# Patient Record
Sex: MALE | Race: WHITE | NOT HISPANIC OR LATINO | Employment: FULL TIME | URBAN - METROPOLITAN AREA
[De-identification: names, ages, dates, MRNs, and addresses within clinical notes are randomized per-mention and may not be internally consistent; named-entity substitution may affect disease eponyms.]

---

## 2017-03-27 ENCOUNTER — TELEPHONE (OUTPATIENT)
Dept: CARDIOLOGY | Facility: CLINIC | Age: 57
End: 2017-03-27

## 2017-03-27 NOTE — TELEPHONE ENCOUNTER
Patient called in requesting if an echocardiogram can be done before he sees Dr. Hamm in June. He describes a 3 week history of short jabbing sharp chest pains that can occur in rapid succession and are random in nature. They occur everyday and can have up to 15 episodes daily. There is nothing that makes it either better or worse. He remains physically active and exercise does not exacerbate these sharp jabs. He denies any associated symptoms such as sob,heart racing skipping or fluttering.His last echo in June 2015 showed normal heart function and structure. He has a history of cardiomyopathy. Other than these sharp jabs he has been feeling well. I tried giving him reassurance that this was unlikely cardiac related but he still would like to know if Dr. Hamm would order an echo to be sure his heart is fine. I told him that I will review with Dr. Hamm and get back to him with his recommendations. [facility]         Left ventricular systolic function is normal.  The visual ejection fraction is estimated at 55-60%.  The left ventricle is normal in size.  There is moderate concentric left ventricular hypertrophy.  The left atrium is mildly dilated.  Doppler interrogation does not demonstrate significant stenosis or  insufficiency involving cardiac valves.     No significant change since 7/25/2014.  ______________________________________________________________________________           Left Ventricle  The left ventricle is normal in size. There is moderate concentric left  ventricular hypertrophy. Left ventricular systolic function is normal. The  visual ejection fraction is estimated at 55-60%. No regional wall motion  abnormalities noted. There is no thrombus seen in the left ventricle.     Right Ventricle  The right ventricle is normal in structure, function and size. There is no  mass or thrombus in the right ventricle.  Atria  The left atrium is mildly dilated. Right atrial size is normal. There is  no  atrial shunt seen.     Mitral Valve  There is moderate mitral annular calcification. There is no mitral  regurgitation noted. There is no mitral valve stenosis.     Tricuspid Valve  Normal tricuspid valve. There is trace tricuspid regurgitation. Right  ventricular systolic pressure could not be approximated due to inadequate  tricuspid regurgitation. There is no tricuspid stenosis.     Aortic Valve  The aortic valve is trileaflet. No aortic regurgitation is present. No aortic  stenosis is present.     Pulmonic Valve  Normal pulmonic valve. There is no pulmonic valvular regurgitation. There is  no pulmonic valvular stenosis.     Vessels  The aortic root is normal size. The ascending aorta is Borderline dilated.  The IVC is normal in size and reactivity with respiration, suggesting normal  central venous pressure. The pulmonary artery is normal size.  Pericardium  The pericardium appears normal. There is no pleural effusion.     Rhythm  The rhythm was sinus bradycardia.     ______________________________________________________________________________  MMode/2D Measurements & Calculations     IVSd: 1.3 cm  LVIDd: 4.0 cm  LVIDs: 2.5 cm  LVPWd: 1.3 cm  FS: 36.5 %  EDV(Teich): 69.4 ml  ESV(Teich): 23.0 ml  LV mass(C)d: 188.0 grams  Ao root diam: 3.6 cm  LA dimension: 3.8 cm  asc Aorta Diam: 3.7 cm  LA/Ao: 1.1           Doppler Measurements & Calculations     MV E max adeola: 88.4 cm/sec  MV A max adeola: 107.6 cm/sec  MV E/A: 0.82  MV dec time: 0.25 sec  PA V2 max: 79.4 cm/sec  PA max P.5 mmHg  Lateral E/e': 11.3  Medial E/e': 13.5             I saw Artie Maria Alejandra today who is 54. I originally met Artie in 2006 when he presented with a very severe cardiomyopathy and ejection fraction of 15% and definite symptoms. The etiology of his cardiomyopathy was never completely defined but remarkably, with medical therapy of carvedilol and lisinopril, his ejection fraction recovered to be progressively normal. In the last 6  years, he has had ejection fractions of 55%-60% and essentially normal heart otherwise.       Artie comes back now after a 2-year hiatus and once again asymptomatic. He denies chest pain, shortness of breath, palpitations, orthopnea or lower leg edema. His blood pressures he said are always below 130/80. He lives a normal life. He exercises gently but regularly, walking 3 days a week and doing other business activities.       CURRENT MEDICATIONS: His current program is stable and includes:   1. Carvedilol 25 mg b.i.d.   2. Lisinopril 5 mg a day.   3. Spironolactone 12.5 mg a day.   4. Ranitidine 150 mg b.i.d.   5. Tylenol p.r.n.       His lipid profiles without a statin have been normal. He never did have documented arterial disease so an LDL of 98 is excellent. His renal function has been normal with creatinine 1.0 and BUN is between 9 and 18.       His last echocardiogram dated 06/03/2015 showed a normal-sized left ventricle and an EF of 55%-60%. The right ventricle was also normal. Mitral, aortic and tricuspid valves were normal. He did have very mild LVH.       PHYSICAL EXAM:   VITAL SIGNS: His physical exam revealed a blood pressure 130/80, heart rate 70 beats a minute, he weighed 174 pounds.   GENERAL: He is nicely tanned. He is trim and fit.   HEAD AND NECK: Normal. Carotids were equal, no bruits heard. No neck vein distention at 30 degrees.   HEART: Regular without gallop, murmur, rub or click.   LUNGS: Clear.   ABDOMEN: Soft without organomegaly.   EXTREMITIES: Show +2 pedal pulses, no edema.   NEUROLOGIC AND CUTANEOUS: Observations were normal. As always, he has a really nice tony.       Artie is 54. He is asymptomatic. His heart is essentially normal. As we do every 2 years, we discussed the pros and cons of stopping the medications versus continuing them. My therapeutic prejudice has been to continue medications in this setting. I offered to stop the spironolactone and continue carvedilol and lisinopril. He  felt that everything is fine in his world and he did not want to change anything and, quite honestly, I could not disagree with them.       I asked to see him in 2 years. I did not order any tests. I did not order any studies. If he has problems, let me know.       IMPRESSION:   1. Resolution of his clinical cardiomyopathy.   2. No signs of heart failure.   3. No medication changes or studies ordered.       PLAN: As above. If he has problems, let me know.

## 2017-03-31 ENCOUNTER — TELEPHONE (OUTPATIENT)
Dept: CARDIOLOGY | Facility: CLINIC | Age: 57
End: 2017-03-31

## 2017-03-31 DIAGNOSIS — I42.9 CARDIOMYOPATHY (H): ICD-10-CM

## 2017-03-31 DIAGNOSIS — R07.9 CHEST PAIN: Primary | ICD-10-CM

## 2017-03-31 NOTE — TELEPHONE ENCOUNTER
I phoned patient to tell him that Dr. Hamm would be happy to do an echocardiogram. There has been no changes in his stabbing chest pains. I placed the order while he was on the phone and then transferred him to scheduling. Sujey

## 2017-03-31 NOTE — TELEPHONE ENCOUNTER
----- Message from Berhane Hamm MD sent at 3/31/2017  3:54 PM CDT -----  If he is concerned => get the echo   JE

## 2017-04-17 ENCOUNTER — HOSPITAL ENCOUNTER (OUTPATIENT)
Dept: CARDIOLOGY | Facility: CLINIC | Age: 57
Discharge: HOME | End: 2017-04-17
Attending: INTERNAL MEDICINE | Admitting: INTERNAL MEDICINE
Payer: COMMERCIAL

## 2017-04-17 ENCOUNTER — TELEPHONE (OUTPATIENT)
Dept: CARDIOLOGY | Facility: CLINIC | Age: 57
End: 2017-04-17

## 2017-04-17 DIAGNOSIS — R07.9 CHEST PAIN: ICD-10-CM

## 2017-04-17 DIAGNOSIS — I42.9 CARDIOMYOPATHY (H): ICD-10-CM

## 2017-04-17 PROCEDURE — 93306 TTE W/DOPPLER COMPLETE: CPT

## 2017-04-17 PROCEDURE — 93306 TTE W/DOPPLER COMPLETE: CPT | Mod: 26 | Performed by: INTERNAL MEDICINE

## 2017-04-17 NOTE — TELEPHONE ENCOUNTER
I phoned patient results of resting echocardiogram that was done because he had been experiencing sharp chest pains. I told him that the echo showed normal heart structure and function and was reported as being unchanged from 2015. I was greatly relieved to know this. There has been no change in the nature or frequency of his chest pains and has no idea what is causing them. He returns 6/20 to see Dr. Hamm. He had no other questions for me at this time. Sujey

## 2017-05-25 DIAGNOSIS — I50.9 CHF (CONGESTIVE HEART FAILURE) (H): ICD-10-CM

## 2017-05-25 RX ORDER — CARVEDILOL 25 MG/1
25 TABLET ORAL 2 TIMES DAILY WITH MEALS
Qty: 180 TABLET | Refills: 0 | Status: SHIPPED | OUTPATIENT
Start: 2017-05-25 | End: 2017-06-20

## 2017-05-25 RX ORDER — LISINOPRIL 5 MG/1
5 TABLET ORAL DAILY
Qty: 90 TABLET | Refills: 0 | Status: SHIPPED | OUTPATIENT
Start: 2017-05-25 | End: 2017-06-20

## 2017-06-16 ENCOUNTER — PRE VISIT (OUTPATIENT)
Dept: CARDIOLOGY | Facility: CLINIC | Age: 57
End: 2017-06-16

## 2017-06-20 ENCOUNTER — OFFICE VISIT (OUTPATIENT)
Dept: CARDIOLOGY | Facility: CLINIC | Age: 57
End: 2017-06-20
Attending: INTERNAL MEDICINE
Payer: COMMERCIAL

## 2017-06-20 VITALS
DIASTOLIC BLOOD PRESSURE: 74 MMHG | HEIGHT: 72 IN | HEART RATE: 80 BPM | SYSTOLIC BLOOD PRESSURE: 122 MMHG | BODY MASS INDEX: 23.7 KG/M2 | WEIGHT: 175 LBS

## 2017-06-20 DIAGNOSIS — I43 CARDIOMYOPATHY IN DISEASES CLASSIFIED ELSEWHERE (H): ICD-10-CM

## 2017-06-20 DIAGNOSIS — I50.40 COMBINED SYSTOLIC AND DIASTOLIC CONGESTIVE HEART FAILURE, UNSPECIFIED CONGESTIVE HEART FAILURE CHRONICITY: ICD-10-CM

## 2017-06-20 DIAGNOSIS — I42.9 PRIMARY CARDIOMYOPATHY (H): ICD-10-CM

## 2017-06-20 PROCEDURE — 99213 OFFICE O/P EST LOW 20 MIN: CPT | Performed by: INTERNAL MEDICINE

## 2017-06-20 RX ORDER — LISINOPRIL 5 MG/1
5 TABLET ORAL DAILY
Qty: 90 TABLET | Refills: 0 | Status: SHIPPED | OUTPATIENT
Start: 2017-06-20 | End: 2017-11-15

## 2017-06-20 RX ORDER — SPIRONOLACTONE 25 MG/1
12.5 TABLET ORAL DAILY
Qty: 45 TABLET | Refills: 3 | Status: SHIPPED | OUTPATIENT
Start: 2017-06-20 | End: 2018-06-12

## 2017-06-20 RX ORDER — CARVEDILOL 25 MG/1
25 TABLET ORAL 2 TIMES DAILY WITH MEALS
Qty: 180 TABLET | Refills: 0 | Status: SHIPPED | OUTPATIENT
Start: 2017-06-20 | End: 2017-11-15

## 2017-06-20 NOTE — MR AVS SNAPSHOT
"              After Visit Summary   6/20/2017    Alvarez Bess    MRN: [id number]           Patient Information     Date Of Birth          1960        Visit Information        Provider Department      6/20/2017 7:45 AM Berhane Hamm MD [facility] [place]        Today's Diagnoses     Cardiomyopathy in diseases classified elsewhere (H)        Primary cardiomyopathy (H)        Combined systolic and diastolic congestive heart failure, unspecified congestive heart failure chronicity (H)           Follow-ups after your visit        Additional Services     Follow-Up with Cardiologist                 Future tests that were ordered for you today     Open Future Orders        Priority Expected Expires Ordered    Echocardiogram Routine 6/20/2019 7/10/2019 6/20/2017    Follow-Up with Cardiologist Routine 6/20/2019 7/10/2019 6/20/2017            Who to contact     If you have questions or need follow up information about today's clinic visit or your schedule please contact [facility] [place] directly at [phone].  Normal or non-critical lab and imaging results will be communicated to you by [employer], letter or phone within 4 business days after the clinic has received the results. If you do not hear from us within 7 days, please contact the clinic through [email or web address] or phone. If you have a critical or abnormal lab result, we will notify you by phone as soon as possible.  Submit refill requests through [employer] or call your pharmacy and they will forward the refill request to us. Please allow 3 business days for your refill to be completed.          Additional Information About Your Visit        MyChart Information     [employer] lets you send messages to your doctor, view your test results, renew your prescriptions, schedule appointments and more. To sign up, go to [email or web address] . Click on \"Log in\" on the left side of the screen, which will " "take you to the Welcome page. Then click on \"Sign up Now\" on the right side of the page.     You will be asked to enter the access code listed below, as well as some personal information. Please follow the directions to create your username and password.     Your access code is: [email or web address]  Expires: 2017  8:32 AM     Your access code will  in 90 days. If you need help or a new code, please call your [place] clinic or [phone].        Care EveryWhere ID     This is your Care EveryWhere ID. This could be used by other organizations to access your [place] medical records  [id number]        Your Vitals Were     Pulse Height BMI (Body Mass Index)             80 1.829 m (6') 23.73 kg/m2          Blood Pressure from Last 3 Encounters:   17 122/74   06/12/15 130/82   14 137/80    Weight from Last 3 Encounters:   17 79.4 kg (175 lb)   06/12/15 79.2 kg (174 lb 9.6 oz)   14 77.6 kg (171 lb)              We Performed the Following     Follow-Up with Cardiologist          Where to get your medicines      These medications were sent to [facility] [id number] [place] [address] [id number] [place] [address] AT [address]  [address] [facility] [place] [address]     Phone:  [phone]     carvedilol 25 MG tablet    lisinopril 5 MG tablet    spironolactone 25 MG tablet          Primary Care Provider Office Phone # Fax #    Frank Haines PA-C [phone]       [facility] [address] [address]        Thank you!     Thank you for choosing [facility] [place]  for your care. Our goal is always to provide you with excellent care. Hearing back from our patients is one way we can continue to improve our services. Please take a few minutes to complete the written survey that you may receive in the mail after your visit with us. Thank you!             Your Updated Medication List - Protect others around you: Learn " how to safely use, store and throw away your medicines at www.disposemymeds.org.          This list is accurate as of: 6/20/17  8:32 AM.  Always use your most recent med list.                   Brand Name Dispense Instructions for use    acetaminophen 325 MG tablet    TYLENOL    100 tablet    Take 2 tablets (650 mg) by mouth every 4 hours as needed for other (mild pain)       carvedilol 25 MG tablet    COREG    180 tablet    Take 1 tablet (25 mg) by mouth 2 times daily (with meals)       lisinopril 5 MG tablet    PRINIVIL/ZESTRIL    90 tablet    Take 1 tablet (5 mg) by mouth daily       spironolactone 25 MG tablet    ALDACTONE    45 tablet    Take 0.5 tablets (12.5 mg) by mouth daily       ZANTAC PO      Take 150 mg by mouth 2 times daily

## 2017-06-20 NOTE — PROGRESS NOTES
HPI and Plan:   See dictation  I recommend continuing current treatment plans in the chart.    No orders of the defined types were placed in this encounter.    Orders Placed This Encounter   Medications     carvedilol (COREG) 25 MG tablet     Sig: Take 1 tablet (25 mg) by mouth 2 times daily (with meals)     Dispense:  180 tablet     Refill:  0     lisinopril (PRINIVIL/ZESTRIL) 5 MG tablet     Sig: Take 1 tablet (5 mg) by mouth daily     Dispense:  90 tablet     Refill:  0     spironolactone (ALDACTONE) 25 MG tablet     Sig: Take 0.5 tablets (12.5 mg) by mouth daily     Dispense:  45 tablet     Refill:  3     Medications Discontinued During This Encounter   Medication Reason     carvedilol (COREG) 25 MG tablet Reorder     lisinopril (PRINIVIL/ZESTRIL) 5 MG tablet Reorder     spironolactone (ALDACTONE) 25 MG tablet Reorder         Encounter Diagnoses   Name Primary?     Cardiomyopathy in diseases classified elsewhere (H)      Primary cardiomyopathy (H)      Congestive heart failure (H)      Combined systolic and diastolic congestive heart failure, unspecified congestive heart failure chronicity (H)        CURRENT MEDICATIONS:  Current Outpatient Prescriptions   Medication Sig Dispense Refill     carvedilol (COREG) 25 MG tablet Take 1 tablet (25 mg) by mouth 2 times daily (with meals) 180 tablet 0     lisinopril (PRINIVIL/ZESTRIL) 5 MG tablet Take 1 tablet (5 mg) by mouth daily 90 tablet 0     spironolactone (ALDACTONE) 25 MG tablet Take 0.5 tablets (12.5 mg) by mouth daily 45 tablet 3     acetaminophen (TYLENOL) 325 MG tablet Take 2 tablets (650 mg) by mouth every 4 hours as needed for other (mild pain) 100 tablet 0     Ranitidine HCl (ZANTAC PO) Take 150 mg by mouth 2 times daily       [DISCONTINUED] carvedilol (COREG) 25 MG tablet Take 1 tablet (25 mg) by mouth 2 times daily (with meals) 180 tablet 0     [DISCONTINUED] lisinopril (PRINIVIL/ZESTRIL) 5 MG tablet Take 1 tablet (5 mg) by mouth daily 90 tablet 0      [DISCONTINUED] spironolactone (ALDACTONE) 25 MG tablet Take 0.5 tablets (12.5 mg) by mouth daily 45 tablet 3       ALLERGIES     Allergies   Allergen Reactions     Morphine Hcl      Extreme agitation       PAST MEDICAL HISTORY:  Past Medical History:   Diagnosis Date     Cardiomyopathy (H)      Congestive heart failure, unspecified      Gastro-oesophageal reflux disease      Hypertension        PAST SURGICAL HISTORY:  Past Surgical History:   Procedure Laterality Date     ANGIOGRAM  1/6/05    left dominant coronary system with essentially normal coronary arteries, trivial plaque, severe dilated cardiomyopathy, left ventricle hypertrophy and severely hypokinetic, asynchrony or desynchrony of LV function     ENT SURGERY      polyps from throat     EXCISE LESION FOOT  12/16/2013    Procedure: EXCISE LESION FOOT;  Scar revision of Right 3rd toe       SOFT TISSUE SURGERY      neuroma from right foot       FAMILY HISTORY:  Family History   Problem Relation Age of Onset     HEART DISEASE Brother        SOCIAL HISTORY:  Social History     Social History     Marital status: Single     Spouse name: N/A     Number of children: N/A     Years of education: N/A     Social History Main Topics     Smoking status: Former Smoker     Quit date: 12/10/2005     Smokeless tobacco: None     Alcohol use Yes      Comment: 2 beer / week     Drug use: No     Sexual activity: Not Asked     Other Topics Concern     None     Social History Narrative         Review of Systems:  Skin:  Negative       Eyes:  Positive for glasses    ENT:  Negative      Respiratory:  Negative for       Cardiovascular:    Positive for chest pain a month ago better know  Gastroenterology:        Genitourinary:  Negative for      Musculoskeletal:  Positive for   right shoulder pain  Neurologic:  Negative for      Psychiatric:  Negative for      Heme/Lymph/Imm:  Negative      Endocrine:  Negative        Physical Exam:  Vitals: /74  Pulse 80  Ht 1.829 m (6')  Wt  79.4 kg (175 lb)  BMI 23.73 kg/m2    Constitutional:  cooperative, alert and oriented, well developed, well nourished, in no acute distress appears younger than stated age      Skin:  warm and dry to the touch   nice tan!    Head:  normocephalic        Eyes:           ENT:  no pallor or cyanosis        Neck:  carotid pulses are full and equal bilaterally;JVP normal;no carotid bruit        Chest:  clear to auscultation          Cardiac: regular rhythm;no murmurs, gallops or rubs detected                  Abdomen:  abdomen soft;no masses;non-tender        Vascular: pulses full and equal                                        Extremities and Back:  no deformities, clubbing, cyanosis, erythema observed;no edema              Neurological:  affect appropriate, oriented to time, person and place;no gross motor deficits          Recent Lab Results:  LIPID RESULTS:  Lab Results   Component Value Date    CHOL 166 09/18/2014    HDL 51 09/18/2014    LDL 98 09/18/2014    TRIG 87 09/18/2014    CHOLHDLRATIO 3.3 09/18/2014       LIVER ENZYME RESULTS:  Lab Results   Component Value Date    AST 15 09/18/2014    ALT 27 09/18/2014       CBC RESULTS:  Lab Results   Component Value Date    WBC 12.9 (H) 12/23/2014    RBC 4.53 12/23/2014    HGB 14.8 12/23/2014    HCT 42.7 12/23/2014    MCV 94 12/23/2014    MCH 32.7 12/23/2014    MCHC 34.7 12/23/2014    RDW 12.2 12/23/2014     12/23/2014       BMP RESULTS:  Lab Results   Component Value Date     12/23/2014    POTASSIUM 4.5 12/23/2014    CHLORIDE 102 12/23/2014    CO2 27 12/23/2014    ANIONGAP 4 12/23/2014     (H) 12/23/2014    BUN 18 12/23/2014    CR 1.06 12/23/2014    GFRESTIMATED 73 12/23/2014    GFRESTBLACK 89 12/23/2014    PAULINA 9.3 12/23/2014        A1C RESULTS:  No results found for: A1C    INR RESULTS:  Lab Results   Component Value Date    INR 0.99 10/03/2005           CC  Berhane Hamm MD   [facility]  [address]  [place]  [id number]

## 2017-06-20 NOTE — LETTER
6/20/2017    Frank Haines PA-C  [facility]   [address]  [facility] [address]    RE: Alvarez NUÑEZ Maria Alejandra       Dear Colleague,    I had the pleasure of seeing Alvarez Bess in the [facility].    Artie Bess is 56.  I met him in 2006 when he presented with symptomatic severe idiopathic cardiomyopathy.  His EF at that time was 15%.  He was treated in the usual fashion with ACE, beta blocker and Aldactone therapy.  With that, his ejection fraction gradually crept towards normal.  He has had a tendency to hypertension in his adult years.  He had a remote history of drinking and smoking, but gave up both in 2005.  He has had a remarkable and spectacular recovery.  Coronary angiography in 2005 revealed normal coronary arteries and a previously noted dilated cardiomyopathy.      Currently, he is on:   1.  Carvedilol 25 mg b.i.d.   2.  Lisinopril 5 mg once a day.   3.  Spironolactone 12.5 mg a day.   4.  He also takes Zantac.      He says he feels wonderful.  He denies any cardiovascular symptoms including chest pain, palpitations, shortness of breath, orthopnea or lower leg edema.  He takes his medicines faithfully.  He exercises regularly and he is asymptomatic.      He has an essentially negative review of systems other than some achiness of the right shoulder which is clearly musculoskeletal.  The remainder of his review is negative.      He has had some chest pains, but they are focal left upper chest and stabbing, sharp and decidedly noncardiac in origin.      ALLERGIES:  He was intolerant of morphine.        PHYSICAL EXAMINATION:   GENERAL:  Trim adult male.  He looks younger than his years.   VITAL SIGNS:  Blood pressure 122/74, heart rate 80, weight 175 pounds.   HEAD AND NECK:  Normal.  Carotids were equal, no bruits heard.   HEART:  Regular without gallop, murmur, or rub.   LUNGS:  Clear to auscultation.   ABDOMEN:  Soft without organomegaly, mass or pain.    EXTREMITIES:  Normal with +2 pedal pulses, no edema.      I had once again a long discussion about proper treatment after a cardiomyopathy has resolved.  His was quite severe with an EF of 15%.  He really feels, and I  have to agree, that it is not broken do not fix it.  He is happy with his medicines, he feels asymptomatic.  He is totally tolerant of them.  I offered the idea that he could stop the Aldactone which is 12.5 mg a day and he clearly was uncomfortable and reluctant to do so.  For better or worse, he is normotensive, asymptomatic.  His ejection fraction was recently evaluated in a surveillance echo dated 04/17/2017, 2 months ago.  His left ventricle was normal sized. EF was 55%-60%.  All cardiac valves were normal and right heart pressures were normal; basically a normal heart.      It is an ongoing great ending to a wonderful story.  We agreed he would stay on his current program.  I asked to see him in 2 years with a surveillance echo at that time, if anything for partially for reassurance for this really delightful and high quality young man.  He works full-time.  He is happy with life and doing quite well.     Outpatient Encounter Prescriptions as of 6/20/2017   Medication Sig Dispense Refill     carvedilol (COREG) 25 MG tablet Take 1 tablet (25 mg) by mouth 2 times daily (with meals) 180 tablet 0     lisinopril (PRINIVIL/ZESTRIL) 5 MG tablet Take 1 tablet (5 mg) by mouth daily 90 tablet 0     spironolactone (ALDACTONE) 25 MG tablet Take 0.5 tablets (12.5 mg) by mouth daily 45 tablet 3     acetaminophen (TYLENOL) 325 MG tablet Take 2 tablets (650 mg) by mouth every 4 hours as needed for other (mild pain) 100 tablet 0     Ranitidine HCl (ZANTAC PO) Take 150 mg by mouth 2 times daily       [DISCONTINUED] carvedilol (COREG) 25 MG tablet Take 1 tablet (25 mg) by mouth 2 times daily (with meals) 180 tablet 0     [DISCONTINUED] lisinopril (PRINIVIL/ZESTRIL) 5 MG tablet Take 1 tablet (5 mg) by mouth daily  90 tablet 0     [DISCONTINUED] spironolactone (ALDACTONE) 25 MG tablet Take 0.5 tablets (12.5 mg) by mouth daily 45 tablet 3     No facility-administered encounter medications on file as of 6/20/2017.       IMPRESSION:   1.  Resolved underlying idiopathic cardiomyopathy.   2.  Mild hypertension, treated.   3.  Healthy adult otherwise, normal heart otherwise.   4.  No changes were made.  I renewed his 3 medicines.        If you have any questions or other concerns, give me a call.        Again, thank you for allowing me to participate in the care of your patient.      Sincerely,    SUNIL SALEH MD     [facility]

## 2017-06-20 NOTE — PROGRESS NOTES
HISTORY OF PRESENT ILLNESS:  Artie Bess is 56.  I met him in 2006 when he presented with symptomatic severe idiopathic cardiomyopathy.  His EF at that time was 15%.  He was treated in the usual fashion with ACE, beta blocker and Aldactone therapy.  With that, his ejection fraction gradually crept towards normal.  He has had a tendency to hypertension in his adult years.  He had a remote history of drinking and smoking, but gave up both in 2005.  He has had a remarkable and spectacular recovery.  Coronary angiography in 2005 revealed normal coronary arteries and a previously noted dilated cardiomyopathy.      Currently, he is on:   1.  Carvedilol 25 mg b.i.d.   2.  Lisinopril 5 mg once a day.   3.  Spironolactone 12.5 mg a day.   4.  He also takes Zantac.      He says he feels wonderful.  He denies any cardiovascular symptoms including chest pain, palpitations, shortness of breath, orthopnea or lower leg edema.  He takes his medicines faithfully.  He exercises regularly and he is asymptomatic.      He has an essentially negative review of systems other than some achiness of the right shoulder which is clearly musculoskeletal.  The remainder of his review is negative.      He has had some chest pains, but they are focal left upper chest and stabbing, sharp and decidedly noncardiac in origin.      ALLERGIES:  He was intolerant of morphine.      PHYSICAL EXAMINATION:   GENERAL:  Trim adult male.  He looks younger than his years.   VITAL SIGNS:  Blood pressure 122/74, heart rate 80, weight 175 pounds.   HEAD AND NECK:  Normal.  Carotids were equal, no bruits heard.   HEART:  Regular without gallop, murmur, or rub.   LUNGS:  Clear to auscultation.   ABDOMEN:  Soft without organomegaly, mass or pain.   EXTREMITIES:  Normal with +2 pedal pulses, no edema.      I had once again a long discussion about proper treatment after a cardiomyopathy has resolved.  His was quite severe with an EF of 15%.  He really feels, and I   have to agree, that it is not broken do not fix it.  He is happy with his medicines, he feels asymptomatic.  He is totally tolerant of them.  I offered the idea that he could stop the Aldactone which is 12.5 mg a day and he clearly was uncomfortable and reluctant to do so.  For better or worse, he is normotensive, asymptomatic.  His ejection fraction was recently evaluated in a surveillance echo dated 2017, 2 months ago.  His left ventricle was normal sized. EF was 55%-60%.  All cardiac valves were normal and right heart pressures were normal; basically a normal heart.      It is an ongoing great ending to a wonderful story.  We agreed he would stay on his current program.  I asked to see him in 2 years with a surveillance echo at that time, if anything for partially for reassurance for this really delightful and high quality young man.  He works full-time.  He is happy with life and doing quite well.      IMPRESSION:   1.  Resolved underlying idiopathic cardiomyopathy.   2.  Mild hypertension, treated.   3.  Healthy adult otherwise, normal heart otherwise.   4.  No changes were made.  I renewed his 3 medicines.        If you have any questions or other concerns, give me a call.        cc:      KARLI Smith   [address], [address]   [place] [address]         SUNIL SALEH MD, [facility]             D: 2017 08:32   T: 2017 15:34   MT: ELSIE      Name:     MAITE LUGO   MRN:      [id number]        Account:      [id number]   :      1960           Service Date: 2017      Document: [id number]

## 2017-11-15 DIAGNOSIS — I50.40 COMBINED SYSTOLIC AND DIASTOLIC CONGESTIVE HEART FAILURE, UNSPECIFIED CONGESTIVE HEART FAILURE CHRONICITY: ICD-10-CM

## 2017-11-15 RX ORDER — CARVEDILOL 25 MG/1
25 TABLET ORAL 2 TIMES DAILY WITH MEALS
Qty: 180 TABLET | Refills: 2 | Status: SHIPPED | OUTPATIENT
Start: 2017-11-15 | End: 2018-09-04

## 2017-11-15 RX ORDER — LISINOPRIL 5 MG/1
5 TABLET ORAL DAILY
Qty: 90 TABLET | Refills: 2 | Status: SHIPPED | OUTPATIENT
Start: 2017-11-15 | End: 2018-09-04

## 2018-06-12 DIAGNOSIS — I50.40 COMBINED SYSTOLIC AND DIASTOLIC CONGESTIVE HEART FAILURE, UNSPECIFIED CONGESTIVE HEART FAILURE CHRONICITY: ICD-10-CM

## 2018-06-12 RX ORDER — SPIRONOLACTONE 25 MG/1
12.5 TABLET ORAL DAILY
Qty: 45 TABLET | Refills: 3 | Status: SHIPPED | OUTPATIENT
Start: 2018-06-12 | End: 2019-07-25

## 2018-09-04 DIAGNOSIS — I50.40 COMBINED SYSTOLIC AND DIASTOLIC CONGESTIVE HEART FAILURE, UNSPECIFIED CONGESTIVE HEART FAILURE CHRONICITY: ICD-10-CM

## 2018-09-04 RX ORDER — LISINOPRIL 5 MG/1
5 TABLET ORAL DAILY
Qty: 90 TABLET | Refills: 0 | Status: SHIPPED | OUTPATIENT
Start: 2018-09-04 | End: 2018-09-07

## 2018-09-04 RX ORDER — CARVEDILOL 25 MG/1
25 TABLET ORAL 2 TIMES DAILY WITH MEALS
Qty: 180 TABLET | Refills: 0 | Status: SHIPPED | OUTPATIENT
Start: 2018-09-04 | End: 2018-09-07

## 2018-09-07 DIAGNOSIS — I42.9 CARDIOMYOPATHY (H): Primary | ICD-10-CM

## 2018-09-07 DIAGNOSIS — I50.40 COMBINED SYSTOLIC AND DIASTOLIC CONGESTIVE HEART FAILURE, UNSPECIFIED CONGESTIVE HEART FAILURE CHRONICITY: ICD-10-CM

## 2018-09-07 RX ORDER — LISINOPRIL 5 MG/1
5 TABLET ORAL DAILY
Qty: 90 TABLET | Refills: 3 | Status: SHIPPED | OUTPATIENT
Start: 2018-09-07 | End: 2018-12-12

## 2018-09-07 RX ORDER — CARVEDILOL 25 MG/1
25 TABLET ORAL 2 TIMES DAILY WITH MEALS
Qty: 180 TABLET | Refills: 0 | Status: SHIPPED | OUTPATIENT
Start: 2018-09-07 | End: 2018-12-12

## 2018-09-07 NOTE — TELEPHONE ENCOUNTER
Cardiac medications needing refills. Per Dr. Hamm, patient is not due for a f/u until 2019. Denies any shortness of breath , chest discomfort or other cardiac issues at this time. Will send in refills to get to next recommended f/u of June 2019.

## 2018-12-12 DIAGNOSIS — I50.9 CHF (CONGESTIVE HEART FAILURE) (H): ICD-10-CM

## 2018-12-12 DIAGNOSIS — I42.9 CARDIOMYOPATHY (H): ICD-10-CM

## 2018-12-12 DIAGNOSIS — I50.40 COMBINED SYSTOLIC AND DIASTOLIC CONGESTIVE HEART FAILURE (H): ICD-10-CM

## 2018-12-12 RX ORDER — CARVEDILOL 25 MG/1
25 TABLET ORAL 2 TIMES DAILY WITH MEALS
Qty: 180 TABLET | Refills: 0 | Status: SHIPPED | OUTPATIENT
Start: 2018-12-12 | End: 2019-03-11

## 2018-12-12 RX ORDER — LISINOPRIL 5 MG/1
5 TABLET ORAL DAILY
Qty: 90 TABLET | Refills: 3 | Status: SHIPPED | OUTPATIENT
Start: 2018-12-12 | End: 2019-12-03

## 2019-03-11 DIAGNOSIS — I50.40 COMBINED SYSTOLIC AND DIASTOLIC CONGESTIVE HEART FAILURE (H): ICD-10-CM

## 2019-03-11 DIAGNOSIS — I42.9 CARDIOMYOPATHY (H): ICD-10-CM

## 2019-03-11 RX ORDER — CARVEDILOL 25 MG/1
25 TABLET ORAL 2 TIMES DAILY WITH MEALS
Qty: 180 TABLET | Refills: 0 | Status: SHIPPED | OUTPATIENT
Start: 2019-03-11 | End: 2019-06-05

## 2019-05-06 ENCOUNTER — OFFICE VISIT (OUTPATIENT)
Dept: UROLOGY | Facility: CLINIC | Age: 59
End: 2019-05-06
Payer: COMMERCIAL

## 2019-05-06 VITALS — HEIGHT: 73 IN | OXYGEN SATURATION: 94 % | BODY MASS INDEX: 21.2 KG/M2 | HEART RATE: 76 BPM | WEIGHT: 160 LBS

## 2019-05-06 DIAGNOSIS — N41.0 ACUTE PROSTATITIS: ICD-10-CM

## 2019-05-06 DIAGNOSIS — N50.819 TESTICLE PAIN: Primary | ICD-10-CM

## 2019-05-06 LAB
ALBUMIN UR-MCNC: NEGATIVE MG/DL
APPEARANCE UR: CLEAR
BILIRUB UR QL STRIP: NEGATIVE
COLOR UR AUTO: YELLOW
GLUCOSE UR STRIP-MCNC: NEGATIVE MG/DL
HGB UR QL STRIP: NEGATIVE
KETONES UR STRIP-MCNC: NEGATIVE MG/DL
LEUKOCYTE ESTERASE UR QL STRIP: NEGATIVE
NITRATE UR QL: NEGATIVE
PH UR STRIP: 5.5 PH (ref 5–7)
RESIDUAL VOLUME (RV) (EXTERNAL): 118
SOURCE: NORMAL
SP GR UR STRIP: 1.01 (ref 1–1.03)
UROBILINOGEN UR STRIP-ACNC: 0.2 EU/DL (ref 0.2–1)

## 2019-05-06 PROCEDURE — 99204 OFFICE O/P NEW MOD 45 MIN: CPT | Mod: 25 | Performed by: PHYSICIAN ASSISTANT

## 2019-05-06 PROCEDURE — 51798 US URINE CAPACITY MEASURE: CPT | Performed by: PHYSICIAN ASSISTANT

## 2019-05-06 PROCEDURE — 81003 URINALYSIS AUTO W/O SCOPE: CPT | Mod: QW | Performed by: PHYSICIAN ASSISTANT

## 2019-05-06 RX ORDER — DOXYCYCLINE 100 MG/1
100 CAPSULE ORAL 2 TIMES DAILY
Qty: 84 CAPSULE | Refills: 1 | Status: SHIPPED | OUTPATIENT
Start: 2019-05-06 | End: 2021-10-05

## 2019-05-06 RX ORDER — TAMSULOSIN HYDROCHLORIDE 0.4 MG/1
0.4 CAPSULE ORAL DAILY
Qty: 90 CAPSULE | Refills: 3 | Status: SHIPPED | OUTPATIENT
Start: 2019-05-06 | End: 2021-10-05

## 2019-05-06 ASSESSMENT — MIFFLIN-ST. JEOR: SCORE: 1609.64

## 2019-05-06 ASSESSMENT — PAIN SCALES - GENERAL: PAINLEVEL: EXTREME PAIN (8)

## 2019-05-06 NOTE — NURSING NOTE
Pt here for bilateral testicle pain.  Pt sometimes has clear leakage from penis.  Pt states his penis is shrinking.  Pt states he feels like his L testicle is shrinking.  Pt sometimes has stomach pain.  Pt has no sex drive and cant get an erection.  When pt passes gas he feels like he has to pee.  Pt states he is not constipated.  Pt denies trauma to testicles.  Pt states he does a lot of heavy lifting at work.  He works in a printing company.  Pt denies gross hem.  Pt has dysuria.  Pt does not ride bike a lot.  Pt does not sit in a machine at work.  Pt doesn't drink caffeine or fruit juice.  Pt has kit terence in the am.  Pt sometimes has beers.      ALEKSANDR Herrera CMA     300

## 2019-05-06 NOTE — PATIENT INSTRUCTIONS
Doxycycline 100mg BID x 6 wks    You have been prescribed Flomax (tamsulosin).    Flomax as prescribed for men with difficulty urinating due to a benign prostatic enlargement and/or prostatitis.  Hopefully, you will see improvement in urinary stream in the next 10-14 days.    Common side effects include:  Weakness or fatigue  Blurred vision  Sinus congestion or runny nose  Dizziness or lightheadedness  Decrease or lack of ejaculation    Warm tub soaks every night.

## 2019-05-06 NOTE — PROGRESS NOTES
May 6, 2019      CC: low sex drive, lack of erection, right testicle pain    HPI:  Alvarez Bess is a pleasant 58 year old male who presents for evaluation of the above. Also notes feelings of incomplete emptying. All started rather suddenly 1 month ago.     Takes a diuretic at bedtime, nocturia 6x per night. No flank pain or gross hematuria. No fevers or chills.       10/28/16 PSA 0.91    Brother with prostate cancer.    Past Medical History:   Diagnosis Date     Cardiomyopathy (H)      Congestive heart failure, unspecified      Gastro-oesophageal reflux disease      Hypertension        Past Surgical History:   Procedure Laterality Date     ANGIOGRAM  05    left dominant coronary system with essentially normal coronary arteries, trivial plaque, severe dilated cardiomyopathy, left ventricle hypertrophy and severely hypokinetic, asynchrony or desynchrony of LV function     ENT SURGERY      polyps from throat     EXCISE LESION FOOT  2013    Procedure: EXCISE LESION FOOT;  Scar revision of Right 3rd toe       SOFT TISSUE SURGERY      neuroma from right foot       Social History     Socioeconomic History     Marital status: Single     Spouse name: Not on file     Number of children: Not on file     Years of education: Not on file     Highest education level: Not on file   Occupational History     Not on file   Social Needs     Financial resource strain: Not on file     Food insecurity:     Worry: Not on file     Inability: Not on file     Transportation needs:     Medical: Not on file     Non-medical: Not on file   Tobacco Use     Smoking status: Former Smoker     Last attempt to quit: 12/10/2005     Years since quittin.4   Substance and Sexual Activity     Alcohol use: Yes     Comment: 2 beer / week     Drug use: No     Sexual activity: Not on file   Lifestyle     Physical activity:     Days per week: Not on file     Minutes per session: Not on file     Stress: Not on file   Relationships     Social  "connections:     Talks on phone: Not on file     Gets together: Not on file     Attends Sikhism service: Not on file     Active member of club or organization: Not on file     Attends meetings of clubs or organizations: Not on file     Relationship status: Not on file     Intimate partner violence:     Fear of current or ex partner: Not on file     Emotionally abused: Not on file     Physically abused: Not on file     Forced sexual activity: Not on file   Other Topics Concern     Parent/sibling w/ CABG, MI or angioplasty before 65F 55M? Not Asked   Social History Narrative     Not on file       Family History   Problem Relation Age of Onset     Heart Disease Brother        ROS:14 point ROS neg other than the symptoms noted above in the HPI.    Allergies   Allergen Reactions     Morphine Hcl      Extreme agitation       Current Outpatient Medications   Medication     acetaminophen (TYLENOL) 325 MG tablet     carvedilol (COREG) 25 MG tablet     lisinopril (PRINIVIL/ZESTRIL) 5 MG tablet     Ranitidine HCl (ZANTAC PO)     spironolactone (ALDACTONE) 25 MG tablet     No current facility-administered medications for this visit.          PEx:   Pulse 76, height 1.854 m (6' 1\"), weight 72.6 kg (160 lb), SpO2 94 %.  6' 1\", Body mass index is 21.11 kg/m ., 160 lbs 0 oz  Gen appearance:  Well groomed,: age-appropriate appearing male in NAD.   HEENT:  EOMI, AT NC, CN grossly normal  Psych:  alert , comfortable in no acute distress  Neuro:  A/O X 3  Skin:  Warm to touch, clear of rashes, ecchymoses  Resp:  No increased respiratory effort  lymph:  No LE edema  Abd:  Soft/NT, ND, no palpable masses, no CVAT  Back: bony spine is non-tender, flanks are nontender  :      Circ penis, no penile plaques or lesions.      Orthotopic location of the urethral meatus.     Scrotum normal.     Testicles of normal firmness and consistency, no masses. Right testicle tender.     Epididymes bilaterally without masses, no tenderness with " palpation.     Vas and cord normal bilaterally.  NATACHA: normal tone, no masses, (medium) prostate without nodules or tenderness.    Urine: Neg  PVR 118cc    A/P: Alvarez Bess is a 58 year old male with prostatitis  -Doxy + flomax  -Suspect component of BPH w/ LUTs; 90 days of flomax prescribed  -If pain in testicle continues may need US  -ED/low sex drive may be a result of prostatitis. If not improved, will need total testosterone and poss referral to Endo.   -RTO 6 wks. Will need PSA at some point.   -Also briefly discussed role of cysto if needed    Lorena Archer PA-C  [facility]    30 minutes were spent with the patient today, > 50% in counseling and coordination of care

## 2019-05-06 NOTE — LETTER
5/6/2019       RE: Alvarez Bess  [address] [facility] [address]     Dear Colleague,    Thank you for referring your patient, Alvarez Bess, to the [facility] [place] at [facility]. Please see a copy of my visit note below.    May 6, 2019      CC: low sex drive, lack of erection, right testicle pain    HPI:  Alvarez Bess is a pleasant 58 year old male who presents for evaluation of the above. Also notes feelings of incomplete emptying. All started rather suddenly 1 month ago.     Takes a diuretic at bedtime, nocturia 6x per night. No flank pain or gross hematuria. No fevers or chills.       10/28/16 PSA 0.91    Brother with prostate cancer.    Past Medical History:   Diagnosis Date     Cardiomyopathy (H)      Congestive heart failure, unspecified      Gastro-oesophageal reflux disease      Hypertension        Past Surgical History:   Procedure Laterality Date     ANGIOGRAM  1/6/05    left dominant coronary system with essentially normal coronary arteries, trivial plaque, severe dilated cardiomyopathy, left ventricle hypertrophy and severely hypokinetic, asynchrony or desynchrony of LV function     ENT SURGERY      polyps from throat     EXCISE LESION FOOT  12/16/2013    Procedure: EXCISE LESION FOOT;  Scar revision of Right 3rd toe       SOFT TISSUE SURGERY      neuroma from right foot       Social History     Socioeconomic History     Marital status: Single     Spouse name: Not on file     Number of children: Not on file     Years of education: Not on file     Highest education level: Not on file   Occupational History     Not on file   Social Needs     Financial resource strain: Not on file     Food insecurity:     Worry: Not on file     Inability: Not on file     Transportation needs:     Medical: Not on file     Non-medical: Not on file   Tobacco Use     Smoking status: Former Smoker     Last attempt to quit:  "12/10/2005     Years since quittin.4   Substance and Sexual Activity     Alcohol use: Yes     Comment: 2 beer / week     Drug use: No     Sexual activity: Not on file   Lifestyle     Physical activity:     Days per week: Not on file     Minutes per session: Not on file     Stress: Not on file   Relationships     Social connections:     Talks on phone: Not on file     Gets together: Not on file     Attends Sabianist service: Not on file     Active member of club or organization: Not on file     Attends meetings of clubs or organizations: Not on file     Relationship status: Not on file     Intimate partner violence:     Fear of current or ex partner: Not on file     Emotionally abused: Not on file     Physically abused: Not on file     Forced sexual activity: Not on file   Other Topics Concern     Parent/sibling w/ CABG, MI or angioplasty before 65F 55M? Not Asked   Social History Narrative     Not on file       Family History   Problem Relation Age of Onset     Heart Disease Brother        ROS:14 point ROS neg other than the symptoms noted above in the HPI.    Allergies   Allergen Reactions     Morphine Hcl      Extreme agitation       Current Outpatient Medications   Medication     acetaminophen (TYLENOL) 325 MG tablet     carvedilol (COREG) 25 MG tablet     lisinopril (PRINIVIL/ZESTRIL) 5 MG tablet     Ranitidine HCl (ZANTAC PO)     spironolactone (ALDACTONE) 25 MG tablet     No current facility-administered medications for this visit.          PEx:   Pulse 76, height 1.854 m (6' 1\"), weight 72.6 kg (160 lb), SpO2 94 %.  6' 1\", Body mass index is 21.11 kg/m ., 160 lbs 0 oz  Gen appearance:  Well groomed,: age-appropriate appearing male in NAD.   HEENT:  EOMI, AT NC, CN grossly normal  Psych:  alert , comfortable in no acute distress  Neuro:  A/O X 3  Skin:  Warm to touch, clear of rashes, ecchymoses  Resp:  No increased respiratory effort  lymph:  No LE edema  Abd:  Soft/NT, ND, no palpable masses, no " CVAT  Back: bony spine is non-tender, flanks are nontender  :      Circ penis, no penile plaques or lesions.      Orthotopic location of the urethral meatus.     Scrotum normal.     Testicles of normal firmness and consistency, no masses. Right testicle tender.     Epididymes bilaterally without masses, no tenderness with palpation.     Vas and cord normal bilaterally.  NATACHA: normal tone, no masses, (medium) prostate without nodules or tenderness.    Urine: Neg  PVR 118cc    A/P: Alvarez Bess is a 58 year old male with prostatitis  -Doxy + flomax  -Suspect component of BPH w/ LUTs; 90 days of flomax prescribed  -If pain in testicle continues may need US  -ED/low sex drive may be a result of prostatitis. If not improved, will need total testosterone and poss referral to Endo.   -RTO 6 wks. Will need PSA at some point.   -Also briefly discussed role of cysto if needed    30 minutes were spent with the patient today, > 50% in counseling and coordination of care    Again, thank you for allowing me to participate in the care of your patient.      Sincerely,    Lorena Archer PA-C, PAMJ

## 2019-06-04 ENCOUNTER — TELEPHONE (OUTPATIENT)
Dept: UROLOGY | Facility: CLINIC | Age: 59
End: 2019-06-04

## 2019-06-04 NOTE — TELEPHONE ENCOUNTER
Urology pt of Lorena Archer PA-C seen as a new pt 5/6/2019. Alvarez is being treated for prostatitis and was started on doxycycline and Flomax. He calls today c/o headache which he attributes to the doxycycline. Will review with provider in clinic.    Per Dr. Perez, more likely cause is Flomax. Advised pt to stop Flomax and monitor result. He expressed understanding and agreement with this plan and will let this clinic know response.

## 2019-06-05 DIAGNOSIS — I50.40 COMBINED SYSTOLIC AND DIASTOLIC CONGESTIVE HEART FAILURE (H): ICD-10-CM

## 2019-06-05 DIAGNOSIS — I42.9 CARDIOMYOPATHY (H): ICD-10-CM

## 2019-06-05 RX ORDER — CARVEDILOL 25 MG/1
25 TABLET ORAL 2 TIMES DAILY WITH MEALS
Qty: 180 TABLET | Refills: 0 | Status: SHIPPED | OUTPATIENT
Start: 2019-06-05 | End: 2019-09-03

## 2019-06-17 ENCOUNTER — OFFICE VISIT (OUTPATIENT)
Dept: UROLOGY | Facility: CLINIC | Age: 59
End: 2019-06-17
Payer: COMMERCIAL

## 2019-06-17 VITALS
DIASTOLIC BLOOD PRESSURE: 62 MMHG | SYSTOLIC BLOOD PRESSURE: 118 MMHG | WEIGHT: 163 LBS | BODY MASS INDEX: 21.6 KG/M2 | HEIGHT: 73 IN | HEART RATE: 80 BPM

## 2019-06-17 DIAGNOSIS — Z12.5 SCREENING FOR PROSTATE CANCER: ICD-10-CM

## 2019-06-17 DIAGNOSIS — N50.82 SCROTAL PAIN: Primary | ICD-10-CM

## 2019-06-17 DIAGNOSIS — N41.9 PROSTATITIS: Primary | ICD-10-CM

## 2019-06-17 LAB
ALBUMIN UR-MCNC: NEGATIVE MG/DL
APPEARANCE UR: CLEAR
BILIRUB UR QL STRIP: NEGATIVE
COLOR UR AUTO: YELLOW
GLUCOSE UR STRIP-MCNC: NEGATIVE MG/DL
HGB UR QL STRIP: NEGATIVE
KETONES UR STRIP-MCNC: NEGATIVE MG/DL
LEUKOCYTE ESTERASE UR QL STRIP: NEGATIVE
NITRATE UR QL: NEGATIVE
PH UR STRIP: 5.5 PH (ref 5–7)
SOURCE: NORMAL
SP GR UR STRIP: 1.01 (ref 1–1.03)
UROBILINOGEN UR STRIP-ACNC: 0.2 EU/DL (ref 0.2–1)

## 2019-06-17 PROCEDURE — 81003 URINALYSIS AUTO W/O SCOPE: CPT | Mod: QW | Performed by: PHYSICIAN ASSISTANT

## 2019-06-17 PROCEDURE — 99214 OFFICE O/P EST MOD 30 MIN: CPT | Performed by: PHYSICIAN ASSISTANT

## 2019-06-17 ASSESSMENT — MIFFLIN-ST. JEOR: SCORE: 1623.24

## 2019-06-17 ASSESSMENT — PAIN SCALES - GENERAL: PAINLEVEL: MODERATE PAIN (4)

## 2019-06-17 NOTE — LETTER
6/17/2019       RE: Alvarez Bess  [address] [facility] [address]     Dear Colleague,    Thank you for referring your patient, Alvarez Bess, to the [facility] UROLOGY CLINIC [place] at [facility]. Please see a copy of my visit note below.    CC: low sex drive, lack of erection, right testicle pain    HPI:  Alvarez Bess is a pleasant 58 year old male who presents in follow up of the above. Also notes feelings of incomplete emptying. All started rather suddenly 1 month ago.     Takes a diuretic at bedtime, nocturia 6x per night. No flank pain or gross hematuria. No fevers or chills.   Right scrotal pain continues.     10/28/16 PSA 0.91    Brother with prostate cancer (around 55 years old).     Dizzy, congested and light headed with Flomax and Doxy. Able to take 4 weeks.       Past Medical History:   Diagnosis Date     Cardiomyopathy (H)      Congestive heart failure, unspecified      Gastro-oesophageal reflux disease      Hypertension      Penile discharge        Past Surgical History:   Procedure Laterality Date     ANGIOGRAM  1/6/05    left dominant coronary system with essentially normal coronary arteries, trivial plaque, severe dilated cardiomyopathy, left ventricle hypertrophy and severely hypokinetic, asynchrony or desynchrony of LV function     ENT SURGERY      polyps from throat     EXCISE LESION FOOT  12/16/2013    Procedure: EXCISE LESION FOOT;  Scar revision of Right 3rd toe       SOFT TISSUE SURGERY      neuroma from right foot       Social History     Socioeconomic History     Marital status: Single     Spouse name: Not on file     Number of children: Not on file     Years of education: Not on file     Highest education level: Not on file   Occupational History     Not on file   Social Needs     Financial resource strain: Not on file     Food insecurity:     Worry: Not on file     Inability: Not on file      "Transportation needs:     Medical: Not on file     Non-medical: Not on file   Tobacco Use     Smoking status: Former Smoker     Last attempt to quit: 12/10/2005     Years since quittin.5     Smokeless tobacco: Never Used   Substance and Sexual Activity     Alcohol use: Yes     Comment: 2 beer / week     Drug use: No     Sexual activity: Not on file   Lifestyle     Physical activity:     Days per week: Not on file     Minutes per session: Not on file     Stress: Not on file   Relationships     Social connections:     Talks on phone: Not on file     Gets together: Not on file     Attends Anglican service: Not on file     Active member of club or organization: Not on file     Attends meetings of clubs or organizations: Not on file     Relationship status: Not on file     Intimate partner violence:     Fear of current or ex partner: Not on file     Emotionally abused: Not on file     Physically abused: Not on file     Forced sexual activity: Not on file   Other Topics Concern     Parent/sibling w/ CABG, MI or angioplasty before 65F 55M? Not Asked   Social History Narrative     Not on file       Family History   Problem Relation Age of Onset     Heart Disease Brother        ROS:14 point ROS neg other than the symptoms noted above in the HPI.    Allergies   Allergen Reactions     Morphine Hcl      Extreme agitation       Current Outpatient Medications   Medication     acetaminophen (TYLENOL) 325 MG tablet     carvedilol (COREG) 25 MG tablet     lisinopril (PRINIVIL/ZESTRIL) 5 MG tablet     Probiotic Product (PROBIOTIC DAILY PO)     Ranitidine HCl (ZANTAC PO)     spironolactone (ALDACTONE) 25 MG tablet     doxycycline monohydrate (MONODOX) 100 MG capsule     tamsulosin (FLOMAX) 0.4 MG capsule     No current facility-administered medications for this visit.          PEx:   /62   Pulse 80   Ht 1.854 m (6' 1\")   Wt 73.9 kg (163 lb)   BMI 21.51 kg/m       Gen appearance:  Well groomed,: age-appropriate " appearing male in NAD.   HEENT:  EOMI, AT NC, CN grossly normal  Psych:  alert , comfortable in no acute distress  Neuro:  A/O X 3  Skin:  Warm to touch, clear of rashes, ecchymoses  Resp:  No increased respiratory effort  lymph:  No LE edema  Abd:  Soft/NT, ND, no palpable masses, no CVAT  Back: bony spine is non-tender, flanks are nontender  :  Last visit--     Circ penis, no penile plaques or lesions.      Orthotopic location of the urethral meatus.     Scrotum normal.     Testicles of normal firmness and consistency, no masses. Right testicle tender.     Epididymes bilaterally without masses, no tenderness with palpation.     Vas and cord normal bilaterally.  NATACHA: last visit--normal tone, no masses, (medium) prostate without nodules or tenderness.    Urine: Neg      A/P: Alvarez Bess is a 58 year old male with BPH w/ LUTS, right scrotal pain, ED, cardiac hx  - US, will call with results  -ED/low sex drive. May need total testosterone and poss referral to Endo. Defer to MD russell at follow up.   -cysto  -Stop Flomax    Lorena Archer PA-C  [facility]    30 minutes were spent with the patient today, > 50% in counseling and coordination of care

## 2019-06-17 NOTE — PROGRESS NOTES
CC: low sex drive, lack of erection, right testicle pain    HPI:  Alvarez Bess is a pleasant 58 year old male who presents in follow up of the above. Also notes feelings of incomplete emptying. All started rather suddenly 1 month ago.     Takes a diuretic at bedtime, nocturia 6x per night. No flank pain or gross hematuria. No fevers or chills.   Right scrotal pain continues.     10/28/16 PSA 0.91    Brother with prostate cancer (around 55 years old).     Dizzy, congested and light headed with Flomax and Doxy. Able to take 4 weeks.       Past Medical History:   Diagnosis Date     Cardiomyopathy (H)      Congestive heart failure, unspecified      Gastro-oesophageal reflux disease      Hypertension      Penile discharge        Past Surgical History:   Procedure Laterality Date     ANGIOGRAM  05    left dominant coronary system with essentially normal coronary arteries, trivial plaque, severe dilated cardiomyopathy, left ventricle hypertrophy and severely hypokinetic, asynchrony or desynchrony of LV function     ENT SURGERY      polyps from throat     EXCISE LESION FOOT  2013    Procedure: EXCISE LESION FOOT;  Scar revision of Right 3rd toe       SOFT TISSUE SURGERY      neuroma from right foot       Social History     Socioeconomic History     Marital status: Single     Spouse name: Not on file     Number of children: Not on file     Years of education: Not on file     Highest education level: Not on file   Occupational History     Not on file   Social Needs     Financial resource strain: Not on file     Food insecurity:     Worry: Not on file     Inability: Not on file     Transportation needs:     Medical: Not on file     Non-medical: Not on file   Tobacco Use     Smoking status: Former Smoker     Last attempt to quit: 12/10/2005     Years since quittin.5     Smokeless tobacco: Never Used   Substance and Sexual Activity     Alcohol use: Yes     Comment: 2 beer / week     Drug use: No     Sexual  "activity: Not on file   Lifestyle     Physical activity:     Days per week: Not on file     Minutes per session: Not on file     Stress: Not on file   Relationships     Social connections:     Talks on phone: Not on file     Gets together: Not on file     Attends Church service: Not on file     Active member of club or organization: Not on file     Attends meetings of clubs or organizations: Not on file     Relationship status: Not on file     Intimate partner violence:     Fear of current or ex partner: Not on file     Emotionally abused: Not on file     Physically abused: Not on file     Forced sexual activity: Not on file   Other Topics Concern     Parent/sibling w/ CABG, MI or angioplasty before 65F 55M? Not Asked   Social History Narrative     Not on file       Family History   Problem Relation Age of Onset     Heart Disease Brother        ROS:14 point ROS neg other than the symptoms noted above in the HPI.    Allergies   Allergen Reactions     Morphine Hcl      Extreme agitation       Current Outpatient Medications   Medication     acetaminophen (TYLENOL) 325 MG tablet     carvedilol (COREG) 25 MG tablet     lisinopril (PRINIVIL/ZESTRIL) 5 MG tablet     Probiotic Product (PROBIOTIC DAILY PO)     Ranitidine HCl (ZANTAC PO)     spironolactone (ALDACTONE) 25 MG tablet     doxycycline monohydrate (MONODOX) 100 MG capsule     tamsulosin (FLOMAX) 0.4 MG capsule     No current facility-administered medications for this visit.          PEx:   /62   Pulse 80   Ht 1.854 m (6' 1\")   Wt 73.9 kg (163 lb)   BMI 21.51 kg/m      Gen appearance:  Well groomed,: age-appropriate appearing male in NAD.   HEENT:  EOMI, AT NC, CN grossly normal  Psych:  alert , comfortable in no acute distress  Neuro:  A/O X 3  Skin:  Warm to touch, clear of rashes, ecchymoses  Resp:  No increased respiratory effort  lymph:  No LE edema  Abd:  Soft/NT, ND, no palpable masses, no CVAT  Back: bony spine is non-tender, flanks are " nontender  :  Last visit--     Circ penis, no penile plaques or lesions.      Orthotopic location of the urethral meatus.     Scrotum normal.     Testicles of normal firmness and consistency, no masses. Right testicle tender.     Epididymes bilaterally without masses, no tenderness with palpation.     Vas and cord normal bilaterally.  NATACHA: last visit--normal tone, no masses, (medium) prostate without nodules or tenderness.    Urine: Neg      A/P: Alvarez Bess is a 58 year old male with BPH w/ LUTS, right scrotal pain, ED, cardiac hx  - US, will call with results  -ED/low sex drive. May need total testosterone and poss referral to Endo. Defer to MD russell at follow up.   -cysto  -Stop Flomax    Lorena Archer PA-C  [facility]    30 minutes were spent with the patient today, > 50% in counseling and coordination of care

## 2019-06-17 NOTE — NURSING NOTE
Here for follow up. Testicular pain remains the same. No change in symptoms. He stopped both the Doxy and Flomax a few days ago because of severe headaches .Gin Bush LPN

## 2019-06-20 ENCOUNTER — HOSPITAL ENCOUNTER (OUTPATIENT)
Dept: LAB | Facility: CLINIC | Age: 59
Discharge: HOME | End: 2019-06-20
Attending: PHYSICIAN ASSISTANT | Admitting: PHYSICIAN ASSISTANT
Payer: COMMERCIAL

## 2019-06-20 DIAGNOSIS — Z12.5 SCREENING FOR PROSTATE CANCER: ICD-10-CM

## 2019-06-20 LAB — PSA SERPL-MCNC: 0.73 UG/L (ref 0–4)

## 2019-06-20 PROCEDURE — 84153 ASSAY OF PSA TOTAL: CPT | Performed by: PHYSICIAN ASSISTANT

## 2019-06-20 PROCEDURE — 36415 COLL VENOUS BLD VENIPUNCTURE: CPT | Performed by: PHYSICIAN ASSISTANT

## 2019-06-24 ENCOUNTER — HOSPITAL ENCOUNTER (OUTPATIENT)
Dept: ULTRASOUND IMAGING | Facility: CLINIC | Age: 59
Discharge: HOME | End: 2019-06-24
Attending: PHYSICIAN ASSISTANT | Admitting: PHYSICIAN ASSISTANT
Payer: COMMERCIAL

## 2019-06-24 DIAGNOSIS — N50.82 SCROTAL PAIN: ICD-10-CM

## 2019-06-24 PROCEDURE — 93976 VASCULAR STUDY: CPT

## 2019-07-16 ENCOUNTER — OFFICE VISIT (OUTPATIENT)
Dept: UROLOGY | Facility: CLINIC | Age: 59
End: 2019-07-16
Payer: COMMERCIAL

## 2019-07-16 VITALS
WEIGHT: 162 LBS | SYSTOLIC BLOOD PRESSURE: 146 MMHG | BODY MASS INDEX: 21.47 KG/M2 | DIASTOLIC BLOOD PRESSURE: 86 MMHG | OXYGEN SATURATION: 99 % | HEIGHT: 73 IN | HEART RATE: 72 BPM

## 2019-07-16 DIAGNOSIS — R68.82 LOW LIBIDO: ICD-10-CM

## 2019-07-16 DIAGNOSIS — N41.0 ACUTE PROSTATITIS: Primary | ICD-10-CM

## 2019-07-16 LAB
ALBUMIN UR-MCNC: NEGATIVE MG/DL
APPEARANCE UR: CLEAR
BILIRUB UR QL STRIP: NEGATIVE
COLOR UR AUTO: YELLOW
GLUCOSE UR STRIP-MCNC: NEGATIVE MG/DL
HGB UR QL STRIP: NEGATIVE
KETONES UR STRIP-MCNC: NEGATIVE MG/DL
LEUKOCYTE ESTERASE UR QL STRIP: NEGATIVE
NITRATE UR QL: NEGATIVE
PH UR STRIP: 6 PH (ref 5–7)
SOURCE: NORMAL
SP GR UR STRIP: 1.02 (ref 1–1.03)
UROBILINOGEN UR STRIP-ACNC: 0.2 EU/DL (ref 0.2–1)

## 2019-07-16 PROCEDURE — 99213 OFFICE O/P EST LOW 20 MIN: CPT | Performed by: UROLOGY

## 2019-07-16 PROCEDURE — 84270 ASSAY OF SEX HORMONE GLOBUL: CPT | Performed by: UROLOGY

## 2019-07-16 PROCEDURE — 36415 COLL VENOUS BLD VENIPUNCTURE: CPT | Performed by: UROLOGY

## 2019-07-16 PROCEDURE — 81003 URINALYSIS AUTO W/O SCOPE: CPT | Mod: QW | Performed by: UROLOGY

## 2019-07-16 PROCEDURE — 84403 ASSAY OF TOTAL TESTOSTERONE: CPT | Performed by: UROLOGY

## 2019-07-16 ASSESSMENT — MIFFLIN-ST. JEOR: SCORE: 1618.71

## 2019-07-16 ASSESSMENT — PAIN SCALES - GENERAL: PAINLEVEL: MILD PAIN (2)

## 2019-07-16 NOTE — NURSING NOTE
Pt states he can not get an erection.  When he does it curves.  Pt has no sex drive.  Pt states his penis disappears.    Pt has dysuria and tip of his penis burns.  Pt has testicle pain.  Pt states doxy was not helping.  Pt had HA and blurred vision.  Pt has not tried viagra.  ALEKSANDR Herrera CMA

## 2019-07-16 NOTE — LETTER
7/16/2019       RE: Alvarez Bess  [address] [address] [place] [address]     Dear Colleague,    Thank you for referring your patient, Alvarez Bess, to the [facility] [place] at [facility]. Please see a copy of my visit note below.    Alvarez Mitchell is a 58-year-old male withp 3 months of dysuria, discomfort at the meatus, low libido, poor erections with curvature.  This all occurred suddenly in April.  There is been no change with his home situation, work situation, girlfriend situation-he is monogamous.  There is no drug use.  He is lightheaded with Flomax and did not tolerate the doxycycline well.  He has not seen his primary care physician in 3 years  His brother has prostate cancer  He has a curvature with erection and it is painful  Other past medical history: Cardiomyopathy, congestive heart failure, GERD, hypertension, quit smoking 14 years ago.  Surgeries include right foot neuroma, throat polyps, angiogram showing dilated cardiomyopathy with severe hypokinesis  Medications: Tylenol, Coreg, lisinopril, probiotic, ranitidine, spironolactone  Allergies: Morphine  Review of systems: No  Hematuria  Normal urinalysis  Exam: Alert and oriented, normal vital signs, normal appearance, normal respirations, neuro grossly intact.  Normal testicles, epididymis knees and cords.  Normal phallus with plaque dorsally distal to the midshaft.  This is tender and consistent with Peyronie's.  Normal sphincter tone, no rectal mass or impaction, small benign prostate and normal seminal vesicles  Assessment: Possible bacterial prostatitis, Peyronie's disease, erectile dysfunction, low libido, microlithiasis on testicular ultrasound-discussed  Plan: Free and total testosterone this morning, bacterial semen culture, see Víctor Bhandari MD for treatment of Peyronie's disease.  Yearly PSA and digital rectal exam, yearly testicular exam and  ultrasound-low risk of getting testicular cancer    Again, thank you for allowing me to participate in the care of your patient.      Sincerely,    Mars Samuels MD

## 2019-07-16 NOTE — PROGRESS NOTES
Alvarez Mitchell is a 58-year-old male withp 3 months of dysuria, discomfort at the meatus, low libido, poor erections with curvature.  This all occurred suddenly in April.  There is been no change with his home situation, work situation, girlfriend situation-he is monogamous.  There is no drug use.  He is lightheaded with Flomax and did not tolerate the doxycycline well.  He has not seen his primary care physician in 3 years  His brother has prostate cancer  He has a curvature with erection and it is painful  Other past medical history: Cardiomyopathy, congestive heart failure, GERD, hypertension, quit smoking 14 years ago.  Surgeries include right foot neuroma, throat polyps, angiogram showing dilated cardiomyopathy with severe hypokinesis  Medications: Tylenol, Coreg, lisinopril, probiotic, ranitidine, spironolactone  Allergies: Morphine  Review of systems: No  Hematuria  Normal urinalysis  Exam: Alert and oriented, normal vital signs, normal appearance, normal respirations, neuro grossly intact.  Normal testicles, epididymis knees and cords.  Normal phallus with plaque dorsally distal to the midshaft.  This is tender and consistent with Peyronie's.  Normal sphincter tone, no rectal mass or impaction, small benign prostate and normal seminal vesicles  Assessment: Possible bacterial prostatitis, Peyronie's disease, erectile dysfunction, low libido, microlithiasis on testicular ultrasound-discussed  Plan: Free and total testosterone this morning, bacterial semen culture, see Víctor Bhandari MD for treatment of Peyronie's disease.  Yearly PSA and digital rectal exam, yearly testicular exam and ultrasound-low risk of getting testicular cancer

## 2019-07-16 NOTE — LETTER
7/16/2019      RE: Alvarez Bess  [address] [address] [address]       Alvarez Mitchell is a 58-year-old male withp 3 months of dysuria, discomfort at the meatus, low libido, poor erections with curvature.  This all occurred suddenly in April.  There is been no change with his home situation, work situation, girlfriend situation-he is monogamous.  There is no drug use.  He is lightheaded with Flomax and did not tolerate the doxycycline well.  He has not seen his primary care physician in 3 years  His brother has prostate cancer  He has a curvature with erection and it is painful  Other past medical history: Cardiomyopathy, congestive heart failure, GERD, hypertension, quit smoking 14 years ago.  Surgeries include right foot neuroma, throat polyps, angiogram showing dilated cardiomyopathy with severe hypokinesis  Medications: Tylenol, Coreg, lisinopril, probiotic, ranitidine, spironolactone  Allergies: Morphine  Review of systems: No  Hematuria  Normal urinalysis  Exam: Alert and oriented, normal vital signs, normal appearance, normal respirations, neuro grossly intact.  Normal testicles, epididymis knees and cords.  Normal phallus with plaque dorsally distal to the midshaft.  This is tender and consistent with Peyronie's.  Normal sphincter tone, no rectal mass or impaction, small benign prostate and normal seminal vesicles  Assessment: Possible bacterial prostatitis, Peyronie's disease, erectile dysfunction, low libido, microlithiasis on testicular ultrasound-discussed  Plan: Free and total testosterone this morning, bacterial semen culture, see Víctor Bhandari MD for treatment of Peyronie's disease.  Yearly PSA and digital rectal exam, yearly testicular exam and ultrasound-low risk of getting testicular cancer    Mars Samuels MD

## 2019-07-18 LAB
SHBG SERPL-SCNC: 30 NMOL/L (ref 11–80)
TESTOST FREE SERPL-MCNC: 12.9 NG/DL (ref 4.7–24.4)
TESTOST SERPL-MCNC: 570 NG/DL (ref 240–950)

## 2019-07-19 PROBLEM — E78.5 HYPERLIPIDEMIA: Status: ACTIVE | Noted: 2019-07-19

## 2019-07-23 ENCOUNTER — TELEPHONE (OUTPATIENT)
Dept: UROLOGY | Facility: CLINIC | Age: 59
End: 2019-07-23

## 2019-07-23 DIAGNOSIS — N41.0 ACUTE PROSTATITIS: ICD-10-CM

## 2019-07-23 PROCEDURE — 87070 CULTURE OTHR SPECIMN AEROBIC: CPT | Performed by: UROLOGY

## 2019-07-23 PROCEDURE — 87186 SC STD MICRODIL/AGAR DIL: CPT | Performed by: UROLOGY

## 2019-07-23 PROCEDURE — 87077 CULTURE AEROBIC IDENTIFY: CPT | Performed by: UROLOGY

## 2019-07-23 NOTE — TELEPHONE ENCOUNTER
Called Alvarez back to relate that results available to date are WNL.    [facility] Call Center    Phone Message    May a detailed message be left on voicemail: yes    Reason for Call: Requesting Results   Name/type of test: Testosterone Free and Total and UA without Microscopic  Date of test: 7/16  Was test done at a location other than [facility] (Please fill in the location if not [facility])?: No      Action Taken: Message routed to:  [facility] ua uro

## 2019-07-25 DIAGNOSIS — I50.9 CHF (CONGESTIVE HEART FAILURE) (H): ICD-10-CM

## 2019-07-25 RX ORDER — SPIRONOLACTONE 25 MG/1
12.5 TABLET ORAL DAILY
Qty: 45 TABLET | Refills: 0 | Status: SHIPPED | OUTPATIENT
Start: 2019-07-25 | End: 2019-10-28

## 2019-07-26 LAB
BACTERIA SPEC CULT: ABNORMAL
BACTERIA SPEC CULT: ABNORMAL
SPECIMEN SOURCE: ABNORMAL

## 2019-07-29 ENCOUNTER — HOSPITAL ENCOUNTER (OUTPATIENT)
Dept: CARDIOLOGY | Facility: CLINIC | Age: 59
Discharge: HOME | End: 2019-07-29
Attending: INTERNAL MEDICINE | Admitting: INTERNAL MEDICINE
Payer: COMMERCIAL

## 2019-07-29 DIAGNOSIS — I43 CARDIOMYOPATHY IN DISEASES CLASSIFIED ELSEWHERE (H): ICD-10-CM

## 2019-07-29 DIAGNOSIS — I42.9 PRIMARY CARDIOMYOPATHY (H): ICD-10-CM

## 2019-07-29 PROCEDURE — 93306 TTE W/DOPPLER COMPLETE: CPT | Mod: 26 | Performed by: INTERNAL MEDICINE

## 2019-07-29 PROCEDURE — 93306 TTE W/DOPPLER COMPLETE: CPT

## 2019-07-30 ENCOUNTER — OFFICE VISIT (OUTPATIENT)
Dept: CARDIOLOGY | Facility: CLINIC | Age: 59
End: 2019-07-30
Attending: INTERNAL MEDICINE
Payer: COMMERCIAL

## 2019-07-30 VITALS
BODY MASS INDEX: 23.3 KG/M2 | DIASTOLIC BLOOD PRESSURE: 68 MMHG | HEART RATE: 78 BPM | SYSTOLIC BLOOD PRESSURE: 130 MMHG | WEIGHT: 172 LBS | HEIGHT: 72 IN

## 2019-07-30 DIAGNOSIS — I43 CARDIOMYOPATHY IN DISEASES CLASSIFIED ELSEWHERE (H): Primary | ICD-10-CM

## 2019-07-30 PROCEDURE — 99213 OFFICE O/P EST LOW 20 MIN: CPT | Mod: 25 | Performed by: INTERNAL MEDICINE

## 2019-07-30 PROCEDURE — 93000 ELECTROCARDIOGRAM COMPLETE: CPT | Performed by: INTERNAL MEDICINE

## 2019-07-30 ASSESSMENT — MIFFLIN-ST. JEOR: SCORE: 1648.19

## 2019-07-30 NOTE — PROGRESS NOTES
Cardiology Progress Note          Assessment and Plan:     1. Nonischemic cardiomyopathy, resolved and asymptomatic    Continue current medications    Follow-up in 2 years with echocardiogram    This note was transcribed using electronic voice recognition software and there may be typographical errors present.                Interval History:   The patient is a very pleasant 58 year old former patient of Dr. Hamm who is seeing me to establish care.  He knows me from taking care of his mother in cardiology clinic.  He has a history of nonischemic cardiomyopathy with improvement from EF 15% to 55%, maintaining his gains and asymptomatic on recent echocardiogram 7/29/2019.  He denies any PND/orthopnea.  Denies exertional chest discomfort.  No edema.  Would like to continue on his current schedule of follow-up every 2 years with echocardiogram and continuing his current cardiovascular medications.                     Review of Systems:   Review of Systems:  Skin:  Negative for     Eyes:  Positive for glasses  ENT:  Negative for    Respiratory:  Positive for cough  Cardiovascular:    palpitations;Positive for;lightheadedness  Gastroenterology: Negative for    Genitourinary:  Positive for urinary frequency  Musculoskeletal:  Negative for    Neurologic:  Negative for    Psychiatric:  Negative for    Heme/Lymph/Imm:  Negative for    Endocrine:  Negative for                Physical Exam:     Vitals: /68   Pulse 78   Ht 1.829 m (6')   Wt 78 kg (172 lb)   BMI 23.33 kg/m    Constitutional:  cooperative, alert and oriented, well developed, well nourished, in no acute distress appears younger than stated age      Skin:  warm and dry to the touch        Head:  normocephalic        Eyes:       Equal and reactive    ENT:  no pallor or cyanosis        Neck:  JVP normal;no carotid bruit        Chest:  clear to auscultation        Cardiac: regular rhythm;no murmurs, gallops or rubs detected                  Abdomen:       benign    Vascular: pulses full and equal                                      Extremities and Back:  no deformities, clubbing, cyanosis, erythema observed;no edema        Neurological:  no gross motor deficits;affect appropriate                 Medications:     Current Outpatient Medications   Medication Sig Dispense Refill     acetaminophen (TYLENOL) 325 MG tablet Take 2 tablets (650 mg) by mouth every 4 hours as needed for other (mild pain) 100 tablet 0     carvedilol (COREG) 25 MG tablet Take 1 tablet (25 mg) by mouth 2 times daily (with meals) 180 tablet 0     lisinopril (PRINIVIL/ZESTRIL) 5 MG tablet Take 1 tablet (5 mg) by mouth daily 90 tablet 3     Probiotic Product (PROBIOTIC DAILY PO)        Ranitidine HCl (ZANTAC PO) Take 150 mg by mouth 2 times daily       spironolactone (ALDACTONE) 25 MG tablet Take 0.5 tablets (12.5 mg) by mouth daily 45 tablet 0     doxycycline monohydrate (MONODOX) 100 MG capsule Take 1 capsule (100 mg) by mouth 2 times daily (Patient not taking: Reported on 6/17/2019) 84 capsule 1     tamsulosin (FLOMAX) 0.4 MG capsule Take 1 capsule (0.4 mg) by mouth daily (Patient not taking: Reported on 6/17/2019) 90 capsule 3                Data:   All laboratory data reviewed  No results found for this or any previous visit (from the past 24 hour(s)).    All laboratory data reviewed  Lab Results   Component Value Date    CHOL 166 09/18/2014     Lab Results   Component Value Date    HDL 51 09/18/2014     Lab Results   Component Value Date    LDL 98 09/18/2014     Lab Results   Component Value Date    TRIG 87 09/18/2014     Lab Results   Component Value Date    CHOLHDLRATIO 3.3 09/18/2014     TSH   Date Value Ref Range Status   10/04/2005 0.82 0.4 - 5.0 mU/L Final     Last Basic Metabolic Panel:  Lab Results   Component Value Date     12/23/2014      Lab Results   Component Value Date    POTASSIUM 4.5 12/23/2014     Lab Results   Component Value Date    CHLORIDE 102 12/23/2014     Lab  Results   Component Value Date    PAULINA 9.3 12/23/2014     Lab Results   Component Value Date    CO2 27 12/23/2014     Lab Results   Component Value Date    BUN 18 12/23/2014     Lab Results   Component Value Date    CR 1.06 12/23/2014     Lab Results   Component Value Date     12/23/2014     Lab Results   Component Value Date    WBC 12.9 12/23/2014     Lab Results   Component Value Date    RBC 4.53 12/23/2014     Lab Results   Component Value Date    HGB 14.8 12/23/2014     Lab Results   Component Value Date    HCT 42.7 12/23/2014     Lab Results   Component Value Date    MCV 94 12/23/2014     Lab Results   Component Value Date    MCH 32.7 12/23/2014     Lab Results   Component Value Date    MCHC 34.7 12/23/2014     Lab Results   Component Value Date    RDW 12.2 12/23/2014     Lab Results   Component Value Date     12/23/2014

## 2019-07-30 NOTE — LETTER
7/30/2019    [facility] [place]  [address]  [facility] [address]    RE: Alvarez Bess       Dear Colleague,    I had the pleasure of seeing Alvarez Bess in the [facility].    Cardiology Progress Note          Assessment and Plan:     1. Nonischemic cardiomyopathy, resolved and asymptomatic    Continue current medications    Follow-up in 2 years with echocardiogram    This note was transcribed using electronic voice recognition software and there may be typographical errors present.                Interval History:   The patient is a very pleasant 58 year old former patient of Dr. Hamm who is seeing me to establish care.  He knows me from taking care of his mother in cardiology clinic.  He has a history of nonischemic cardiomyopathy with improvement from EF 15% to 55%, maintaining his gains and asymptomatic on recent echocardiogram 7/29/2019.  He denies any PND/orthopnea.  Denies exertional chest discomfort.  No edema.  Would like to continue on his current schedule of follow-up every 2 years with echocardiogram and continuing his current cardiovascular medications.                     Review of Systems:   Review of Systems:  Skin:  Negative for     Eyes:  Positive for glasses  ENT:  Negative for    Respiratory:  Positive for cough  Cardiovascular:    palpitations;Positive for;lightheadedness  Gastroenterology: Negative for    Genitourinary:  Positive for urinary frequency  Musculoskeletal:  Negative for    Neurologic:  Negative for    Psychiatric:  Negative for    Heme/Lymph/Imm:  Negative for    Endocrine:  Negative for                Physical Exam:     Vitals: /68   Pulse 78   Ht 1.829 m (6')   Wt 78 kg (172 lb)   BMI 23.33 kg/m     Constitutional:  cooperative, alert and oriented, well developed, well nourished, in no acute distress appears younger than stated age      Skin:  warm and dry to the touch        Head:  normocephalic        Eyes:        Equal and reactive    ENT:  no pallor or cyanosis        Neck:  JVP normal;no carotid bruit        Chest:  clear to auscultation        Cardiac: regular rhythm;no murmurs, gallops or rubs detected                  Abdomen:      benign    Vascular: pulses full and equal                                      Extremities and Back:  no deformities, clubbing, cyanosis, erythema observed;no edema        Neurological:  no gross motor deficits;affect appropriate                 Medications:     Current Outpatient Medications   Medication Sig Dispense Refill     acetaminophen (TYLENOL) 325 MG tablet Take 2 tablets (650 mg) by mouth every 4 hours as needed for other (mild pain) 100 tablet 0     carvedilol (COREG) 25 MG tablet Take 1 tablet (25 mg) by mouth 2 times daily (with meals) 180 tablet 0     lisinopril (PRINIVIL/ZESTRIL) 5 MG tablet Take 1 tablet (5 mg) by mouth daily 90 tablet 3     Probiotic Product (PROBIOTIC DAILY PO)        Ranitidine HCl (ZANTAC PO) Take 150 mg by mouth 2 times daily       spironolactone (ALDACTONE) 25 MG tablet Take 0.5 tablets (12.5 mg) by mouth daily 45 tablet 0     doxycycline monohydrate (MONODOX) 100 MG capsule Take 1 capsule (100 mg) by mouth 2 times daily (Patient not taking: Reported on 6/17/2019) 84 capsule 1     tamsulosin (FLOMAX) 0.4 MG capsule Take 1 capsule (0.4 mg) by mouth daily (Patient not taking: Reported on 6/17/2019) 90 capsule 3                Data:   All laboratory data reviewed  No results found for this or any previous visit (from the past 24 hour(s)).    All laboratory data reviewed  Lab Results   Component Value Date    CHOL 166 09/18/2014     Lab Results   Component Value Date    HDL 51 09/18/2014     Lab Results   Component Value Date    LDL 98 09/18/2014     Lab Results   Component Value Date    TRIG 87 09/18/2014     Lab Results   Component Value Date    CHOLHDLRATIO 3.3 09/18/2014     TSH   Date Value Ref Range Status   10/04/2005 0.82 0.4 - 5.0 mU/L Final      Last Basic Metabolic Panel:  Lab Results   Component Value Date     12/23/2014      Lab Results   Component Value Date    POTASSIUM 4.5 12/23/2014     Lab Results   Component Value Date    CHLORIDE 102 12/23/2014     Lab Results   Component Value Date    PAULINA 9.3 12/23/2014     Lab Results   Component Value Date    CO2 27 12/23/2014     Lab Results   Component Value Date    BUN 18 12/23/2014     Lab Results   Component Value Date    CR 1.06 12/23/2014     Lab Results   Component Value Date     12/23/2014     Lab Results   Component Value Date    WBC 12.9 12/23/2014     Lab Results   Component Value Date    RBC 4.53 12/23/2014     Lab Results   Component Value Date    HGB 14.8 12/23/2014     Lab Results   Component Value Date    HCT 42.7 12/23/2014     Lab Results   Component Value Date    MCV 94 12/23/2014     Lab Results   Component Value Date    MCH 32.7 12/23/2014     Lab Results   Component Value Date    MCHC 34.7 12/23/2014     Lab Results   Component Value Date    RDW 12.2 12/23/2014     Lab Results   Component Value Date     12/23/2014         Thank you for allowing me to participate in the care of your patient.    Sincerely,     Dl Ordonez MD     [facility]

## 2019-08-01 ENCOUNTER — TELEPHONE (OUTPATIENT)
Dept: UROLOGY | Facility: CLINIC | Age: 59
End: 2019-08-01

## 2019-08-01 DIAGNOSIS — N41.0 ACUTE PROSTATITIS: Primary | ICD-10-CM

## 2019-08-01 NOTE — TELEPHONE ENCOUNTER
[facility] Call Center    Phone Message    May a detailed message be left on voicemail: yes    Reason for Call: Other: Ariella from [employer] called with some questions about the cystoscopy that the pt is going to be having. She is needing to know the CPT code, the billable charges, and if it's going to be done in the office. Please give her a call back at [phone]      Action Taken: Message routed to:  [facility] Urology

## 2019-08-01 NOTE — TELEPHONE ENCOUNTER
I called and gave him the message from Dr. Samuels.  RX ordered.  Clarisa FITCH  [facility]  August 1, 2019 10:58 AM            ----- Message from Mars Samuels MD sent at 7/31/2019  5:06 PM CDT -----  OK to inform of normal testosterone  Start Augmentin 875mg q12 hrs X 2 weeks for prostate infection - #30 please

## 2019-09-03 DIAGNOSIS — I50.40 COMBINED SYSTOLIC AND DIASTOLIC CONGESTIVE HEART FAILURE (H): ICD-10-CM

## 2019-09-03 DIAGNOSIS — I42.9 CARDIOMYOPATHY (H): ICD-10-CM

## 2019-09-03 RX ORDER — CARVEDILOL 25 MG/1
25 TABLET ORAL 2 TIMES DAILY WITH MEALS
Qty: 180 TABLET | Refills: 3 | Status: SHIPPED | OUTPATIENT
Start: 2019-09-03 | End: 2020-08-17

## 2019-10-02 ENCOUNTER — HEALTH MAINTENANCE LETTER (OUTPATIENT)
Age: 59
End: 2019-10-02

## 2019-10-28 DIAGNOSIS — I50.9 CHF (CONGESTIVE HEART FAILURE) (H): ICD-10-CM

## 2019-10-28 RX ORDER — SPIRONOLACTONE 25 MG/1
12.5 TABLET ORAL DAILY
Qty: 45 TABLET | Refills: 2 | Status: SHIPPED | OUTPATIENT
Start: 2019-10-28 | End: 2020-04-17

## 2019-12-03 DIAGNOSIS — I42.9 CARDIOMYOPATHY (H): ICD-10-CM

## 2019-12-03 DIAGNOSIS — I50.9 CHF (CONGESTIVE HEART FAILURE) (H): ICD-10-CM

## 2019-12-03 RX ORDER — LISINOPRIL 5 MG/1
5 TABLET ORAL DAILY
Qty: 90 TABLET | Refills: 3 | Status: SHIPPED | OUTPATIENT
Start: 2019-12-03 | End: 2020-11-25

## 2019-12-03 NOTE — TELEPHONE ENCOUNTER
Medication Refilled: lisinopril  Last office visit: 7/30/19  Last Labs/EKG:none  Next office visit: 7/2021  Pharmacy sent to: niya Carmen RN

## 2020-04-17 DIAGNOSIS — I50.9 CHF (CONGESTIVE HEART FAILURE) (H): ICD-10-CM

## 2020-04-17 RX ORDER — SPIRONOLACTONE 25 MG/1
12.5 TABLET ORAL DAILY
Qty: 45 TABLET | Refills: 2 | Status: SHIPPED | OUTPATIENT
Start: 2020-04-17 | End: 2021-04-01

## 2020-08-17 DIAGNOSIS — I50.40 COMBINED SYSTOLIC AND DIASTOLIC CONGESTIVE HEART FAILURE (H): ICD-10-CM

## 2020-08-17 DIAGNOSIS — I42.9 CARDIOMYOPATHY (H): ICD-10-CM

## 2020-08-17 RX ORDER — CARVEDILOL 25 MG/1
25 TABLET ORAL 2 TIMES DAILY WITH MEALS
Qty: 180 TABLET | Refills: 3 | Status: SHIPPED | OUTPATIENT
Start: 2020-08-17 | End: 2021-07-29

## 2020-09-21 ENCOUNTER — TELEPHONE (OUTPATIENT)
Dept: CARDIOLOGY | Facility: CLINIC | Age: 60
End: 2020-09-21

## 2020-09-21 NOTE — TELEPHONE ENCOUNTER
VM received from patient, wondering if it is ok from a cardiac standpoint and his medications for him to get a Shingles shot.     Last OV 7/30/19 with Dr. Ordonez:    1. Nonischemic cardiomyopathy, resolved and asymptomatic    Continue current medications    Follow-up in 2 years with echocardiogram     This note was transcribed using electronic voice recognition software and there may be typographical errors present.     Will route to Dr. Ordonez for review.

## 2020-09-28 NOTE — TELEPHONE ENCOUNTER
RN called patient and left detailed VM with Neil Ordonez's recommendations below. RN advised patient in VM to call clinic with any further questions/concerns.       Dr. Ordonez's recommendations.      Yes okay for shingles shot thanks!

## 2020-11-25 DIAGNOSIS — I50.9 CHF (CONGESTIVE HEART FAILURE) (H): ICD-10-CM

## 2020-11-25 DIAGNOSIS — I42.9 CARDIOMYOPATHY (H): ICD-10-CM

## 2020-11-25 RX ORDER — LISINOPRIL 5 MG/1
5 TABLET ORAL DAILY
Qty: 90 TABLET | Refills: 2 | Status: SHIPPED | OUTPATIENT
Start: 2020-11-25 | End: 2021-08-17

## 2020-11-25 RX ORDER — LISINOPRIL 5 MG/1
5 TABLET ORAL DAILY
Qty: 90 TABLET | Refills: 2 | Status: SHIPPED | OUTPATIENT
Start: 2020-11-25 | End: 2020-11-25

## 2020-11-25 NOTE — TELEPHONE ENCOUNTER
Received refill request for:  Lisinopril  Last OV was: 7/30/2019  Labs/EKG: na  F/U scheduled: Orders for 7/2021  New script sent to: Jocelyn Mariscal LPN  [facility]  [phone]

## 2021-01-15 ENCOUNTER — HEALTH MAINTENANCE LETTER (OUTPATIENT)
Age: 61
End: 2021-01-15

## 2021-04-01 DIAGNOSIS — I50.9 CHF (CONGESTIVE HEART FAILURE) (H): ICD-10-CM

## 2021-04-01 RX ORDER — SPIRONOLACTONE 25 MG/1
12.5 TABLET ORAL DAILY
Qty: 45 TABLET | Refills: 2 | Status: SHIPPED | OUTPATIENT
Start: 2021-04-01 | End: 2022-01-07

## 2021-04-01 NOTE — TELEPHONE ENCOUNTER
LOV 7/30/19. RN refilled rx per RN refill protocol. Follow up two years per Dr. Ordonez. Orders up to date in [facility]. Will refill med.

## 2021-07-29 DIAGNOSIS — I42.9 CARDIOMYOPATHY (H): ICD-10-CM

## 2021-07-29 DIAGNOSIS — I50.40 COMBINED SYSTOLIC AND DIASTOLIC CONGESTIVE HEART FAILURE (H): ICD-10-CM

## 2021-07-29 RX ORDER — CARVEDILOL 25 MG/1
25 TABLET ORAL 2 TIMES DAILY WITH MEALS
Qty: 180 TABLET | Refills: 0 | Status: SHIPPED | OUTPATIENT
Start: 2021-07-29 | End: 2021-11-15

## 2021-08-17 DIAGNOSIS — I42.9 CARDIOMYOPATHY (H): ICD-10-CM

## 2021-08-17 DIAGNOSIS — I50.9 CHF (CONGESTIVE HEART FAILURE) (H): ICD-10-CM

## 2021-08-17 RX ORDER — LISINOPRIL 5 MG/1
5 TABLET ORAL DAILY
Qty: 90 TABLET | Refills: 0 | Status: SHIPPED | OUTPATIENT
Start: 2021-08-17 | End: 2021-11-15

## 2021-08-17 NOTE — TELEPHONE ENCOUNTER
Received refill request for:  Lisinopril  Last OV was: 7/30/2019 with Dr. Ordonez  Labs/EKG: last CMP 10/28/2016  F/U scheduled: 10/5/2021 with Dr. Ordonez  New script sent to: Jocelyn

## 2021-08-20 ENCOUNTER — HOSPITAL ENCOUNTER (OUTPATIENT)
Dept: CARDIOLOGY | Facility: CLINIC | Age: 61
Discharge: HOME | End: 2021-08-20
Attending: INTERNAL MEDICINE | Admitting: INTERNAL MEDICINE
Payer: COMMERCIAL

## 2021-08-20 DIAGNOSIS — I43 CARDIOMYOPATHY IN DISEASES CLASSIFIED ELSEWHERE (H): ICD-10-CM

## 2021-08-20 LAB — LVEF ECHO: NORMAL

## 2021-08-20 PROCEDURE — 93306 TTE W/DOPPLER COMPLETE: CPT

## 2021-08-20 PROCEDURE — 93306 TTE W/DOPPLER COMPLETE: CPT | Mod: 26 | Performed by: INTERNAL MEDICINE

## 2021-09-04 ENCOUNTER — HEALTH MAINTENANCE LETTER (OUTPATIENT)
Age: 61
End: 2021-09-04

## 2021-10-05 ENCOUNTER — OFFICE VISIT (OUTPATIENT)
Dept: CARDIOLOGY | Facility: CLINIC | Age: 61
End: 2021-10-05
Payer: COMMERCIAL

## 2021-10-05 VITALS
BODY MASS INDEX: 22.13 KG/M2 | SYSTOLIC BLOOD PRESSURE: 122 MMHG | WEIGHT: 167 LBS | HEIGHT: 73 IN | OXYGEN SATURATION: 98 % | HEART RATE: 75 BPM | DIASTOLIC BLOOD PRESSURE: 80 MMHG

## 2021-10-05 DIAGNOSIS — I42.9 CARDIOMYOPATHY, UNSPECIFIED TYPE (H): Primary | ICD-10-CM

## 2021-10-05 PROCEDURE — 99214 OFFICE O/P EST MOD 30 MIN: CPT | Performed by: INTERNAL MEDICINE

## 2021-10-05 RX ORDER — OMEPRAZOLE 20 MG/1
20 TABLET, DELAYED RELEASE ORAL DAILY
COMMUNITY

## 2021-10-05 ASSESSMENT — MIFFLIN-ST. JEOR: SCORE: 1631.39

## 2021-10-05 NOTE — LETTER
10/5/2021    [facility]  [address]  [facility] [address]    RE: Alvarez eBss       Dear Colleague,    I had the pleasure of seeing Alvarez Bess in the [facility].    Cardiology Progress Note          Assessment and Plan:       1. History of cardiomyopathy, resolved    Most recent echocardiogram in August had normal function.  Mildly enlarged aorta that likely does not need any specific follow-up except for continued blood pressure control.    Continue current cardiovascular medications.  Could reduce his beta-blocker dose if more fatigue.  If fatigue and dyspnea worsen, ischemic evaluation.      2. Atypical chest discomfort    Brief and nonexertional, likely neurologic or musculoskeletal    If exertional symptoms, patient will notify us.    Follow-up in 2 years with echo per patient request    30 minutes was spent with the patient, precharting and reviewing tests as well as post visit charting all done today..    This note was transcribed using electronic voice recognition software and there may be typographical errors present.                    Interval History:     The patient is a very pleasant 60 year old whom I have been following for history of cardiomyopathy, resolved.  He has a little more dyspnea and fatigue and feels like he is getting older.  We discussed possibly reducing his dose of beta-blocker but he prefers to stay on his current medical regimen as it has been working well for him.  He also gets atypical likely nerve pain that is very sharp and lasts for milliseconds.                     Review of Systems:     Review of Systems:  Skin:  Negative     Eyes:  Positive for glasses  ENT:  Negative    Respiratory:  Positive for dyspnea on exertion;cough  Cardiovascular:    palpitations;Positive for  Gastroenterology: Positive for reflux  Genitourinary:  Positive for nocturia  Musculoskeletal:  Positive for muscular  "weakness;neck pain  Neurologic:  Negative    Psychiatric:  Positive for    Heme/Lymph/Imm:  Negative    Endocrine:  Negative                Physical Exam:     Vitals: /80 (BP Location: Right arm, Patient Position: Sitting, Cuff Size: Adult Regular)   Pulse 75   Ht 1.854 m (6' 1\")   Wt 75.8 kg (167 lb)   SpO2 98%   BMI 22.03 kg/m    Constitutional:  cooperative, alert and oriented, well developed, well nourished, in no acute distress        Skin:  warm and dry to the touch        Head:  normocephalic        Eyes:       CONSUELO    Chest:  clear to auscultation        Cardiac: regular rhythm;no murmurs, gallops or rubs detected                  Extremities and Back:  no deformities, clubbing, cyanosis, erythema observed;no edema        Neurological:  no gross motor deficits;affect appropriate                 Medications:     Current Outpatient Medications   Medication Sig Dispense Refill     acetaminophen (TYLENOL) 325 MG tablet Take 2 tablets (650 mg) by mouth every 4 hours as needed for other (mild pain) 100 tablet 0     carvedilol (COREG) 25 MG tablet Take 1 tablet (25 mg) by mouth 2 times daily (with meals) 180 tablet 0     lisinopril (ZESTRIL) 5 MG tablet Take 1 tablet (5 mg) by mouth daily 90 tablet 0     omeprazole (PRILOSEC OTC) 20 MG EC tablet Take 20 mg by mouth daily       Probiotic Product (PROBIOTIC DAILY PO)        spironolactone (ALDACTONE) 25 MG tablet Take 0.5 tablets (12.5 mg) by mouth daily 45 tablet 2                Data:   All laboratory data reviewed  No results found for this or any previous visit (from the past 24 hour(s)).    All laboratory data reviewed  Lab Results   Component Value Date    CHOL 166 09/18/2014     Lab Results   Component Value Date    HDL 51 09/18/2014     Lab Results   Component Value Date    LDL 98 09/18/2014     Lab Results   Component Value Date    TRIG 87 09/18/2014     Lab Results   Component Value Date    CHOLHDLRATIO 3.3 09/18/2014     TSH   Date Value Ref " Range Status   10/04/2005 0.82 0.4 - 5.0 mU/L Final     Last Basic Metabolic Panel:  Lab Results   Component Value Date     12/23/2014      Lab Results   Component Value Date    POTASSIUM 4.5 12/23/2014     Lab Results   Component Value Date    CHLORIDE 102 12/23/2014     Lab Results   Component Value Date    PAULINA 9.3 12/23/2014     Lab Results   Component Value Date    CO2 27 12/23/2014     Lab Results   Component Value Date    BUN 18 12/23/2014     Lab Results   Component Value Date    CR 1.06 12/23/2014     Lab Results   Component Value Date     12/23/2014     Lab Results   Component Value Date    WBC 12.9 12/23/2014     Lab Results   Component Value Date    RBC 4.53 12/23/2014     Lab Results   Component Value Date    HGB 14.8 12/23/2014     Lab Results   Component Value Date    HCT 42.7 12/23/2014     Lab Results   Component Value Date    MCV 94 12/23/2014     Lab Results   Component Value Date    MCH 32.7 12/23/2014     Lab Results   Component Value Date    MCHC 34.7 12/23/2014     Lab Results   Component Value Date    RDW 12.2 12/23/2014     Lab Results   Component Value Date     12/23/2014                   Thank you for allowing me to participate in the care of your patient.      Sincerely,     Dl Ordonez MD     [facility]  cc:   No referring provider defined for this encounter.

## 2021-10-05 NOTE — PROGRESS NOTES
Cardiology Progress Note          Assessment and Plan:       1. History of cardiomyopathy, resolved    Most recent echocardiogram in August had normal function.  Mildly enlarged aorta that likely does not need any specific follow-up except for continued blood pressure control.    Continue current cardiovascular medications.  Could reduce his beta-blocker dose if more fatigue.  If fatigue and dyspnea worsen, ischemic evaluation.      2. Atypical chest discomfort    Brief and nonexertional, likely neurologic or musculoskeletal    If exertional symptoms, patient will notify us.    Follow-up in 2 years with echo per patient request    30 minutes was spent with the patient, precharting and reviewing tests as well as post visit charting all done today..    This note was transcribed using electronic voice recognition software and there may be typographical errors present.                    Interval History:     The patient is a very pleasant 60 year old whom I have been following for history of cardiomyopathy, resolved.  He has a little more dyspnea and fatigue and feels like he is getting older.  We discussed possibly reducing his dose of beta-blocker but he prefers to stay on his current medical regimen as it has been working well for him.  He also gets atypical likely nerve pain that is very sharp and lasts for milliseconds.                     Review of Systems:     Review of Systems:  Skin:  Negative     Eyes:  Positive for glasses  ENT:  Negative    Respiratory:  Positive for dyspnea on exertion;cough  Cardiovascular:    palpitations;Positive for  Gastroenterology: Positive for reflux  Genitourinary:  Positive for nocturia  Musculoskeletal:  Positive for muscular weakness;neck pain  Neurologic:  Negative    Psychiatric:  Positive for    Heme/Lymph/Imm:  Negative    Endocrine:  Negative                Physical Exam:     Vitals: /80 (BP Location: Right arm, Patient Position: Sitting, Cuff Size: Adult Regular)    "Pulse 75   Ht 1.854 m (6' 1\")   Wt 75.8 kg (167 lb)   SpO2 98%   BMI 22.03 kg/m    Constitutional:  cooperative, alert and oriented, well developed, well nourished, in no acute distress        Skin:  warm and dry to the touch        Head:  normocephalic        Eyes:       CONSUELO    Chest:  clear to auscultation        Cardiac: regular rhythm;no murmurs, gallops or rubs detected                  Extremities and Back:  no deformities, clubbing, cyanosis, erythema observed;no edema        Neurological:  no gross motor deficits;affect appropriate                 Medications:     Current Outpatient Medications   Medication Sig Dispense Refill     acetaminophen (TYLENOL) 325 MG tablet Take 2 tablets (650 mg) by mouth every 4 hours as needed for other (mild pain) 100 tablet 0     carvedilol (COREG) 25 MG tablet Take 1 tablet (25 mg) by mouth 2 times daily (with meals) 180 tablet 0     lisinopril (ZESTRIL) 5 MG tablet Take 1 tablet (5 mg) by mouth daily 90 tablet 0     omeprazole (PRILOSEC OTC) 20 MG EC tablet Take 20 mg by mouth daily       Probiotic Product (PROBIOTIC DAILY PO)        spironolactone (ALDACTONE) 25 MG tablet Take 0.5 tablets (12.5 mg) by mouth daily 45 tablet 2                Data:   All laboratory data reviewed  No results found for this or any previous visit (from the past 24 hour(s)).    All laboratory data reviewed  Lab Results   Component Value Date    CHOL 166 09/18/2014     Lab Results   Component Value Date    HDL 51 09/18/2014     Lab Results   Component Value Date    LDL 98 09/18/2014     Lab Results   Component Value Date    TRIG 87 09/18/2014     Lab Results   Component Value Date    CHOLHDLRATIO 3.3 09/18/2014     TSH   Date Value Ref Range Status   10/04/2005 0.82 0.4 - 5.0 mU/L Final     Last Basic Metabolic Panel:  Lab Results   Component Value Date     12/23/2014      Lab Results   Component Value Date    POTASSIUM 4.5 12/23/2014     Lab Results   Component Value Date    CHLORIDE " 102 12/23/2014     Lab Results   Component Value Date    PAULINA 9.3 12/23/2014     Lab Results   Component Value Date    CO2 27 12/23/2014     Lab Results   Component Value Date    BUN 18 12/23/2014     Lab Results   Component Value Date    CR 1.06 12/23/2014     Lab Results   Component Value Date     12/23/2014     Lab Results   Component Value Date    WBC 12.9 12/23/2014     Lab Results   Component Value Date    RBC 4.53 12/23/2014     Lab Results   Component Value Date    HGB 14.8 12/23/2014     Lab Results   Component Value Date    HCT 42.7 12/23/2014     Lab Results   Component Value Date    MCV 94 12/23/2014     Lab Results   Component Value Date    MCH 32.7 12/23/2014     Lab Results   Component Value Date    MCHC 34.7 12/23/2014     Lab Results   Component Value Date    RDW 12.2 12/23/2014     Lab Results   Component Value Date     12/23/2014

## 2021-11-15 DIAGNOSIS — I42.9 CARDIOMYOPATHY (H): ICD-10-CM

## 2021-11-15 DIAGNOSIS — I50.40 COMBINED SYSTOLIC AND DIASTOLIC CONGESTIVE HEART FAILURE (H): ICD-10-CM

## 2021-11-15 DIAGNOSIS — I50.9 CHF (CONGESTIVE HEART FAILURE) (H): ICD-10-CM

## 2021-11-15 RX ORDER — LISINOPRIL 5 MG/1
5 TABLET ORAL DAILY
Qty: 90 TABLET | Refills: 2 | Status: SHIPPED | OUTPATIENT
Start: 2021-11-15 | End: 2022-08-08

## 2021-11-15 RX ORDER — CARVEDILOL 25 MG/1
25 TABLET ORAL 2 TIMES DAILY WITH MEALS
Qty: 180 TABLET | Refills: 2 | Status: SHIPPED | OUTPATIENT
Start: 2021-11-15 | End: 2022-08-08

## 2022-01-07 DIAGNOSIS — I50.9 CHF (CONGESTIVE HEART FAILURE) (H): ICD-10-CM

## 2022-01-07 RX ORDER — SPIRONOLACTONE 25 MG/1
12.5 TABLET ORAL DAILY
Qty: 45 TABLET | Refills: 3 | Status: SHIPPED | OUTPATIENT
Start: 2022-01-07 | End: 2023-01-12

## 2022-01-07 NOTE — TELEPHONE ENCOUNTER
Medication Refilled: Spironolactone   Last office visit: 10/5/2021 with Dr. Ordonez   Last Labs/EKG: NA  Next office visit: 10/2023 per OV note   Pharmacy sent to: niya Carmen RN

## 2022-02-19 ENCOUNTER — HEALTH MAINTENANCE LETTER (OUTPATIENT)
Age: 62
End: 2022-02-19

## 2022-04-21 ENCOUNTER — TELEPHONE (OUTPATIENT)
Dept: CARDIOLOGY | Facility: CLINIC | Age: 62
End: 2022-04-21
Payer: COMMERCIAL

## 2022-04-21 NOTE — TELEPHONE ENCOUNTER
Contacted patient to review further. Patient is wondering if our clinic would recommend the booster shot from a cardiac standpoint. Reviewed with patient that we have recommended COVID-19 booster shots to our patients, due to their cardiac history. Patient verbalized understanding and agreed with plan of care. No further questions.

## 2022-04-21 NOTE — TELEPHONE ENCOUNTER
[facility] Call Center    Phone Message    May a detailed message be left on voicemail: yes     Reason for Call: Other: Patient called and spoke with writer, he states he would like to speak with care team regarding covid booster. Please call patient to discuss further      Action Taken: Message routed to:  [facility] Cardio     Travel Screening: Not Applicable

## 2022-08-08 DIAGNOSIS — I50.9 CHF (CONGESTIVE HEART FAILURE) (H): ICD-10-CM

## 2022-08-08 DIAGNOSIS — I50.40 COMBINED SYSTOLIC AND DIASTOLIC CONGESTIVE HEART FAILURE (H): ICD-10-CM

## 2022-08-08 DIAGNOSIS — I42.9 CARDIOMYOPATHY (H): ICD-10-CM

## 2022-08-08 RX ORDER — LISINOPRIL 5 MG/1
5 TABLET ORAL DAILY
Qty: 90 TABLET | Refills: 2 | Status: SHIPPED | OUTPATIENT
Start: 2022-08-08 | End: 2023-05-10

## 2022-08-08 RX ORDER — CARVEDILOL 25 MG/1
25 TABLET ORAL 2 TIMES DAILY WITH MEALS
Qty: 180 TABLET | Refills: 0 | Status: SHIPPED | OUTPATIENT
Start: 2022-08-08 | End: 2022-11-09

## 2022-08-08 NOTE — PROGRESS NOTES
[facility] Refill Guideline reviewed.  Medication meets criteria for refill. SYLVAIN Mccray RN

## 2022-10-22 ENCOUNTER — HEALTH MAINTENANCE LETTER (OUTPATIENT)
Age: 62
End: 2022-10-22

## 2022-11-09 DIAGNOSIS — I50.40 COMBINED SYSTOLIC AND DIASTOLIC CONGESTIVE HEART FAILURE (H): ICD-10-CM

## 2022-11-09 DIAGNOSIS — I42.9 CARDIOMYOPATHY (H): ICD-10-CM

## 2022-11-09 RX ORDER — CARVEDILOL 25 MG/1
25 TABLET ORAL 2 TIMES DAILY WITH MEALS
Qty: 180 TABLET | Refills: 3 | Status: SHIPPED | OUTPATIENT
Start: 2022-11-09 | End: 2023-11-07

## 2023-01-12 DIAGNOSIS — I50.9 CHF (CONGESTIVE HEART FAILURE) (H): ICD-10-CM

## 2023-01-12 RX ORDER — SPIRONOLACTONE 25 MG/1
12.5 TABLET ORAL DAILY
Qty: 45 TABLET | Refills: 2 | Status: SHIPPED | OUTPATIENT
Start: 2023-01-12 | End: 2023-06-13

## 2023-04-01 ENCOUNTER — HEALTH MAINTENANCE LETTER (OUTPATIENT)
Age: 63
End: 2023-04-01

## 2023-05-04 ASSESSMENT — ENCOUNTER SYMPTOMS
NAUSEA: 0
DIZZINESS: 0
FREQUENCY: 0
DIARRHEA: 0
HEADACHES: 0
HEMATURIA: 0
CHILLS: 0
MYALGIAS: 1
PALPITATIONS: 0
ARTHRALGIAS: 1
EYE PAIN: 0
JOINT SWELLING: 1
CONSTIPATION: 0
ABDOMINAL PAIN: 0
WEAKNESS: 0
HEARTBURN: 0
SHORTNESS OF BREATH: 0
COUGH: 1
HEMATOCHEZIA: 0
SORE THROAT: 0
DYSURIA: 0
NERVOUS/ANXIOUS: 0
PARESTHESIAS: 1
FEVER: 0

## 2023-05-10 ENCOUNTER — OFFICE VISIT (OUTPATIENT)
Dept: INTERNAL MEDICINE | Facility: CLINIC | Age: 63
End: 2023-05-10
Payer: COMMERCIAL

## 2023-05-10 ENCOUNTER — ANCILLARY PROCEDURE (OUTPATIENT)
Dept: GENERAL RADIOLOGY | Facility: CLINIC | Age: 63
End: 2023-05-10
Payer: COMMERCIAL

## 2023-05-10 VITALS
HEART RATE: 70 BPM | DIASTOLIC BLOOD PRESSURE: 61 MMHG | RESPIRATION RATE: 20 BRPM | TEMPERATURE: 98.1 F | BODY MASS INDEX: 21.54 KG/M2 | OXYGEN SATURATION: 99 % | SYSTOLIC BLOOD PRESSURE: 102 MMHG | WEIGHT: 159 LBS | HEIGHT: 72 IN

## 2023-05-10 DIAGNOSIS — Z12.11 SCREEN FOR COLON CANCER: ICD-10-CM

## 2023-05-10 DIAGNOSIS — I10 PRIMARY HYPERTENSION: ICD-10-CM

## 2023-05-10 DIAGNOSIS — Z11.4 SCREENING FOR HIV (HUMAN IMMUNODEFICIENCY VIRUS): ICD-10-CM

## 2023-05-10 DIAGNOSIS — M25.649 MORNING JOINT STIFFNESS OF HAND, UNSPECIFIED LATERALITY: ICD-10-CM

## 2023-05-10 DIAGNOSIS — Z11.59 NEED FOR HEPATITIS C SCREENING TEST: ICD-10-CM

## 2023-05-10 DIAGNOSIS — Z00.00 ROUTINE GENERAL MEDICAL EXAMINATION AT A HEALTH CARE FACILITY: Primary | ICD-10-CM

## 2023-05-10 DIAGNOSIS — R05.3 CHRONIC COUGH: ICD-10-CM

## 2023-05-10 DIAGNOSIS — Z12.5 SCREENING PSA (PROSTATE SPECIFIC ANTIGEN): ICD-10-CM

## 2023-05-10 DIAGNOSIS — M25.442 SWELLING OF FINGER JOINT OF LEFT HAND: ICD-10-CM

## 2023-05-10 DIAGNOSIS — M25.441 FINGER JOINT SWELLING, RIGHT: ICD-10-CM

## 2023-05-10 DIAGNOSIS — I42.9 CARDIOMYOPATHY (H): ICD-10-CM

## 2023-05-10 DIAGNOSIS — R20.2 NUMBNESS AND TINGLING IN BOTH HANDS: ICD-10-CM

## 2023-05-10 DIAGNOSIS — E78.2 MIXED HYPERLIPIDEMIA: ICD-10-CM

## 2023-05-10 DIAGNOSIS — L30.9 DERMATITIS: ICD-10-CM

## 2023-05-10 DIAGNOSIS — R20.0 NUMBNESS AND TINGLING IN BOTH HANDS: ICD-10-CM

## 2023-05-10 DIAGNOSIS — I50.9 CONGESTIVE HEART FAILURE, UNSPECIFIED HF CHRONICITY, UNSPECIFIED HEART FAILURE TYPE (H): ICD-10-CM

## 2023-05-10 DIAGNOSIS — I50.9 CHF (CONGESTIVE HEART FAILURE) (H): ICD-10-CM

## 2023-05-10 LAB
ALBUMIN SERPL BCG-MCNC: 3.9 G/DL (ref 3.5–5.2)
ALP SERPL-CCNC: 92 U/L (ref 40–129)
ALT SERPL W P-5'-P-CCNC: 19 U/L (ref 10–50)
ANION GAP SERPL CALCULATED.3IONS-SCNC: 12 MMOL/L (ref 7–15)
AST SERPL W P-5'-P-CCNC: 17 U/L (ref 10–50)
BASOPHILS # BLD AUTO: 0.1 10E3/UL (ref 0–0.2)
BASOPHILS # BLD MANUAL: 0.2 10E3/UL (ref 0–0.2)
BASOPHILS NFR BLD AUTO: 1 %
BASOPHILS NFR BLD MANUAL: 1 %
BILIRUB SERPL-MCNC: 0.5 MG/DL
BUN SERPL-MCNC: 10.3 MG/DL (ref 8–23)
CALCIUM SERPL-MCNC: 9 MG/DL (ref 8.8–10.2)
CHLORIDE SERPL-SCNC: 98 MMOL/L (ref 98–107)
CHOLEST SERPL-MCNC: 137 MG/DL
CREAT SERPL-MCNC: 0.97 MG/DL (ref 0.67–1.17)
CRP SERPL-MCNC: 16.5 MG/L
DEPRECATED HCO3 PLAS-SCNC: 25 MMOL/L (ref 22–29)
EOSINOPHIL # BLD AUTO: 11.9 10E3/UL (ref 0–0.7)
EOSINOPHIL # BLD MANUAL: 10.9 10E3/UL (ref 0–0.7)
EOSINOPHIL NFR BLD AUTO: 50 %
EOSINOPHIL NFR BLD MANUAL: 46 %
ERYTHROCYTE [DISTWIDTH] IN BLOOD BY AUTOMATED COUNT: 13.1 % (ref 10–15)
ERYTHROCYTE [DISTWIDTH] IN BLOOD BY AUTOMATED COUNT: 13.2 % (ref 10–15)
ERYTHROCYTE [SEDIMENTATION RATE] IN BLOOD BY WESTERGREN METHOD: 7 MM/HR (ref 0–20)
GFR SERPL CREATININE-BSD FRML MDRD: 89 ML/MIN/1.73M2
GLUCOSE SERPL-MCNC: 104 MG/DL (ref 70–99)
HBA1C MFR BLD: 5.1 % (ref 0–5.6)
HCT VFR BLD AUTO: 42.7 % (ref 40–53)
HCT VFR BLD AUTO: 43.5 % (ref 40–53)
HDLC SERPL-MCNC: 46 MG/DL
HGB BLD-MCNC: 14.7 G/DL (ref 13.3–17.7)
HGB BLD-MCNC: 14.8 G/DL (ref 13.3–17.7)
IMM GRANULOCYTES # BLD: 0.1 10E3/UL
IMM GRANULOCYTES NFR BLD: 0 %
LDLC SERPL CALC-MCNC: 75 MG/DL
LYMPHOCYTES # BLD AUTO: 2.4 10E3/UL (ref 0.8–5.3)
LYMPHOCYTES # BLD MANUAL: 3.1 10E3/UL (ref 0.8–5.3)
LYMPHOCYTES NFR BLD AUTO: 10 %
LYMPHOCYTES NFR BLD MANUAL: 13 %
MCH RBC QN AUTO: 32.4 PG (ref 26.5–33)
MCH RBC QN AUTO: 32.7 PG (ref 26.5–33)
MCHC RBC AUTO-ENTMCNC: 34 G/DL (ref 31.5–36.5)
MCHC RBC AUTO-ENTMCNC: 34.4 G/DL (ref 31.5–36.5)
MCV RBC AUTO: 94 FL (ref 78–100)
MCV RBC AUTO: 96 FL (ref 78–100)
MONOCYTES # BLD AUTO: 1 10E3/UL (ref 0–1.3)
MONOCYTES # BLD MANUAL: 0.7 10E3/UL (ref 0–1.3)
MONOCYTES NFR BLD AUTO: 4 %
MONOCYTES NFR BLD MANUAL: 3 %
NEUTROPHILS # BLD AUTO: 8 10E3/UL (ref 1.6–8.3)
NEUTROPHILS # BLD MANUAL: 8.8 10E3/UL (ref 1.6–8.3)
NEUTROPHILS NFR BLD AUTO: 34 %
NEUTROPHILS NFR BLD MANUAL: 37 %
NONHDLC SERPL-MCNC: 91 MG/DL
PLAT MORPH BLD: ABNORMAL
PLATELET # BLD AUTO: 293 10E3/UL (ref 150–450)
PLATELET # BLD AUTO: 313 10E3/UL (ref 150–450)
POTASSIUM SERPL-SCNC: 5 MMOL/L (ref 3.4–5.3)
PROT SERPL-MCNC: 7 G/DL (ref 6.4–8.3)
PSA SERPL DL<=0.01 NG/ML-MCNC: 3.73 NG/ML (ref 0–4.5)
RBC # BLD AUTO: 4.52 10E6/UL (ref 4.4–5.9)
RBC # BLD AUTO: 4.54 10E6/UL (ref 4.4–5.9)
RBC MORPH BLD: ABNORMAL
SODIUM SERPL-SCNC: 135 MMOL/L (ref 136–145)
TRIGL SERPL-MCNC: 80 MG/DL
TSH SERPL DL<=0.005 MIU/L-ACNC: 0.99 UIU/ML (ref 0.3–4.2)
WBC # BLD AUTO: 23.1 10E3/UL (ref 4–11)
WBC # BLD AUTO: 23.7 10E3/UL (ref 4–11)

## 2023-05-10 PROCEDURE — 80050 GENERAL HEALTH PANEL: CPT

## 2023-05-10 PROCEDURE — G0103 PSA SCREENING: HCPCS

## 2023-05-10 PROCEDURE — 71046 X-RAY EXAM CHEST 2 VIEWS: CPT | Mod: TC | Performed by: RADIOLOGY

## 2023-05-10 PROCEDURE — 36415 COLL VENOUS BLD VENIPUNCTURE: CPT

## 2023-05-10 PROCEDURE — 86140 C-REACTIVE PROTEIN: CPT

## 2023-05-10 PROCEDURE — 86431 RHEUMATOID FACTOR QUANT: CPT

## 2023-05-10 PROCEDURE — 80061 LIPID PANEL: CPT

## 2023-05-10 PROCEDURE — 86038 ANTINUCLEAR ANTIBODIES: CPT

## 2023-05-10 PROCEDURE — 85652 RBC SED RATE AUTOMATED: CPT

## 2023-05-10 PROCEDURE — 85007 BL SMEAR W/DIFF WBC COUNT: CPT

## 2023-05-10 PROCEDURE — 87389 HIV-1 AG W/HIV-1&-2 AB AG IA: CPT

## 2023-05-10 PROCEDURE — 86803 HEPATITIS C AB TEST: CPT

## 2023-05-10 PROCEDURE — 83036 HEMOGLOBIN GLYCOSYLATED A1C: CPT

## 2023-05-10 PROCEDURE — 99386 PREV VISIT NEW AGE 40-64: CPT

## 2023-05-10 PROCEDURE — 99214 OFFICE O/P EST MOD 30 MIN: CPT | Mod: 25

## 2023-05-10 RX ORDER — TAMSULOSIN HYDROCHLORIDE 0.4 MG/1
0.4 CAPSULE ORAL DAILY
Qty: 90 CAPSULE | Refills: 1 | Status: SHIPPED | OUTPATIENT
Start: 2023-05-10 | End: 2023-05-10

## 2023-05-10 RX ORDER — LISINOPRIL 5 MG/1
5 TABLET ORAL DAILY
Qty: 90 TABLET | Refills: 1 | Status: SHIPPED | OUTPATIENT
Start: 2023-05-10 | End: 2023-08-11

## 2023-05-10 ASSESSMENT — ENCOUNTER SYMPTOMS
JOINT SWELLING: 1
CHILLS: 0
ARTHRALGIAS: 1
HEADACHES: 0
NERVOUS/ANXIOUS: 0
SORE THROAT: 0
COUGH: 1
CONSTIPATION: 0
EYE PAIN: 0
FEVER: 0
PARESTHESIAS: 1
WEAKNESS: 0
DIZZINESS: 0
FREQUENCY: 0
ABDOMINAL PAIN: 0
SHORTNESS OF BREATH: 0
NAUSEA: 0
MYALGIAS: 1
PALPITATIONS: 0
DYSURIA: 0
DIARRHEA: 0
HEARTBURN: 0
HEMATOCHEZIA: 0
HEMATURIA: 0

## 2023-05-10 NOTE — PATIENT INSTRUCTIONS
Carpal tunnel wrist brace    Flonase nasal spray may help with cough    Preventive Health Recommendations  Male Ages 50 - 64    Yearly exam:             See your health care provider every year in order to  o   Review health changes.   o   Discuss preventive care.    o   Review your medicines if your doctor has prescribed any.   Have a cholesterol test every 5 years, or more frequently if you are at risk for high cholesterol/heart disease.   Have a diabetes test (fasting glucose) every three years. If you are at risk for diabetes, you should have this test more often.   Have a colonoscopy at age 50, or have a yearly FIT test (stool test). These exams will check for colon cancer.    Talk with your health care provider about whether or not a prostate cancer screening test (PSA) is right for you.  You should be tested each year for STDs (sexually transmitted diseases), if you re at risk.     Shots: Get a flu shot each year. Get a tetanus shot every 10 years.     Nutrition:  Eat at least 5 servings of fruits and vegetables daily.   Eat whole-grain bread, whole-wheat pasta and brown rice instead of white grains and rice.   Get adequate Calcium and Vitamin D.     Lifestyle  Exercise for at least 150 minutes a week (30 minutes a day, 5 days a week). This will help you control your weight and prevent disease.   Limit alcohol to one drink per day.   No smoking.   Wear sunscreen to prevent skin cancer.   See your dentist every six months for an exam and cleaning.   See your eye doctor every 1 to 2 years.

## 2023-05-10 NOTE — PROGRESS NOTES
psa  SUBJECTIVE:   CC: Artie is an 62 year old who presents for preventative health visit.       5/10/2023     8:29 AM   Additional Questions   Roomed by Mei Martinez MA   Accompanied by Himself   Patient has been advised of split billing requirements and indicates understanding: Yes     Healthy Habits:     Getting at least 3 servings of Calcium per day:  NO    Bi-annual eye exam:  NO    Dental care twice a year:  NO    Sleep apnea or symptoms of sleep apnea:  None    Diet:  Regular (no restrictions)    Frequency of exercise:  2-3 days/week    Duration of exercise:  Less than 15 minutes    Taking medications regularly:  Yes    Medication side effects:  Not applicable    PHQ-2 Total Score: 0    Additional concerns today:  Yes      Today's PHQ-2 Score:       2023     1:59 PM   PHQ-2 (  [employer])   Q1: Little interest or pleasure in doing things 0   Q2: Feeling down, depressed or hopeless 0   PHQ-2 Score 0   Q1: Little interest or pleasure in doing things Not at all   Q2: Feeling down, depressed or hopeless Not at all   PHQ-2 Score 0       Have you ever done Advance Care Planning? (For example, a Health Directive, POLST, or a discussion with a medical provider or your loved ones about your wishes): No, advance care planning information given to patient to review.  Advanced care planning was discussed at today's visit.    Social History     Tobacco Use     Smoking status: Former     Types: Cigarettes     Quit date: 12/10/2005     Years since quittin.4     Smokeless tobacco: Never   Vaping Use     Vaping status: Never Used   Substance Use Topics     Alcohol use: Yes     Comment: 2 beer / week             2023     1:58 PM   Alcohol Use   Prescreen: >3 drinks/day or >7 drinks/week? No          View : No data to display.                Last PSA:   PSA   Date Value Ref Range Status   2019 0.73 0 - 4 ug/L Final     Comment:     Assay Method:  Chemiluminescence using Siemens Vista analyzer       Reviewed  orders with patient. Reviewed health maintenance and updated orders accordingly - Yes    Reviewed and updated as needed this visit by clinical staff   Tobacco  Allergies  Meds              Reviewed and updated as needed this visit by Provider                 Past Medical History:   Diagnosis Date     Cardiomyopathy (H)      Congestive heart failure (H)      Problem list name updated by automated process. Provider to review     Congestive heart failure, unspecified      Gastro-oesophageal reflux disease      Hyperlipidemia 7/19/2019     Hypertension      Penile discharge      Vitamin D deficiency 7/9/2010      Past Surgical History:   Procedure Laterality Date     ANGIOGRAM  1/6/05    left dominant coronary system with essentially normal coronary arteries, trivial plaque, severe dilated cardiomyopathy, left ventricle hypertrophy and severely hypokinetic, asynchrony or desynchrony of LV function     ENT SURGERY      polyps from throat     EXCISE LESION FOOT  12/16/2013    Procedure: EXCISE LESION FOOT;  Scar revision of Right 3rd toe       SOFT TISSUE SURGERY      neuroma from right foot       Review of Systems   Constitutional: Negative for chills and fever.   HENT: Negative for congestion, ear pain, hearing loss and sore throat.    Eyes: Negative for pain and visual disturbance.   Respiratory: Positive for cough. Negative for shortness of breath.    Cardiovascular: Negative for chest pain, palpitations and peripheral edema.   Gastrointestinal: Negative for abdominal pain, constipation, diarrhea, heartburn, hematochezia and nausea.   Genitourinary: Negative for dysuria, frequency, genital sores, hematuria, impotence, penile discharge and urgency.   Musculoskeletal: Positive for arthralgias, joint swelling and myalgias.   Skin: Positive for rash.   Neurological: Positive for paresthesias. Negative for dizziness, weakness and headaches.   Psychiatric/Behavioral: Negative for mood changes. The patient is not  "nervous/anxious.      6 months  Numbness in hands. When sleeping he feels his hand tingling. Feels very still in the morning  phallens test is positive for tingling. Clumsiness in hands when he picks things up he drops them at times.  He has changed position while sleeping at night and that has improved symptoms   Joints swollen in     Cough- over one month. Patient does smoke. Cough comes and goes. It is not productive. No fever or chills, no SOB    OBJECTIVE:   /61 (BP Location: Right arm, Patient Position: Sitting, Cuff Size: Adult Regular)   Pulse 70   Temp 98.1  F (36.7  C) (Oral)   Resp 20   Ht 1.822 m (5' 11.75\")   Wt 72.1 kg (159 lb)   SpO2 99%   BMI 21.71 kg/m      Physical Exam  GENERAL: alert and no distress  EYES: Eyes grossly normal to inspection  HENT: ear canals and TM's normal, nose and mouth without ulcers or lesions  NECK: no adenopathy, no asymmetry, masses, or scars and thyroid normal to palpation  RESP: lungs clear to auscultation - no rales, rhonchi or wheezes  CV: regular rate and rhythm, normal S1 S2, no S3 or S4, no murmur, click or rub, no peripheral edema and peripheral pulses strong  ABDOMEN: soft, nontender, no hepatosplenomegaly, no masses and bowel sounds normal  MS: no gross musculoskeletal defects noted, no edema  SKIN: no suspicious lesions or rashes, dermatitis on LE  NEURO: Normal strength and tone, mentation intact and speech normal  PSYCH: mentation appears normal, affect normal/bright      ASSESSMENT/PLAN:   (Z00.00) Routine general medical examination at a health care facility  (primary encounter diagnosis)  Comment: routine  Plan: TSH WITH FREE T4 REFLEX, Comprehensive         metabolic panel (BMP + Alb, Alk Phos, ALT, AST,        Total. Bili, TP)          (Z11.4) Screening for HIV (human immunodeficiency virus)  Comment: screening  Plan: HIV Antigen Antibody Combo            (Z11.59) Need for hepatitis C screening test  Comment: screening  Plan: Hepatitis C " Screen Reflex to HCV RNA Quant and         Genotype            (I10) Primary hypertension  Comment: Under control today on current regimen.  Patient BP is low but he is asymptomatic.  Continue meds  Plan:     (E78.2) Mixed hyperlipidemia  Comment: Update lipids  Plan:     (I50.9) Congestive heart failure, unspecified HF chronicity, unspecified heart failure type (H)  Comment: Is going to follow up with cardiology.  Asymptomatic at this point  Plan: Lipid panel reflex to direct LDL Non-fasting,         CBC with Platelets            (M25.442) Swelling of finger joint of left hand  Comment: Patient has morning stiffness and swollen joints in bilateral upper extremities.  Will check for autoimmune and refer to hand specialist  Plan: Orthopedic  Referral, Anti Nuclear Nelda        IgG by IFA with Reflex, Rheumatoid factor, ESR:        Erythrocyte sedimentation rate, CRP,         inflammation            (M25.441) Finger joint swelling, right  Comment: As above  Plan: Orthopedic  Referral, Anti Nuclear Nelda        IgG by IFA with Reflex, Rheumatoid factor, ESR:        Erythrocyte sedimentation rate, CRP,         inflammation            (R20.0,  R20.2) Numbness and tingling in both hands  Comment: As above  Plan: Orthopedic  Referral, Anti Nuclear Nelda        IgG by IFA with Reflex, Rheumatoid factor, ESR:        Erythrocyte sedimentation rate, CRP,         inflammation            (Z12.11) Screen for colon cancer  Comment: Screening  Plan: Colonoscopy Screening  Referral            (M25.649) Morning joint stiffness of hand, unspecified laterality  Comment: Complains of morning stiffness in hands that gets better throughout the day  Plan: ortho consult    (L30.9) Dermatitis  Comment: on LE. Has steroid cream that has helped. Will follow up with derm  Plan:     (R05.3) Chronic cough  Comment: intermittent cough. Non-productive. No fever or chills  Plan: XR Chest 2 Views            (Z12.5)  Screening PSA (prostate specific antigen)  Comment: screening  Plan: PSA, screen            COUNSELING:   Reviewed preventive health counseling, as reflected in patient instructions        He reports that he quit smoking about 17 years ago. His smoking use included cigarettes. He has never used smokeless tobacco.      Moshe Nieves NP  [facility]

## 2023-05-11 DIAGNOSIS — D72.10 EOSINOPHILIA, UNSPECIFIED TYPE: Primary | ICD-10-CM

## 2023-05-11 DIAGNOSIS — R79.82 ELEVATED C-REACTIVE PROTEIN (CRP): ICD-10-CM

## 2023-05-11 LAB
ANA SER QL IF: NEGATIVE
HCV AB SERPL QL IA: NONREACTIVE
HIV 1+2 AB+HIV1 P24 AG SERPL QL IA: NONREACTIVE
RHEUMATOID FACT SER NEPH-ACNC: <7 IU/ML

## 2023-05-12 ENCOUNTER — PATIENT OUTREACH (OUTPATIENT)
Dept: ONCOLOGY | Facility: CLINIC | Age: 63
End: 2023-05-12
Payer: COMMERCIAL

## 2023-05-12 NOTE — PROGRESS NOTES
Referral received for benign heme services, see below.    Referral reason: Eosinophilia - chronic    Current abnormal labs: Available in Chart Review    Outreach: MyChart sent to patient    Plan: Triage instructions updated and sent to [facility] for completion.

## 2023-05-15 ENCOUNTER — TRANSFERRED RECORDS (OUTPATIENT)
Dept: HEALTH INFORMATION MANAGEMENT | Facility: CLINIC | Age: 63
End: 2023-05-15

## 2023-05-19 ENCOUNTER — LAB (OUTPATIENT)
Dept: LAB | Facility: CLINIC | Age: 63
End: 2023-05-19
Payer: COMMERCIAL

## 2023-05-19 DIAGNOSIS — R79.82 ELEVATED C-REACTIVE PROTEIN (CRP): ICD-10-CM

## 2023-05-19 DIAGNOSIS — D72.10 EOSINOPHILIA, UNSPECIFIED TYPE: ICD-10-CM

## 2023-05-19 LAB
BASOPHILS # BLD MANUAL: 0 10E3/UL (ref 0–0.2)
BASOPHILS NFR BLD MANUAL: 0 %
CRP SERPL-MCNC: 11.37 MG/L
EOSINOPHIL # BLD MANUAL: 10.2 10E3/UL (ref 0–0.7)
EOSINOPHIL NFR BLD MANUAL: 40 %
ERYTHROCYTE [DISTWIDTH] IN BLOOD BY AUTOMATED COUNT: 13.4 % (ref 10–15)
HCT VFR BLD AUTO: 42.9 % (ref 40–53)
HGB BLD-MCNC: 14.5 G/DL (ref 13.3–17.7)
LYMPHOCYTES # BLD MANUAL: 4.1 10E3/UL (ref 0.8–5.3)
LYMPHOCYTES NFR BLD MANUAL: 16 %
MCH RBC QN AUTO: 32.7 PG (ref 26.5–33)
MCHC RBC AUTO-ENTMCNC: 33.8 G/DL (ref 31.5–36.5)
MCV RBC AUTO: 97 FL (ref 78–100)
MONOCYTES # BLD MANUAL: 1 10E3/UL (ref 0–1.3)
MONOCYTES NFR BLD MANUAL: 4 %
NEUTROPHILS # BLD MANUAL: 10.2 10E3/UL (ref 1.6–8.3)
NEUTROPHILS NFR BLD MANUAL: 40 %
PATH REV: ABNORMAL
PLAT MORPH BLD: ABNORMAL
PLATELET # BLD AUTO: 292 10E3/UL (ref 150–450)
RBC # BLD AUTO: 4.43 10E6/UL (ref 4.4–5.9)
RBC MORPH BLD: ABNORMAL
WBC # BLD AUTO: 25.6 10E3/UL (ref 4–11)

## 2023-05-19 PROCEDURE — 85007 BL SMEAR W/DIFF WBC COUNT: CPT

## 2023-05-19 PROCEDURE — 36415 COLL VENOUS BLD VENIPUNCTURE: CPT

## 2023-05-19 PROCEDURE — 85027 COMPLETE CBC AUTOMATED: CPT

## 2023-05-19 PROCEDURE — 86140 C-REACTIVE PROTEIN: CPT

## 2023-06-12 ENCOUNTER — TELEPHONE (OUTPATIENT)
Dept: CARDIOLOGY | Facility: CLINIC | Age: 63
End: 2023-06-12
Payer: COMMERCIAL

## 2023-06-12 DIAGNOSIS — I50.9 CHF (CONGESTIVE HEART FAILURE) (H): ICD-10-CM

## 2023-06-12 NOTE — TELEPHONE ENCOUNTER
Health Call Center    Phone Message    May a detailed message be left on voicemail: yes     Reason for Call: Other: Artie called requesting to speak with Dr. Ordonez or another member of his care team regarding some concerns he has about his health. Artie has been noticing his BP has been dropping and then going back to normal then dropping again. He also had labs done with his Primary back in May and they noticed some odd blood counts. Please reach out to Artie to discuss. Thank you!     Action Taken: Other: Cardiology    Travel Screening: Not Applicable     Thank you!  [facility]

## 2023-06-13 RX ORDER — SPIRONOLACTONE 25 MG/1
12.5 TABLET ORAL DAILY
Qty: 1 TABLET | Refills: 0 | COMMUNITY
Start: 2023-06-13 | End: 2023-07-26

## 2023-06-13 NOTE — TELEPHONE ENCOUNTER
"RN reviewed with patient his concerns. Patient reported to RN that he has had some abnormal lab work (elevated WBC count and CRP) and his PMD recommended f/u with hematology. Patient reports to RN that his BP's have been running consistently low in the morning and into the early afternoon (systolic often <90). Patient reports his BP will \"normalize\" in the afternoon to low 100's/115 range. Patient reports his HR on average is in the 80's. Patient is currently taking carvedilol 25mg BID, lisinopril 5mg daily and spironolactone 12.5 mg daily. Patient reports that in the morning and early afternoon when his BP is low he has some dizziness and light headedness. RN advised patient that this RN would send updates to Dr. Ordonez for further review and recommendation and that we would call him with that update. Patient verbalized understanding and is in agreement with plan.         "

## 2023-06-14 ENCOUNTER — TELEPHONE (OUTPATIENT)
Dept: CARDIOLOGY | Facility: CLINIC | Age: 63
End: 2023-06-14

## 2023-06-14 ENCOUNTER — OFFICE VISIT (OUTPATIENT)
Dept: ORTHOPEDICS | Facility: CLINIC | Age: 63
End: 2023-06-14
Payer: COMMERCIAL

## 2023-06-14 VITALS — WEIGHT: 156 LBS | BODY MASS INDEX: 21.13 KG/M2 | HEIGHT: 72 IN

## 2023-06-14 DIAGNOSIS — M25.442 SWELLING OF FINGER JOINT OF LEFT HAND: Primary | ICD-10-CM

## 2023-06-14 DIAGNOSIS — R20.2 NUMBNESS AND TINGLING IN BOTH HANDS: ICD-10-CM

## 2023-06-14 DIAGNOSIS — G56.03 BILATERAL CARPAL TUNNEL SYNDROME: ICD-10-CM

## 2023-06-14 DIAGNOSIS — M25.441 FINGER JOINT SWELLING, RIGHT: ICD-10-CM

## 2023-06-14 DIAGNOSIS — R20.0 NUMBNESS AND TINGLING IN BOTH HANDS: ICD-10-CM

## 2023-06-14 PROCEDURE — 99204 OFFICE O/P NEW MOD 45 MIN: CPT | Performed by: FAMILY MEDICINE

## 2023-06-14 RX ORDER — CELECOXIB 100 MG/1
100 CAPSULE ORAL 2 TIMES DAILY PRN
Qty: 60 CAPSULE | Refills: 1 | Status: SHIPPED | OUTPATIENT
Start: 2023-06-14 | End: 2023-08-09

## 2023-06-14 NOTE — PATIENT INSTRUCTIONS
1. Swelling of finger joint of left hand    2. Finger joint swelling, right    3. Numbness and tingling in both hands    4. Bilateral carpal tunnel syndrome      - Patient has bilateral chronic numbness, tingling and weakness due to carpal tunnel syndrome  -Patient will purchase over-the-counter cock-up wrist braces.  Patient was shown options online  -Patient will start Celebrex 100mg twice a day as needed  -Patient will start formal hand therapy and home exercise program  -Patietn will follow up in 4-6 weeks for re-evaluation.  -Call direct clinic number [[phone]] at any time with questions or concerns.    Albert Yeo MD [facility]  [facility]

## 2023-06-14 NOTE — TELEPHONE ENCOUNTER
[facility] Call Center    Phone Message    May a detailed message be left on voicemail: yes     Reason for Call: Other: Pt states was prescribed a medication by Ortho and wants to make sure he is ok to take it, please reach out to pt to further assist.     Action Taken: Other: Cardiology    Travel Screening: Not Applicable     Thank you!  [facility]

## 2023-06-14 NOTE — LETTER
6/14/2023         RE: Alvarez Bess  [address] [facility] [address]        Dear Colleague,    Thank you for referring your patient, Alvarez Bess, to the [facility] [place]. Please see a copy of my visit note below.    ASSESSMENT & PLAN  Patient Instructions     1. Swelling of finger joint of left hand    2. Finger joint swelling, right    3. Numbness and tingling in both hands    4. Bilateral carpal tunnel syndrome      - Patient has bilateral chronic numbness, tingling and weakness due to carpal tunnel syndrome  -Patient will purchase over-the-counter cock-up wrist braces.  Patient was shown options online  -Patient will start Celebrex 100mg twice a day as needed  -Patient will start formal hand therapy and home exercise program  -Patietn will follow up in 4-6 weeks for re-evaluation.  -Call direct clinic number [[phone]] at any time with questions or concerns.    Albert Yeo MD [facility]  [facility]          -----    SUBJECTIVE  Alvarez Bess is a/an 62 year old Right handed male who is seen in consultation at the request of  Moshe Nieves N.P. for evaluation of bilateral hand pain and numbness / tingling (R>L). The patient is seen by themselves.    Onset: 6 month(s) ago. Reports insidious onset without acute precipitating event.  Location of Pain: bilateral hands with pain radiating / shooting up arms to shoulders, neck  Rating of Pain at worst: 10/10  Rating of Pain Currently: 6/10  Worsened by: pain is worse at night, pain wakes him at night,  / grasp  Better with: nothing  Treatments tried: rest/activity avoidance, ice and Tylenol  Associated symptoms: burning, numbness and tingling in bilateral hands - mainly thumb, 2nd and 3rd fingers and palm, and weakness of both hands, stiffness in the morning  Orthopedic history: YES - patient also complains of neck pain ongoing for 2-3 months  "- has been seeing chiropractor - no relief  Relevant surgical history: NO  Social history: social history: works - owns [employer] business    Past Medical History:   Diagnosis Date     Cardiomyopathy (H)      Congestive heart failure (H)      Problem list name updated by automated process. Provider to review     Congestive heart failure, unspecified      Gastro-oesophageal reflux disease      Hyperlipidemia 2019     Hypertension      Penile discharge      Vitamin D deficiency 2010     Social History     Socioeconomic History     Marital status: Single   Tobacco Use     Smoking status: Some Days     Types: Cigarettes     Last attempt to quit: 12/10/2005     Years since quittin.5     Smokeless tobacco: Never   Vaping Use     Vaping status: Never Used   Substance and Sexual Activity     Alcohol use: Yes     Comment: 2 beer day     Drug use: No     Sexual activity: Not Currently   Other Topics Concern     Parent/sibling w/ CABG, MI or angioplasty before 65F 55M? No         Patient's past medical, surgical, social, and family histories were reviewed today and no changes are noted.    REVIEW OF SYSTEMS:  10 point ROS is negative other than symptoms noted above in HPI, Past Medical History or as stated below  Constitutional: NEGATIVE for fever, chills, change in weight  Skin: NEGATIVE for worrisome rashes, moles or lesions  GI/: NEGATIVE for bowel or bladder changes  Neuro: NEGATIVE for weakness, dizziness or paresthesias    OBJECTIVE:  Ht 1.822 m (5' 11.75\")   Wt 70.8 kg (156 lb)   BMI 21.31 kg/m     General: healthy, alert and in no distress  HEENT: no scleral icterus or conjunctival erythema  Skin: no suspicious lesions or rash. No jaundice.  CV: regular rhythm by palpation  Resp: normal respiratory effort without conversational dyspnea   Psych: normal mood and affect  Gait: normal steady gait with appropriate coordination and balance  Neuro: normal light touch sensory exam of the bilateral hands.  "   MSK:  BILATERALBILATERAL HAND  Inspection:    No swelling or obvious deformity or asymmetry  Palpation:   Carpals: normal   Metacarpals: normal   Thumb: normal   Fingers: normal  Range of Motion:    Full active flexion and extension at MCP, PIP, and DIP joints; normal finger cascade without malrotation.  Wrist pronation, supination, and ulnar/radial deviation normal.  Strength:  Grossly intact  Special Tests:    Positive: Tinel's    Negative: Phalen's    Independent visualization of the below image:  No results found for this or any previous visit (from the past 24 hour(s)).          Albert Yeo MD [facility]        Again, thank you for allowing me to participate in the care of your patient.        Sincerely,        Albert Yeo, MD

## 2023-06-14 NOTE — TELEPHONE ENCOUNTER
Patient called and inquired if Dr. Ordonez had any concerns with him taking celebrex along with his cardiac meds (spironolactone, lisinopril and carvedilol). RN did not see any severe interactions on micromedex, but will send to Dr. Ordonez for review.

## 2023-06-14 NOTE — PROGRESS NOTES
ASSESSMENT & PLAN  Patient Instructions     1. Swelling of finger joint of left hand    2. Finger joint swelling, right    3. Numbness and tingling in both hands    4. Bilateral carpal tunnel syndrome      - Patient has bilateral chronic numbness, tingling and weakness due to carpal tunnel syndrome  -Patient will purchase over-the-counter cock-up wrist braces.  Patient was shown options online  -Patient will start Celebrex 100mg twice a day as needed  -Patient will start formal hand therapy and home exercise program  -Patietn will follow up in 4-6 weeks for re-evaluation.  -Call direct clinic number [[phone]] at any time with questions or concerns.    Albert Yeo MD [facility] Orthopedics and Sports Medicine  [facility]          -----    SUBJECTIVE  Alvarez Bess is a/an 62 year old Right handed male who is seen in consultation at the request of  Moshe Nieves N.P. for evaluation of bilateral hand pain and numbness / tingling (R>L). The patient is seen by themselves.    Onset: 6 month(s) ago. Reports insidious onset without acute precipitating event.  Location of Pain: bilateral hands with pain radiating / shooting up arms to shoulders, neck  Rating of Pain at worst: 10/10  Rating of Pain Currently: 6/10  Worsened by: pain is worse at night, pain wakes him at night,  / grasp  Better with: nothing  Treatments tried: rest/activity avoidance, ice and Tylenol  Associated symptoms: burning, numbness and tingling in bilateral hands - mainly thumb, 2nd and 3rd fingers and palm, and weakness of both hands, stiffness in the morning  Orthopedic history: YES - patient also complains of neck pain ongoing for 2-3 months - has been seeing chiropractor - no relief  Relevant surgical history: NO  Social history: social history: works - owns printing business    Past Medical History:   Diagnosis Date     Cardiomyopathy (H)      Congestive heart failure (H)      Problem list name updated by  "automated process. Provider to review     Congestive heart failure, unspecified      Gastro-oesophageal reflux disease      Hyperlipidemia 2019     Hypertension      Penile discharge      Vitamin D deficiency 2010     Social History     Socioeconomic History     Marital status: Single   Tobacco Use     Smoking status: Some Days     Types: Cigarettes     Last attempt to quit: 12/10/2005     Years since quittin.5     Smokeless tobacco: Never   Vaping Use     Vaping status: Never Used   Substance and Sexual Activity     Alcohol use: Yes     Comment: 2 beer day     Drug use: No     Sexual activity: Not Currently   Other Topics Concern     Parent/sibling w/ CABG, MI or angioplasty before 65F 55M? No         Patient's past medical, surgical, social, and family histories were reviewed today and no changes are noted.    REVIEW OF SYSTEMS:  10 point ROS is negative other than symptoms noted above in HPI, Past Medical History or as stated below  Constitutional: NEGATIVE for fever, chills, change in weight  Skin: NEGATIVE for worrisome rashes, moles or lesions  GI/: NEGATIVE for bowel or bladder changes  Neuro: NEGATIVE for weakness, dizziness or paresthesias    OBJECTIVE:  Ht 1.822 m (5' 11.75\")   Wt 70.8 kg (156 lb)   BMI 21.31 kg/m     General: healthy, alert and in no distress  HEENT: no scleral icterus or conjunctival erythema  Skin: no suspicious lesions or rash. No jaundice.  CV: regular rhythm by palpation  Resp: normal respiratory effort without conversational dyspnea   Psych: normal mood and affect  Gait: normal steady gait with appropriate coordination and balance  Neuro: normal light touch sensory exam of the bilateral hands.    MSK:  BILATERALBILATERAL HAND  Inspection:    No swelling or obvious deformity or asymmetry  Palpation:   Carpals: normal   Metacarpals: normal   Thumb: normal   Fingers: normal  Range of Motion:    Full active flexion and extension at MCP, PIP, and DIP joints; normal " finger cascade without malrotation.  Wrist pronation, supination, and ulnar/radial deviation normal.  Strength:  Grossly intact  Special Tests:    Positive: Tinel's    Negative: Phalen's    Independent visualization of the below image:  No results found for this or any previous visit (from the past 24 hour(s)).          Albert Yeo MD [facility] Sports and Orthopedic Care

## 2023-06-15 NOTE — TELEPHONE ENCOUNTER
RN called patient and left detailed VM with Dr. Ordonez's recommendations below and advised patient in VM to callback with any questions.       Can sometimes cause fluid retention but I'm okay with him using it.... if he gets more swelling or shortness of breath (on med) would repeat limited echo.     Dr. Ordonez

## 2023-06-16 NOTE — TELEPHONE ENCOUNTER
RECORDS STATUS - ALL OTHER DIAGNOSIS      RECORDS RECEIVED FROM: Epic   DATE RECEIVED:    NOTES STATUS DETAILS   OFFICE NOTE from referring provider Epic 05/10/23: Moshe Nieves NP   MEDICATION LIST [facility]    LABS     PATHOLOGY REPORTS     ANYTHING RELATED TO DIAGNOSIS Epic Most recent 05/19/23

## 2023-06-27 ENCOUNTER — VIRTUAL VISIT (OUTPATIENT)
Dept: ONCOLOGY | Facility: CLINIC | Age: 63
End: 2023-06-27
Payer: COMMERCIAL

## 2023-06-27 ENCOUNTER — PRE VISIT (OUTPATIENT)
Dept: ONCOLOGY | Facility: CLINIC | Age: 63
End: 2023-06-27
Payer: COMMERCIAL

## 2023-06-27 DIAGNOSIS — D72.10 EOSINOPHILIA, UNSPECIFIED TYPE: ICD-10-CM

## 2023-06-27 PROCEDURE — 99205 OFFICE O/P NEW HI 60 MIN: CPT | Mod: VID | Performed by: INTERNAL MEDICINE

## 2023-06-27 RX ORDER — BETAMETHASONE DIPROPIONATE 0.5 MG/G
CREAM TOPICAL
COMMUNITY
Start: 2023-06-25 | End: 2023-12-13

## 2023-06-27 NOTE — Clinical Note
"    6/27/2023         RE: Alvarez Bess  [address] [facility] [address]        Dear Colleague,    Thank you for referring your patient, Alvarez Bess, to the [facility]. Please see a copy of my visit note below.    Virtual Visit Details    Type of service:  Video Visit     Originating Location (pt. Location): {video visit patient location:902654::\"Home\"}  {PROVIDER LOCATION On-site should be selected for visits conducted from your clinic location or adjoining [facility] hospital, academic office, or other nearby [facility] building. Off-site should be selected for all other provider locations, including home:506064}  Distant Location (provider location):  {virtual location provider:909183}  Platform used for Video Visit: {Virtual Visit Platforms:837563::\"[email or web address]\"}    [facility]    Hematology/Oncology New Patient Note      Today's Date: 06/27/23    Reason for Consult: eosinophilia      HISTORY OF PRESENT ILLNESS: Alvarez is a very pleasant 62-year-old male who is here for further evaluation of eosinophilia and elevated white count.  Patient states that he has had 14 pound unintentional weight loss over the last year .  He denies any night sweats or weight loss.  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  He is following up with dermatology.  Patient had an elevated white count since 2014 December   his white count was at 12.9.   Most recently his white count Is a 25.6 absolute neutrophils at 10.2 absolute eosinophils at 10.2 CRP level at 11.371-month ago it was at 16.5 sedimentation rate at 7.  His hemoglobin is normal at 14.7 platelet count of 2.93    REVIEW OF SYSTEMS:   14 point ROS was reviewed and is negative other than as noted above in HPI.       HOME MEDICATIONS:  Current Outpatient Medications   Medication Sig Dispense Refill     acetaminophen (TYLENOL) 325 MG tablet Take 2 tablets (650 mg) by mouth " every 4 hours as needed for other (mild pain) 100 tablet 0     augmented betamethasone dipropionate (DIPROLENE AF) 0.05 % external cream        carvedilol (COREG) 25 MG tablet Take 1 tablet (25 mg) by mouth 2 times daily (with meals) 180 tablet 3     lisinopril (ZESTRIL) 5 MG tablet Take 1 tablet (5 mg) by mouth daily 90 tablet 1     omeprazole (PRILOSEC OTC) 20 MG EC tablet Take 20 mg by mouth daily       Probiotic Product (PROBIOTIC DAILY PO)        spironolactone (ALDACTONE) 25 MG tablet Take 0.5 tablets (12.5 mg) by mouth daily 1 tablet 0     celecoxib (CELEBREX) 100 MG capsule Take 1 capsule (100 mg) by mouth 2 times daily as needed for moderate pain (Patient not taking: Reported on 6/27/2023) 60 capsule 1         ALLERGIES:  Allergies   Allergen Reactions     Morphine Hcl      Extreme agitation         PAST MEDICAL HISTORY:  Past Medical History:   Diagnosis Date     Cardiomyopathy (H)      Congestive heart failure (H)      Problem list name updated by automated process. Provider to review     Congestive heart failure, unspecified      Gastro-oesophageal reflux disease      Hyperlipidemia 7/19/2019     Hypertension      Penile discharge      Vitamin D deficiency 7/9/2010         PAST SURGICAL HISTORY:  Past Surgical History:   Procedure Laterality Date     ANGIOGRAM  1/6/05    left dominant coronary system with essentially normal coronary arteries, trivial plaque, severe dilated cardiomyopathy, left ventricle hypertrophy and severely hypokinetic, asynchrony or desynchrony of LV function     ENT SURGERY      polyps from throat     EXCISE LESION FOOT  12/16/2013    Procedure: EXCISE LESION FOOT;  Scar revision of Right 3rd toe       SOFT TISSUE SURGERY      neuroma from right foot         SOCIAL HISTORY:  Social History     Socioeconomic History     Marital status: Single     Spouse name: Not on file     Number of children: Not on file     Years of education: Not on file     Highest education level: Not on file    Occupational History     Not on file   Tobacco Use     Smoking status: Some Days     Types: Cigarettes     Last attempt to quit: 12/10/2005     Years since quittin.5     Smokeless tobacco: Never   Vaping Use     Vaping Use: Never used   Substance and Sexual Activity     Alcohol use: Yes     Comment: 2 beer day     Drug use: No     Sexual activity: Not Currently   Other Topics Concern     Parent/sibling w/ CABG, MI or angioplasty before 65F 55M? No   Social History Narrative     Not on file     Social Determinants of Health     Financial Resource Strain: Not on file   Food Insecurity: Not on file   Transportation Needs: Not on file   Physical Activity: Not on file   Stress: Not on file   Social Connections: Not on file   Intimate Partner Violence: Not on file   Housing Stability: Not on file   Smokes on and off.  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  Brother prostate cancer.  Father had melanoma      PHYSICAL EXAM:  Vital signs:  There were no vitals taken for this visit.   ECO  ECO  GENERAL/CONSTITUTIONAL: No acute distress. Healthy, alert.  EYES: No scleral icterus.  No redness or discharge.    RESPIRATORY: No audible wheeze, cough, or visible cyanosis.  No visible retractions or increased work of breathing.  Able to speak fully in complete sentences.  MUSCULOSKELETAL: Normal range of motion.  NEUROLOGIC: Alert, oriented, answers questions appropriately. No tremor. Mentation intact and speech normal  INTEGUMENTARY: No jaundice.  No obvious rash or skin lesions.  PSYCHIATRIC:  Mentation appears normal, affect normal/bright, judgement and insight intact, normal speech and appearance well-groomed.    The rest of a comprehensive physical exam is deferred due to public health emergency video visit  restrictions.ce      LABS:  CBC RESULTS:   Recent Labs   Lab Test 05/19/23  0833   WBC 25.6*   RBC 4.43   HGB 14.5   HCT 42.9   MCV 97   MCH 32.7   MCHC 33.8   RDW 13.4          Recent Labs   Lab Test 05/10/23  0922   *   POTASSIUM 5.0   CHLORIDE 98   CO2 25   ANIONGAP 12   *   BUN 10.3   CR 0.97   PAULINA 9.0         PATHOLOGY:  na    IMAGING:  na    ASSESSMENT/PLAN:  Alvarez is a very pleasant 62-year-old male who is here for further evaluation of leukocytosis, eosinophilia and neutrophilia    Concerning findings are unintentional weight loss and a rash that has been ongoing.  We will get a copy of his report.  Dermatology reportk I would like to obtain a CT of chest abdomen pelvis to rule out malignancy with an elevated CRP, unintentional weight loss.  In addition to that we will obtain peripheral flow, peripheral smear, CML work-up.  If the above does not explain the process I will obtain a bone marrow biopsy    1.  Eosinophilia/leukocytosis plan as above see me back after resulted and hopefully 1 to 2 weeks    2.  Rash: We will get the dermatology report.  He states it was biopsied at [facility] in [place]         Total time spent on day of visit, including review of tests, obtaining/reviewing separately obtained history, ordering medications/tests/procedures, communicating with PCP/consultants, and documenting in electronic medical record: 60 minutes        Vasyl Case MD  Hematology/Oncology  [facility]      Again, thank you for allowing me to participate in the care of your patient.        Sincerely,        Vasyl Case MD

## 2023-06-27 NOTE — NURSING NOTE
Is the patient currently in the state of [place]? YES    Visit mode:VIDEO    If the visit is dropped, the patient can be reconnected by: VIDEO VISIT: Send to e-mail at: [email or web address]    Will anyone else be joining the visit? NO      How would you like to obtain your AVS? MyChart    Are changes needed to the allergy or medication list? NO    Reason for visit: Consult      Viviana MARTINEZ

## 2023-06-27 NOTE — PROGRESS NOTES
[facility]    Hematology/Oncology New Patient Note      Today's Date: 06/27/23    Reason for Consult: eosinophilia      HISTORY OF PRESENT ILLNESS: Alvarez is a very pleasant 62-year-old male who is here for further evaluation of eosinophilia and elevated white count.  Patient states that he has had 14 pound unintentional weight loss over the last year .  He denies any night sweats or weight loss.  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  He is following up with dermatology.  Patient had an elevated white count since 2014 December   his white count was at 12.9.   Most recently his white count Is a 25.6 absolute neutrophils at 10.2 absolute eosinophils at 10.2 CRP level at 11.371-month ago it was at 16.5 sedimentation rate at 7.  His hemoglobin is normal at 14.7 platelet count of 2.93    REVIEW OF SYSTEMS:   14 point ROS was reviewed and is negative other than as noted above in HPI.       HOME MEDICATIONS:  Current Outpatient Medications   Medication Sig Dispense Refill     acetaminophen (TYLENOL) 325 MG tablet Take 2 tablets (650 mg) by mouth every 4 hours as needed for other (mild pain) 100 tablet 0     augmented betamethasone dipropionate (DIPROLENE AF) 0.05 % external cream        carvedilol (COREG) 25 MG tablet Take 1 tablet (25 mg) by mouth 2 times daily (with meals) 180 tablet 3     lisinopril (ZESTRIL) 5 MG tablet Take 1 tablet (5 mg) by mouth daily 90 tablet 1     omeprazole (PRILOSEC OTC) 20 MG EC tablet Take 20 mg by mouth daily       Probiotic Product (PROBIOTIC DAILY PO)        spironolactone (ALDACTONE) 25 MG tablet Take 0.5 tablets (12.5 mg) by mouth daily 1 tablet 0     celecoxib (CELEBREX) 100 MG capsule Take 1 capsule (100 mg) by mouth 2 times daily as needed for moderate pain (Patient not taking: Reported on 6/27/2023) 60 capsule 1         ALLERGIES:  Allergies   Allergen Reactions     Morphine Hcl      Extreme agitation          PAST MEDICAL HISTORY:  Past Medical History:   Diagnosis Date     Cardiomyopathy (H)      Congestive heart failure (H)      Problem list name updated by automated process. Provider to review     Congestive heart failure, unspecified      Gastro-oesophageal reflux disease      Hyperlipidemia 2019     Hypertension      Penile discharge      Vitamin D deficiency 2010         PAST SURGICAL HISTORY:  Past Surgical History:   Procedure Laterality Date     ANGIOGRAM  05    left dominant coronary system with essentially normal coronary arteries, trivial plaque, severe dilated cardiomyopathy, left ventricle hypertrophy and severely hypokinetic, asynchrony or desynchrony of LV function     ENT SURGERY      polyps from throat     EXCISE LESION FOOT  2013    Procedure: EXCISE LESION FOOT;  Scar revision of Right 3rd toe       SOFT TISSUE SURGERY      neuroma from right foot         SOCIAL HISTORY:  Social History     Socioeconomic History     Marital status: Single     Spouse name: Not on file     Number of children: Not on file     Years of education: Not on file     Highest education level: Not on file   Occupational History     Not on file   Tobacco Use     Smoking status: Some Days     Types: Cigarettes     Last attempt to quit: 12/10/2005     Years since quittin.5     Smokeless tobacco: Never   Vaping Use     Vaping Use: Never used   Substance and Sexual Activity     Alcohol use: Yes     Comment: 2 beer day     Drug use: No     Sexual activity: Not Currently   Other Topics Concern     Parent/sibling w/ CABG, MI or angioplasty before 65F 55M? No   Social History Narrative     Not on file     Social Determinants of Health     Financial Resource Strain: Not on file   Food Insecurity: Not on file   Transportation Needs: Not on file   Physical Activity: Not on file   Stress: Not on file   Social Connections: Not on file   Intimate Partner Violence: Not on file   Housing Stability: Not on file    Smokes on and off.  Works at a [employer] shop drinks 2 beers a day      FAMILY HISTORY:  Family History   Problem Relation Age of Onset     Heart Disease Mother      Heart Disease Brother      Myocardial Infarction Brother    Mother had ovarian cancer.  Brother prostate cancer.  Father had melanoma      PHYSICAL EXAM:  Vital signs:  There were no vitals taken for this visit.   ECO  ECO  GENERAL/CONSTITUTIONAL: No acute distress. Healthy, alert.  EYES: No scleral icterus.  No redness or discharge.    RESPIRATORY: No audible wheeze, cough, or visible cyanosis.  No visible retractions or increased work of breathing.  Able to speak fully in complete sentences.  MUSCULOSKELETAL: Normal range of motion.  NEUROLOGIC: Alert, oriented, answers questions appropriately. No tremor. Mentation intact and speech normal  INTEGUMENTARY: No jaundice.  No obvious rash or skin lesions.  PSYCHIATRIC:  Mentation appears normal, affect normal/bright, judgement and insight intact, normal speech and appearance well-groomed.    The rest of a comprehensive physical exam is deferred due to public health emergency video visit restrictions.ce      LABS:  CBC RESULTS:   Recent Labs   Lab Test 23  0833   WBC 25.6*   RBC 4.43   HGB 14.5   HCT 42.9   MCV 97   MCH 32.7   MCHC 33.8   RDW 13.4          Recent Labs   Lab Test 05/10/23  0922   *   POTASSIUM 5.0   CHLORIDE 98   CO2 25   ANIONGAP 12   *   BUN 10.3   CR 0.97   PAULINA 9.0         PATHOLOGY:  na    IMAGING:  na    ASSESSMENT/PLAN:  Alvraez is a very pleasant 62-year-old male who is here for further evaluation of leukocytosis, eosinophilia and neutrophilia    Concerning findings are unintentional weight loss and a rash that has been ongoing.  We will get a copy of his report.  Dermatology reportk I would like to obtain a CT of chest abdomen pelvis to rule out malignancy with an elevated CRP, unintentional weight loss.  In addition to that we will obtain  peripheral flow, peripheral smear, CML work-up.  If the above does not explain the process I will obtain a bone marrow biopsy    1.  Eosinophilia/leukocytosis plan as above see me back after resulted and hopefully 1 to 2 weeks    2.  Rash: We will get the dermatology report.  He states it was biopsied at [facility] in [place]         Total time spent on day of visit, including review of tests, obtaining/reviewing separately obtained history, ordering medications/tests/procedures, communicating with PCP/consultants, and documenting in electronic medical record: 60 minutes        Vasyl Case MD  Hematology/Oncology  [facility]

## 2023-06-27 NOTE — Clinical Note
"    6/27/2023         RE: Alvarez Bess  [id number] [address] [id number]        Dear Colleague,    Thank you for referring your patient, Alvarez Bess, to the [facility]. Please see a copy of my visit note below.    Virtual Visit Details    Type of service:  Video Visit     Originating Location (pt. Location): {video visit patient location:210231::\"Home\"}  {PROVIDER LOCATION On-site should be selected for visits conducted from your clinic location or adjoining [facility] hospital, academic office, or other nearby [facility] building. Off-site should be selected for all other provider locations, including home:[id number]}  Distant Location (provider location):  {virtual location provider:751791}  Platform used for Video Visit: {Virtual Visit Platforms:607684::\"Rijuven\"}    [facility]    Hematology/Oncology New Patient Note      Today's Date: 06/27/23    Reason for Consult: eosinophilia      HISTORY OF PRESENT ILLNESS: Alvarez is a very pleasant 62-year-old male who is here for further evaluation of eosinophilia and elevated white count.  Patient states that he has had 14 pound unintentional weight loss over the last year .  He denies any night sweats or weight loss.  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  He is following up with dermatology.  Patient had an elevated white count since 2014 December   his white count was at 12.9.   Most recently his white count Is a 25.6 absolute neutrophils at 10.2 absolute eosinophils at 10.2 CRP level at 11.371-month ago it was at 16.5 sedimentation rate at 7.  His hemoglobin is normal at 14.7 platelet count of 2.93    REVIEW OF SYSTEMS:   14 point ROS was reviewed and is negative other than as noted above in HPI.       HOME MEDICATIONS:  Current Outpatient Medications   Medication Sig Dispense Refill     acetaminophen (TYLENOL) 325 MG tablet Take 2 tablets (650 mg) by mouth " every 4 hours as needed for other (mild pain) 100 tablet 0     augmented betamethasone dipropionate (DIPROLENE AF) 0.05 % external cream        carvedilol (COREG) 25 MG tablet Take 1 tablet (25 mg) by mouth 2 times daily (with meals) 180 tablet 3     lisinopril (ZESTRIL) 5 MG tablet Take 1 tablet (5 mg) by mouth daily 90 tablet 1     omeprazole (PRILOSEC OTC) 20 MG EC tablet Take 20 mg by mouth daily       Probiotic Product (PROBIOTIC DAILY PO)        spironolactone (ALDACTONE) 25 MG tablet Take 0.5 tablets (12.5 mg) by mouth daily 1 tablet 0     celecoxib (CELEBREX) 100 MG capsule Take 1 capsule (100 mg) by mouth 2 times daily as needed for moderate pain (Patient not taking: Reported on 6/27/2023) 60 capsule 1         ALLERGIES:  Allergies   Allergen Reactions     Morphine Hcl      Extreme agitation         PAST MEDICAL HISTORY:  Past Medical History:   Diagnosis Date     Cardiomyopathy (H)      Congestive heart failure (H)      Problem list name updated by automated process. Provider to review     Congestive heart failure, unspecified      Gastro-oesophageal reflux disease      Hyperlipidemia 7/19/2019     Hypertension      Penile discharge      Vitamin D deficiency 7/9/2010         PAST SURGICAL HISTORY:  Past Surgical History:   Procedure Laterality Date     ANGIOGRAM  1/6/05    left dominant coronary system with essentially normal coronary arteries, trivial plaque, severe dilated cardiomyopathy, left ventricle hypertrophy and severely hypokinetic, asynchrony or desynchrony of LV function     ENT SURGERY      polyps from throat     EXCISE LESION FOOT  12/16/2013    Procedure: EXCISE LESION FOOT;  Scar revision of Right 3rd toe       SOFT TISSUE SURGERY      neuroma from right foot         SOCIAL HISTORY:  Social History     Socioeconomic History     Marital status: Single     Spouse name: Not on file     Number of children: Not on file     Years of education: Not on file     Highest education level: Not on file    Occupational History     Not on file   Tobacco Use     Smoking status: Some Days     Types: Cigarettes     Last attempt to quit: 12/10/2005     Years since quittin.5     Smokeless tobacco: Never   Vaping Use     Vaping Use: Never used   Substance and Sexual Activity     Alcohol use: Yes     Comment: 2 beer day     Drug use: No     Sexual activity: Not Currently   Other Topics Concern     Parent/sibling w/ CABG, MI or angioplasty before 65F 55M? No   Social History Narrative     Not on file     Social Determinants of Health     Financial Resource Strain: Not on file   Food Insecurity: Not on file   Transportation Needs: Not on file   Physical Activity: Not on file   Stress: Not on file   Social Connections: Not on file   Intimate Partner Violence: Not on file   Housing Stability: Not on file   Smokes on and off.  Works at a [employer] shop drinks 2 beers a day      FAMILY HISTORY:  Family History   Problem Relation Age of Onset     Heart Disease Mother      Heart Disease Brother      Myocardial Infarction Brother    Mother had ovarian cancer.  Brother prostate cancer.  Father had melanoma      PHYSICAL EXAM:  Vital signs:  There were no vitals taken for this visit.   ECO  ECO  GENERAL/CONSTITUTIONAL: No acute distress. Healthy, alert.  EYES: No scleral icterus.  No redness or discharge.    RESPIRATORY: No audible wheeze, cough, or visible cyanosis.  No visible retractions or increased work of breathing.  Able to speak fully in complete sentences.  MUSCULOSKELETAL: Normal range of motion.  NEUROLOGIC: Alert, oriented, answers questions appropriately. No tremor. Mentation intact and speech normal  INTEGUMENTARY: No jaundice.  No obvious rash or skin lesions.  PSYCHIATRIC:  Mentation appears normal, affect normal/bright, judgement and insight intact, normal speech and appearance well-groomed.    The rest of a comprehensive physical exam is deferred due to public health emergency video visit  restrictions.ce      LABS:  CBC RESULTS:   Recent Labs   Lab Test 05/19/23  0833   WBC 25.6*   RBC 4.43   HGB 14.5   HCT 42.9   MCV 97   MCH 32.7   MCHC 33.8   RDW 13.4          Recent Labs   Lab Test 05/10/23  0922   *   POTASSIUM 5.0   CHLORIDE 98   CO2 25   ANIONGAP 12   *   BUN 10.3   CR 0.97   PAULINA 9.0         PATHOLOGY:  na    IMAGING:  na    ASSESSMENT/PLAN:  Alvarez is a very pleasant 62-year-old male who is here for further evaluation of leukocytosis, eosinophilia and neutrophilia    Concerning findings are unintentional weight loss and a rash that has been ongoing.  We will get a copy of his report.  Dermatology reportk I would like to obtain a CT of chest abdomen pelvis to rule out malignancy with an elevated CRP, unintentional weight loss.  In addition to that we will obtain peripheral flow, peripheral smear, CML work-up.  If the above does not explain the process I will obtain a bone marrow biopsy    1.  Eosinophilia/leukocytosis plan as above see me back after resulted and hopefully 1 to 2 weeks    2.  Rash: We will get the dermatology report.  He states it was biopsied at [facility] in [place]         Total time spent on day of visit, including review of tests, obtaining/reviewing separately obtained history, ordering medications/tests/procedures, communicating with PCP/consultants, and documenting in electronic medical record: 60 minutes        Vasyl Case MD  Hematology/Oncology  [facility]      Again, thank you for allowing me to participate in the care of your patient.        Sincerely,        Vasyl Case MD

## 2023-06-28 ENCOUNTER — TELEPHONE (OUTPATIENT)
Dept: ONCOLOGY | Facility: CLINIC | Age: 63
End: 2023-06-28
Payer: COMMERCIAL

## 2023-06-28 NOTE — TELEPHONE ENCOUNTER
Left voicemail message for patient requesting a return call regarding scheduling follow up with Dr Case, see appointment scheduled on 7/24/23.

## 2023-07-13 ENCOUNTER — HOSPITAL ENCOUNTER (OUTPATIENT)
Dept: CT IMAGING | Facility: CLINIC | Age: 63
Discharge: HOME | End: 2023-07-13
Attending: INTERNAL MEDICINE | Admitting: INTERNAL MEDICINE
Payer: COMMERCIAL

## 2023-07-13 ENCOUNTER — LAB (OUTPATIENT)
Dept: INFUSION THERAPY | Facility: CLINIC | Age: 63
End: 2023-07-13
Attending: INTERNAL MEDICINE
Payer: COMMERCIAL

## 2023-07-13 DIAGNOSIS — D72.10 EOSINOPHILIA, UNSPECIFIED TYPE: ICD-10-CM

## 2023-07-13 LAB
BASOPHILS # BLD MANUAL: 0 10E3/UL (ref 0–0.2)
BASOPHILS NFR BLD MANUAL: 0 %
EOSINOPHIL # BLD MANUAL: 13.5 10E3/UL (ref 0–0.7)
EOSINOPHIL NFR BLD MANUAL: 51 %
ERYTHROCYTE [DISTWIDTH] IN BLOOD BY AUTOMATED COUNT: 13.3 % (ref 10–15)
HCT VFR BLD AUTO: 42.4 % (ref 40–53)
HGB BLD-MCNC: 14.1 G/DL (ref 13.3–17.7)
INTERPRETATION: NORMAL
LYMPHOCYTES # BLD MANUAL: 2.4 10E3/UL (ref 0.8–5.3)
LYMPHOCYTES NFR BLD MANUAL: 9 %
MCH RBC QN AUTO: 32.4 PG (ref 26.5–33)
MCHC RBC AUTO-ENTMCNC: 33.3 G/DL (ref 31.5–36.5)
MCV RBC AUTO: 98 FL (ref 78–100)
MONOCYTES # BLD MANUAL: 1.3 10E3/UL (ref 0–1.3)
MONOCYTES NFR BLD MANUAL: 5 %
NEUTROPHILS # BLD MANUAL: 9.3 10E3/UL (ref 1.6–8.3)
NEUTROPHILS NFR BLD MANUAL: 35 %
PATH REPORT.COMMENTS IMP SPEC: NORMAL
PATH REPORT.COMMENTS IMP SPEC: NORMAL
PATH REPORT.FINAL DX SPEC: NORMAL
PATH REPORT.MICROSCOPIC SPEC OTHER STN: NORMAL
PATH REPORT.MICROSCOPIC SPEC OTHER STN: NORMAL
PATH REPORT.RELEVANT HX SPEC: NORMAL
PLAT MORPH BLD: ABNORMAL
PLATELET # BLD AUTO: 283 10E3/UL (ref 150–450)
RBC # BLD AUTO: 4.35 10E6/UL (ref 4.4–5.9)
RBC MORPH BLD: ABNORMAL
RETICS # AUTO: 0.08 10E6/UL (ref 0.03–0.1)
RETICS/RBC NFR AUTO: 1.9 % (ref 0.5–2)
SIGNIFICANT RESULTS: NORMAL
SPECIMEN DESCRIPTION: NORMAL
TEST DETAILS, MDL: NORMAL
WBC # BLD AUTO: 26.5 10E3/UL (ref 4–11)

## 2023-07-13 PROCEDURE — 74177 CT ABD & PELVIS W/CONTRAST: CPT

## 2023-07-13 PROCEDURE — 81339 MPL GENE SEQ ALYS EXON 10: CPT

## 2023-07-13 PROCEDURE — 81270 JAK2 GENE: CPT | Performed by: INTERNAL MEDICINE

## 2023-07-13 PROCEDURE — 250N000011 HC RX IP 250 OP 636: Performed by: RADIOLOGY

## 2023-07-13 PROCEDURE — 85060 BLOOD SMEAR INTERPRETATION: CPT | Performed by: PATHOLOGY

## 2023-07-13 PROCEDURE — 82785 ASSAY OF IGE: CPT | Performed by: INTERNAL MEDICINE

## 2023-07-13 PROCEDURE — 250N000009 HC RX 250: Performed by: RADIOLOGY

## 2023-07-13 PROCEDURE — 36415 COLL VENOUS BLD VENIPUNCTURE: CPT

## 2023-07-13 PROCEDURE — 85045 AUTOMATED RETICULOCYTE COUNT: CPT | Performed by: INTERNAL MEDICINE

## 2023-07-13 PROCEDURE — G0452 MOLECULAR PATHOLOGY INTERPR: HCPCS | Mod: 26 | Performed by: PATHOLOGY

## 2023-07-13 PROCEDURE — 85027 COMPLETE CBC AUTOMATED: CPT | Performed by: INTERNAL MEDICINE

## 2023-07-13 PROCEDURE — 85007 BL SMEAR W/DIFF WBC COUNT: CPT | Performed by: INTERNAL MEDICINE

## 2023-07-13 RX ORDER — IOPAMIDOL 755 MG/ML
500 INJECTION, SOLUTION INTRAVASCULAR ONCE
Status: COMPLETED | OUTPATIENT
Start: 2023-07-13 | End: 2023-07-13

## 2023-07-13 RX ADMIN — IOPAMIDOL 79 ML: 755 INJECTION, SOLUTION INTRAVENOUS at 08:44

## 2023-07-13 RX ADMIN — SODIUM CHLORIDE 60 ML: 9 INJECTION, SOLUTION INTRAVENOUS at 08:44

## 2023-07-13 NOTE — PROGRESS NOTES
Nursing Note:  Alvarez Bess presents today for labs/PIV placement  Patient seen by provider today: No   present during visit today: Not Applicable.    Note: N/A.    Intravenous Access:  Labs drawn without difficulty.  Peripheral IV placed.    Discharge Plan:   Patient was sent to imaging for CT appointment.    Grace Tobar RN

## 2023-07-14 LAB — IGE SERPL-ACNC: 448 KU/L (ref 0–114)

## 2023-07-19 LAB
INTERPRETATION: NORMAL
SIGNIFICANT RESULTS: NORMAL
SPECIMEN DESCRIPTION: NORMAL
TEST DETAILS, MDL: NORMAL

## 2023-07-21 ENCOUNTER — TELEPHONE (OUTPATIENT)
Dept: INTERNAL MEDICINE | Facility: CLINIC | Age: 63
End: 2023-07-21
Payer: COMMERCIAL

## 2023-07-24 ENCOUNTER — ONCOLOGY VISIT (OUTPATIENT)
Dept: ONCOLOGY | Facility: CLINIC | Age: 63
End: 2023-07-24
Attending: INTERNAL MEDICINE
Payer: COMMERCIAL

## 2023-07-24 VITALS
SYSTOLIC BLOOD PRESSURE: 135 MMHG | HEART RATE: 74 BPM | RESPIRATION RATE: 16 BRPM | HEIGHT: 71 IN | BODY MASS INDEX: 21.7 KG/M2 | DIASTOLIC BLOOD PRESSURE: 76 MMHG | WEIGHT: 155 LBS

## 2023-07-24 DIAGNOSIS — R59.1 LYMPHADENOPATHY: Primary | ICD-10-CM

## 2023-07-24 PROCEDURE — G0463 HOSPITAL OUTPT CLINIC VISIT: HCPCS | Performed by: INTERNAL MEDICINE

## 2023-07-24 PROCEDURE — 99215 OFFICE O/P EST HI 40 MIN: CPT | Performed by: INTERNAL MEDICINE

## 2023-07-24 ASSESSMENT — PAIN SCALES - GENERAL: PAINLEVEL: SEVERE PAIN (6)

## 2023-07-24 NOTE — PROGRESS NOTES
[facility]    Hematology/Oncology Return patient note    Today's Date: July 24, 2023    Reason for Consult: eosinophilia      HISTORY OF PRESENT ILLNESS: Alvarez is a very pleasant 62-year-old male who is here for further evaluation of eosinophilia and elevated white count.  Patient states that he has had 14 pound unintentional weight loss over the last year .  He denies any night sweats or weight loss.  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  He is following up with dermatology.  Patient had an elevated white count since 2014 December   his white count was at 12.9.   Most recently his white count Is a 25.6 absolute neutrophils at 10.2 absolute eosinophils at 10.2 CRP level at 11.371-month ago it was at 16.5 sedimentation rate at 7.  His hemoglobin is normal at 14.7 platelet count of 2.93    Interval history: Artie returns for follow-up today.  He states he has not been feeling well and he has a low blood pressure issue. He is on lisinopril and carvedilol and is being managed by Dr Ordonez.  He also continues to have a rash that is not improving with steroids he has not seen dermatology recently. I still dont have the dermatology report from previous     REVIEW OF SYSTEMS:   14 point ROS was reviewed and is negative other than as noted above in HPI.       HOME MEDICATIONS:  Current Outpatient Medications   Medication Sig Dispense Refill    acetaminophen (TYLENOL) 325 MG tablet Take 2 tablets (650 mg) by mouth every 4 hours as needed for other (mild pain) 100 tablet 0    augmented betamethasone dipropionate (DIPROLENE AF) 0.05 % external cream       carvedilol (COREG) 25 MG tablet Take 1 tablet (25 mg) by mouth 2 times daily (with meals) 180 tablet 3    lisinopril (ZESTRIL) 5 MG tablet Take 1 tablet (5 mg) by mouth daily 90 tablet 1    omeprazole (PRILOSEC OTC) 20 MG EC tablet Take 20 mg by mouth daily      Probiotic Product (PROBIOTIC DAILY  PO)       celecoxib (CELEBREX) 100 MG capsule Take 1 capsule (100 mg) by mouth 2 times daily as needed for moderate pain (Patient not taking: Reported on 2023) 60 capsule 1    spironolactone (ALDACTONE) 25 MG tablet Take 0.5 tablets (12.5 mg) by mouth daily (Patient not taking: Reported on 2023) 1 tablet 0         ALLERGIES:  Allergies   Allergen Reactions    Morphine Hcl      Extreme agitation         PAST MEDICAL HISTORY:  Past Medical History:   Diagnosis Date    Cardiomyopathy (H)     Congestive heart failure (H)      Problem list name updated by automated process. Provider to review    Congestive heart failure, unspecified     Gastro-oesophageal reflux disease     Hyperlipidemia 2019    Hypertension     Penile discharge     Vitamin D deficiency 2010         PAST SURGICAL HISTORY:  Past Surgical History:   Procedure Laterality Date    ANGIOGRAM  05    left dominant coronary system with essentially normal coronary arteries, trivial plaque, severe dilated cardiomyopathy, left ventricle hypertrophy and severely hypokinetic, asynchrony or desynchrony of LV function    ENT SURGERY      polyps from throat    EXCISE LESION FOOT  2013    Procedure: EXCISE LESION FOOT;  Scar revision of Right 3rd toe      SOFT TISSUE SURGERY      neuroma from right foot         SOCIAL HISTORY:  Social History     Socioeconomic History    Marital status: Single     Spouse name: Not on file    Number of children: Not on file    Years of education: Not on file    Highest education level: Not on file   Occupational History    Not on file   Tobacco Use    Smoking status: Some Days     Types: Cigarettes     Last attempt to quit: 12/10/2005     Years since quittin.6    Smokeless tobacco: Never   Vaping Use    Vaping Use: Never used   Substance and Sexual Activity    Alcohol use: Yes     Comment: 2 beer day    Drug use: No    Sexual activity: Not Currently   Other Topics Concern    Parent/sibling w/ CABG, MI or  angioplasty before 65F 55M? No   Social History Narrative    Not on file     Social Determinants of Health     Financial Resource Strain: Not on file   Food Insecurity: Not on file   Transportation Needs: Not on file   Physical Activity: Not on file   Stress: Not on file   Social Connections: Not on file   Intimate Partner Violence: Not on file   Housing Stability: Not on file   Smokes on and off.  Works at a [employer] shop drinks 2 beers a day      FAMILY HISTORY:  Family History   Problem Relation Age of Onset    Heart Disease Mother     Heart Disease Brother     Myocardial Infarction Brother    Mother had ovarian cancer.  Brother prostate cancer.  Father had melanoma      PHYSICAL EXAM:  Vital signs:  There were no vitals taken for this visit.   ECO     Erythematous maculopapular rash on the legs and back  Appears cachectic  Heart tachycardic  Lungs clear      LABS:  Noted and reviewed with the patient he has a detected alteration in MPL      PATHOLOGY:  na    IMAGIN. Diffuse colonic wall thickening predominantly of the descending and  sigmoid colon, of indeterminate etiology, can be seen with colitis.  2. Multiple bilateral enlarged axillary and inguinal lymph nodes, of  indeterminate etiology.  3. Small amount of free fluid in the pelvis, indeterminate, could be  reactive.  4. 2.3 cm left adrenal nodule, not significantly changed as compared  to 2014 exam, and is likely benign nodule likely adrenal adenoma.  5. A few scattered pulmonary nodules including a 3 mm posterior right  lower lobe nodule, as per Fleischner's Society criteria, for low risk  patient no routine follow-up is recommended, and for high-risk  patient, optional chest CT in 12 months can be considered.  6. Moderate atherosclerotic vascular calcification of the abdominal  aorta and iliac vessels.    ASSESSMENT/PLAN:  Alvarez is a very pleasant 62-year-old male who is here for further evaluation of leukocytosis, eosinophilia and  neutrophilia      JAK2 mutation negative.  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  Mutation is associated with AML, CMML, myeloproliferative neoplasm and primary myelofibrosis      1.  Eosinophilia/leukocytosis  with MPL mutation  -check bone marrow biopsy  2.  Rash:  -Refer to dermatology for biopsy    3.  Lymphadenopathy on CT scan   -PET/CT To assess SUV of the nodes    4 diffuse colonic thickening  -no diarrhea, refer to GI    5 2.3 adrenal nodule  -pet/ct     6 hypotension: hold lisinopril, follow up with Dr Ordonez he is informed    Total time spent on day of visit, including review of tests, obtaining/reviewing separately obtained history, ordering medications/tests/procedures, communicating with PCP/consultants, and documenting in electronic medical record: 45 minutes    Vasyl Case MD  Hematology/Oncology  [facility]

## 2023-07-24 NOTE — PROGRESS NOTES
"Oncology Rooming Note    July 24, 2023 10:51 AM   Alvarez Bess is a 62 year old male who presents for:    Chief Complaint   Patient presents with    Oncology Clinic Visit     Initial Vitals: There were no vitals taken for this visit. Estimated body mass index is 21.31 kg/m  as calculated from the following:    Height as of 6/14/23: 1.822 m (5' 11.75\").    Weight as of 6/14/23: 70.8 kg (156 lb). There is no height or weight on file to calculate BSA.  Data Unavailable Comment: Data Unavailable   No LMP for male patient.  Allergies reviewed: Yes  Medications reviewed: Yes    Medications: Medication refills not needed today.  Pharmacy name entered into [employer]: [id number] - [place], [place] - [address]    Clinical concerns: Artie has had feelings of dizziness and weakness. Today, I struggled on getting an O2 reading from him. After warming his hands, I was able to get a reading of 98.       Soledad Yip MA            "

## 2023-07-24 NOTE — LETTER
"    7/24/2023         RE: Alvarez Bess  [address] [facility] [address]        Dear Colleague,    Thank you for referring your patient, Alvarez Bess, to the [facility]. Please see a copy of my visit note below.    Oncology Rooming Note    July 24, 2023 10:51 AM   Alvarez Bess is a 62 year old male who presents for:    Chief Complaint   Patient presents with     Oncology Clinic Visit     Initial Vitals: There were no vitals taken for this visit. Estimated body mass index is 21.31 kg/m  as calculated from the following:    Height as of 6/14/23: 1.822 m (5' 11.75\").    Weight as of 6/14/23: 70.8 kg (156 lb). There is no height or weight on file to calculate BSA.  Data Unavailable Comment: Data Unavailable   No LMP for male patient.  Allergies reviewed: Yes  Medications reviewed: Yes    Medications: Medication refills not needed today.  Pharmacy name entered into [facility]: [id number] [place], [place] - [address] [facility] [address]    Clinical concerns: Artie has had feelings of dizziness and weakness. Today, I struggled on getting an O2 reading from him. After warming his hands, I was able to get a reading of 98.       Soledad Yip MA              [facility]    Hematology/Oncology Return patient note    Today's Date: July 24, 2023    Reason for Consult: eosinophilia      HISTORY OF PRESENT ILLNESS: Alvarez is a very pleasant 62-year-old male who is here for further evaluation of eosinophilia and elevated white count.  Patient states that he has had 14 pound unintentional weight loss over the last year .  He denies any night sweats or weight loss.  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  He is following up with dermatology.  Patient had an elevated white count since 2014 December   his white count was at 12.9.   Most recently his white count Is a 25.6 absolute " neutrophils at 10.2 absolute eosinophils at 10.2 CRP level at 11.371-month ago it was at 16.5 sedimentation rate at 7.  His hemoglobin is normal at 14.7 platelet count of 2.93    Interval history: Artie returns for follow-up today.  He states he has not been feeling well and he has a low blood pressure issue. He is on lisinopril and carvedilol and is being managed by Dr Ordonez.  He also continues to have a rash that is not improving with steroids he has not seen dermatology recently. I still dont have the dermatology report from previous     REVIEW OF SYSTEMS:   14 point ROS was reviewed and is negative other than as noted above in HPI.       HOME MEDICATIONS:  Current Outpatient Medications   Medication Sig Dispense Refill     acetaminophen (TYLENOL) 325 MG tablet Take 2 tablets (650 mg) by mouth every 4 hours as needed for other (mild pain) 100 tablet 0     augmented betamethasone dipropionate (DIPROLENE AF) 0.05 % external cream        carvedilol (COREG) 25 MG tablet Take 1 tablet (25 mg) by mouth 2 times daily (with meals) 180 tablet 3     lisinopril (ZESTRIL) 5 MG tablet Take 1 tablet (5 mg) by mouth daily 90 tablet 1     omeprazole (PRILOSEC OTC) 20 MG EC tablet Take 20 mg by mouth daily       Probiotic Product (PROBIOTIC DAILY PO)        celecoxib (CELEBREX) 100 MG capsule Take 1 capsule (100 mg) by mouth 2 times daily as needed for moderate pain (Patient not taking: Reported on 6/27/2023) 60 capsule 1     spironolactone (ALDACTONE) 25 MG tablet Take 0.5 tablets (12.5 mg) by mouth daily (Patient not taking: Reported on 7/24/2023) 1 tablet 0         ALLERGIES:  Allergies   Allergen Reactions     Morphine Hcl      Extreme agitation         PAST MEDICAL HISTORY:  Past Medical History:   Diagnosis Date     Cardiomyopathy (H)      Congestive heart failure (H)      Problem list name updated by automated process. Provider to review     Congestive heart failure, unspecified      Gastro-oesophageal reflux disease       Hyperlipidemia 2019     Hypertension      Penile discharge      Vitamin D deficiency 2010         PAST SURGICAL HISTORY:  Past Surgical History:   Procedure Laterality Date     ANGIOGRAM  05    left dominant coronary system with essentially normal coronary arteries, trivial plaque, severe dilated cardiomyopathy, left ventricle hypertrophy and severely hypokinetic, asynchrony or desynchrony of LV function     ENT SURGERY      polyps from throat     EXCISE LESION FOOT  2013    Procedure: EXCISE LESION FOOT;  Scar revision of Right 3rd toe       SOFT TISSUE SURGERY      neuroma from right foot         SOCIAL HISTORY:  Social History     Socioeconomic History     Marital status: Single     Spouse name: Not on file     Number of children: Not on file     Years of education: Not on file     Highest education level: Not on file   Occupational History     Not on file   Tobacco Use     Smoking status: Some Days     Types: Cigarettes     Last attempt to quit: 12/10/2005     Years since quittin.6     Smokeless tobacco: Never   Vaping Use     Vaping Use: Never used   Substance and Sexual Activity     Alcohol use: Yes     Comment: 2 beer day     Drug use: No     Sexual activity: Not Currently   Other Topics Concern     Parent/sibling w/ CABG, MI or angioplasty before 65F 55M? No   Social History Narrative     Not on file     Social Determinants of Health     Financial Resource Strain: Not on file   Food Insecurity: Not on file   Transportation Needs: Not on file   Physical Activity: Not on file   Stress: Not on file   Social Connections: Not on file   Intimate Partner Violence: Not on file   Housing Stability: Not on file   Smokes on and off.  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  Brother prostate cancer.  Father  had melanoma      PHYSICAL EXAM:  Vital signs:  There were no vitals taken for this visit.   ECO     Erythematous maculopapular rash on the legs and back  Appears cachectic  Heart tachycardic  Lungs clear      LABS:  Noted and reviewed with the patient he has a detected alteration in MPL      PATHOLOGY:  na    IMAGIN. Diffuse colonic wall thickening predominantly of the descending and  sigmoid colon, of indeterminate etiology, can be seen with colitis.  2. Multiple bilateral enlarged axillary and inguinal lymph nodes, of  indeterminate etiology.  3. Small amount of free fluid in the pelvis, indeterminate, could be  reactive.  4. 2.3 cm left adrenal nodule, not significantly changed as compared  to 2014 exam, and is likely benign nodule likely adrenal adenoma.  5. A few scattered pulmonary nodules including a 3 mm posterior right  lower lobe nodule, as per Fleischner's Society criteria, for low risk  patient no routine follow-up is recommended, and for high-risk  patient, optional chest CT in 12 months can be considered.  6. Moderate atherosclerotic vascular calcification of the abdominal  aorta and iliac vessels.    ASSESSMENT/PLAN:  Alvarez is a very pleasant 62-year-old male who is here for further evaluation of leukocytosis, eosinophilia and neutrophilia      JAK2 mutation negative.  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  Mutation is associated with AML, CMML, myeloproliferative neoplasm and primary myelofibrosis      1.  Eosinophilia/leukocytosis  with MPL mutation  -check bone marrow biopsy  2.  Rash:  -Refer to dermatology for biopsy    3.  Lymphadenopathy on CT scan   -PET/CT To assess SUV of the nodes    4 diffuse colonic thickening  -no diarrhea, refer to GI    5 2.3 adrenal nodule  -pet/ct     6 hypotension: hold lisinopril, follow up with Dr Ordonez he is informed    Total time spent on day of visit, including review of tests, obtaining/reviewing separately obtained history, ordering  medications/tests/procedures, communicating with PCP/consultants, and documenting in electronic medical record: 45 minutes    Vasyl Case MD  Hematology/Oncology  [facility]           Again, thank you for allowing me to participate in the care of your patient.        Sincerely,        Vasyl Case MD

## 2023-07-25 ENCOUNTER — TELEPHONE (OUTPATIENT)
Dept: CARDIOLOGY | Facility: CLINIC | Age: 63
End: 2023-07-25
Payer: COMMERCIAL

## 2023-07-25 ENCOUNTER — PREP FOR PROCEDURE (OUTPATIENT)
Dept: ONCOLOGY | Facility: CLINIC | Age: 63
End: 2023-07-25
Payer: COMMERCIAL

## 2023-07-25 DIAGNOSIS — I50.9 CHF (CONGESTIVE HEART FAILURE) (H): ICD-10-CM

## 2023-07-25 DIAGNOSIS — I42.9 CARDIOMYOPATHY (H): ICD-10-CM

## 2023-07-25 DIAGNOSIS — R59.1 LYMPHADENOPATHY: Primary | ICD-10-CM

## 2023-07-25 NOTE — TELEPHONE ENCOUNTER
Spoke with patient and he stated that Dr. Case reviewed his recent BPs (see mychart enc 7/19/23 for readings) and patient was instructed to hold his Lisinopril. He held his lisinopril starting today and so far his readings have been  4:30 am 79/60, 90/64, 1 pm 121/70, 135/73, 125/68.   Patient plans to continue holding his Lisinopril (continuing the Carvedilol) and monitoring his BP. He will send Dr. Ordonez an update of the readings in 1 week unless Dr. Ordonez has any other recommendations or concerns in the mean time.     Will route to Dr. Ordonez to update.

## 2023-07-25 NOTE — TELEPHONE ENCOUNTER
Health Call Center    Phone Message    May a detailed message be left on voicemail: yes     Reason for Call: Other: Pt stated his Oncologist took him off the isinopril (ZESTRIL) 5 MG tablet yesterday.  Pt wants to make sure it's ok w/Dr. Ordonez and for his heart to be off the isinopril (ZESTRIL) 5 MG tablet.  Please call pt back to discuss.     Action Taken: Message routed to:  [facility] cardio    Travel Screening: Not Applicable    Thank you!  [facility]

## 2023-07-28 ENCOUNTER — DOCUMENTATION ONLY (OUTPATIENT)
Dept: OTHER | Facility: CLINIC | Age: 63
End: 2023-07-28
Payer: COMMERCIAL

## 2023-07-28 PROBLEM — R59.1 LYMPHADENOPATHY: Status: ACTIVE | Noted: 2023-07-25

## 2023-08-08 ENCOUNTER — HOSPITAL ENCOUNTER (OUTPATIENT)
Dept: PET IMAGING | Facility: CLINIC | Age: 63
Discharge: HOME | End: 2023-08-08
Attending: INTERNAL MEDICINE | Admitting: INTERNAL MEDICINE
Payer: COMMERCIAL

## 2023-08-08 DIAGNOSIS — R59.1 LYMPHADENOPATHY: ICD-10-CM

## 2023-08-08 PROCEDURE — 78815 PET IMAGE W/CT SKULL-THIGH: CPT | Mod: PI

## 2023-08-08 PROCEDURE — 78815 PET IMAGE W/CT SKULL-THIGH: CPT | Mod: 26 | Performed by: STUDENT IN AN ORGANIZED HEALTH CARE EDUCATION/TRAINING PROGRAM

## 2023-08-08 PROCEDURE — A9552 F18 FDG: HCPCS | Performed by: INTERNAL MEDICINE

## 2023-08-08 PROCEDURE — 343N000001 HC RX 343: Performed by: INTERNAL MEDICINE

## 2023-08-08 RX ADMIN — FLUDEOXYGLUCOSE F-18 9.88 MILLICURIE: 500 INJECTION, SOLUTION INTRAVENOUS at 06:43

## 2023-08-11 ENCOUNTER — OFFICE VISIT (OUTPATIENT)
Dept: INTERNAL MEDICINE | Facility: CLINIC | Age: 63
End: 2023-08-11
Payer: COMMERCIAL

## 2023-08-11 VITALS
HEIGHT: 73 IN | WEIGHT: 156 LBS | TEMPERATURE: 98.3 F | SYSTOLIC BLOOD PRESSURE: 116 MMHG | DIASTOLIC BLOOD PRESSURE: 62 MMHG | OXYGEN SATURATION: 100 % | RESPIRATION RATE: 18 BRPM | HEART RATE: 88 BPM | BODY MASS INDEX: 20.67 KG/M2

## 2023-08-11 DIAGNOSIS — R59.1 LYMPHADENOPATHY: ICD-10-CM

## 2023-08-11 DIAGNOSIS — I10 PRIMARY HYPERTENSION: ICD-10-CM

## 2023-08-11 DIAGNOSIS — D72.10 EOSINOPHILIA, UNSPECIFIED TYPE: ICD-10-CM

## 2023-08-11 DIAGNOSIS — Z01.818 PREOP GENERAL PHYSICAL EXAM: Primary | ICD-10-CM

## 2023-08-11 LAB
ANION GAP SERPL CALCULATED.3IONS-SCNC: 10 MMOL/L (ref 7–15)
BASOPHILS # BLD MANUAL: 0.7 10E3/UL (ref 0–0.2)
BASOPHILS NFR BLD MANUAL: 2 %
BUN SERPL-MCNC: 10.8 MG/DL (ref 8–23)
CALCIUM SERPL-MCNC: 9.3 MG/DL (ref 8.8–10.2)
CHLORIDE SERPL-SCNC: 98 MMOL/L (ref 98–107)
CREAT SERPL-MCNC: 0.85 MG/DL (ref 0.67–1.17)
DEPRECATED HCO3 PLAS-SCNC: 28 MMOL/L (ref 22–29)
EOSINOPHIL # BLD MANUAL: 20.1 10E3/UL (ref 0–0.7)
EOSINOPHIL NFR BLD MANUAL: 61 %
ERYTHROCYTE [DISTWIDTH] IN BLOOD BY AUTOMATED COUNT: 13.2 % (ref 10–15)
GFR SERPL CREATININE-BSD FRML MDRD: >90 ML/MIN/1.73M2
GLUCOSE SERPL-MCNC: 63 MG/DL (ref 70–99)
HCT VFR BLD AUTO: 40.6 % (ref 40–53)
HGB BLD-MCNC: 13.7 G/DL (ref 13.3–17.7)
LYMPHOCYTES # BLD MANUAL: 3.9 10E3/UL (ref 0.8–5.3)
LYMPHOCYTES NFR BLD MANUAL: 12 %
MCH RBC QN AUTO: 32.4 PG (ref 26.5–33)
MCHC RBC AUTO-ENTMCNC: 33.7 G/DL (ref 31.5–36.5)
MCV RBC AUTO: 96 FL (ref 78–100)
MONOCYTES # BLD MANUAL: 0.7 10E3/UL (ref 0–1.3)
MONOCYTES NFR BLD MANUAL: 2 %
NEUTROPHILS # BLD MANUAL: 7.6 10E3/UL (ref 1.6–8.3)
NEUTROPHILS NFR BLD MANUAL: 23 %
PLAT MORPH BLD: ABNORMAL
PLATELET # BLD AUTO: 284 10E3/UL (ref 150–450)
POTASSIUM SERPL-SCNC: 4.2 MMOL/L (ref 3.4–5.3)
RBC # BLD AUTO: 4.23 10E6/UL (ref 4.4–5.9)
RBC MORPH BLD: ABNORMAL
SODIUM SERPL-SCNC: 136 MMOL/L (ref 136–145)
WBC # BLD AUTO: 32.9 10E3/UL (ref 4–11)

## 2023-08-11 PROCEDURE — 93000 ELECTROCARDIOGRAM COMPLETE: CPT | Performed by: NURSE PRACTITIONER

## 2023-08-11 PROCEDURE — 80048 BASIC METABOLIC PNL TOTAL CA: CPT | Performed by: NURSE PRACTITIONER

## 2023-08-11 PROCEDURE — 85007 BL SMEAR W/DIFF WBC COUNT: CPT | Performed by: NURSE PRACTITIONER

## 2023-08-11 PROCEDURE — 99203 OFFICE O/P NEW LOW 30 MIN: CPT | Performed by: NURSE PRACTITIONER

## 2023-08-11 PROCEDURE — 36415 COLL VENOUS BLD VENIPUNCTURE: CPT | Performed by: NURSE PRACTITIONER

## 2023-08-11 PROCEDURE — 85027 COMPLETE CBC AUTOMATED: CPT | Performed by: NURSE PRACTITIONER

## 2023-08-11 NOTE — PROGRESS NOTES
[place] [address]  [address]  [facility] [address]  Phone: [phone]  Primary Provider: Moshe Nieves  Pre-op Performing Provider: KADEN YO      PREOPERATIVE EVALUATION:  Today's date: 8/11/2023    Alvarez Bess is a 62 year old male who presents for a preoperative evaluation.      8/11/2023    10:02 AM   Additional Questions   Roomed by jluis SOTO       Surgical Information:  Surgery/Procedure: bone marrow biopsy  [place] [facility] [place]geon: Oli  Surgery Date: 8-15-23  Time of Surgery: 11 am  Where patient plans to recover: At home with family  Fax number for surgical facility: Note does not need to be faxed, will be available electronically in Epic.    Assessment & Plan     The proposed surgical procedure is considered LOW risk.    Preop general physical exam    - EKG 12-lead complete w/read - Clinics    Lymphadenopathy  He had a recent PET scan that recommended possible biopsy of axilla or inguinal lymph nodes with concern for lymphoma.  He will follow-up with his oncologist regarding this  - CBC with platelets and differential; Future  - CBC with platelets and differential    Eosinophilia, unspecified type  He has had abnormalities in his blood work and a bone marrow biopsy is recommended to define  - CBC with platelets and differential; Future  - CBC with platelets and differential    Primary hypertension  In good range on current medicines  - CBC with platelets and differential; Future  - Basic metabolic panel  (Ca, Cl, CO2, Creat, Gluc, K, Na, BUN); Future  - CBC with platelets and differential  - Basic metabolic panel  (Ca, Cl, CO2, Creat, Gluc, K, Na, BUN)            - No identified additional risk factors other than previously addressed    Antiplatelet or Anticoagulation Medication Instructions:   - Patient is on no antiplatelet or anticoagulation medications.    Additional Medication Instructions:  Patient is to take all scheduled  medications on the day of surgery    RECOMMENDATION:  APPROVAL GIVEN to proceed with proposed procedure, without further diagnostic evaluation.      21 minutes spent by me on the date of the encounter doing chart review, history and exam, documentation and further activities per the note      Subjective       HPI related to upcoming procedure: lymphadenopathy  He had a recent PET scan that showed some enlarged lymph nodes in the axilla and inguinal area.  A biopsy was recommended for those and he will discuss that with his oncologist.  He has had some eosinophilia and is having the bone marrow biopsy to kind to define his blood abnormalities.  He is being done under anesthesia and that is why he needs a preop    He has a remote history of heart failure in 2005, but has been well-controlled and in no episodes since that time.  He feels well, but a little tired.  His energy is little bit improved than it was few months ago.  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  He has an intermittent cough that is nonproductive with no fever or other symptoms of any kind of infection        8/11/2023     9:01 AM   Preop Questions   1. Have you ever had a heart attack or stroke? No   2. Have you ever had surgery on your heart or blood vessels, such as a stent placement, a coronary artery bypass, or surgery on an artery in your head, neck, heart, or legs? No   3. Do you have chest pain with activity? No   4. Do you have a history of  heart failure? YES - 2005- well controlled since   5. Do you currently have a cold, bronchitis or symptoms of other infection? UNKNOWN - occasional cough - not feeling sick     6. Do you have a cough, shortness of breath, or wheezing? YES - as above    7. Do you or anyone in your family have previous history of blood clots? No   8. Do you or does anyone in your family have a serious bleeding problem such as prolonged bleeding following  surgeries or cuts? No   9. Have you ever had problems with anemia or been told to take iron pills? No   10. Have you had any abnormal blood loss such as black, tarry or bloody stools? No   11. Have you ever had a blood transfusion? No   12. Are you willing to have a blood transfusion if it is medically needed before, during, or after your surgery? Yes   13. Have you or any of your relatives ever had problems with anesthesia? No   14. Do you have sleep apnea, excessive snoring or daytime drowsiness? No   15. Do you have any artifical heart valves or other implanted medical devices like a pacemaker, defibrillator, or continuous glucose monitor? No   16. Do you have artificial joints? No   17. Are you allergic to latex? No       Health Care Directive:  Patient has a Health Care Directive on file      Preoperative Review of :   reviewed - no record of controlled substances prescribed.          Review of Systems  CONSTITUTIONAL: NEGATIVE for fever, chills, change in weight  INTEGUMENTARY/SKIN: NEGATIVE for worrisome rashes, moles or lesions  EYES: NEGATIVE for vision changes or irritation  ENT/MOUTH: NEGATIVE for ear, mouth and throat problems  RESP: NEGATIVE for significant cough or SOB  CV: NEGATIVE for chest pain, palpitations or peripheral edema  GI: NEGATIVE for nausea, abdominal pain, heartburn, or change in bowel habits  : NEGATIVE for frequency, dysuria, or hematuria  MUSCULOSKELETAL: NEGATIVE for significant arthralgias or myalgia  NEURO: NEGATIVE for weakness, dizziness or paresthesias  ENDOCRINE: NEGATIVE for temperature intolerance, skin/hair changes  HEME: NEGATIVE for bleeding problems  PSYCHIATRIC: NEGATIVE for changes in mood or affect    Patient Active Problem List    Diagnosis Date Noted    Lymphadenopathy 07/25/2023     Priority: Medium    Hyperlipidemia 07/19/2019     Priority: Medium    Plantar wart of right foot 05/12/2016     Priority: Medium    Cardiomyopathy (H)      Priority: Medium     Congestive heart failure (H)      Priority: Medium     Problem list name updated by automated process. Provider to review      Gastroenteritis 09/17/2014     Priority: Medium    Hypertension 07/09/2010     Priority: Medium    IBS (irritable bowel syndrome) 07/09/2010     Priority: Medium    Impaired fasting glucose 07/09/2010     Priority: Medium    Vitamin D deficiency 07/09/2010     Priority: Medium      Past Medical History:   Diagnosis Date    Cardiomyopathy (H)     Congestive heart failure (H)      Problem list name updated by automated process. Provider to review    Congestive heart failure, unspecified     Gastro-oesophageal reflux disease     Hyperlipidemia 07/19/2019    Hypertension     Penile discharge     Vitamin D deficiency 07/09/2010     Past Surgical History:   Procedure Laterality Date    ANGIOGRAM  1/6/05    left dominant coronary system with essentially normal coronary arteries, trivial plaque, severe dilated cardiomyopathy, left ventricle hypertrophy and severely hypokinetic, asynchrony or desynchrony of LV function    ENT SURGERY      polyps from throat    EXCISE LESION FOOT  12/16/2013    Procedure: EXCISE LESION FOOT;  Scar revision of Right 3rd toe      SOFT TISSUE SURGERY      neuroma from right foot     Current Outpatient Medications   Medication Sig Dispense Refill    acetaminophen (TYLENOL) 325 MG tablet Take 2 tablets (650 mg) by mouth every 4 hours as needed for other (mild pain) 100 tablet 0    augmented betamethasone dipropionate (DIPROLENE AF) 0.05 % external cream       carvedilol (COREG) 25 MG tablet Take 1 tablet (25 mg) by mouth 2 times daily (with meals) 180 tablet 3    lisinopril (ZESTRIL) 5 MG tablet Take 1 tablet (5 mg) by mouth daily 90 tablet 1    omeprazole (PRILOSEC OTC) 20 MG EC tablet Take 20 mg by mouth daily      Probiotic Product (PROBIOTIC DAILY PO)          Allergies   Allergen Reactions    Morphine Hcl      Extreme agitation        Social History     Tobacco Use     Smoking status: Some Days     Packs/day: 0.50     Years: 10.00     Pack years: 5.00     Types: Cigarettes     Last attempt to quit: 12/10/2005     Years since quittin.6    Smokeless tobacco: Never   Substance Use Topics    Alcohol use: Yes     Comment: 2 beer day     Family History   Problem Relation Age of Onset    Heart Disease Mother     Heart Disease Brother     Myocardial Infarction Brother      History   Drug Use No         Objective     There were no vitals taken for this visit.    Physical Exam    GENERAL APPEARANCE: alert and no distress     HENT: ear canals and TM's normal and nose and mouth without ulcers or lesions     RESP: lungs clear to auscultation - no rales, rhonchi or wheezes     CV: regular rates and rhythm, normal S1 S2, no S3 or S4 and no murmur, click or rub     ABDOMEN:  soft, nontender, no HSM or masses and bowel sounds normal     MS: extremities normal- no gross deformities noted, no evidence of inflammation in joints, FROM in all extremities.     SKIN: no suspicious lesions or rashes     NEURO: Normal strength and tone, sensory exam grossly normal, mentation intact and speech normal     PSYCH: mentation appears normal. and affect normal/bright    Recent Labs   Lab Test 23  0805 23  0833 05/10/23  0922   HGB 14.1 14.5 14.8  14.7    292 313  293   NA  --   --  135*   POTASSIUM  --   --  5.0   CR  --   --  0.97   A1C  --   --  5.1        Diagnostics:  Labs pending at this time.  Results will be reviewed when available.   EKG: appears normal, NSR, unchanged from previous tracings    Revised Cardiac Risk Index (RCRI):  The patient has the following serious cardiovascular risks for perioperative complications:   - No serious cardiac risks = 0 points   Hisotyr heart failure  but no issues since that time     RCRI Interpretation: 1 point: Class II (low risk - 0.9% complication rate)         Signed Electronically by: CARLI Reed CNP  Copy of this  evaluation report is provided to requesting physician.

## 2023-08-11 NOTE — NURSING NOTE
"Chief Complaint   Patient presents with    Pre-Op Exam     initial /62   Pulse 88   Temp 98.3  F (36.8  C) (Oral)   Resp 18   Ht 1.854 m (6' 1\")   Wt 70.8 kg (156 lb)   SpO2 100%   BMI 20.58 kg/m   Estimated body mass index is 20.58 kg/m  as calculated from the following:    Height as of this encounter: 1.854 m (6' 1\").    Weight as of this encounter: 70.8 kg (156 lb)..  bp completed using cuff size large  PRO ARORA LPN  "

## 2023-08-11 NOTE — H&P (VIEW-ONLY)
[place] [address]  [address]  [facility] [address]  Phone: [phone]  Primary Provider: Moshe Nieves  Pre-op Performing Provider: KADEN YO      PREOPERATIVE EVALUATION:  Today's date: 8/11/2023    Alvarez Bess is a 62 year old male who presents for a preoperative evaluation.      8/11/2023    10:02 AM   Additional Questions   Roomed by jluis SOTO       Surgical Information:  Surgery/Procedure: bone marrow biopsy  Surgery Location: [facility] [place]  Surgeon: Oli  Surgery Date: 8-15-23  Time of Surgery: 11 am  Where patient plans to recover: At home with family  Fax number for surgical facility: Note does not need to be faxed, will be available electronically in Epic.    Assessment & Plan     The proposed surgical procedure is considered LOW risk.    Preop general physical exam    - EKG 12-lead complete w/read - Clinics    Lymphadenopathy  He had a recent PET scan that recommended possible biopsy of axilla or inguinal lymph nodes with concern for lymphoma.  He will follow-up with his oncologist regarding this  - CBC with platelets and differential; Future  - CBC with platelets and differential    Eosinophilia, unspecified type  He has had abnormalities in his blood work and a bone marrow biopsy is recommended to define  - CBC with platelets and differential; Future  - CBC with platelets and differential    Primary hypertension  In good range on current medicines  - CBC with platelets and differential; Future  - Basic metabolic panel  (Ca, Cl, CO2, Creat, Gluc, K, Na, BUN); Future  - CBC with platelets and differential  - Basic metabolic panel  (Ca, Cl, CO2, Creat, Gluc, K, Na, BUN)            - No identified additional risk factors other than previously addressed    Antiplatelet or Anticoagulation Medication Instructions:   - Patient is on no antiplatelet or anticoagulation medications.    Additional Medication Instructions:  Patient is to take all scheduled  medications on the day of surgery    RECOMMENDATION:  APPROVAL GIVEN to proceed with proposed procedure, without further diagnostic evaluation.      21 minutes spent by me on the date of the encounter doing chart review, history and exam, documentation and further activities per the note      Subjective       HPI related to upcoming procedure: lymphadenopathy  He had a recent PET scan that showed some enlarged lymph nodes in the axilla and inguinal area.  A biopsy was recommended for those and he will discuss that with his oncologist.  He has had some eosinophilia and is having the bone marrow biopsy to kind to define his blood abnormalities.  He is being done under anesthesia and that is why he needs a preop    He has a remote history of heart failure in 2005, but has been well-controlled and in no episodes since that time.  He feels well, but a little tired.  His energy is little bit improved than it was few months ago.  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  He has an intermittent cough that is nonproductive with no fever or other symptoms of any kind of infection        8/11/2023     9:01 AM   Preop Questions   1. Have you ever had a heart attack or stroke? No   2. Have you ever had surgery on your heart or blood vessels, such as a stent placement, a coronary artery bypass, or surgery on an artery in your head, neck, heart, or legs? No   3. Do you have chest pain with activity? No   4. Do you have a history of  heart failure? YES - 2005- well controlled since   5. Do you currently have a cold, bronchitis or symptoms of other infection? UNKNOWN - occasional cough - not feeling sick     6. Do you have a cough, shortness of breath, or wheezing? YES - as above    7. Do you or anyone in your family have previous history of blood clots? No   8. Do you or does anyone in your family have a serious bleeding problem such as prolonged bleeding following  surgeries or cuts? No   9. Have you ever had problems with anemia or been told to take iron pills? No   10. Have you had any abnormal blood loss such as black, tarry or bloody stools? No   11. Have you ever had a blood transfusion? No   12. Are you willing to have a blood transfusion if it is medically needed before, during, or after your surgery? Yes   13. Have you or any of your relatives ever had problems with anesthesia? No   14. Do you have sleep apnea, excessive snoring or daytime drowsiness? No   15. Do you have any artifical heart valves or other implanted medical devices like a pacemaker, defibrillator, or continuous glucose monitor? No   16. Do you have artificial joints? No   17. Are you allergic to latex? No       Health Care Directive:  Patient has a Health Care Directive on file      Preoperative Review of :   reviewed - no record of controlled substances prescribed.          Review of Systems  CONSTITUTIONAL: NEGATIVE for fever, chills, change in weight  INTEGUMENTARY/SKIN: NEGATIVE for worrisome rashes, moles or lesions  EYES: NEGATIVE for vision changes or irritation  ENT/MOUTH: NEGATIVE for ear, mouth and throat problems  RESP: NEGATIVE for significant cough or SOB  CV: NEGATIVE for chest pain, palpitations or peripheral edema  GI: NEGATIVE for nausea, abdominal pain, heartburn, or change in bowel habits  : NEGATIVE for frequency, dysuria, or hematuria  MUSCULOSKELETAL: NEGATIVE for significant arthralgias or myalgia  NEURO: NEGATIVE for weakness, dizziness or paresthesias  ENDOCRINE: NEGATIVE for temperature intolerance, skin/hair changes  HEME: NEGATIVE for bleeding problems  PSYCHIATRIC: NEGATIVE for changes in mood or affect    Patient Active Problem List    Diagnosis Date Noted    Lymphadenopathy 07/25/2023     Priority: Medium    Hyperlipidemia 07/19/2019     Priority: Medium    Plantar wart of right foot 05/12/2016     Priority: Medium    Cardiomyopathy (H)      Priority: Medium     Congestive heart failure (H)      Priority: Medium     Problem list name updated by automated process. Provider to review      Gastroenteritis 09/17/2014     Priority: Medium    Hypertension 07/09/2010     Priority: Medium    IBS (irritable bowel syndrome) 07/09/2010     Priority: Medium    Impaired fasting glucose 07/09/2010     Priority: Medium    Vitamin D deficiency 07/09/2010     Priority: Medium      Past Medical History:   Diagnosis Date    Cardiomyopathy (H)     Congestive heart failure (H)      Problem list name updated by automated process. Provider to review    Congestive heart failure, unspecified     Gastro-oesophageal reflux disease     Hyperlipidemia 07/19/2019    Hypertension     Penile discharge     Vitamin D deficiency 07/09/2010     Past Surgical History:   Procedure Laterality Date    ANGIOGRAM  1/6/05    left dominant coronary system with essentially normal coronary arteries, trivial plaque, severe dilated cardiomyopathy, left ventricle hypertrophy and severely hypokinetic, asynchrony or desynchrony of LV function    ENT SURGERY      polyps from throat    EXCISE LESION FOOT  12/16/2013    Procedure: EXCISE LESION FOOT;  Scar revision of Right 3rd toe      SOFT TISSUE SURGERY      neuroma from right foot     Current Outpatient Medications   Medication Sig Dispense Refill    acetaminophen (TYLENOL) 325 MG tablet Take 2 tablets (650 mg) by mouth every 4 hours as needed for other (mild pain) 100 tablet 0    augmented betamethasone dipropionate (DIPROLENE AF) 0.05 % external cream       carvedilol (COREG) 25 MG tablet Take 1 tablet (25 mg) by mouth 2 times daily (with meals) 180 tablet 3    lisinopril (ZESTRIL) 5 MG tablet Take 1 tablet (5 mg) by mouth daily 90 tablet 1    omeprazole (PRILOSEC OTC) 20 MG EC tablet Take 20 mg by mouth daily      Probiotic Product (PROBIOTIC DAILY PO)          Allergies   Allergen Reactions    Morphine Hcl      Extreme agitation        Social History     Tobacco Use     Smoking status: Some Days     Packs/day: 0.50     Years: 10.00     Pack years: 5.00     Types: Cigarettes     Last attempt to quit: 12/10/2005     Years since quittin.6    Smokeless tobacco: Never   Substance Use Topics    Alcohol use: Yes     Comment: 2 beer day     Family History   Problem Relation Age of Onset    Heart Disease Mother     Heart Disease Brother     Myocardial Infarction Brother      History   Drug Use No         Objective     There were no vitals taken for this visit.    Physical Exam    GENERAL APPEARANCE: alert and no distress     HENT: ear canals and TM's normal and nose and mouth without ulcers or lesions     RESP: lungs clear to auscultation - no rales, rhonchi or wheezes     CV: regular rates and rhythm, normal S1 S2, no S3 or S4 and no murmur, click or rub     ABDOMEN:  soft, nontender, no HSM or masses and bowel sounds normal     MS: extremities normal- no gross deformities noted, no evidence of inflammation in joints, FROM in all extremities.     SKIN: no suspicious lesions or rashes     NEURO: Normal strength and tone, sensory exam grossly normal, mentation intact and speech normal     PSYCH: mentation appears normal. and affect normal/bright    Recent Labs   Lab Test 23  0805 23  0833 05/10/23  0922   HGB 14.1 14.5 14.8  14.7    292 313  293   NA  --   --  135*   POTASSIUM  --   --  5.0   CR  --   --  0.97   A1C  --   --  5.1        Diagnostics:  Labs pending at this time.  Results will be reviewed when available.   EKG: appears normal, NSR, unchanged from previous tracings    Revised Cardiac Risk Index (RCRI):  The patient has the following serious cardiovascular risks for perioperative complications:   - No serious cardiac risks = 0 points   Hisotyr heart failure  but no issues since that time     RCRI Interpretation: 1 point: Class II (low risk - 0.9% complication rate)         Signed Electronically by: CARLI Reed CNP  Copy of this  evaluation report is provided to requesting physician.

## 2023-08-14 ENCOUNTER — ANESTHESIA EVENT (OUTPATIENT)
Dept: SURGERY | Facility: AMBULATORY SURGERY CENTER | Age: 63
End: 2023-08-14
Payer: COMMERCIAL

## 2023-08-14 ENCOUNTER — TELEPHONE (OUTPATIENT)
Dept: ONCOLOGY | Facility: CLINIC | Age: 63
End: 2023-08-14
Payer: COMMERCIAL

## 2023-08-14 RX ORDER — LIDOCAINE HYDROCHLORIDE 10 MG/ML
8-10 INJECTION, SOLUTION EPIDURAL; INFILTRATION; INTRACAUDAL; PERINEURAL
Status: CANCELLED | OUTPATIENT
Start: 2023-08-14

## 2023-08-14 RX ORDER — LIDOCAINE 40 MG/G
CREAM TOPICAL
Status: CANCELLED | OUTPATIENT
Start: 2023-08-14

## 2023-08-14 NOTE — TELEPHONE ENCOUNTER
Writer called Artie back regarding his [employer] message inquiring about his follow up results after his Bone marrow biopsy. Informed Artie that Dr. Case will review his bone marrow biopsy results at his follow up visit on 9/19/23 with Dr. Case. Margret Nicholas RN,BSN,OCN,CBCN

## 2023-08-15 ENCOUNTER — ANESTHESIA (OUTPATIENT)
Dept: SURGERY | Facility: AMBULATORY SURGERY CENTER | Age: 63
End: 2023-08-15
Payer: COMMERCIAL

## 2023-08-15 ENCOUNTER — OFFICE VISIT (OUTPATIENT)
Dept: ONCOLOGY | Facility: CLINIC | Age: 63
End: 2023-08-15
Attending: NURSE PRACTITIONER
Payer: COMMERCIAL

## 2023-08-15 ENCOUNTER — LAB (OUTPATIENT)
Dept: LAB | Facility: CLINIC | Age: 63
End: 2023-08-15
Attending: INTERNAL MEDICINE
Payer: COMMERCIAL

## 2023-08-15 ENCOUNTER — HOSPITAL ENCOUNTER (OUTPATIENT)
Facility: AMBULATORY SURGERY CENTER | Age: 63
Discharge: HOME | End: 2023-08-15
Attending: DENTIST
Payer: COMMERCIAL

## 2023-08-15 VITALS — HEART RATE: 90 BPM

## 2023-08-15 VITALS
DIASTOLIC BLOOD PRESSURE: 61 MMHG | RESPIRATION RATE: 14 BRPM | TEMPERATURE: 97.8 F | HEART RATE: 73 BPM | SYSTOLIC BLOOD PRESSURE: 124 MMHG | WEIGHT: 153 LBS | HEIGHT: 73 IN | BODY MASS INDEX: 20.28 KG/M2 | OXYGEN SATURATION: 98 %

## 2023-08-15 VITALS
RESPIRATION RATE: 16 BRPM | SYSTOLIC BLOOD PRESSURE: 135 MMHG | HEART RATE: 73 BPM | OXYGEN SATURATION: 99 % | TEMPERATURE: 98.6 F | DIASTOLIC BLOOD PRESSURE: 76 MMHG

## 2023-08-15 DIAGNOSIS — D72.10 EOSINOPHILIA, UNSPECIFIED TYPE: ICD-10-CM

## 2023-08-15 DIAGNOSIS — R59.1 LYMPHADENOPATHY: ICD-10-CM

## 2023-08-15 DIAGNOSIS — D72.10 EOSINOPHILIA, UNSPECIFIED TYPE: Primary | ICD-10-CM

## 2023-08-15 LAB
BASOPHILS # BLD MANUAL: 0 10E3/UL (ref 0–0.2)
BASOPHILS NFR BLD MANUAL: 0 %
EOSINOPHIL # BLD MANUAL: 18.2 10E3/UL (ref 0–0.7)
EOSINOPHIL NFR BLD MANUAL: 62 %
ERYTHROCYTE [DISTWIDTH] IN BLOOD BY AUTOMATED COUNT: 13.2 % (ref 10–15)
HCT VFR BLD AUTO: 43.4 % (ref 40–53)
HGB BLD-MCNC: 15 G/DL (ref 13.3–17.7)
INR PPP: 1.16 (ref 0.85–1.15)
INTERPRETATION: NORMAL
LYMPHOCYTES # BLD MANUAL: 1.8 10E3/UL (ref 0.8–5.3)
LYMPHOCYTES NFR BLD MANUAL: 6 %
MCH RBC QN AUTO: 32.4 PG (ref 26.5–33)
MCHC RBC AUTO-ENTMCNC: 34.6 G/DL (ref 31.5–36.5)
MCV RBC AUTO: 94 FL (ref 78–100)
MONOCYTES # BLD MANUAL: 0.9 10E3/UL (ref 0–1.3)
MONOCYTES NFR BLD MANUAL: 3 %
MYELOCYTES # BLD MANUAL: 0.3 10E3/UL
MYELOCYTES NFR BLD MANUAL: 1 %
NEUTROPHILS # BLD MANUAL: 8.2 10E3/UL (ref 1.6–8.3)
NEUTROPHILS NFR BLD MANUAL: 28 %
PLAT MORPH BLD: ABNORMAL
PLATELET # BLD AUTO: 265 10E3/UL (ref 150–450)
RBC # BLD AUTO: 4.63 10E6/UL (ref 4.4–5.9)
RBC MORPH BLD: ABNORMAL
RETICS # AUTO: 0.08 10E6/UL (ref 0.03–0.1)
RETICS/RBC NFR AUTO: 1.6 % (ref 0.5–2)
SIGNIFICANT RESULTS: NORMAL
SPECIMEN DESCRIPTION: NORMAL
TEST DETAILS, MDL: NORMAL
WBC # BLD AUTO: 29.3 10E3/UL (ref 4–11)

## 2023-08-15 PROCEDURE — 88184 FLOWCYTOMETRY/ TC 1 MARKER: CPT | Performed by: PATHOLOGY

## 2023-08-15 PROCEDURE — 88264 CHROMOSOME ANALYSIS 20-25: CPT

## 2023-08-15 PROCEDURE — 88185 FLOWCYTOMETRY/TC ADD-ON: CPT

## 2023-08-15 PROCEDURE — 88341 IMHCHEM/IMCYTCHM EA ADD ANTB: CPT | Mod: 26 | Performed by: PATHOLOGY

## 2023-08-15 PROCEDURE — 36592 COLLECT BLOOD FROM PICC: CPT

## 2023-08-15 PROCEDURE — 85060 BLOOD SMEAR INTERPRETATION: CPT | Mod: GC | Performed by: PATHOLOGY

## 2023-08-15 PROCEDURE — 85097 BONE MARROW INTERPRETATION: CPT | Mod: GC | Performed by: PATHOLOGY

## 2023-08-15 PROCEDURE — 88291 CYTO/MOLECULAR REPORT: CPT | Performed by: MEDICAL GENETICS

## 2023-08-15 PROCEDURE — 88313 SPECIAL STAINS GROUP 2: CPT | Mod: TC,59

## 2023-08-15 PROCEDURE — 88313 SPECIAL STAINS GROUP 2: CPT | Mod: 26 | Performed by: PATHOLOGY

## 2023-08-15 PROCEDURE — 85007 BL SMEAR W/DIFF WBC COUNT: CPT

## 2023-08-15 PROCEDURE — 88342 IMHCHEM/IMCYTCHM 1ST ANTB: CPT | Mod: 26 | Performed by: PATHOLOGY

## 2023-08-15 PROCEDURE — 88305 TISSUE EXAM BY PATHOLOGIST: CPT | Mod: 26 | Performed by: PATHOLOGY

## 2023-08-15 PROCEDURE — 999N000128 HC STATISTIC PERIPHERAL IV START W/O US GUIDANCE

## 2023-08-15 PROCEDURE — 36415 COLL VENOUS BLD VENIPUNCTURE: CPT

## 2023-08-15 PROCEDURE — 88311 DECALCIFY TISSUE: CPT | Mod: 26 | Performed by: PATHOLOGY

## 2023-08-15 PROCEDURE — G0452 MOLECULAR PATHOLOGY INTERPR: HCPCS | Mod: 26 | Performed by: PATHOLOGY

## 2023-08-15 PROCEDURE — 88369 M/PHMTRC ALYSISHQUANT/SEMIQ: CPT | Mod: 26 | Performed by: MEDICAL GENETICS

## 2023-08-15 PROCEDURE — 85610 PROTHROMBIN TIME: CPT

## 2023-08-15 PROCEDURE — 88311 DECALCIFY TISSUE: CPT | Mod: TC

## 2023-08-15 PROCEDURE — 88184 FLOWCYTOMETRY/ TC 1 MARKER: CPT

## 2023-08-15 PROCEDURE — 38222 DX BONE MARROW BX & ASPIR: CPT

## 2023-08-15 PROCEDURE — 85027 COMPLETE CBC AUTOMATED: CPT

## 2023-08-15 PROCEDURE — 81450 HL NEO GSAP 5-50DNA/DNA&RNA: CPT

## 2023-08-15 PROCEDURE — 85045 AUTOMATED RETICULOCYTE COUNT: CPT

## 2023-08-15 PROCEDURE — 88275 CYTOGENETICS 100-300: CPT | Mod: XU

## 2023-08-15 PROCEDURE — 88368 INSITU HYBRIDIZATION MANUAL: CPT | Mod: 26 | Performed by: MEDICAL GENETICS

## 2023-08-15 PROCEDURE — 88237 TISSUE CULTURE BONE MARROW: CPT

## 2023-08-15 PROCEDURE — 999N000285 HC STATISTIC VASC ACCESS LAB DRAW WITH PIV START

## 2023-08-15 PROCEDURE — 88189 FLOWCYTOMETRY/READ 16 & >: CPT | Mod: GC | Performed by: PATHOLOGY

## 2023-08-15 RX ORDER — ONDANSETRON 2 MG/ML
4 INJECTION INTRAMUSCULAR; INTRAVENOUS EVERY 30 MIN PRN
Status: DISCONTINUED | OUTPATIENT
Start: 2023-08-15 | End: 2023-08-16 | Stop reason: HOSPADM

## 2023-08-15 RX ORDER — FENTANYL CITRATE 50 UG/ML
50 INJECTION, SOLUTION INTRAMUSCULAR; INTRAVENOUS EVERY 5 MIN PRN
Status: DISCONTINUED | OUTPATIENT
Start: 2023-08-15 | End: 2023-08-16 | Stop reason: HOSPADM

## 2023-08-15 RX ORDER — ONDANSETRON 4 MG/1
4 TABLET, ORALLY DISINTEGRATING ORAL EVERY 30 MIN PRN
Status: DISCONTINUED | OUTPATIENT
Start: 2023-08-15 | End: 2023-08-16 | Stop reason: HOSPADM

## 2023-08-15 RX ORDER — HYDROMORPHONE HYDROCHLORIDE 1 MG/ML
0.4 INJECTION, SOLUTION INTRAMUSCULAR; INTRAVENOUS; SUBCUTANEOUS EVERY 5 MIN PRN
Status: DISCONTINUED | OUTPATIENT
Start: 2023-08-15 | End: 2023-08-16 | Stop reason: HOSPADM

## 2023-08-15 RX ORDER — OXYCODONE HYDROCHLORIDE 5 MG/1
10 TABLET ORAL EVERY 4 HOURS PRN
Status: DISCONTINUED | OUTPATIENT
Start: 2023-08-15 | End: 2023-08-16 | Stop reason: HOSPADM

## 2023-08-15 RX ORDER — SODIUM CHLORIDE, SODIUM LACTATE, POTASSIUM CHLORIDE, CALCIUM CHLORIDE 600; 310; 30; 20 MG/100ML; MG/100ML; MG/100ML; MG/100ML
INJECTION, SOLUTION INTRAVENOUS CONTINUOUS
Status: DISCONTINUED | OUTPATIENT
Start: 2023-08-15 | End: 2023-08-16 | Stop reason: HOSPADM

## 2023-08-15 RX ORDER — HYDRALAZINE HYDROCHLORIDE 20 MG/ML
2.5-5 INJECTION INTRAMUSCULAR; INTRAVENOUS EVERY 10 MIN PRN
Status: DISCONTINUED | OUTPATIENT
Start: 2023-08-15 | End: 2023-08-16 | Stop reason: HOSPADM

## 2023-08-15 RX ORDER — LIDOCAINE 40 MG/G
CREAM TOPICAL
Status: DISCONTINUED | OUTPATIENT
Start: 2023-08-15 | End: 2023-08-16 | Stop reason: HOSPADM

## 2023-08-15 RX ORDER — FENTANYL CITRATE 50 UG/ML
25 INJECTION, SOLUTION INTRAMUSCULAR; INTRAVENOUS EVERY 5 MIN PRN
Status: DISCONTINUED | OUTPATIENT
Start: 2023-08-15 | End: 2023-08-16 | Stop reason: HOSPADM

## 2023-08-15 RX ORDER — LIDOCAINE HYDROCHLORIDE 10 MG/ML
8-10 INJECTION, SOLUTION EPIDURAL; INFILTRATION; INTRACAUDAL; PERINEURAL
Status: DISCONTINUED | OUTPATIENT
Start: 2023-08-15 | End: 2023-08-16 | Stop reason: HOSPADM

## 2023-08-15 RX ORDER — HYDROMORPHONE HYDROCHLORIDE 1 MG/ML
0.2 INJECTION, SOLUTION INTRAMUSCULAR; INTRAVENOUS; SUBCUTANEOUS EVERY 5 MIN PRN
Status: DISCONTINUED | OUTPATIENT
Start: 2023-08-15 | End: 2023-08-16 | Stop reason: HOSPADM

## 2023-08-15 RX ORDER — FENTANYL CITRATE 50 UG/ML
25 INJECTION, SOLUTION INTRAMUSCULAR; INTRAVENOUS
Status: DISCONTINUED | OUTPATIENT
Start: 2023-08-15 | End: 2023-08-16 | Stop reason: HOSPADM

## 2023-08-15 RX ORDER — LIDOCAINE HYDROCHLORIDE 20 MG/ML
INJECTION, SOLUTION INFILTRATION; PERINEURAL PRN
Status: DISCONTINUED | OUTPATIENT
Start: 2023-08-15 | End: 2023-08-15

## 2023-08-15 RX ORDER — METOPROLOL TARTRATE 1 MG/ML
1-2 INJECTION, SOLUTION INTRAVENOUS EVERY 5 MIN PRN
Status: DISCONTINUED | OUTPATIENT
Start: 2023-08-15 | End: 2023-08-16 | Stop reason: HOSPADM

## 2023-08-15 RX ORDER — ACETAMINOPHEN 325 MG/1
975 TABLET ORAL ONCE
Status: COMPLETED | OUTPATIENT
Start: 2023-08-15 | End: 2023-08-15

## 2023-08-15 RX ORDER — PROPOFOL 10 MG/ML
INJECTION, EMULSION INTRAVENOUS CONTINUOUS PRN
Status: DISCONTINUED | OUTPATIENT
Start: 2023-08-15 | End: 2023-08-15

## 2023-08-15 RX ORDER — OXYCODONE HYDROCHLORIDE 5 MG/1
5 TABLET ORAL EVERY 4 HOURS PRN
Status: DISCONTINUED | OUTPATIENT
Start: 2023-08-15 | End: 2023-08-16 | Stop reason: HOSPADM

## 2023-08-15 RX ORDER — PROPOFOL 10 MG/ML
INJECTION, EMULSION INTRAVENOUS PRN
Status: DISCONTINUED | OUTPATIENT
Start: 2023-08-15 | End: 2023-08-15

## 2023-08-15 RX ADMIN — PROPOFOL 20 MG: 10 INJECTION, EMULSION INTRAVENOUS at 11:26

## 2023-08-15 RX ADMIN — PROPOFOL 30 MG: 10 INJECTION, EMULSION INTRAVENOUS at 11:18

## 2023-08-15 RX ADMIN — LIDOCAINE HYDROCHLORIDE 60 MG: 20 INJECTION, SOLUTION INFILTRATION; PERINEURAL at 11:12

## 2023-08-15 RX ADMIN — PROPOFOL 200 MCG/KG/MIN: 10 INJECTION, EMULSION INTRAVENOUS at 11:12

## 2023-08-15 RX ADMIN — SODIUM CHLORIDE, SODIUM LACTATE, POTASSIUM CHLORIDE, CALCIUM CHLORIDE: 600; 310; 30; 20 INJECTION, SOLUTION INTRAVENOUS at 11:10

## 2023-08-15 RX ADMIN — ACETAMINOPHEN 975 MG: 325 TABLET ORAL at 10:23

## 2023-08-15 RX ADMIN — PROPOFOL 20 MG: 10 INJECTION, EMULSION INTRAVENOUS at 11:23

## 2023-08-15 RX ADMIN — PROPOFOL 30 MG: 10 INJECTION, EMULSION INTRAVENOUS at 11:30

## 2023-08-15 ASSESSMENT — LIFESTYLE VARIABLES: TOBACCO_USE: 1

## 2023-08-15 NOTE — NURSING NOTE
Chief Complaint   Patient presents with    Labs Only     Labs drawn by RN in Lab following Right Arm PIV placement by Vascular Access RN.      Gin Shabazz, RN

## 2023-08-15 NOTE — DISCHARGE INSTRUCTIONS
How to Care for your Bone Marrow Biopsy    Activity  Relax and take it easy for the next 24 hours.   Resume regular activity after 24 hours.    Diet   Resume pre-procedure diet and drink plenty of fluids.    If you received sedation, you may feel a little nauseated so start with a clear liquid diet until the nausea passes.    Do Not Immerse Bone Marrow Biopsy Puncture Site in Water  Do not take a bath until the puncture site has healed.  Do not sit in a hot tub or spa until the puncture site has healed.  Do not swim until the puncture site has healed.  Wait 24 hours before taking a shower.    Drainage  Drainage should be minimal.  IF bleeding should occur and soaks through the dressing, lie down and put pressure on the puncture site.    IF bleeding persists, apply gentle pressure with your hand over the dressing for 5 minutes.    IF the pressure doesn't stop the bleeding, contact your provider immediately.    Dressing  Keep the dressing dry and in place for 24 hours, unless instructed otherwise.    IF bleeding soaks through the dressing in the first 24 hours do NOT remove the dressing as you may pull off any scab that has formed.  Instead, reinforce the dressing with extra gauze and tape.    No Alcohol  Do not drink alcoholic beverages for the next 24 hours.    No Driving or Operating Machinery  No driving or operating machinery for the next 24 hours.    Notify your provider IF:    Excessive bleeding or drainage at the puncture site    Excessive swelling, redness or tenderness at the puncture site    Fever above 100.5 degrees taken orally    Severe pain    Drainage that is green, yellow, thick white or has a bad odor    Telephone Numbers  Bone Marrow transplant clinic:  [phone] (Monday thru Friday, 8:00 am to 4:00 pm)  After business hours call the [facility]:  [phone] and ask for the Hematology/BMT doctor on call.  Or call the Emergency Room at the [facility]  [facility]:  [phone].    You took 975 mg of tylenol at 10:30 am. You can take additional tylenol after 4:30 pm.

## 2023-08-15 NOTE — ANESTHESIA PREPROCEDURE EVALUATION
Anesthesia Pre-Procedure Evaluation    Patient: Alvarez Bess   MRN: [id number] : 1960        Procedure : Procedure(s):  BIOPSY, BONE MARROW          Past Medical History:   Diagnosis Date    Cardiomyopathy (H)     Congestive heart failure (H)      Problem list name updated by automated process. Provider to review    Congestive heart failure, unspecified     Eosinophilia     Gastro-oesophageal reflux disease     Hyperlipidemia 2019    Hypertension     Penile discharge     Vitamin D deficiency 2010      Past Surgical History:   Procedure Laterality Date    ANGIOGRAM  05    left dominant coronary system with essentially normal coronary arteries, trivial plaque, severe dilated cardiomyopathy, left ventricle hypertrophy and severely hypokinetic, asynchrony or desynchrony of LV function    ENT SURGERY      polyps from throat    EXCISE LESION FOOT  2013    Procedure: EXCISE LESION FOOT;  Scar revision of Right 3rd toe      SOFT TISSUE SURGERY      neuroma from right foot      Allergies   Allergen Reactions    Morphine Hcl      Extreme agitation      Social History     Tobacco Use    Smoking status: Some Days     Packs/day: 0.50     Years: 10.00     Pack years: 5.00     Types: Cigarettes     Last attempt to quit: 12/10/2005     Years since quittin.6    Smokeless tobacco: Never   Substance Use Topics    Alcohol use: Yes     Comment: 2 beer day      Wt Readings from Last 1 Encounters:   23 70.8 kg (156 lb)        Anesthesia Evaluation   Pt has had prior anesthetic.         ROS/MED HX  ENT/Pulmonary:     (+)                tobacco use, Current use,                      Neurologic:       Cardiovascular:     (+) Dyslipidemia hypertension- -   -  - -      CHF                                METS/Exercise Tolerance:     Hematologic: Comments: Lymphadenopathy      Musculoskeletal:       GI/Hepatic:     (+) GERD,                   Renal/Genitourinary:       Endo:        Psychiatric/Substance Use:       Infectious Disease:       Malignancy:       Other:            Physical Exam    Airway        Mallampati: II   TM distance: > 3 FB   Neck ROM: full   Mouth opening: > 3 cm    Respiratory Devices and Support         Dental       (+) Minor Abnormalities - some fillings, tiny chips      Cardiovascular   cardiovascular exam normal          Pulmonary   pulmonary exam normal                OUTSIDE LABS:  CBC:   Lab Results   Component Value Date    WBC 29.3 (H) 08/15/2023    WBC 32.9 (H) 08/11/2023    HGB 15.0 08/15/2023    HGB 13.7 08/11/2023    HCT 43.4 08/15/2023    HCT 40.6 08/11/2023     08/15/2023     08/11/2023     BMP:   Lab Results   Component Value Date     08/11/2023     (L) 05/10/2023    POTASSIUM 4.2 08/11/2023    POTASSIUM 5.0 05/10/2023    CHLORIDE 98 08/11/2023    CHLORIDE 98 05/10/2023    CO2 28 08/11/2023    CO2 25 05/10/2023    BUN 10.8 08/11/2023    BUN 10.3 05/10/2023    CR 0.85 08/11/2023    CR 0.97 05/10/2023    GLC 63 (L) 08/11/2023     (H) 05/10/2023     COAGS:   Lab Results   Component Value Date    PTT 35 10/07/2005    INR 0.99 10/03/2005     POC:   Lab Results   Component Value Date     10/04/2005     HEPATIC:   Lab Results   Component Value Date    ALBUMIN 3.9 05/10/2023    PROTTOTAL 7.0 05/10/2023    ALT 19 05/10/2023    AST 17 05/10/2023    ALKPHOS 92 05/10/2023    BILITOTAL 0.5 05/10/2023     OTHER:   Lab Results   Component Value Date    PH 7.35 10/04/2005    LACT 0.8 09/17/2014    A1C 5.1 05/10/2023    PAULINA 9.3 08/11/2023    PHOS 3.9 10/06/2005    MAG 2.4 (H) 10/06/2005    LIPASE 81 09/18/2014    TSH 0.99 05/10/2023    T4 1.25 10/04/2005    T3 94 10/04/2005    SED 7 05/10/2023       Anesthesia Plan    ASA Status:  3    NPO Status:  NPO Appropriate    Anesthesia Type: MAC.     - Reason for MAC: straight local not clinically adequate              Consents    Anesthesia Plan(s) and associated risks, benefits, and  realistic alternatives discussed. Questions answered and patient/representative(s) expressed understanding.     - Discussed: Risks, Benefits and Alternatives for the PROCEDURE were discussed     - Discussed with:  Patient            Postoperative Care            Comments:                Jordin Johnson MD

## 2023-08-15 NOTE — ANESTHESIA CARE TRANSFER NOTE
Patient: Alvarez Bess    Procedure: Procedure(s):  BIOPSY, BONE MARROW       Diagnosis: Lymphadenopathy [R59.1]  Diagnosis Additional Information: No value filed.    Anesthesia Type:   MAC     Note:    Oropharynx: spontaneously breathing  Level of Consciousness: awake  Oxygen Supplementation: room air    Independent Airway: airway patency satisfactory and stable  Dentition: dentition unchanged  Vital Signs Stable: post-procedure vital signs reviewed and stable  Report to RN Given: handoff report given  Patient transferred to: Phase II    Handoff Report: Identifed the Patient, Identified the Reponsible Provider, Reviewed the pertinent medical history, Discussed the surgical course, Reviewed Intra-OP anesthesia mangement and issues during anesthesia, Set expectations for post-procedure period and Allowed opportunity for questions and acknowledgement of understanding      Vitals:  Vitals Value Taken Time   BP 91/53 08/15/23 1139   Temp 36.4  C (97.6  F) 08/15/23 1139   Pulse     Resp 14 08/15/23 1139   SpO2 97 % 08/15/23 1139       Electronically Signed By: CARLI Roblero CRNA  August 15, 2023  11:42 AM

## 2023-08-15 NOTE — ANESTHESIA POSTPROCEDURE EVALUATION
Patient: Alvarez Bess    Procedure: Procedure(s):  BIOPSY, BONE MARROW       Anesthesia Type:  MAC    Note:  Disposition: Outpatient   Postop Pain Control: Uneventful            Sign Out: Well controlled pain   PONV: No   Neuro/Psych: Uneventful            Sign Out: Acceptable/Baseline neuro status   Airway/Respiratory: Uneventful            Sign Out: Acceptable/Baseline resp. status   CV/Hemodynamics: Uneventful            Sign Out: Acceptable CV status; No obvious hypovolemia; No obvious fluid overload   Other NRE: NONE   DID A NON-ROUTINE EVENT OCCUR?            Last vitals:  Vitals Value Taken Time   /61 08/15/23 1200   Temp 36.6  C (97.8  F) 08/15/23 1200   Pulse     Resp 14 08/15/23 1200   SpO2 98 % 08/15/23 1200       Electronically Signed By: Jordin Johnson MD  August 15, 2023  12:34 PM

## 2023-08-15 NOTE — LETTER
8/15/2023         RE: Alvarez Bess  [address] [address] [address]        Dear Colleague,    Thank you for referring your patient, Alvarez Bess, to the [facility]. Please see a copy of my visit note below.    Please see same day procedure note.       Again, thank you for allowing me to participate in the care of your patient.        Sincerely,        [facility] Advanced Practice Provider

## 2023-08-15 NOTE — PROCEDURES
"BMT ONC Adult Bone Marrow Biopsy Procedure Note  August 15, 2023  /61   Pulse 73   Temp 97.8  F (36.6  C) (Temporal)   Resp 14   Ht 1.854 m (6' 1\")   Wt 69.4 kg (153 lb)   SpO2 98%   BMI 20.19 kg/m       Learning needs assessment complete within 12 months? YES    DIAGNOSIS: Work up of eosinophilia    PROCEDURE: Unilateral Bone Marrow Biopsy and Unilateral Aspirate    LOCATION: [facility] 5th floor-Procedure Room    Patient s identification was positively verified by verbal identification and invasive procedure safety checklist was completed. Informed consent was obtained. Following the administration of Propofol as pre-medication, patient was placed in the prone position and prepped and draped in a sterile manner. Approximately 10 cc of 1% Lidocaine was used over the left posterior iliac spine. Following this a 3 mm incision was made. Trephine bone marrow core(s) was (were) obtained from the [facility]. Bone marrow aspirates were obtained from the [facility]. Aspirates were sent for morphology, immunophenotyping, cytogenetics, and molecular diagnostics. A total of approximately 20 ml of marrow was aspirated. Following this procedure a sterile dressing was applied to the bone marrow biopsy site(s). The patient was placed in the supine position to maintain pressure on the biopsy site. Post-procedure wound care instructions were given.     Complications: NO    Pre-procedural pain: 0 out of 10 on the numeric pain rating scale.     Procedural pain: 0 out of 10 on the numeric pain rating scale.     Post-procedural pain assessment: 0 out of 10 on the numeric pain rating scale.     Interventions: NO    Length of procedure:20 minutes or less      Procedure performed by: Susy FIELD-BC      "

## 2023-08-16 LAB
PATH REPORT.COMMENTS IMP SPEC: NORMAL
PATH REPORT.FINAL DX SPEC: NORMAL
PATH REPORT.MICROSCOPIC SPEC OTHER STN: NORMAL
PATH REPORT.RELEVANT HX SPEC: NORMAL

## 2023-08-18 ENCOUNTER — OFFICE VISIT (OUTPATIENT)
Dept: CARDIOLOGY | Facility: CLINIC | Age: 63
End: 2023-08-18
Payer: COMMERCIAL

## 2023-08-18 VITALS
HEART RATE: 79 BPM | WEIGHT: 157 LBS | SYSTOLIC BLOOD PRESSURE: 140 MMHG | DIASTOLIC BLOOD PRESSURE: 70 MMHG | BODY MASS INDEX: 20.71 KG/M2

## 2023-08-18 DIAGNOSIS — I40.1 EOSINOPHILIC MYOCARDITIS: Primary | ICD-10-CM

## 2023-08-18 DIAGNOSIS — D72.18 EOSINOPHILIC MYOCARDITIS: Primary | ICD-10-CM

## 2023-08-18 LAB
PATH REPORT.ADDENDUM SPEC: NORMAL
PATH REPORT.COMMENTS IMP SPEC: NORMAL
PATH REPORT.COMMENTS IMP SPEC: NORMAL
PATH REPORT.FINAL DX SPEC: NORMAL
PATH REPORT.GROSS SPEC: NORMAL
PATH REPORT.MICROSCOPIC SPEC OTHER STN: NORMAL
PATH REPORT.MICROSCOPIC SPEC OTHER STN: NORMAL
PATH REPORT.RELEVANT HX SPEC: NORMAL

## 2023-08-18 PROCEDURE — 99215 OFFICE O/P EST HI 40 MIN: CPT | Performed by: INTERNAL MEDICINE

## 2023-08-18 NOTE — PROGRESS NOTES
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  He may stay on the carvedilol for now and remain off his lisinopril and spironolactone.  If his eosinophilia gets under better control in the future, anticipate him needing reinstitution of some of his previous cardiac medications.      History of cardiomyopathy  Recheck echocardiogram as peripheral eosinophilia can cause damage to the heart valves and apical thrombus.    Follow-up with me in January already scheduled    40 minutes was spent with the patient, precharting and reviewing tests as well as post visit charting all done today..    This note was transcribed using electronic voice recognition software and there may be typographical errors present.                    Interval History:     The patient is a very pleasant 62 year old whom I have been following for history of cardiomyopathy with resolution of LV dysfunction who had been stable on his antihypertensives for a number of years.    However he did get recently diagnosed with leukocytosis and eosinophilia and is undergoing work-up with Dr. Case whom he likes very much.    She astutely noticed that he was hypotensive and symptomatic on his current medical regimen and reached out to me directly to have him seen and medications adjusted.    He is currently off his spironolactone and lisinopril, remains on his carvedilol with good blood pressure control.  In the mornings he can dip as low as 100 systolic but he feels fine.  Less lightheaded and dizzy than he was previously.                     Review of Systems:     Review of Systems:  Skin:  Positive for rash   Eyes:  Positive for    ENT:  Negative    Respiratory:  Negative     Cardiovascular:    fatigue;lightheadedness;Positive for  Gastroenterology: Negative    Genitourinary:  not assessed    Musculoskeletal:  Positive for joint pain  Neurologic:  Negative    Psychiatric:  Positive for sleep disturbances  Heme/Lymph/Imm:  Negative    Endocrine:  Negative                Physical Exam:     Vitals: BP (!) 140/70   Pulse 79   Wt 71.2 kg (157 lb)   BMI 20.71 kg/m    Constitutional:  cooperative, alert and oriented, well developed, well nourished, in no acute distress appears younger than stated age      Skin:  warm and dry to the touch   nice tan!    Head:  normocephalic        Eyes:  pupils equal and round, conjunctivae and lids unremarkable, sclera white, no xanthalasma, EOMS intact, no nystagmus        Chest:  clear to auscultation        Cardiac: regular rhythm       grade 2;systolic murmur          Extremities and Back:  no deformities, clubbing, cyanosis, erythema observed;no edema        Neurological:  no gross motor deficits;affect appropriate                 Medications:     Current Outpatient Medications   Medication Sig Dispense Refill    acetaminophen (TYLENOL) 325 MG tablet Take 2 tablets (650 mg) by mouth every 4 hours as needed for other (mild pain) 100 tablet 0    augmented betamethasone dipropionate (DIPROLENE AF) 0.05 % external cream       carvedilol (COREG) 25 MG tablet Take 1 tablet (25 mg) by mouth 2 times daily (with meals) 180 tablet 3    omeprazole (PRILOSEC OTC) 20 MG EC tablet Take 20 mg by mouth daily      Probiotic Product (PROBIOTIC DAILY PO)                   Data:   All laboratory data reviewed  No results found for this or any previous visit (from the past 24 hour(s)).    All laboratory data reviewed  Lab Results   Component Value Date    CHOL 137 05/10/2023    CHOL 166 09/18/2014     Lab Results   Component Value Date    HDL 46 05/10/2023    HDL 51 09/18/2014     Lab Results   Component Value Date    LDL 75 05/10/2023    LDL 98 09/18/2014     Lab Results    Component Value Date    TRIG 80 05/10/2023    TRIG 87 09/18/2014     Lab Results   Component Value Date    CHOLHDLRATIO 3.3 09/18/2014     TSH   Date Value Ref Range Status   05/10/2023 0.99 0.30 - 4.20 uIU/mL Final   10/04/2005 0.82 0.4 - 5.0 mU/L Final     Last Basic Metabolic Panel:  Lab Results   Component Value Date     08/11/2023     12/23/2014      Lab Results   Component Value Date    POTASSIUM 4.2 08/11/2023    POTASSIUM 4.5 12/23/2014     Lab Results   Component Value Date    CHLORIDE 98 08/11/2023    CHLORIDE 102 12/23/2014     Lab Results   Component Value Date    PAULINA 9.3 08/11/2023    PAULINA 9.3 12/23/2014     Lab Results   Component Value Date    CO2 28 08/11/2023    CO2 27 12/23/2014     Lab Results   Component Value Date    BUN 10.8 08/11/2023    BUN 18 12/23/2014     Lab Results   Component Value Date    CR 0.85 08/11/2023    CR 1.06 12/23/2014     Lab Results   Component Value Date    GLC 63 08/11/2023     12/23/2014     Lab Results   Component Value Date    WBC 29.3 08/15/2023    WBC 12.9 12/23/2014     Lab Results   Component Value Date    RBC 4.63 08/15/2023    RBC 4.53 12/23/2014     Lab Results   Component Value Date    HGB 15.0 08/15/2023    HGB 14.8 12/23/2014     Lab Results   Component Value Date    HCT 43.4 08/15/2023    HCT 42.7 12/23/2014     Lab Results   Component Value Date    MCV 94 08/15/2023    MCV 94 12/23/2014     Lab Results   Component Value Date    MCH 32.4 08/15/2023    MCH 32.7 12/23/2014     Lab Results   Component Value Date    MCHC 34.6 08/15/2023    MCHC 34.7 12/23/2014     Lab Results   Component Value Date    RDW 13.2 08/15/2023    RDW 12.2 12/23/2014     Lab Results   Component Value Date     08/15/2023     12/23/2014

## 2023-08-18 NOTE — LETTER
8/18/2023    Moshe Nieves, MERVIN  [address] [facility] [address]    RE: Alvarez Bess       Dear Colleague,     I had the pleasure of seeing Alvarez Bess in the [facility].  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  He may stay on the carvedilol for now and remain off his lisinopril and spironolactone.  If his eosinophilia gets under better control in the future, anticipate him needing reinstitution of some of his previous cardiac medications.      History of cardiomyopathy  Recheck echocardiogram as peripheral eosinophilia can cause damage to the heart valves and apical thrombus.    Follow-up with me in January already scheduled    40 minutes was spent with the patient, precharting and reviewing tests as well as post visit charting all done today..    This note was transcribed using electronic voice recognition software and there may be typographical errors present.                    Interval History:     The patient is a very pleasant 62 year old whom I have been following for history of cardiomyopathy with resolution of LV dysfunction who had been stable on his antihypertensives for a number of years.    However he did get recently diagnosed with leukocytosis and eosinophilia and is undergoing work-up with Dr. Case whom he likes very much.    She astutely noticed that he was hypotensive and symptomatic on his current medical regimen and reached out to me directly to have him seen and medications adjusted.    He is currently off his spironolactone and lisinopril, remains on his carvedilol with good blood pressure control.  In the mornings he can dip as low as 100 systolic but he feels fine.   Less lightheaded and dizzy than he was previously.                     Review of Systems:     Review of Systems:  Skin:  Positive for rash   Eyes:  Positive for    ENT:  Negative    Respiratory:  Negative    Cardiovascular:    fatigue;lightheadedness;Positive for  Gastroenterology: Negative    Genitourinary:  not assessed    Musculoskeletal:  Positive for joint pain  Neurologic:  Negative    Psychiatric:  Positive for sleep disturbances  Heme/Lymph/Imm:  Negative    Endocrine:  Negative                Physical Exam:     Vitals: BP (!) 140/70   Pulse 79   Wt 71.2 kg (157 lb)   BMI 20.71 kg/m    Constitutional:  cooperative, alert and oriented, well developed, well nourished, in no acute distress appears younger than stated age      Skin:  warm and dry to the touch   nice tan!    Head:  normocephalic        Eyes:  pupils equal and round, conjunctivae and lids unremarkable, sclera white, no xanthalasma, EOMS intact, no nystagmus        Chest:  clear to auscultation        Cardiac: regular rhythm       grade 2;systolic murmur          Extremities and Back:  no deformities, clubbing, cyanosis, erythema observed;no edema        Neurological:  no gross motor deficits;affect appropriate                 Medications:     Current Outpatient Medications   Medication Sig Dispense Refill    acetaminophen (TYLENOL) 325 MG tablet Take 2 tablets (650 mg) by mouth every 4 hours as needed for other (mild pain) 100 tablet 0    augmented betamethasone dipropionate (DIPROLENE AF) 0.05 % external cream       carvedilol (COREG) 25 MG tablet Take 1 tablet (25 mg) by mouth 2 times daily (with meals) 180 tablet 3    omeprazole (PRILOSEC OTC) 20 MG EC tablet Take 20 mg by mouth daily      Probiotic Product (PROBIOTIC DAILY PO)                   Data:   All laboratory data reviewed  No results found for this or any previous visit (from the past 24 hour(s)).    All laboratory data reviewed  Lab Results   Component Value Date    CHOL 137  05/10/2023    CHOL 166 09/18/2014     Lab Results   Component Value Date    HDL 46 05/10/2023    HDL 51 09/18/2014     Lab Results   Component Value Date    LDL 75 05/10/2023    LDL 98 09/18/2014     Lab Results   Component Value Date    TRIG 80 05/10/2023    TRIG 87 09/18/2014     Lab Results   Component Value Date    CHOLHDLRATIO 3.3 09/18/2014     TSH   Date Value Ref Range Status   05/10/2023 0.99 0.30 - 4.20 uIU/mL Final   10/04/2005 0.82 0.4 - 5.0 mU/L Final     Last Basic Metabolic Panel:  Lab Results   Component Value Date     08/11/2023     12/23/2014      Lab Results   Component Value Date    POTASSIUM 4.2 08/11/2023    POTASSIUM 4.5 12/23/2014     Lab Results   Component Value Date    CHLORIDE 98 08/11/2023    CHLORIDE 102 12/23/2014     Lab Results   Component Value Date    PAULINA 9.3 08/11/2023    PAULINA 9.3 12/23/2014     Lab Results   Component Value Date    CO2 28 08/11/2023    CO2 27 12/23/2014     Lab Results   Component Value Date    BUN 10.8 08/11/2023    BUN 18 12/23/2014     Lab Results   Component Value Date    CR 0.85 08/11/2023    CR 1.06 12/23/2014     Lab Results   Component Value Date    GLC 63 08/11/2023     12/23/2014     Lab Results   Component Value Date    WBC 29.3 08/15/2023    WBC 12.9 12/23/2014     Lab Results   Component Value Date    RBC 4.63 08/15/2023    RBC 4.53 12/23/2014     Lab Results   Component Value Date    HGB 15.0 08/15/2023    HGB 14.8 12/23/2014     Lab Results   Component Value Date    HCT 43.4 08/15/2023    HCT 42.7 12/23/2014     Lab Results   Component Value Date    MCV 94 08/15/2023    MCV 94 12/23/2014     Lab Results   Component Value Date    MCH 32.4 08/15/2023    MCH 32.7 12/23/2014     Lab Results   Component Value Date    MCHC 34.6 08/15/2023    MCHC 34.7 12/23/2014     Lab Results   Component Value Date    RDW 13.2 08/15/2023    RDW 12.2 12/23/2014     Lab Results   Component Value Date     08/15/2023     12/23/2014          Thank you for allowing me to participate in the care of your patient.      Sincerely,     Dl Ordonez MD     [facility]  cc:   No referring provider defined for this encounter.

## 2023-08-21 LAB
CULTURE HARVEST COMPLETE DATE: NORMAL
INTERPRETATION: NORMAL

## 2023-08-23 DIAGNOSIS — D72.10 EOSINOPHILIA: Primary | ICD-10-CM

## 2023-08-24 ENCOUNTER — LAB (OUTPATIENT)
Dept: LAB | Facility: CLINIC | Age: 63
End: 2023-08-24
Attending: INTERNAL MEDICINE
Payer: COMMERCIAL

## 2023-08-24 ENCOUNTER — HOSPITAL ENCOUNTER (OUTPATIENT)
Dept: CARDIOLOGY | Facility: CLINIC | Age: 63
Discharge: HOME | End: 2023-08-24
Attending: INTERNAL MEDICINE
Payer: COMMERCIAL

## 2023-08-24 DIAGNOSIS — I40.1 EOSINOPHILIC MYOCARDITIS: ICD-10-CM

## 2023-08-24 DIAGNOSIS — D72.18 EOSINOPHILIC MYOCARDITIS: ICD-10-CM

## 2023-08-24 LAB — LVEF ECHO: NORMAL

## 2023-08-24 PROCEDURE — 93306 TTE W/DOPPLER COMPLETE: CPT

## 2023-08-24 PROCEDURE — 81170 ABL1 GENE: CPT | Performed by: INTERNAL MEDICINE

## 2023-08-24 PROCEDURE — 93306 TTE W/DOPPLER COMPLETE: CPT | Mod: 26 | Performed by: INTERNAL MEDICINE

## 2023-08-24 PROCEDURE — 36415 COLL VENOUS BLD VENIPUNCTURE: CPT | Mod: GT,95 | Performed by: INTERNAL MEDICINE

## 2023-09-05 LAB
BCR/ABL1 KINASE DOMAIN MUT ANL BLD/T: NOT DETECTED
SPECIMEN SOURCE: NORMAL

## 2023-09-19 ENCOUNTER — TELEPHONE (OUTPATIENT)
Dept: ONCOLOGY | Facility: CLINIC | Age: 63
End: 2023-09-19
Payer: COMMERCIAL

## 2023-09-19 ENCOUNTER — ONCOLOGY VISIT (OUTPATIENT)
Dept: ONCOLOGY | Facility: CLINIC | Age: 63
End: 2023-09-19
Attending: INTERNAL MEDICINE
Payer: COMMERCIAL

## 2023-09-19 VITALS
DIASTOLIC BLOOD PRESSURE: 77 MMHG | WEIGHT: 156.8 LBS | SYSTOLIC BLOOD PRESSURE: 158 MMHG | HEIGHT: 73 IN | OXYGEN SATURATION: 93 % | BODY MASS INDEX: 20.78 KG/M2 | RESPIRATION RATE: 16 BRPM | HEART RATE: 61 BPM

## 2023-09-19 DIAGNOSIS — R94.8 ABNORMAL POSITRON EMISSION TOMOGRAPHY (PET) SCAN: Primary | ICD-10-CM

## 2023-09-19 DIAGNOSIS — D72.118 OTHER HYPEREOSINOPHILIC SYNDROME: Primary | ICD-10-CM

## 2023-09-19 LAB
ADDITIONAL COMMENTS: NORMAL
CULTURE HARVEST COMPLETE DATE: NORMAL
CULTURE HARVEST COMPLETE DATE: NORMAL
INTERPRETATION: NORMAL
ISCN: NORMAL
METHODS: NORMAL

## 2023-09-19 PROCEDURE — G0463 HOSPITAL OUTPT CLINIC VISIT: HCPCS | Performed by: INTERNAL MEDICINE

## 2023-09-19 PROCEDURE — 99215 OFFICE O/P EST HI 40 MIN: CPT | Performed by: INTERNAL MEDICINE

## 2023-09-19 RX ORDER — LORAZEPAM 0.5 MG/1
0.5 TABLET ORAL EVERY 6 HOURS PRN
Qty: 30 TABLET | Refills: 3 | Status: ON HOLD | OUTPATIENT
Start: 2023-09-19 | End: 2024-03-20

## 2023-09-19 RX ORDER — HYDROXYUREA 500 MG/1
500 CAPSULE ORAL DAILY
Qty: 901 CAPSULE | Refills: 3 | Status: SHIPPED | OUTPATIENT
Start: 2023-09-19 | End: 2024-01-23 | Stop reason: DRUGHIGH

## 2023-09-19 ASSESSMENT — PAIN SCALES - GENERAL: PAINLEVEL: NO PAIN (0)

## 2023-09-19 NOTE — PROGRESS NOTES
"Oncology Rooming Note    September 19, 2023 8:47 AM   Alvarez Bess is a 62 year old male who presents for:    Chief Complaint   Patient presents with    Oncology Clinic Visit     Initial Vitals: BP (!) 158/77   Pulse 61   Resp 16   Ht 1.854 m (6' 1\")   Wt 71.1 kg (156 lb 12.8 oz)   BMI 20.69 kg/m   Estimated body mass index is 20.69 kg/m  as calculated from the following:    Height as of this encounter: 1.854 m (6' 1\").    Weight as of this encounter: 71.1 kg (156 lb 12.8 oz). Body surface area is 1.91 meters squared.  No Pain (0) Comment: Data Unavailable   No LMP for male patient.  Allergies reviewed: Yes  Medications reviewed: Yes    Medications: Medication refills not needed today.  Pharmacy name entered into [employer]: [id number] - [place], [place] - [address]    Clinical concerns: None       Soledad Yip MA            "

## 2023-09-19 NOTE — LETTER
"    9/19/2023         RE: Alvarez Bess  [address] [facility] [address]        Dear Colleague,    Thank you for referring your patient, Alvarez Bess, to the [facility]. Please see a copy of my visit note below.    Oncology Rooming Note    September 19, 2023 8:47 AM   Alvarez Bess is a 62 year old male who presents for:    Chief Complaint   Patient presents with     Oncology Clinic Visit     Initial Vitals: BP (!) 158/77   Pulse 61   Resp 16   Ht 1.854 m (6' 1\")   Wt 71.1 kg (156 lb 12.8 oz)   BMI 20.69 kg/m   Estimated body mass index is 20.69 kg/m  as calculated from the following:    Height as of this encounter: 1.854 m (6' 1\").    Weight as of this encounter: 71.1 kg (156 lb 12.8 oz). Body surface area is 1.91 meters squared.  No Pain (0) Comment: Data Unavailable   No LMP for male patient.  Allergies reviewed: Yes  Medications reviewed: Yes    Medications: Medication refills not needed today.  Pharmacy name entered into [employer]: [id number] [place], [place] - [address]    Clinical concerns: None       Soledad Yip MA              [facility]    Hematology/Oncology Return patient note    Today's Date: September 19, 2023    Reason for Consult: eosinophilia      HISTORY OF PRESENT ILLNESS: Alvarez is a very pleasant 62-year-old male who is here for further evaluation of eosinophilia and elevated white count.  Patient states that he has had 14 pound unintentional weight loss over the last year .  He denies any night sweats or weight loss.  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  He is following up with dermatology.  Patient had an elevated white count since 2014 December   his white count was at 12.9.   Most recently his white count Is a 25.6 absolute neutrophils at 10.2 absolute eosinophils at 10.2 CRP level at 11.371-month ago it " was at 16.5 sedimentation rate at 7.  His hemoglobin is normal at 14.7 platelet count of 2.93    Interval history: Artie returns for follow-up today.  He states he has not been feeling well and he has a low blood pressure issue. He is on lisinopril and carvedilol and is being managed by Dr Ordonez.  He also continues to have a rash that is not improving with steroids he has not seen dermatology recently. I still dont have the dermatology report from previous       Interval history: Artie returns for follow-up today.  Multiple investigations were completed  And the results are following      1.  PET/CT shows bilateral axillary and inguinal hypermetabolic adenopathy concerning for neoplastic process including hypereosinophilic syndromes and lymphoma.  Recommend tissue sampling.  SUV in those regions at 3.3-3.6.  Additional of hypermetabolic foci within the right gluteus and left vastus lateralis intermedius muscles SUV of 8.4 may be related to the neoplastic process.  Images were reviewed with the patient.  Patient denies any night sweats or weight loss his weight has actually been stable.  His the rash persists    2 bone marrow biopsy Completed on 8/15/2023 showed marked hypercellular bone marrow with marked eosinophilia maturing trilineage hematopoiesis no increased dysplasia or increase in blasts.  Peripheral blood showing moderate leukocytosis with eosinophilia.  Adequate neutrophils with slight shift to immaturity.  Flow cytometry did not show any increase in myeloid blasts and no abnormal population of cells.  Morphologic findings just showed peripheral eosinophilia.  JAK2 mutation negative.  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  This was a summary for the bone marrow biopsy.    FISH cytogenetics and molecular studies were negative for PD GFR alpha, beta, FGFR 1, JAK2  genes and BCR-ABL mutation ruling out certain leukemias and CML    REVIEW OF SYSTEMS:   14 point ROS was reviewed and is negative other than as noted above in HPI.       HOME MEDICATIONS:  Current Outpatient Medications   Medication Sig Dispense Refill     acetaminophen (TYLENOL) 325 MG tablet Take 2 tablets (650 mg) by mouth every 4 hours as needed for other (mild pain) 100 tablet 0     augmented betamethasone dipropionate (DIPROLENE AF) 0.05 % external cream        carvedilol (COREG) 25 MG tablet Take 1 tablet (25 mg) by mouth 2 times daily (with meals) 180 tablet 3     hydroxyurea (HYDREA) 500 MG capsule Take 1 capsule (500 mg) by mouth daily 901 capsule 3     LORazepam (ATIVAN) 0.5 MG tablet Take 1 tablet (0.5 mg) by mouth every 6 hours as needed for anxiety 30 tablet 3     omeprazole (PRILOSEC OTC) 20 MG EC tablet Take 20 mg by mouth daily       Probiotic Product (PROBIOTIC DAILY PO)            ALLERGIES:  Allergies   Allergen Reactions     Morphine Hcl      Extreme agitation         PAST MEDICAL HISTORY:  Past Medical History:   Diagnosis Date     Cardiomyopathy (H)      Congestive heart failure (H)      Problem list name updated by automated process. Provider to review     Congestive heart failure, unspecified      Eosinophilia      Gastro-oesophageal reflux disease      Hyperlipidemia 07/19/2019     Hypertension      Penile discharge      Vitamin D deficiency 07/09/2010         PAST SURGICAL HISTORY:  Past Surgical History:   Procedure Laterality Date     ANGIOGRAM  1/6/05    left dominant coronary system with essentially normal coronary arteries, trivial plaque, severe dilated cardiomyopathy, left ventricle hypertrophy and severely hypokinetic, asynchrony or desynchrony of LV function     BONE MARROW BIOPSY, BONE SPECIMEN, NEEDLE/TROCAR N/A 8/15/2023    Procedure: BIOPSY, BONE MARROW;  Surgeon: Susy Baird;  Location: [facility] OR     ENT SURGERY      polyps from throat     EXCISE LESION FOOT   "2013    Procedure: EXCISE LESION FOOT;  Scar revision of Right 3rd toe       SOFT TISSUE SURGERY      neuroma from right foot         SOCIAL HISTORY:  Social History     Socioeconomic History     Marital status: Single     Spouse name: Not on file     Number of children: Not on file     Years of education: Not on file     Highest education level: Not on file   Occupational History     Not on file   Tobacco Use     Smoking status: Some Days     Packs/day: 0.50     Years: 10.00     Pack years: 5.00     Types: Cigarettes     Last attempt to quit: 12/10/2005     Years since quittin.7     Smokeless tobacco: Never   Vaping Use     Vaping Use: Never used   Substance and Sexual Activity     Alcohol use: Yes     Comment: 2 beer day     Drug use: No     Sexual activity: Not Currently     Partners: Male   Other Topics Concern     Parent/sibling w/ CABG, MI or angioplasty before 65F 55M? No   Social History Narrative     Not on file     Social Determinants of Health     Financial Resource Strain: Not on file   Food Insecurity: Not on file   Transportation Needs: Not on file   Physical Activity: Not on file   Stress: Not on file   Social Connections: Not on file   Interpersonal Safety: Not on file   Housing Stability: Not on file   Smokes on and off.  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  Brother prostate cancer.  Father had melanoma      PHYSICAL EXAM:  Vital signs:  BP (!) 158/77   Pulse 61   Resp 16   Ht 1.854 m (6' 1\")   Wt 71.1 kg (156 lb 12.8 oz)   SpO2 93%   BMI 20.69 kg/m     ECO     Erythematous maculopapular rash on the legs and back  Appears stable  Lungs are clear      LABS:  Noted and reviewed with the patient he has a detected alteration in MPL see interval history      PATHOLOGY:  As per interval " history     ASSESSMENT/PLAN:  Alvarez is a very pleasant 62-year-old male who is here for further evaluation of leukocytosis, eosinophilia and neutrophilia    Patient appears to have myeloproliferative features with MPL + disease,  HES, ELIZABETH-NOS- and PDGFRA-associated disease account for a significant proportion of the cases of HES with myeloproliferative features, a causative genetic abnormality cannot be demonstrated in all cases. In a [place] series of 35 patients with HES, 9 patients were described as having clinical or hematological features of myeloproliferative syndrome (including palpable splenomegaly, neutrophilia, circulating myelocytes and/or erythroblasts, hyperplastic BM, and myelofibrosis).11  Three of the 9 patients had no detectable FIP1L1/PDGFRA (F/P) fusion gene or cytogenetic abnormalities, and 1 responded to imatinib therapy. Several additional case series have described imatinib-responsive, F/P-negative patients with HES,12-15  although the presence of myeloproliferative features in these patients has not been systematically addressed.  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  Based on the PET/CT I do want obtain a biopsy to see if there is any associated lymphomas      1.  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        Sincerely,        Vasyl Case MD

## 2023-09-19 NOTE — TELEPHONE ENCOUNTER
Called patient, he is aware of his Ctogenetic result showing no clonal chromosomal abnormality. Results will be discussed with Dr. Case and Artie is aware that writer will contact him if there are any changes in his treatment plan. Margret Nicholas RN,BSN,OCN,CBCN

## 2023-09-20 ENCOUNTER — TELEPHONE (OUTPATIENT)
Dept: INTERVENTIONAL RADIOLOGY/VASCULAR | Facility: CLINIC | Age: 63
End: 2023-09-20
Payer: COMMERCIAL

## 2023-09-20 DIAGNOSIS — L29.9 ITCHING: ICD-10-CM

## 2023-09-20 DIAGNOSIS — R94.8 ABNORMAL POSITRON EMISSION TOMOGRAPHY (PET) SCAN: Primary | ICD-10-CM

## 2023-09-20 NOTE — TELEPHONE ENCOUNTER
Interventional Radiology - Progress Note  Outpatient - [facility]  9/20/2023    IR Brief Note    Patient referred to IR for biopsy of left thigh lesion as demonstrated on PET CT one month ago. Imaging reviewed with Dr Conde. Per Dr Conde, recommend MRI Lt thigh with contrast to review prior to scheduling biopsy. Discussed with Dr Case. Her team will place orders for imaging. Will defer biopsy outpatient order until after MRI. All aforementioned in agreement with plan.    Ildefonso Elliott PA-C  Interventional Radiology  [phone] (IR)  *[phone] (TY Office)

## 2023-09-20 NOTE — TELEPHONE ENCOUNTER
Called Artie left message that left thigh MRI is needed before biopsy can be done. Order placed and left Artie message that scheduling will reach out to him.     Also, left Artie message no change in his treatment plan per Dr. Case with his cytogenetics result. Margret Nicholas RN,BSN,OCN,CBCN

## 2023-09-21 ENCOUNTER — ANCILLARY PROCEDURE (OUTPATIENT)
Dept: MRI IMAGING | Facility: CLINIC | Age: 63
End: 2023-09-21
Attending: INTERNAL MEDICINE
Payer: COMMERCIAL

## 2023-09-21 DIAGNOSIS — R94.8 ABNORMAL POSITRON EMISSION TOMOGRAPHY (PET) SCAN: ICD-10-CM

## 2023-09-21 PROCEDURE — 255N000002 HC RX 255 OP 636: Mod: JZ | Performed by: INTERNAL MEDICINE

## 2023-09-21 PROCEDURE — A9585 GADOBUTROL INJECTION: HCPCS | Mod: JZ | Performed by: INTERNAL MEDICINE

## 2023-09-21 PROCEDURE — 73719 MRI LOWER EXTREMITY W/DYE: CPT | Mod: LT

## 2023-09-21 RX ORDER — GADOBUTROL 604.72 MG/ML
7 INJECTION INTRAVENOUS ONCE
Status: COMPLETED | OUTPATIENT
Start: 2023-09-21 | End: 2023-09-21

## 2023-09-21 RX ADMIN — GADOBUTROL 7 ML: 604.72 INJECTION INTRAVENOUS at 14:00

## 2023-09-23 NOTE — PROGRESS NOTES
[facility]    Hematology/Oncology Return patient note    Today's Date: September 19, 2023    Reason for Consult: eosinophilia      HISTORY OF PRESENT ILLNESS: Alvarez is a very pleasant 62-year-old male who is here for further evaluation of eosinophilia and elevated white count.  Patient states that he has had 14 pound unintentional weight loss over the last year .  He denies any night sweats or weight loss.  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  He is following up with dermatology.  Patient had an elevated white count since 2014 December   his white count was at 12.9.   Most recently his white count Is a 25.6 absolute neutrophils at 10.2 absolute eosinophils at 10.2 CRP level at 11.371-month ago it was at 16.5 sedimentation rate at 7.  His hemoglobin is normal at 14.7 platelet count of 2.93    Interval history: Artie returns for follow-up today.  He states he has not been feeling well and he has a low blood pressure issue. He is on lisinopril and carvedilol and is being managed by Dr Ordonez.  He also continues to have a rash that is not improving with steroids he has not seen dermatology recently. I still dont have the dermatology report from previous       Interval history: Artie returns for follow-up today.  Multiple investigations were completed  And the results are following      1.  PET/CT shows bilateral axillary and inguinal hypermetabolic adenopathy concerning for neoplastic process including hypereosinophilic syndromes and lymphoma.  Recommend tissue sampling.  SUV in those regions at 3.3-3.6.  Additional of hypermetabolic foci within the right gluteus and left vastus lateralis intermedius muscles SUV of 8.4 may be related to the neoplastic process.  Images were reviewed with the patient.  Patient denies any night sweats or weight loss his weight has actually been stable.  His the rash persists    2 bone marrow biopsy Completed on  8/15/2023 showed marked hypercellular bone marrow with marked eosinophilia maturing trilineage hematopoiesis no increased dysplasia or increase in blasts.  Peripheral blood showing moderate leukocytosis with eosinophilia.  Adequate neutrophils with slight shift to immaturity.  Flow cytometry did not show any increase in myeloid blasts and no abnormal population of cells.  Morphologic findings just showed peripheral eosinophilia.  JAK2 mutation negative.  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  This was a summary for the bone marrow biopsy.    FISH cytogenetics and molecular studies were negative for PD GFR alpha, beta, FGFR 1, JAK2 genes and BCR-ABL mutation ruling out certain leukemias and CML    REVIEW OF SYSTEMS:   14 point ROS was reviewed and is negative other than as noted above in HPI.       HOME MEDICATIONS:  Current Outpatient Medications   Medication Sig Dispense Refill    acetaminophen (TYLENOL) 325 MG tablet Take 2 tablets (650 mg) by mouth every 4 hours as needed for other (mild pain) 100 tablet 0    augmented betamethasone dipropionate (DIPROLENE AF) 0.05 % external cream       carvedilol (COREG) 25 MG tablet Take 1 tablet (25 mg) by mouth 2 times daily (with meals) 180 tablet 3    hydroxyurea (HYDREA) 500 MG capsule Take 1 capsule (500 mg) by mouth daily 901 capsule 3    LORazepam (ATIVAN) 0.5 MG tablet Take 1 tablet (0.5 mg) by mouth every 6 hours as needed for anxiety 30 tablet 3    omeprazole (PRILOSEC OTC) 20 MG EC tablet Take 20 mg by mouth daily      Probiotic Product (PROBIOTIC DAILY PO)            ALLERGIES:  Allergies   Allergen Reactions    Morphine Hcl      Extreme agitation         PAST MEDICAL HISTORY:  Past Medical History:   Diagnosis Date    Cardiomyopathy (H)     Congestive heart failure (H)      Problem list name updated by automated  process. Provider to review    Congestive heart failure, unspecified     Eosinophilia     Gastro-oesophageal reflux disease     Hyperlipidemia 2019    Hypertension     Penile discharge     Vitamin D deficiency 2010         PAST SURGICAL HISTORY:  Past Surgical History:   Procedure Laterality Date    ANGIOGRAM  05    left dominant coronary system with essentially normal coronary arteries, trivial plaque, severe dilated cardiomyopathy, left ventricle hypertrophy and severely hypokinetic, asynchrony or desynchrony of LV function    BONE MARROW BIOPSY, BONE SPECIMEN, NEEDLE/TROCAR N/A 8/15/2023    Procedure: BIOPSY, BONE MARROW;  Surgeon: Susy Baird;  Location: [facility] OR    ENT SURGERY      polyps from throat    EXCISE LESION FOOT  2013    Procedure: EXCISE LESION FOOT;  Scar revision of Right 3rd toe      SOFT TISSUE SURGERY      neuroma from right foot         SOCIAL HISTORY:  Social History     Socioeconomic History    Marital status: Single     Spouse name: Not on file    Number of children: Not on file    Years of education: Not on file    Highest education level: Not on file   Occupational History    Not on file   Tobacco Use    Smoking status: Some Days     Packs/day: 0.50     Years: 10.00     Pack years: 5.00     Types: Cigarettes     Last attempt to quit: 12/10/2005     Years since quittin.7    Smokeless tobacco: Never   Vaping Use    Vaping Use: Never used   Substance and Sexual Activity    Alcohol use: Yes     Comment: 2 beer day    Drug use: No    Sexual activity: Not Currently     Partners: Male   Other Topics Concern    Parent/sibling w/ CABG, MI or angioplasty before 65F 55M? No   Social History Narrative    Not on file     Social Determinants of Health     Financial Resource Strain: Not on file   Food Insecurity: Not on file   Transportation Needs: Not on file   Physical Activity: Not on file   Stress: Not on file   Social Connections: Not on file   Interpersonal  "Safety: Not on file   Housing Stability: Not on file   Smokes on and off.  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  Brother prostate cancer.  Father had melanoma      PHYSICAL EXAM:  Vital signs:  BP (!) 158/77   Pulse 61   Resp 16   Ht 1.854 m (6' 1\")   Wt 71.1 kg (156 lb 12.8 oz)   SpO2 93%   BMI 20.69 kg/m     ECO     Erythematous maculopapular rash on the legs and back  Appears stable  Lungs are clear      LABS:  Noted and reviewed with the patient he has a detected alteration in MPL see interval history      PATHOLOGY:  As per interval history     ASSESSMENT/PLAN:  Alvarez is a very pleasant 62-year-old male who is here for further evaluation of leukocytosis, eosinophilia and neutrophilia    Patient appears to have myeloproliferative features with MPL + disease,  HES, ELIZABETH-NOS- and PDGFRA-associated disease account for a significant proportion of the cases of HES with myeloproliferative features, a causative genetic abnormality cannot be demonstrated in all cases. In a [place] series of 35 patients with HES, 9 patients were described as having clinical or hematological features of myeloproliferative syndrome (including palpable splenomegaly, neutrophilia, circulating myelocytes and/or erythroblasts, hyperplastic BM, and myelofibrosis).11  Three of the 9 patients had no detectable FIP1L1/PDGFRA (F/P) fusion gene or cytogenetic abnormalities, and 1 responded to imatinib therapy. Several additional case series have described imatinib-responsive, F/P-negative patients with HES,12-15  although the presence of myeloproliferative features in these patients has not been systematically addressed.  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  Based on the PET/CT I do want obtain a biopsy to see if there is any associated lymphomas      1.  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

## 2023-09-25 ENCOUNTER — TELEPHONE (OUTPATIENT)
Dept: ONCOLOGY | Facility: CLINIC | Age: 63
End: 2023-09-25
Payer: COMMERCIAL

## 2023-09-25 RX ORDER — HYDROXYZINE HYDROCHLORIDE 25 MG/1
25 TABLET, FILM COATED ORAL EVERY 8 HOURS PRN
Qty: 30 TABLET | Refills: 1 | Status: SHIPPED | OUTPATIENT
Start: 2023-09-25 | End: 2023-12-13

## 2023-09-25 NOTE — TELEPHONE ENCOUNTER
Loretta from [facility] calling to follow up on a fax they received regarding pathology slides.  Loretta states that they do not have any slides at their office, as all of their pathology is completed through [facility].  Loretta would like to talk to RNCC to clarify request.    Will route to RNCC in basket for follow up.    Imelda Cho RN on 9/25/2023 at 4:45 PM

## 2023-09-25 NOTE — CONFIDENTIAL NOTE
Spoke to Artie - let him know that IR is in process of approving the biopsy so they should be reaching out to schedule him in the next day or so. Once we know when the biopsy will take place then he can start taking the Hydrea 24 hours after the biopsy per Dr. Case.  We will need to get him set up for follow up labs and in person visit with Dr. Case 2-3 weeks after the biopsy.    Patient understands and agrees with plan.    Thanks    Ruthy Cintron RN Care Coordinator  [facility]  Ph.[phone] Fax. [phone]

## 2023-09-27 ENCOUNTER — TELEPHONE (OUTPATIENT)
Dept: INTERVENTIONAL RADIOLOGY/VASCULAR | Facility: CLINIC | Age: 63
End: 2023-09-27
Payer: COMMERCIAL

## 2023-09-27 NOTE — TELEPHONE ENCOUNTER
IR OP referral for a left thing muscle biopsy seen on PET which may be related to a neoplastic process.     The imaging was originally reviewed by Radiologist Dr Conde in August who recommended MRI imaging first which was done on 9/21/23.     9/27 Reviewed MRI and notes with Radiologist Dr Maher. There is no discrete mass or target to biopsy, Consider another PET scan in the future to follow up.     Thanks, [facility] CNP ([phone]) (phone [phone])

## 2023-09-29 ENCOUNTER — LAB REQUISITION (OUTPATIENT)
Dept: LAB | Facility: CLINIC | Age: 63
End: 2023-09-29
Payer: COMMERCIAL

## 2023-09-29 PROCEDURE — 88321 CONSLTJ&REPRT SLD PREP ELSWR: CPT | Performed by: DERMATOLOGY

## 2023-10-01 LAB
PATH REPORT.COMMENTS IMP SPEC: NORMAL
PATH REPORT.FINAL DX SPEC: NORMAL
PATH REPORT.GROSS SPEC: NORMAL
PATH REPORT.MICROSCOPIC SPEC OTHER STN: NORMAL
PATH REPORT.RELEVANT HX SPEC: NORMAL
PATH REPORT.RELEVANT HX SPEC: NORMAL
PATH REPORT.SITE OF ORIGIN SPEC: NORMAL

## 2023-10-12 ENCOUNTER — LAB (OUTPATIENT)
Dept: LAB | Facility: CLINIC | Age: 63
End: 2023-10-12
Payer: COMMERCIAL

## 2023-10-12 DIAGNOSIS — D72.118 OTHER HYPEREOSINOPHILIC SYNDROME: ICD-10-CM

## 2023-10-12 LAB
ALBUMIN SERPL BCG-MCNC: 3.7 G/DL (ref 3.5–5.2)
ALP SERPL-CCNC: 76 U/L (ref 40–129)
ALT SERPL W P-5'-P-CCNC: 15 U/L (ref 0–70)
ANION GAP SERPL CALCULATED.3IONS-SCNC: 9 MMOL/L (ref 7–15)
AST SERPL W P-5'-P-CCNC: 21 U/L (ref 0–45)
BASO+EOS+MONOS # BLD AUTO: ABNORMAL 10*3/UL
BASO+EOS+MONOS NFR BLD AUTO: ABNORMAL %
BASOPHILS # BLD AUTO: ABNORMAL 10*3/UL
BASOPHILS # BLD MANUAL: 0 10E3/UL (ref 0–0.2)
BASOPHILS NFR BLD AUTO: ABNORMAL %
BASOPHILS NFR BLD MANUAL: 0 %
BILIRUB SERPL-MCNC: 0.7 MG/DL
BUN SERPL-MCNC: 15.5 MG/DL (ref 8–23)
CALCIUM SERPL-MCNC: 9.1 MG/DL (ref 8.8–10.2)
CHLORIDE SERPL-SCNC: 96 MMOL/L (ref 98–107)
CREAT SERPL-MCNC: 0.89 MG/DL (ref 0.67–1.17)
DEPRECATED HCO3 PLAS-SCNC: 27 MMOL/L (ref 22–29)
EGFRCR SERPLBLD CKD-EPI 2021: >90 ML/MIN/1.73M2
EOSINOPHIL # BLD AUTO: ABNORMAL 10*3/UL
EOSINOPHIL # BLD MANUAL: 7.8 10E3/UL (ref 0–0.7)
EOSINOPHIL NFR BLD AUTO: ABNORMAL %
EOSINOPHIL NFR BLD MANUAL: 39 %
ERYTHROCYTE [DISTWIDTH] IN BLOOD BY AUTOMATED COUNT: 14.1 % (ref 10–15)
GLUCOSE SERPL-MCNC: 97 MG/DL (ref 70–99)
HCT VFR BLD AUTO: 42.3 % (ref 40–53)
HGB BLD-MCNC: 14.6 G/DL (ref 13.3–17.7)
IMM GRANULOCYTES # BLD: ABNORMAL 10*3/UL
IMM GRANULOCYTES NFR BLD: ABNORMAL %
LYMPHOCYTES # BLD AUTO: ABNORMAL 10*3/UL
LYMPHOCYTES # BLD MANUAL: 1.6 10E3/UL (ref 0.8–5.3)
LYMPHOCYTES NFR BLD AUTO: ABNORMAL %
LYMPHOCYTES NFR BLD MANUAL: 8 %
MCH RBC QN AUTO: 32.9 PG (ref 26.5–33)
MCHC RBC AUTO-ENTMCNC: 34.5 G/DL (ref 31.5–36.5)
MCV RBC AUTO: 95 FL (ref 78–100)
MONOCYTES # BLD AUTO: ABNORMAL 10*3/UL
MONOCYTES # BLD MANUAL: 0.6 10E3/UL (ref 0–1.3)
MONOCYTES NFR BLD AUTO: ABNORMAL %
MONOCYTES NFR BLD MANUAL: 3 %
NEUTROPHILS # BLD AUTO: ABNORMAL 10*3/UL
NEUTROPHILS # BLD MANUAL: 10.1 10E3/UL (ref 1.6–8.3)
NEUTROPHILS NFR BLD AUTO: ABNORMAL %
NEUTROPHILS NFR BLD MANUAL: 50 %
NRBC # BLD AUTO: 0 10E3/UL
NRBC BLD AUTO-RTO: 0 /100
PLAT MORPH BLD: ABNORMAL
PLATELET # BLD AUTO: 217 10E3/UL (ref 150–450)
POTASSIUM SERPL-SCNC: 4.3 MMOL/L (ref 3.4–5.3)
PROT SERPL-MCNC: 6.6 G/DL (ref 6.4–8.3)
RBC # BLD AUTO: 4.44 10E6/UL (ref 4.4–5.9)
RBC MORPH BLD: ABNORMAL
SODIUM SERPL-SCNC: 132 MMOL/L (ref 135–145)
WBC # BLD AUTO: 20.1 10E3/UL (ref 4–11)

## 2023-10-12 PROCEDURE — 80053 COMPREHEN METABOLIC PANEL: CPT

## 2023-10-12 PROCEDURE — 36415 COLL VENOUS BLD VENIPUNCTURE: CPT

## 2023-10-12 PROCEDURE — 85027 COMPLETE CBC AUTOMATED: CPT

## 2023-10-12 PROCEDURE — 85007 BL SMEAR W/DIFF WBC COUNT: CPT

## 2023-10-26 ENCOUNTER — LAB (OUTPATIENT)
Dept: LAB | Facility: CLINIC | Age: 63
End: 2023-10-26
Payer: COMMERCIAL

## 2023-10-26 DIAGNOSIS — D72.118 OTHER HYPEREOSINOPHILIC SYNDROME: ICD-10-CM

## 2023-10-26 LAB
BASOPHILS # BLD AUTO: ABNORMAL 10*3/UL
BASOPHILS # BLD MANUAL: 0.2 10E3/UL (ref 0–0.2)
BASOPHILS NFR BLD AUTO: ABNORMAL %
BASOPHILS NFR BLD MANUAL: 1 %
EOSINOPHIL # BLD AUTO: ABNORMAL 10*3/UL
EOSINOPHIL # BLD MANUAL: 8.2 10E3/UL (ref 0–0.7)
EOSINOPHIL NFR BLD AUTO: ABNORMAL %
EOSINOPHIL NFR BLD MANUAL: 48 %
ERYTHROCYTE [DISTWIDTH] IN BLOOD BY AUTOMATED COUNT: 16.8 % (ref 10–15)
HCT VFR BLD AUTO: 40.5 % (ref 40–53)
HGB BLD-MCNC: 13.9 G/DL (ref 13.3–17.7)
IMM GRANULOCYTES # BLD: ABNORMAL 10*3/UL
IMM GRANULOCYTES NFR BLD: ABNORMAL %
LYMPHOCYTES # BLD AUTO: ABNORMAL 10*3/UL
LYMPHOCYTES # BLD MANUAL: 2.6 10E3/UL (ref 0.8–5.3)
LYMPHOCYTES NFR BLD AUTO: ABNORMAL %
LYMPHOCYTES NFR BLD MANUAL: 15 %
MCH RBC QN AUTO: 33.7 PG (ref 26.5–33)
MCHC RBC AUTO-ENTMCNC: 34.3 G/DL (ref 31.5–36.5)
MCV RBC AUTO: 98 FL (ref 78–100)
MONOCYTES # BLD AUTO: ABNORMAL 10*3/UL
MONOCYTES # BLD MANUAL: 0.7 10E3/UL (ref 0–1.3)
MONOCYTES NFR BLD AUTO: ABNORMAL %
MONOCYTES NFR BLD MANUAL: 4 %
NEUTROPHILS # BLD AUTO: ABNORMAL 10*3/UL
NEUTROPHILS # BLD MANUAL: 5.4 10E3/UL (ref 1.6–8.3)
NEUTROPHILS NFR BLD AUTO: ABNORMAL %
NEUTROPHILS NFR BLD MANUAL: 32 %
NRBC # BLD AUTO: 0 10E3/UL
NRBC BLD AUTO-RTO: 0 /100
PLAT MORPH BLD: ABNORMAL
PLATELET # BLD AUTO: 229 10E3/UL (ref 150–450)
RBC # BLD AUTO: 4.13 10E6/UL (ref 4.4–5.9)
RBC MORPH BLD: ABNORMAL
WBC # BLD AUTO: 17 10E3/UL (ref 4–11)

## 2023-10-26 PROCEDURE — 85027 COMPLETE CBC AUTOMATED: CPT

## 2023-10-26 PROCEDURE — 36415 COLL VENOUS BLD VENIPUNCTURE: CPT

## 2023-10-26 PROCEDURE — 85007 BL SMEAR W/DIFF WBC COUNT: CPT

## 2023-10-30 ENCOUNTER — ONCOLOGY VISIT (OUTPATIENT)
Dept: ONCOLOGY | Facility: CLINIC | Age: 63
End: 2023-10-30
Attending: INTERNAL MEDICINE
Payer: COMMERCIAL

## 2023-10-30 VITALS
HEIGHT: 73 IN | RESPIRATION RATE: 16 BRPM | OXYGEN SATURATION: 100 % | WEIGHT: 160 LBS | HEART RATE: 65 BPM | DIASTOLIC BLOOD PRESSURE: 74 MMHG | SYSTOLIC BLOOD PRESSURE: 154 MMHG | BODY MASS INDEX: 21.2 KG/M2

## 2023-10-30 DIAGNOSIS — R94.8 ABNORMAL POSITRON EMISSION TOMOGRAPHY (PET) SCAN: Primary | ICD-10-CM

## 2023-10-30 DIAGNOSIS — D72.118 OTHER HYPEREOSINOPHILIC SYNDROME: ICD-10-CM

## 2023-10-30 DIAGNOSIS — R59.1 LYMPHADENOPATHY: ICD-10-CM

## 2023-10-30 PROCEDURE — 99215 OFFICE O/P EST HI 40 MIN: CPT | Performed by: INTERNAL MEDICINE

## 2023-10-30 PROCEDURE — 99213 OFFICE O/P EST LOW 20 MIN: CPT | Performed by: INTERNAL MEDICINE

## 2023-10-30 ASSESSMENT — PAIN SCALES - GENERAL: PAINLEVEL: NO PAIN (0)

## 2023-10-30 NOTE — PROGRESS NOTES
[facility]    Hematology/Oncology Return patient note    Today's Date: October 30, 2023    Reason for Consult: eosinophilia      HISTORY OF PRESENT ILLNESS: Alvarez is a very pleasant 62-year-old male who is here for further evaluation of eosinophilia and elevated white count.  Patient states that he has had 14 pound unintentional weight loss over the last year .  He denies any night sweats or weight loss.  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  He is following up with dermatology.  Patient had an elevated white count since 2014 December   his white count was at 12.9.   Most recently his white count Is a 25.6 absolute neutrophils at 10.2 absolute eosinophils at 10.2 CRP level at 11.371-month ago it was at 16.5 sedimentation rate at 7.  His hemoglobin is normal at 14.7 platelet count of 2.93    Interval history: Artie returns for follow-up today.  He states he has not been feeling well and he has a low blood pressure issue. He is on lisinopril and carvedilol and is being managed by Dr Ordonez.  He also continues to have a rash that is not improving with steroids he has not seen dermatology recently. I still dont have the dermatology report from previous       Interval history: Artie returns for follow-up today.  Multiple investigations were completed  And the results are following      1.  PET/CT shows bilateral axillary and inguinal hypermetabolic adenopathy concerning for neoplastic process including hypereosinophilic syndromes and lymphoma.  Recommend tissue sampling.  SUV in those regions at 3.3-3.6.  Additional of hypermetabolic foci within the right gluteus and left vastus lateralis intermedius muscles SUV of 8.4 may be related to the neoplastic process.  Images were reviewed with the patient.  Patient denies any night sweats or weight loss his weight has actually been stable.  His the rash persists    2 bone marrow biopsy Completed on  8/15/2023 showed marked hypercellular bone marrow with marked eosinophilia maturing trilineage hematopoiesis no increased dysplasia or increase in blasts.  Peripheral blood showing moderate leukocytosis with eosinophilia.  Adequate neutrophils with slight shift to immaturity.  Flow cytometry did not show any increase in myeloid blasts and no abnormal population of cells.  Morphologic findings just showed peripheral eosinophilia.  JAK2 mutation negative.  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  This was a summary for the bone marrow biopsy.    FISH cytogenetics and molecular studies were negative for PD GFR alpha, beta, FGFR 1, JAK2 genes and BCR-ABL mutation ruling out certain leukemias and CML. No  mass found on MRI that they could biopsy     Artie returns for follow up.  Overall he is doing better.  His itching is better.  No night sweats.  Weight has remained stable.  He is taking Hydrea 500 mg at bedtime    REVIEW OF SYSTEMS:   14 point ROS was reviewed and is negative other than as noted above in HPI.       HOME MEDICATIONS:  Current Outpatient Medications   Medication Sig Dispense Refill    acetaminophen (TYLENOL) 325 MG tablet Take 2 tablets (650 mg) by mouth every 4 hours as needed for other (mild pain) 100 tablet 0    augmented betamethasone dipropionate (DIPROLENE AF) 0.05 % external cream       carvedilol (COREG) 25 MG tablet Take 1 tablet (25 mg) by mouth 2 times daily (with meals) 180 tablet 3    hydroxyurea (HYDREA) 500 MG capsule Take 1 capsule (500 mg) by mouth daily 901 capsule 3    hydrOXYzine (ATARAX) 25 MG tablet Take 1 tablet (25 mg) by mouth every 8 hours as needed for itching (atarax 25-50 mg every 8 hours as needed) 30 tablet 1    omeprazole (PRILOSEC OTC) 20 MG EC tablet Take 20 mg by mouth daily      Probiotic Product (PROBIOTIC DAILY PO)        LORazepam (ATIVAN) 0.5 MG tablet Take 1 tablet (0.5 mg) by mouth every 6 hours as needed for anxiety (Patient not taking: Reported on 10/30/2023) 30 tablet 3         ALLERGIES:  Allergies   Allergen Reactions    Morphine Hcl      Extreme agitation         PAST MEDICAL HISTORY:  Past Medical History:   Diagnosis Date    Cardiomyopathy (H)     Congestive heart failure (H)      Problem list name updated by automated process. Provider to review    Congestive heart failure, unspecified     Eosinophilia     Gastro-oesophageal reflux disease     Hyperlipidemia 2019    Hypertension     Penile discharge     Vitamin D deficiency 2010         PAST SURGICAL HISTORY:  Past Surgical History:   Procedure Laterality Date    ANGIOGRAM  05    left dominant coronary system with essentially normal coronary arteries, trivial plaque, severe dilated cardiomyopathy, left ventricle hypertrophy and severely hypokinetic, asynchrony or desynchrony of LV function    BONE MARROW BIOPSY, BONE SPECIMEN, NEEDLE/TROCAR N/A 8/15/2023    Procedure: BIOPSY, BONE MARROW;  Surgeon: Susy Baird;  Location: [facility] OR    ENT SURGERY      polyps from throat    EXCISE LESION FOOT  2013    Procedure: EXCISE LESION FOOT;  Scar revision of Right 3rd toe      SOFT TISSUE SURGERY      neuroma from right foot         SOCIAL HISTORY:  Social History     Socioeconomic History    Marital status: Single     Spouse name: Not on file    Number of children: Not on file    Years of education: Not on file    Highest education level: Not on file   Occupational History    Not on file   Tobacco Use    Smoking status: Some Days     Packs/day: 0.50     Years: 10.00     Additional pack years: 0.00     Total pack years: 5.00     Types: Cigarettes     Last attempt to quit: 12/10/2005     Years since quittin.8    Smokeless tobacco: Never   Vaping Use    Vaping Use: Never used   Substance and Sexual Activity    Alcohol use: Yes     Comment: 2 beer  "day    Drug use: No    Sexual activity: Not Currently     Partners: Male   Other Topics Concern    Parent/sibling w/ CABG, MI or angioplasty before 65F 55M? No   Social History Narrative    Not on file     Social Determinants of Health     Financial Resource Strain: Not on file   Food Insecurity: Not on file   Transportation Needs: Not on file   Physical Activity: Not on file   Stress: Not on file   Social Connections: Not on file   Interpersonal Safety: Not on file   Housing Stability: Not on file   Smokes on and off.  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  Brother prostate cancer.  Father had melanoma      PHYSICAL EXAM:  Vital signs:  BP (!) 154/74   Pulse 65   Resp 16   Ht 1.854 m (6' 1\")   Wt 72.6 kg (160 lb)   SpO2 100%   BMI 21.11 kg/m     ECO     Erythematous maculopapular rash on the legs and back much improved  Appears stable  Lungs are clear      LABS:  Noted and reviewed with the patient he has a detected alteration in MPL see interval history      PATHOLOGY:  As per interval history     ASSESSMENT/PLAN:  Alvarez is a very pleasant 62-year-old male who is here for further evaluation of leukocytosis, eosinophilia and neutrophilia    Patient appears to have myeloproliferative features with MPL + disease,  HES, ELIZABETH-NOS- and PDGFRA-associated disease account for a significant proportion of the cases of HES with myeloproliferative features, a causative genetic abnormality cannot be demonstrated in all cases. In a [place] series of 35 patients with HES, 9 patients were described as having clinical or hematological features of myeloproliferative syndrome (including palpable splenomegaly, neutrophilia, circulating myelocytes and/or erythroblasts, hyperplastic BM, and myelofibrosis).11  Three of the 9 patients had no " detectable FIP1L1/PDGFRA (F/P) fusion gene or cytogenetic abnormalities, and 1 responded to imatinib therapy. Several additional case series have described imatinib-responsive, F/P-negative patients with HES,12-15  although the presence of myeloproliferative features in these patients has not been systematically addressed.  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 Labs in one week after dose titration of hydrea, he will do labs in [place]  4 see me in a month     5 Ativan as needed for anxiety    Total time spent on day of visit, including review of tests, obtaining/reviewing separately obtained history, ordering medications/tests/procedures, communicating with PCP/consultants, and documenting in electronic medical record: 40 min  Vasyl Case MD  Hematology/Oncology  [facility]

## 2023-10-30 NOTE — LETTER
"    10/30/2023         RE: Alvarez Bess  [address] [facility] [address]        Dear Colleague,    Thank you for referring your patient, Alvarez Bess, to the [facility]. Please see a copy of my visit note below.    Oncology Rooming Note    October 30, 2023 8:38 AM   Alvarez Bess is a 62 year old male who presents for:    Chief Complaint   Patient presents with     Oncology Clinic Visit     Initial Vitals: BP (!) 154/74   Pulse 65   Resp 16   Ht 1.854 m (6' 1\")   Wt 72.6 kg (160 lb)   SpO2 100%   BMI 21.11 kg/m   Estimated body mass index is 21.11 kg/m  as calculated from the following:    Height as of this encounter: 1.854 m (6' 1\").    Weight as of this encounter: 72.6 kg (160 lb). Body surface area is 1.93 meters squared.  No Pain (0) Comment: Data Unavailable   No LMP for male patient.  Allergies reviewed: Yes  Medications reviewed: Yes    Medications: Medication refills not needed today.  Pharmacy name entered into [employer]: [id number] [place], [place] - [address] [address] [facility] [address]    Clinical concerns: None       Soledad Yip MA              [facility]    Hematology/Oncology Return patient note    Today's Date: October 30, 2023    Reason for Consult: eosinophilia      HISTORY OF PRESENT ILLNESS: Alvarez is a very pleasant 62-year-old male who is here for further evaluation of eosinophilia and elevated white count.  Patient states that he has had 14 pound unintentional weight loss over the last year .  He denies any night sweats or weight loss.  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  He is following up with dermatology.  Patient had an elevated white count since 2014 December   his white count was at 12.9.   Most recently his white count Is a 25.6 absolute neutrophils at 10.2 absolute eosinophils at 10.2 CRP level at 11.371-month ago it was at " 16.5 sedimentation rate at 7.  His hemoglobin is normal at 14.7 platelet count of 2.93    Interval history: Artie returns for follow-up today.  He states he has not been feeling well and he has a low blood pressure issue. He is on lisinopril and carvedilol and is being managed by Dr Ordonez.  He also continues to have a rash that is not improving with steroids he has not seen dermatology recently. I still dont have the dermatology report from previous       Interval history: Artie returns for follow-up today.  Multiple investigations were completed  And the results are following      1.  PET/CT shows bilateral axillary and inguinal hypermetabolic adenopathy concerning for neoplastic process including hypereosinophilic syndromes and lymphoma.  Recommend tissue sampling.  SUV in those regions at 3.3-3.6.  Additional of hypermetabolic foci within the right gluteus and left vastus lateralis intermedius muscles SUV of 8.4 may be related to the neoplastic process.  Images were reviewed with the patient.  Patient denies any night sweats or weight loss his weight has actually been stable.  His the rash persists    2 bone marrow biopsy Completed on 8/15/2023 showed marked hypercellular bone marrow with marked eosinophilia maturing trilineage hematopoiesis no increased dysplasia or increase in blasts.  Peripheral blood showing moderate leukocytosis with eosinophilia.  Adequate neutrophils with slight shift to immaturity.  Flow cytometry did not show any increase in myeloid blasts and no abnormal population of cells.  Morphologic findings just showed peripheral eosinophilia.  JAK2 mutation negative.  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  This was a summary for the bone marrow biopsy.    FISH cytogenetics and molecular studies were negative for PD GFR alpha, beta, FGFR 1, JAK2 genes and  BCR-ABL mutation ruling out certain leukemias and CML. No  mass found on MRI that they could biopsy     Artie returns for follow up.  Overall he is doing better.  His itching is better.  No night sweats.  Weight has remained stable.  He is taking Hydrea 500 mg at bedtime    REVIEW OF SYSTEMS:   14 point ROS was reviewed and is negative other than as noted above in HPI.       HOME MEDICATIONS:  Current Outpatient Medications   Medication Sig Dispense Refill     acetaminophen (TYLENOL) 325 MG tablet Take 2 tablets (650 mg) by mouth every 4 hours as needed for other (mild pain) 100 tablet 0     augmented betamethasone dipropionate (DIPROLENE AF) 0.05 % external cream        carvedilol (COREG) 25 MG tablet Take 1 tablet (25 mg) by mouth 2 times daily (with meals) 180 tablet 3     hydroxyurea (HYDREA) 500 MG capsule Take 1 capsule (500 mg) by mouth daily 901 capsule 3     hydrOXYzine (ATARAX) 25 MG tablet Take 1 tablet (25 mg) by mouth every 8 hours as needed for itching (atarax 25-50 mg every 8 hours as needed) 30 tablet 1     omeprazole (PRILOSEC OTC) 20 MG EC tablet Take 20 mg by mouth daily       Probiotic Product (PROBIOTIC DAILY PO)        LORazepam (ATIVAN) 0.5 MG tablet Take 1 tablet (0.5 mg) by mouth every 6 hours as needed for anxiety (Patient not taking: Reported on 10/30/2023) 30 tablet 3         ALLERGIES:  Allergies   Allergen Reactions     Morphine Hcl      Extreme agitation         PAST MEDICAL HISTORY:  Past Medical History:   Diagnosis Date     Cardiomyopathy (H)      Congestive heart failure (H)      Problem list name updated by automated process. Provider to review     Congestive heart failure, unspecified      Eosinophilia      Gastro-oesophageal reflux disease      Hyperlipidemia 07/19/2019     Hypertension      Penile discharge      Vitamin D deficiency 07/09/2010         PAST SURGICAL HISTORY:  Past Surgical History:   Procedure Laterality Date     ANGIOGRAM  1/6/05    left dominant coronary system  with essentially normal coronary arteries, trivial plaque, severe dilated cardiomyopathy, left ventricle hypertrophy and severely hypokinetic, asynchrony or desynchrony of LV function     BONE MARROW BIOPSY, BONE SPECIMEN, NEEDLE/TROCAR N/A 8/15/2023    Procedure: BIOPSY, BONE MARROW;  Surgeon: Susy Baird;  Location: [facility] OR     ENT SURGERY      polyps from throat     EXCISE LESION FOOT  2013    Procedure: EXCISE LESION FOOT;  Scar revision of Right 3rd toe       SOFT TISSUE SURGERY      neuroma from right foot         SOCIAL HISTORY:  Social History     Socioeconomic History     Marital status: Single     Spouse name: Not on file     Number of children: Not on file     Years of education: Not on file     Highest education level: Not on file   Occupational History     Not on file   Tobacco Use     Smoking status: Some Days     Packs/day: 0.50     Years: 10.00     Additional pack years: 0.00     Total pack years: 5.00     Types: Cigarettes     Last attempt to quit: 12/10/2005     Years since quittin.8     Smokeless tobacco: Never   Vaping Use     Vaping Use: Never used   Substance and Sexual Activity     Alcohol use: Yes     Comment: 2 beer day     Drug use: No     Sexual activity: Not Currently     Partners: Male   Other Topics Concern     Parent/sibling w/ CABG, MI or angioplasty before 65F 55M? No   Social History Narrative     Not on file     Social Determinants of Health     Financial Resource Strain: Not on file   Food Insecurity: Not on file   Transportation Needs: Not on file   Physical Activity: Not on file   Stress: Not on file   Social Connections: Not on file   Interpersonal Safety: Not on file   Housing Stability: Not on file   Smokes on and off.  Works at a [employer] drinks 2 beers a day      FAMILY HISTORY:  Family History   Problem Relation Age of Onset     Heart Disease Mother      Heart Disease Brother      Myocardial Infarction Brother    Mother had ovarian cancer.   "Brother prostate cancer.  Father had melanoma      PHYSICAL EXAM:  Vital signs:  BP (!) 154/74   Pulse 65   Resp 16   Ht 1.854 m (6' 1\")   Wt 72.6 kg (160 lb)   SpO2 100%   BMI 21.11 kg/m     ECO     Erythematous maculopapular rash on the legs and back much improved  Appears stable  Lungs are clear      LABS:  Noted and reviewed with the patient he has a detected alteration in MPL see interval history      PATHOLOGY:  As per interval history     ASSESSMENT/PLAN:  Alvarez is a very pleasant 62-year-old male who is here for further evaluation of leukocytosis, eosinophilia and neutrophilia    Patient appears to have myeloproliferative features with MPL + disease,  HES, ELIZABETH-NOS- and PDGFRA-associated disease account for a significant proportion of the cases of HES with myeloproliferative features, a causative genetic abnormality cannot be demonstrated in all cases. In a [place] series of 35 patients with HES, 9 patients were described as having clinical or hematological features of myeloproliferative syndrome (including palpable splenomegaly, neutrophilia, circulating myelocytes and/or erythroblasts, hyperplastic BM, and myelofibrosis).11  Three of the 9 patients had no detectable FIP1L1/PDGFRA (F/P) fusion gene or cytogenetic abnormalities, and 1 responded to imatinib therapy. Several additional case series have described imatinib-responsive, F/P-negative patients with HES,12-15  although the presence of myeloproliferative features in these patients has not been systematically addressed.  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 Labs in one week after dose titration of hydrea, he will " do labs in [place]  4 see me in a month     5 Ativan as needed for anxiety    Total time spent on day of visit, including review of tests, obtaining/reviewing separately obtained history, ordering medications/tests/procedures, communicating with PCP/consultants, and documenting in electronic medical record: 40 min  Vasyl Case MD  Hematology/Oncology  [facility]           Again, thank you for allowing me to participate in the care of your patient.        Sincerely,        Vasyl Case MD

## 2023-10-30 NOTE — PROGRESS NOTES
"Oncology Rooming Note    October 30, 2023 8:38 AM   Alvarez Bess is a 62 year old male who presents for:    Chief Complaint   Patient presents with    Oncology Clinic Visit     Initial Vitals: BP (!) 154/74   Pulse 65   Resp 16   Ht 1.854 m (6' 1\")   Wt 72.6 kg (160 lb)   SpO2 100%   BMI 21.11 kg/m   Estimated body mass index is 21.11 kg/m  as calculated from the following:    Height as of this encounter: 1.854 m (6' 1\").    Weight as of this encounter: 72.6 kg (160 lb). Body surface area is 1.93 meters squared.  No Pain (0) Comment: Data Unavailable   No LMP for male patient.  Allergies reviewed: Yes  Medications reviewed: Yes    Medications: Medication refills not needed today.  Pharmacy name entered into [employer]: [id number] - [place], [place] - [address]    Clinical concerns: None       Soledad Yip MA            "

## 2023-11-06 ENCOUNTER — TELEPHONE (OUTPATIENT)
Dept: ONCOLOGY | Facility: CLINIC | Age: 63
End: 2023-11-06
Payer: COMMERCIAL

## 2023-11-06 ENCOUNTER — LAB (OUTPATIENT)
Dept: LAB | Facility: CLINIC | Age: 63
End: 2023-11-06
Payer: COMMERCIAL

## 2023-11-06 DIAGNOSIS — D72.118 OTHER HYPEREOSINOPHILIC SYNDROME: ICD-10-CM

## 2023-11-06 DIAGNOSIS — R94.8 ABNORMAL POSITRON EMISSION TOMOGRAPHY (PET) SCAN: ICD-10-CM

## 2023-11-06 DIAGNOSIS — R59.1 LYMPHADENOPATHY: ICD-10-CM

## 2023-11-06 LAB
ALBUMIN SERPL BCG-MCNC: 4 G/DL (ref 3.5–5.2)
ALP SERPL-CCNC: 85 U/L (ref 40–129)
ALT SERPL W P-5'-P-CCNC: 13 U/L (ref 0–70)
ANION GAP SERPL CALCULATED.3IONS-SCNC: 12 MMOL/L (ref 7–15)
AST SERPL W P-5'-P-CCNC: 18 U/L (ref 0–45)
BASOPHILS # BLD AUTO: ABNORMAL 10*3/UL
BASOPHILS # BLD MANUAL: 0 10E3/UL (ref 0–0.2)
BASOPHILS NFR BLD AUTO: ABNORMAL %
BASOPHILS NFR BLD MANUAL: 0 %
BILIRUB SERPL-MCNC: 0.8 MG/DL
BUN SERPL-MCNC: 12 MG/DL (ref 8–23)
CALCIUM SERPL-MCNC: 9.1 MG/DL (ref 8.8–10.2)
CHLORIDE SERPL-SCNC: 98 MMOL/L (ref 98–107)
CREAT SERPL-MCNC: 0.78 MG/DL (ref 0.67–1.17)
DEPRECATED HCO3 PLAS-SCNC: 25 MMOL/L (ref 22–29)
EGFRCR SERPLBLD CKD-EPI 2021: >90 ML/MIN/1.73M2
EOSINOPHIL # BLD AUTO: ABNORMAL 10*3/UL
EOSINOPHIL # BLD MANUAL: 14.3 10E3/UL (ref 0–0.7)
EOSINOPHIL NFR BLD AUTO: ABNORMAL %
EOSINOPHIL NFR BLD MANUAL: 68 %
ERYTHROCYTE [DISTWIDTH] IN BLOOD BY AUTOMATED COUNT: 18.1 % (ref 10–15)
GLUCOSE SERPL-MCNC: 109 MG/DL (ref 70–99)
HCT VFR BLD AUTO: 42.6 % (ref 40–53)
HGB BLD-MCNC: 14.5 G/DL (ref 13.3–17.7)
IMM GRANULOCYTES # BLD: ABNORMAL 10*3/UL
IMM GRANULOCYTES NFR BLD: ABNORMAL %
LDH SERPL L TO P-CCNC: 176 U/L (ref 0–250)
LYMPHOCYTES # BLD AUTO: ABNORMAL 10*3/UL
LYMPHOCYTES # BLD MANUAL: 2.9 10E3/UL (ref 0.8–5.3)
LYMPHOCYTES NFR BLD AUTO: ABNORMAL %
LYMPHOCYTES NFR BLD MANUAL: 14 %
MCH RBC QN AUTO: 34.4 PG (ref 26.5–33)
MCHC RBC AUTO-ENTMCNC: 34 G/DL (ref 31.5–36.5)
MCV RBC AUTO: 101 FL (ref 78–100)
MONOCYTES # BLD AUTO: ABNORMAL 10*3/UL
MONOCYTES # BLD MANUAL: 0.4 10E3/UL (ref 0–1.3)
MONOCYTES NFR BLD AUTO: ABNORMAL %
MONOCYTES NFR BLD MANUAL: 2 %
NEUTROPHILS # BLD AUTO: ABNORMAL 10*3/UL
NEUTROPHILS # BLD MANUAL: 3.4 10E3/UL (ref 1.6–8.3)
NEUTROPHILS NFR BLD AUTO: ABNORMAL %
NEUTROPHILS NFR BLD MANUAL: 16 %
NRBC # BLD AUTO: 0 10E3/UL
NRBC BLD AUTO-RTO: 0 /100
PLAT MORPH BLD: ABNORMAL
PLATELET # BLD AUTO: 221 10E3/UL (ref 150–450)
POTASSIUM SERPL-SCNC: 4 MMOL/L (ref 3.4–5.3)
PROT SERPL-MCNC: 7.2 G/DL (ref 6.4–8.3)
RBC # BLD AUTO: 4.22 10E6/UL (ref 4.4–5.9)
RBC MORPH BLD: ABNORMAL
SODIUM SERPL-SCNC: 135 MMOL/L (ref 135–145)
WBC # BLD AUTO: 21 10E3/UL (ref 4–11)

## 2023-11-06 PROCEDURE — 85027 COMPLETE CBC AUTOMATED: CPT

## 2023-11-06 PROCEDURE — 36415 COLL VENOUS BLD VENIPUNCTURE: CPT

## 2023-11-06 PROCEDURE — 83615 LACTATE (LD) (LDH) ENZYME: CPT

## 2023-11-06 PROCEDURE — 85007 BL SMEAR W/DIFF WBC COUNT: CPT

## 2023-11-06 PROCEDURE — 80053 COMPREHEN METABOLIC PANEL: CPT

## 2023-11-06 NOTE — TELEPHONE ENCOUNTER
Left voicemail message for patient requesting a return call to confirm return appointment with Dr Case. This is first available appointment slot per MD.

## 2023-11-07 DIAGNOSIS — I50.40 COMBINED SYSTOLIC AND DIASTOLIC CONGESTIVE HEART FAILURE (H): ICD-10-CM

## 2023-11-07 DIAGNOSIS — I42.9 CARDIOMYOPATHY (H): ICD-10-CM

## 2023-11-07 RX ORDER — CARVEDILOL 25 MG/1
25 TABLET ORAL 2 TIMES DAILY WITH MEALS
Qty: 180 TABLET | Refills: 3 | Status: SHIPPED | OUTPATIENT
Start: 2023-11-07

## 2023-11-07 NOTE — TELEPHONE ENCOUNTER
Received refill from [facility] pharmacy for Carvedilol 25mg twice daily    Last OV  8/13/23.    Dr. Ordonez    [facility] Refill Guideline reviewed.  Medication meets criteria for refill.    Ginny Arana RN on 11/7/2023 at 1:53 PM

## 2023-11-27 ENCOUNTER — HOSPITAL ENCOUNTER (OUTPATIENT)
Dept: PET IMAGING | Facility: CLINIC | Age: 63
Discharge: HOME | End: 2023-11-27
Attending: INTERNAL MEDICINE | Admitting: INTERNAL MEDICINE
Payer: COMMERCIAL

## 2023-11-27 DIAGNOSIS — R59.1 LYMPHADENOPATHY: ICD-10-CM

## 2023-11-27 DIAGNOSIS — D72.118 OTHER HYPEREOSINOPHILIC SYNDROME: ICD-10-CM

## 2023-11-27 DIAGNOSIS — R94.8 ABNORMAL POSITRON EMISSION TOMOGRAPHY (PET) SCAN: ICD-10-CM

## 2023-11-27 PROCEDURE — 343N000001 HC RX 343: Performed by: INTERNAL MEDICINE

## 2023-11-27 PROCEDURE — 78815 PET IMAGE W/CT SKULL-THIGH: CPT | Mod: PS

## 2023-11-27 PROCEDURE — A9552 F18 FDG: HCPCS | Performed by: INTERNAL MEDICINE

## 2023-11-27 PROCEDURE — 74177 CT ABD & PELVIS W/CONTRAST: CPT

## 2023-11-27 PROCEDURE — 250N000011 HC RX IP 250 OP 636: Mod: JZ | Performed by: INTERNAL MEDICINE

## 2023-11-27 RX ORDER — IOPAMIDOL 755 MG/ML
10-135 INJECTION, SOLUTION INTRAVASCULAR ONCE
Status: COMPLETED | OUTPATIENT
Start: 2023-11-27 | End: 2023-11-27

## 2023-11-27 RX ADMIN — FLUDEOXYGLUCOSE F-18 10.9 MILLICURIE: 500 INJECTION, SOLUTION INTRAVENOUS at 10:13

## 2023-11-27 RX ADMIN — IOPAMIDOL 99 ML: 755 INJECTION, SOLUTION INTRAVENOUS at 10:22

## 2023-12-13 ENCOUNTER — VIRTUAL VISIT (OUTPATIENT)
Dept: ONCOLOGY | Facility: CLINIC | Age: 63
End: 2023-12-13
Attending: INTERNAL MEDICINE
Payer: COMMERCIAL

## 2023-12-13 VITALS
DIASTOLIC BLOOD PRESSURE: 54 MMHG | HEIGHT: 72 IN | SYSTOLIC BLOOD PRESSURE: 98 MMHG | BODY MASS INDEX: 20.53 KG/M2 | WEIGHT: 151.6 LBS

## 2023-12-13 DIAGNOSIS — D72.118 OTHER HYPEREOSINOPHILIC SYNDROME: ICD-10-CM

## 2023-12-13 DIAGNOSIS — L29.9 ITCHING: Primary | ICD-10-CM

## 2023-12-13 PROCEDURE — 99214 OFFICE O/P EST MOD 30 MIN: CPT | Mod: VID | Performed by: INTERNAL MEDICINE

## 2023-12-13 RX ORDER — HYDROXYZINE HYDROCHLORIDE 25 MG/1
50 TABLET, FILM COATED ORAL EVERY 8 HOURS PRN
Qty: 90 TABLET | Refills: 4 | Status: SHIPPED | OUTPATIENT
Start: 2023-12-13 | End: 2024-03-19

## 2023-12-13 RX ORDER — BETAMETHASONE DIPROPIONATE 0.5 MG/G
CREAM TOPICAL 2 TIMES DAILY
Qty: 100 G | Refills: 3 | Status: SHIPPED | OUTPATIENT
Start: 2023-12-13

## 2023-12-13 ASSESSMENT — PAIN SCALES - GENERAL: PAINLEVEL: NO PAIN (0)

## 2023-12-13 NOTE — PROGRESS NOTES
Virtual Visit Details    Type of service:  Video Visit     Originating Location (pt. Location): Home    Distant Location (provider location):  On-site  Platform used for Video Visit: [employer]

## 2023-12-13 NOTE — NURSING NOTE
Is the patient currently in the state of [place]? YES    Visit mode:VIDEO    If the visit is dropped, the patient can be reconnected by: VIDEO VISIT: Send to e-mail at: [email or web address]    Will anyone else be joining the visit? NO  (If patient encounters technical issues they should call [phone])    How would you like to obtain your AVS? MyChart    Are changes needed to the allergy or medication list? No    Reason for visit: SARAH FARRELL

## 2023-12-13 NOTE — LETTER
12/13/2023         RE: Alvarez Bess  [address] [facility] [address]        Dear Colleague,    Thank you for referring your patient, Alvarez Bess, to the [facility]. Please see a copy of my visit note below.    Virtual Visit Details    Type of service:  Video Visit     Originating Location (pt. Location): Home    Distant Location (provider location):  On-site  Platform used for Video Visit: [employer]    [facility]     Hematology/Oncology Return patient note video visit     Today's Date: December 13, 2023    Reason for Consult: eosinophilia      HISTORY OF PRESENT ILLNESS: Alvarez is a very pleasant 62-year-old male who is here for further evaluation of eosinophilia and elevated white count.  Patient states that he has had 14 pound unintentional weight loss over the last year .  He denies any night sweats or weight loss.  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  He is following up with dermatology.  Patient had an elevated white count since 2014 December   his white count was at 12.9.   Most recently his white count Is a 25.6 absolute neutrophils at 10.2 absolute eosinophils at 10.2 CRP level at 11.371-month ago it was at 16.5 sedimentation rate at 7.  His hemoglobin is normal at 14.7 platelet count of 2.93    Interval history: Artie returns for follow-up today.  He states he has not been feeling well and he has a low blood pressure issue. He is on lisinopril and carvedilol and is being managed by Dr Ordonez.  He also continues to have a rash that is not improving with steroids he has not seen dermatology recently. I still dont have the dermatology report from previous       Interval history: Artie returns for follow-up today.  Multiple investigations were completed  And the results are following      1.  PET/CT shows bilateral axillary and inguinal hypermetabolic adenopathy concerning for  neoplastic process including hypereosinophilic syndromes and lymphoma.  Recommend tissue sampling.  SUV in those regions at 3.3-3.6.  Additional of hypermetabolic foci within the right gluteus and left vastus lateralis intermedius muscles SUV of 8.4 may be related to the neoplastic process.  Images were reviewed with the patient.  Patient denies any night sweats or weight loss his weight has actually been stable.  His the rash persists    2 bone marrow biopsy Completed on 8/15/2023 showed marked hypercellular bone marrow with marked eosinophilia maturing trilineage hematopoiesis no increased dysplasia or increase in blasts.  Peripheral blood showing moderate leukocytosis with eosinophilia.  Adequate neutrophils with slight shift to immaturity.  Flow cytometry did not show any increase in myeloid blasts and no abnormal population of cells.  Morphologic findings just showed peripheral eosinophilia.  JAK2 mutation negative.  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  This was a summary for the bone marrow biopsy.    FISH cytogenetics and molecular studies were negative for PD GFR alpha, beta, FGFR 1, JAK2 genes and BCR-ABL mutation ruling out certain leukemias and CML. No  mass found on MRI that they could biopsy     Artie returns for follow up.  Overall his itching is worst     REVIEW OF SYSTEMS:   14 point ROS was reviewed and is negative other than as noted above in HPI.       HOME MEDICATIONS:  Current Outpatient Medications   Medication Sig Dispense Refill     acetaminophen (TYLENOL) 325 MG tablet Take 2 tablets (650 mg) by mouth every 4 hours as needed for other (mild pain) 100 tablet 0     augmented betamethasone dipropionate (DIPROLENE AF) 0.05 % external cream Apply topically 2 times daily 100 g 3     carvedilol (COREG) 25 MG tablet Take 1 tablet (25 mg) by mouth 2  times daily (with meals) 180 tablet 3     hydroxyurea (HYDREA) 500 MG capsule Take 1 capsule (500 mg) by mouth daily 901 capsule 3     hydrOXYzine HCl (ATARAX) 25 MG tablet Take 2 tablets (50 mg) by mouth every 8 hours as needed for itching (atarax 25-50 mg every 8 hours as needed) 90 tablet 4     omeprazole (PRILOSEC OTC) 20 MG EC tablet Take 20 mg by mouth daily       Probiotic Product (PROBIOTIC DAILY PO)        azithromycin (ZITHROMAX) 250 MG tablet 2 tablets day 1 then 1 tablet daily for 4 days 6 tablet 0     LORazepam (ATIVAN) 0.5 MG tablet Take 1 tablet (0.5 mg) by mouth every 6 hours as needed for anxiety (Patient not taking: Reported on 10/30/2023) 30 tablet 3         ALLERGIES:  Allergies   Allergen Reactions     Morphine Hcl      Extreme agitation         PAST MEDICAL HISTORY:  Past Medical History:   Diagnosis Date     Cardiomyopathy (H)      Congestive heart failure (H)      Problem list name updated by automated process. Provider to review     Congestive heart failure, unspecified      Eosinophilia      Gastro-oesophageal reflux disease      Hyperlipidemia 07/19/2019     Hypertension      Penile discharge      Vitamin D deficiency 07/09/2010         PAST SURGICAL HISTORY:  Past Surgical History:   Procedure Laterality Date     ANGIOGRAM  1/6/05    left dominant coronary system with essentially normal coronary arteries, trivial plaque, severe dilated cardiomyopathy, left ventricle hypertrophy and severely hypokinetic, asynchrony or desynchrony of LV function     BONE MARROW BIOPSY, BONE SPECIMEN, NEEDLE/TROCAR N/A 8/15/2023    Procedure: BIOPSY, BONE MARROW;  Surgeon: Susy Baird;  Location: [facility] OR     ENT SURGERY      polyps from throat     EXCISE LESION FOOT  12/16/2013    Procedure: EXCISE LESION FOOT;  Scar revision of Right 3rd toe       SOFT TISSUE SURGERY      neuroma from right foot         SOCIAL HISTORY:  Social History     Socioeconomic History     Marital status: Single     Spouse  name: Not on file     Number of children: Not on file     Years of education: Not on file     Highest education level: Not on file   Occupational History     Not on file   Tobacco Use     Smoking status: Some Days     Packs/day: 0.50     Years: 10.00     Additional pack years: 0.00     Total pack years: 5.00     Types: Cigarettes     Last attempt to quit: 12/10/2005     Years since quittin.0     Smokeless tobacco: Never   Vaping Use     Vaping Use: Never used   Substance and Sexual Activity     Alcohol use: Yes     Comment: 2 beer day     Drug use: No     Sexual activity: Not Currently     Partners: Male   Other Topics Concern     Parent/sibling w/ CABG, MI or angioplasty before 65F 55M? No   Social History Narrative     Not on file     Social Determinants of Health     Financial Resource Strain: Not on file   Food Insecurity: Not on file   Transportation Needs: Not on file   Physical Activity: Not on file   Stress: Not on file   Social Connections: Not on file   Interpersonal Safety: Not on file   Housing Stability: Not on file   Smokes on and off.  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  Brother prostate cancer.  Father had melanoma      PHYSICAL EXAM:  Vital signs:  BP 98/54   Ht 1.829 m (6')   Wt 68.8 kg (151 lb 9.6 oz)   BMI 20.56 kg/m     ECO     Erythematous maculopapular rash on the legs and back worsened although vidoe visit so I can't see well      LABS:  Noted and reviewed with the patient he has a detected alteration in MPL see interval history      PATHOLOGY:  As per interval history     ASSESSMENT/PLAN:  Alvarez is a very pleasant 62-year-old male who is here for further evaluation of leukocytosis, eosinophilia and neutrophilia    Patient appears to have myeloproliferative features with MPL + disease,   HES, ELIZABETH-NOS- and PDGFRA-associated disease account for a significant proportion of the cases of HES with myeloproliferative features, a causative genetic abnormality cannot be demonstrated in all cases. In a French series of 35 patients with HES, 9 patients were described as having clinical or hematological features of myeloproliferative syndrome (including palpable splenomegaly, neutrophilia, circulating myelocytes and/or erythroblasts, hyperplastic BM, and myelofibrosis).11  Three of the 9 patients had no detectable FIP1L1/PDGFRA (F/P) fusion gene or cytogenetic abnormalities, and 1 responded to imatinib therapy. Several additional case series have described imatinib-responsive, F/P-negative patients with HES,12-15  although the presence of myeloproliferative features in these patients has not been systematically addressed.  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  -continue hydrea, but increase to 500 mg bid , eosinophils are up  2 Pet/ct discussed some areas improved and resolved   3 labs and md visit in one month   4Ativan as needed for anxiety  5 rash renewed ointment if not improved    Total time spent on day of visit, including review of tests, obtaining/reviewing separately obtained history, ordering medications/tests/procedures, communicating with PCP/consultants, and documenting in electronic medical record: 40 min  Vasyl Case MD  Hematology/Oncology  [facility]           Again, thank you for allowing me to participate in the care of your patient.        Sincerely,        Vasyl Case MD

## 2023-12-13 NOTE — LETTER
12/13/2023         RE: Alvarez Bess  [id number] [facility] [id number]        Dear Colleague,    Thank you for referring your patient, Alvarez Bess, to the [facility]. Please see a copy of my visit note below.    Virtual Visit Details    Type of service:  Video Visit     Originating Location (pt. Location): Home    Distant Location (provider location):  On-site  Platform used for Video Visit: [employer]    [facility]     Hematology/Oncology Return patient note video visit     Today's Date: December 13, 2023    Reason for Consult: eosinophilia      HISTORY OF PRESENT ILLNESS: Alvarez is a very pleasant 62-year-old male who is here for further evaluation of eosinophilia and elevated white count.  Patient states that he has had 14 pound unintentional weight loss over the last year .  He denies any night sweats or weight loss.  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  He is following up with dermatology.  Patient had an elevated white count since 2014 December   his white count was at 12.9.   Most recently his white count Is a 25.6 absolute neutrophils at 10.2 absolute eosinophils at 10.2 CRP level at 11.371-month ago it was at 16.5 sedimentation rate at 7.  His hemoglobin is normal at 14.7 platelet count of 2.93    Interval history: Artie returns for follow-up today.  He states he has not been feeling well and he has a low blood pressure issue. He is on lisinopril and carvedilol and is being managed by Dr Ordonez.  He also continues to have a rash that is not improving with steroids he has not seen dermatology recently. I still dont have the dermatology report from previous       Interval history: Artie returns for follow-up today.  Multiple investigations were completed  And the results are following      1.  PET/CT shows bilateral axillary and inguinal hypermetabolic adenopathy concerning for  neoplastic process including hypereosinophilic syndromes and lymphoma.  Recommend tissue sampling.  SUV in those regions at 3.3-3.6.  Additional of hypermetabolic foci within the right gluteus and left vastus lateralis intermedius muscles SUV of 8.4 may be related to the neoplastic process.  Images were reviewed with the patient.  Patient denies any night sweats or weight loss his weight has actually been stable.  His the rash persists    2 bone marrow biopsy Completed on 8/15/2023 showed marked hypercellular bone marrow with marked eosinophilia maturing trilineage hematopoiesis no increased dysplasia or increase in blasts.  Peripheral blood showing moderate leukocytosis with eosinophilia.  Adequate neutrophils with slight shift to immaturity.  Flow cytometry did not show any increase in myeloid blasts and no abnormal population of cells.  Morphologic findings just showed peripheral eosinophilia.  JAK2 mutation negative.  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  This was a summary for the bone marrow biopsy.    FISH cytogenetics and molecular studies were negative for PD GFR alpha, beta, FGFR 1, JAK2 genes and BCR-ABL mutation ruling out certain leukemias and CML. No  mass found on MRI that they could biopsy     Artie returns for follow up.  Overall his itching is worst     REVIEW OF SYSTEMS:   14 point ROS was reviewed and is negative other than as noted above in HPI.       HOME MEDICATIONS:  Current Outpatient Medications   Medication Sig Dispense Refill     acetaminophen (TYLENOL) 325 MG tablet Take 2 tablets (650 mg) by mouth every 4 hours as needed for other (mild pain) 100 tablet 0     augmented betamethasone dipropionate (DIPROLENE AF) 0.05 % external cream Apply topically 2 times daily 100 g 3     carvedilol (COREG) 25 MG tablet Take 1 tablet (25 mg) by mouth 2  times daily (with meals) 180 tablet 3     hydroxyurea (HYDREA) 500 MG capsule Take 1 capsule (500 mg) by mouth daily 901 capsule 3     hydrOXYzine HCl (ATARAX) 25 MG tablet Take 2 tablets (50 mg) by mouth every 8 hours as needed for itching (atarax 25-50 mg every 8 hours as needed) 90 tablet 4     omeprazole (PRILOSEC OTC) 20 MG EC tablet Take 20 mg by mouth daily       Probiotic Product (PROBIOTIC DAILY PO)        azithromycin (ZITHROMAX) 250 MG tablet 2 tablets day 1 then 1 tablet daily for 4 days 6 tablet 0     LORazepam (ATIVAN) 0.5 MG tablet Take 1 tablet (0.5 mg) by mouth every 6 hours as needed for anxiety (Patient not taking: Reported on 10/30/2023) 30 tablet 3         ALLERGIES:  Allergies   Allergen Reactions     Morphine Hcl      Extreme agitation         PAST MEDICAL HISTORY:  Past Medical History:   Diagnosis Date     Cardiomyopathy (H)      Congestive heart failure (H)      Problem list name updated by automated process. Provider to review     Congestive heart failure, unspecified      Eosinophilia      Gastro-oesophageal reflux disease      Hyperlipidemia 07/19/2019     Hypertension      Penile discharge      Vitamin D deficiency 07/09/2010         PAST SURGICAL HISTORY:  Past Surgical History:   Procedure Laterality Date     ANGIOGRAM  1/6/05    left dominant coronary system with essentially normal coronary arteries, trivial plaque, severe dilated cardiomyopathy, left ventricle hypertrophy and severely hypokinetic, asynchrony or desynchrony of LV function     BONE MARROW BIOPSY, BONE SPECIMEN, NEEDLE/TROCAR N/A 8/15/2023    Procedure: BIOPSY, BONE MARROW;  Surgeon: Susy Baird;  Location: [facility] OR     ENT SURGERY      polyps from throat     EXCISE LESION FOOT  12/16/2013    Procedure: EXCISE LESION FOOT;  Scar revision of Right 3rd toe       SOFT TISSUE SURGERY      neuroma from right foot         SOCIAL HISTORY:  Social History     Socioeconomic History     Marital status: Single     Spouse  name: Not on file     Number of children: Not on file     Years of education: Not on file     Highest education level: Not on file   Occupational History     Not on file   Tobacco Use     Smoking status: Some Days     Packs/day: 0.50     Years: 10.00     Additional pack years: 0.00     Total pack years: 5.00     Types: Cigarettes     Last attempt to quit: 12/10/2005     Years since quittin.0     Smokeless tobacco: Never   Vaping Use     Vaping Use: Never used   Substance and Sexual Activity     Alcohol use: Yes     Comment: 2 beer day     Drug use: No     Sexual activity: Not Currently     Partners: Male   Other Topics Concern     Parent/sibling w/ CABG, MI or angioplasty before 65F 55M? No   Social History Narrative     Not on file     Social Determinants of Health     Financial Resource Strain: Not on file   Food Insecurity: Not on file   Transportation Needs: Not on file   Physical Activity: Not on file   Stress: Not on file   Social Connections: Not on file   Interpersonal Safety: Not on file   Housing Stability: Not on file   Smokes on and off.  Works at a [employer] shop drinks 2 beers a day      FAMILY HISTORY:  Family History   Problem Relation Age of Onset     Heart Disease Mother      Heart Disease Brother      Myocardial Infarction Brother    Mother had ovarian cancer.  Brother prostate cancer.  Father had melanoma      PHYSICAL EXAM:  Vital signs:  BP 98/54   Ht 1.829 m (6')   Wt 68.8 kg (151 lb 9.6 oz)   BMI 20.56 kg/m     ECO     Erythematous maculopapular rash on the legs and back worsened although vidoe visit so I can't see well      LABS:  Noted and reviewed with the patient he has a detected alteration in MPL see interval history      PATHOLOGY:  As per interval history     ASSESSMENT/PLAN:  Alvarez is a very pleasant 62-year-old male who is here for further evaluation of leukocytosis, eosinophilia and neutrophilia    Patient appears to have myeloproliferative features with MPL + disease,   HES, ELIZABETH-NOS- and PDGFRA-associated disease account for a significant proportion of the cases of HES with myeloproliferative features, a causative genetic abnormality cannot be demonstrated in all cases. In a [place] series of 35 patients with HES, 9 patients were described as having clinical or hematological features of myeloproliferative syndrome (including palpable splenomegaly, neutrophilia, circulating myelocytes and/or erythroblasts, hyperplastic BM, and myelofibrosis).11  Three of the 9 patients had no detectable FIP1L1/PDGFRA (F/P) fusion gene or cytogenetic abnormalities, and 1 responded to imatinib therapy. Several additional case series have described imatinib-responsive, F/P-negative patients with HES,12-15  although the presence of myeloproliferative features in these patients has not been systematically addressed.  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  -continue hydrea, but increase to 500 mg bid , eosinophils are up  2 Pet/ct discussed some areas improved and resolved   3 labs and md visit in one month   4Ativan as needed for anxiety  5 rash renewed ointment if not improved    Total time spent on day of visit, including review of tests, obtaining/reviewing separately obtained history, ordering medications/tests/procedures, communicating with PCP/consultants, and documenting in electronic medical record: 40 min  Vasyl Case MD  Hematology/Oncology  [facility]           Again, thank you for allowing me to participate in the care of your patient.        Sincerely,        Vasyl Case MD

## 2023-12-14 ENCOUNTER — OFFICE VISIT (OUTPATIENT)
Dept: URGENT CARE | Facility: URGENT CARE | Age: 63
End: 2023-12-14
Payer: COMMERCIAL

## 2023-12-14 VITALS
TEMPERATURE: 97.3 F | DIASTOLIC BLOOD PRESSURE: 61 MMHG | HEART RATE: 78 BPM | OXYGEN SATURATION: 100 % | SYSTOLIC BLOOD PRESSURE: 101 MMHG

## 2023-12-14 DIAGNOSIS — J22 LOWER RESP. TRACT INFECTION: Primary | ICD-10-CM

## 2023-12-14 DIAGNOSIS — I51.4 OTHER CHRONIC MYOCARDITIS (H): ICD-10-CM

## 2023-12-14 PROCEDURE — 99213 OFFICE O/P EST LOW 20 MIN: CPT | Performed by: FAMILY MEDICINE

## 2023-12-14 RX ORDER — AZITHROMYCIN 250 MG/1
TABLET, FILM COATED ORAL
Qty: 6 TABLET | Refills: 0 | Status: SHIPPED | OUTPATIENT
Start: 2023-12-14 | End: 2024-03-15

## 2023-12-15 NOTE — PROGRESS NOTES
ASSESSMENT/ PLAN:    Lower resp. tract infection     - azithromycin (ZITHROMAX) 250 MG tablet; 2 tablets day 1 then 1 tablet daily for 4 days        We discussed that based on the patient's description of symptoms, history and physical exam, that a bacterial infection has likely developed in the chest requiring treatment with antibiotics.     We discussed that the chest infection often starts as a viral illness, however, over time, the secretions in the chest can start to grow bacteria, with increased chest discomfort , productive sputum.       The patient is advised to monitor the symptoms of the illness, and if worsening,  worse chest pain, increased productive sputum, persistent fevers/ chills, shortness of breath, then seek re-evaluation with primary care, UC or ER     Symptomatic measures encouraged, humidified air, plenty of fluids.  Patient may consider OTC expectorant and/or cough suppressant to treat symptoms.   acetaminophen, ibuprofen for pain and fever    He says he does not tolerate doxycyline-  declined albuterol inhaler  Declined covid testing,  he had negative home covid testing yesterday    Return if worsening    Other chronic myocarditis (H)  Has a history of eosinophilic myocarditis treated with hydroxyurea.  His medication is myelosuppressive, so he has increased risk of bacterial superinfection      -------------------------------------------------------------------------------------------------------------------------------    SUBJECTIVE:  Chief Complaint   Patient presents with    Urgent Care     Productive Cough  for about a week now and chest congestion, negative covid test yesterday.      Alvarez Bess is a 62 year old male who presents to the clinic today with a chief complaint of cough , wheezing., and lung congestion for 1 week(s).  Patient denies central chest pain. and pleuritic chest pain  His cough is described as persistent, daytime, nightime, and productive mucoid and  yellow.    The patient's symptoms are moderate and worsening.  Associated symptoms include malaise. The patient's symptoms are exacerbated by exercise  Patient has been using nothing  to improve symptoms.    Past Medical History:   Diagnosis Date    Cardiomyopathy (H)     Congestive heart failure (H)      Problem list name updated by automated process. Provider to review    Congestive heart failure, unspecified     Eosinophilia     Gastro-oesophageal reflux disease     Hyperlipidemia 07/19/2019    Hypertension     Penile discharge     Vitamin D deficiency 07/09/2010     Patient Active Problem List   Diagnosis    Gastroenteritis    Cardiomyopathy (H)    Congestive heart failure (H)    Hyperlipidemia    Hypertension    IBS (irritable bowel syndrome)    Impaired fasting glucose    Plantar wart of right foot    Vitamin D deficiency    Lymphadenopathy       ALLERGIES:  Morphine hcl    augmented betamethasone dipropionate (DIPROLENE AF) 0.05 % external cream, Apply topically 2 times daily  carvedilol (COREG) 25 MG tablet, Take 1 tablet (25 mg) by mouth 2 times daily (with meals)  hydroxyurea (HYDREA) 500 MG capsule, Take 1 capsule (500 mg) by mouth daily  hydrOXYzine HCl (ATARAX) 25 MG tablet, Take 2 tablets (50 mg) by mouth every 8 hours as needed for itching (atarax 25-50 mg every 8 hours as needed)  omeprazole (PRILOSEC OTC) 20 MG EC tablet, Take 20 mg by mouth daily  Probiotic Product (PROBIOTIC DAILY PO),   acetaminophen (TYLENOL) 325 MG tablet, Take 2 tablets (650 mg) by mouth every 4 hours as needed for other (mild pain)  LORazepam (ATIVAN) 0.5 MG tablet, Take 1 tablet (0.5 mg) by mouth every 6 hours as needed for anxiety (Patient not taking: Reported on 10/30/2023)    No current facility-administered medications on file prior to visit.      Social History     Tobacco Use    Smoking status: Some Days     Packs/day: 0.50     Years: 10.00     Additional pack years: 0.00     Total pack years: 5.00     Types:  Cigarettes     Last attempt to quit: 12/10/2005     Years since quittin.0    Smokeless tobacco: Never   Substance Use Topics    Alcohol use: Yes     Comment: 2 beer day       Family History   Problem Relation Age of Onset    Heart Disease Mother     Heart Disease Brother     Myocardial Infarction Brother          ROS  CONSTITUTIONAL:NEGATIVE for fever, chills,    INTEGUMENTARY/SKIN: NEGATIVE for worrisome rashes, or lesions  EYES: NEGATIVE for vision changes or irritation  ENT/MOUTH: NEGATIVE for ear, mouth and throat problems    OBJECTIVE:  /61 (BP Location: Right arm, Patient Position: Sitting, Cuff Size: Adult Regular)   Pulse 78   Temp 97.3  F (36.3  C) (Tympanic)   SpO2 100%   GENERAL APPEARANCE: alert, moderate distress, and cooperative, occasional congested cough  EYES: EOMI,  PERRL, conjunctiva clear  HENT: ear canals and TM's normal.  Nose and mouth without ulcers, erythema or lesions  NECK: supple, nontender, no lymphadenopathy  RESP: rhonchi bilateral and throughout and expiratory wheezes few bilateral  CV: regular rates and rhythm, normal S1 S2, no murmur noted  NEURO: Normal strength and tone, sensory exam grossly normal,  normal speech and mentation  SKIN: no suspicious lesions or rashes

## 2023-12-19 ENCOUNTER — TELEPHONE (OUTPATIENT)
Dept: ONCOLOGY | Facility: CLINIC | Age: 63
End: 2023-12-19
Payer: COMMERCIAL

## 2023-12-19 NOTE — PROGRESS NOTES
Received positive distress screen regarding patient's concern for current weight.  Called and left RD contact information on patient's voicemail.  Await return call from patient.     Marcie Arnold, RD, LD  Clinical Dietitian  [facility]  [phone] (direct)

## 2023-12-21 NOTE — PROGRESS NOTES
[facility]     Hematology/Oncology Return patient note video visit     Today's Date: December 13, 2023    Reason for Consult: eosinophilia      HISTORY OF PRESENT ILLNESS: Alvarez is a very pleasant 62-year-old male who is here for further evaluation of eosinophilia and elevated white count.  Patient states that he has had 14 pound unintentional weight loss over the last year .  He denies any night sweats or weight loss.  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  He is following up with dermatology.  Patient had an elevated white count since 2014 December   his white count was at 12.9.   Most recently his white count Is a 25.6 absolute neutrophils at 10.2 absolute eosinophils at 10.2 CRP level at 11.371-month ago it was at 16.5 sedimentation rate at 7.  His hemoglobin is normal at 14.7 platelet count of 2.93    Interval history: Artie returns for follow-up today.  He states he has not been feeling well and he has a low blood pressure issue. He is on lisinopril and carvedilol and is being managed by Dr Ordonez.  He also continues to have a rash that is not improving with steroids he has not seen dermatology recently. I still dont have the dermatology report from previous       Interval history: Artie returns for follow-up today.  Multiple investigations were completed  And the results are following      1.  PET/CT shows bilateral axillary and inguinal hypermetabolic adenopathy concerning for neoplastic process including hypereosinophilic syndromes and lymphoma.  Recommend tissue sampling.  SUV in those regions at 3.3-3.6.  Additional of hypermetabolic foci within the right gluteus and left vastus lateralis intermedius muscles SUV of 8.4 may be related to the neoplastic process.  Images were reviewed with the patient.  Patient denies any night sweats or weight loss his weight has actually been stable.  His the rash persists    2 bone marrow biopsy  Completed on 8/15/2023 showed marked hypercellular bone marrow with marked eosinophilia maturing trilineage hematopoiesis no increased dysplasia or increase in blasts.  Peripheral blood showing moderate leukocytosis with eosinophilia.  Adequate neutrophils with slight shift to immaturity.  Flow cytometry did not show any increase in myeloid blasts and no abnormal population of cells.  Morphologic findings just showed peripheral eosinophilia.  JAK2 mutation negative.  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  This was a summary for the bone marrow biopsy.    FISH cytogenetics and molecular studies were negative for PD GFR alpha, beta, FGFR 1, JAK2 genes and BCR-ABL mutation ruling out certain leukemias and CML. No  mass found on MRI that they could biopsy     Artie returns for follow up.  Overall his itching is worst     REVIEW OF SYSTEMS:   14 point ROS was reviewed and is negative other than as noted above in HPI.       HOME MEDICATIONS:  Current Outpatient Medications   Medication Sig Dispense Refill    acetaminophen (TYLENOL) 325 MG tablet Take 2 tablets (650 mg) by mouth every 4 hours as needed for other (mild pain) 100 tablet 0    augmented betamethasone dipropionate (DIPROLENE AF) 0.05 % external cream Apply topically 2 times daily 100 g 3    carvedilol (COREG) 25 MG tablet Take 1 tablet (25 mg) by mouth 2 times daily (with meals) 180 tablet 3    hydroxyurea (HYDREA) 500 MG capsule Take 1 capsule (500 mg) by mouth daily 901 capsule 3    hydrOXYzine HCl (ATARAX) 25 MG tablet Take 2 tablets (50 mg) by mouth every 8 hours as needed for itching (atarax 25-50 mg every 8 hours as needed) 90 tablet 4    omeprazole (PRILOSEC OTC) 20 MG EC tablet Take 20 mg by mouth daily      Probiotic Product (PROBIOTIC DAILY PO)       azithromycin (ZITHROMAX) 250 MG tablet 2 tablets day 1  then 1 tablet daily for 4 days 6 tablet 0    LORazepam (ATIVAN) 0.5 MG tablet Take 1 tablet (0.5 mg) by mouth every 6 hours as needed for anxiety (Patient not taking: Reported on 10/30/2023) 30 tablet 3         ALLERGIES:  Allergies   Allergen Reactions    Morphine Hcl      Extreme agitation         PAST MEDICAL HISTORY:  Past Medical History:   Diagnosis Date    Cardiomyopathy (H)     Congestive heart failure (H)      Problem list name updated by automated process. Provider to review    Congestive heart failure, unspecified     Eosinophilia     Gastro-oesophageal reflux disease     Hyperlipidemia 2019    Hypertension     Penile discharge     Vitamin D deficiency 2010         PAST SURGICAL HISTORY:  Past Surgical History:   Procedure Laterality Date    ANGIOGRAM  05    left dominant coronary system with essentially normal coronary arteries, trivial plaque, severe dilated cardiomyopathy, left ventricle hypertrophy and severely hypokinetic, asynchrony or desynchrony of LV function    BONE MARROW BIOPSY, BONE SPECIMEN, NEEDLE/TROCAR N/A 8/15/2023    Procedure: BIOPSY, BONE MARROW;  Surgeon: Susy Baird;  Location: [facility] OR    ENT SURGERY      polyps from throat    EXCISE LESION FOOT  2013    Procedure: EXCISE LESION FOOT;  Scar revision of Right 3rd toe      SOFT TISSUE SURGERY      neuroma from right foot         SOCIAL HISTORY:  Social History     Socioeconomic History    Marital status: Single     Spouse name: Not on file    Number of children: Not on file    Years of education: Not on file    Highest education level: Not on file   Occupational History    Not on file   Tobacco Use    Smoking status: Some Days     Packs/day: 0.50     Years: 10.00     Additional pack years: 0.00     Total pack years: 5.00     Types: Cigarettes     Last attempt to quit: 12/10/2005     Years since quittin.0    Smokeless tobacco: Never   Vaping Use    Vaping Use: Never used   Substance and Sexual  Activity    Alcohol use: Yes     Comment: 2 beer day    Drug use: No    Sexual activity: Not Currently     Partners: Male   Other Topics Concern    Parent/sibling w/ CABG, MI or angioplasty before 65F 55M? No   Social History Narrative    Not on file     Social Determinants of Health     Financial Resource Strain: Not on file   Food Insecurity: Not on file   Transportation Needs: Not on file   Physical Activity: Not on file   Stress: Not on file   Social Connections: Not on file   Interpersonal Safety: Not on file   Housing Stability: Not on file   Smokes on and off.  Works at a [employer] shop drinks 2 beers a day      FAMILY HISTORY:  Family History   Problem Relation Age of Onset    Heart Disease Mother     Heart Disease Brother     Myocardial Infarction Brother    Mother had ovarian cancer.  Brother prostate cancer.  Father had melanoma      PHYSICAL EXAM:  Vital signs:  BP 98/54   Ht 1.829 m (6')   Wt 68.8 kg (151 lb 9.6 oz)   BMI 20.56 kg/m     ECO     Erythematous maculopapular rash on the legs and back worsened although vidoe visit so I can't see well      LABS:  Noted and reviewed with the patient he has a detected alteration in MPL see interval history      PATHOLOGY:  As per interval history     ASSESSMENT/PLAN:  Alvarez is a very pleasant 62-year-old male who is here for further evaluation of leukocytosis, eosinophilia and neutrophilia    Patient appears to have myeloproliferative features with MPL + disease,  HES, ELIZABETH-NOS- and PDGFRA-associated disease account for a significant proportion of the cases of HES with myeloproliferative features, a causative genetic abnormality cannot be demonstrated in all cases. In a [place] series of 35 patients with HES, 9 patients were described as having clinical or hematological features of myeloproliferative syndrome (including palpable splenomegaly, neutrophilia, circulating myelocytes and/or erythroblasts, hyperplastic BM, and myelofibrosis).11  Three of the 9  patients had no detectable FIP1L1/PDGFRA (F/P) fusion gene or cytogenetic abnormalities, and 1 responded to imatinib therapy. Several additional case series have described imatinib-responsive, F/P-negative patients with HES,12-15  although the presence of myeloproliferative features in these patients has not been systematically addressed.  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

## 2024-01-16 ENCOUNTER — LAB (OUTPATIENT)
Dept: LAB | Facility: CLINIC | Age: 64
End: 2024-01-16
Payer: COMMERCIAL

## 2024-01-16 DIAGNOSIS — D72.118 OTHER HYPEREOSINOPHILIC SYNDROME: ICD-10-CM

## 2024-01-16 DIAGNOSIS — L29.9 ITCHING: ICD-10-CM

## 2024-01-16 LAB
ERYTHROCYTE [DISTWIDTH] IN BLOOD BY AUTOMATED COUNT: 15.7 % (ref 10–15)
HCT VFR BLD AUTO: 39.2 % (ref 40–53)
HGB BLD-MCNC: 13.6 G/DL (ref 13.3–17.7)
MCH RBC QN AUTO: 39.8 PG (ref 26.5–33)
MCHC RBC AUTO-ENTMCNC: 34.7 G/DL (ref 31.5–36.5)
MCV RBC AUTO: 115 FL (ref 78–100)
PLATELET # BLD AUTO: 174 10E3/UL (ref 150–450)
RBC # BLD AUTO: 3.42 10E6/UL (ref 4.4–5.9)
WBC # BLD AUTO: 14.5 10E3/UL (ref 4–11)

## 2024-01-16 PROCEDURE — 85027 COMPLETE CBC AUTOMATED: CPT

## 2024-01-16 PROCEDURE — 80053 COMPREHEN METABOLIC PANEL: CPT

## 2024-01-16 PROCEDURE — 36415 COLL VENOUS BLD VENIPUNCTURE: CPT

## 2024-01-17 LAB
ALBUMIN SERPL BCG-MCNC: 3.9 G/DL (ref 3.5–5.2)
ALP SERPL-CCNC: 85 U/L (ref 40–150)
ALT SERPL W P-5'-P-CCNC: 22 U/L (ref 0–70)
ANION GAP SERPL CALCULATED.3IONS-SCNC: 8 MMOL/L (ref 7–15)
AST SERPL W P-5'-P-CCNC: 19 U/L (ref 0–45)
BILIRUB SERPL-MCNC: 0.5 MG/DL
BUN SERPL-MCNC: 11.1 MG/DL (ref 8–23)
CALCIUM SERPL-MCNC: 9.1 MG/DL (ref 8.8–10.2)
CHLORIDE SERPL-SCNC: 100 MMOL/L (ref 98–107)
CREAT SERPL-MCNC: 0.88 MG/DL (ref 0.67–1.17)
DEPRECATED HCO3 PLAS-SCNC: 28 MMOL/L (ref 22–29)
EGFRCR SERPLBLD CKD-EPI 2021: >90 ML/MIN/1.73M2
GLUCOSE SERPL-MCNC: 94 MG/DL (ref 70–99)
PATH REPORT.COMMENTS IMP SPEC: NORMAL
PATH REPORT.FINAL DX SPEC: NORMAL
PATH REPORT.MICROSCOPIC SPEC OTHER STN: NORMAL
PATH REPORT.RELEVANT HX SPEC: NORMAL
POTASSIUM SERPL-SCNC: 4.4 MMOL/L (ref 3.4–5.3)
PROT SERPL-MCNC: 7.1 G/DL (ref 6.4–8.3)
SODIUM SERPL-SCNC: 136 MMOL/L (ref 135–145)

## 2024-01-17 PROCEDURE — 88184 FLOWCYTOMETRY/ TC 1 MARKER: CPT | Performed by: PATHOLOGY

## 2024-01-17 PROCEDURE — 88185 FLOWCYTOMETRY/TC ADD-ON: CPT | Performed by: INTERNAL MEDICINE

## 2024-01-17 PROCEDURE — 88189 FLOWCYTOMETRY/READ 16 & >: CPT | Performed by: PATHOLOGY

## 2024-01-23 DIAGNOSIS — D72.10 EOSINOPHILIA: Primary | ICD-10-CM

## 2024-01-23 RX ORDER — HYDROXYUREA 500 MG/1
500 CAPSULE ORAL 2 TIMES DAILY
Qty: 120 CAPSULE | Refills: 1 | Status: ON HOLD | OUTPATIENT
Start: 2024-01-23 | End: 2024-03-20

## 2024-01-30 ENCOUNTER — ONCOLOGY VISIT (OUTPATIENT)
Dept: ONCOLOGY | Facility: CLINIC | Age: 64
End: 2024-01-30
Attending: INTERNAL MEDICINE
Payer: COMMERCIAL

## 2024-01-30 ENCOUNTER — OFFICE VISIT (OUTPATIENT)
Dept: CARDIOLOGY | Facility: CLINIC | Age: 64
End: 2024-01-30
Payer: COMMERCIAL

## 2024-01-30 VITALS
WEIGHT: 159.1 LBS | SYSTOLIC BLOOD PRESSURE: 138 MMHG | HEART RATE: 75 BPM | BODY MASS INDEX: 21.55 KG/M2 | OXYGEN SATURATION: 99 % | DIASTOLIC BLOOD PRESSURE: 80 MMHG | HEIGHT: 72 IN

## 2024-01-30 VITALS
HEIGHT: 72 IN | HEART RATE: 74 BPM | WEIGHT: 161 LBS | DIASTOLIC BLOOD PRESSURE: 65 MMHG | BODY MASS INDEX: 21.81 KG/M2 | RESPIRATION RATE: 16 BRPM | SYSTOLIC BLOOD PRESSURE: 120 MMHG | OXYGEN SATURATION: 100 %

## 2024-01-30 DIAGNOSIS — D72.18 EOSINOPHILIC MYOCARDITIS: Primary | ICD-10-CM

## 2024-01-30 DIAGNOSIS — R59.1 LYMPHADENOPATHY: Primary | ICD-10-CM

## 2024-01-30 DIAGNOSIS — I40.1 EOSINOPHILIC MYOCARDITIS: Primary | ICD-10-CM

## 2024-01-30 PROCEDURE — 99213 OFFICE O/P EST LOW 20 MIN: CPT | Performed by: INTERNAL MEDICINE

## 2024-01-30 PROCEDURE — 99212 OFFICE O/P EST SF 10 MIN: CPT | Performed by: INTERNAL MEDICINE

## 2024-01-30 PROCEDURE — 99214 OFFICE O/P EST MOD 30 MIN: CPT | Performed by: INTERNAL MEDICINE

## 2024-01-30 ASSESSMENT — PAIN SCALES - GENERAL: PAINLEVEL: NO PAIN (0)

## 2024-01-30 NOTE — LETTER
1/30/2024    Moshe Nieves, MERVIN  [address] [facility] [address]    RE: Alvarez Bess       Dear Colleague,     I had the pleasure of seeing Alvarez Bess in the [facility].  Cardiology Progress Note          Assessment and Plan:       Peripheral eosinophilia  No evidence of cardiomyopathy at this point and stable exercise tolerance and stamina.  Routine follow-up in 2 years      20 minutes was spent with the patient, precharting and reviewing tests as well as post visit charting all done today..    This note was transcribed using electronic voice recognition software and there may be typographical errors present.                    Interval History:     The patient is a very pleasant 63 year old whom I have been following for eosinophilia on Hydrea being taken care of by Dr. Case whom the patient likes very much.   He states he feels so much better than when he first started.  We have checked his echocardiogram in the recent past and over the years and it has remained stable without any evidence of thrombus or valvulopathy.    No cardiovascular complaints.  Blood pressures are stable on his current regimen of carvedilol.                         Review of Systems:     Review of Systems:  Skin:  Negative     Eyes:  Positive for glasses  ENT:  Negative    Respiratory:  Negative    Cardiovascular:    fatigue;Positive for  Gastroenterology: Negative    Genitourinary:  Negative    Musculoskeletal:  Positive for joint pain  Neurologic:  Positive for numbness or tingling of hands  Psychiatric:  Positive for sleep disturbances  Heme/Lymph/Imm:  Negative    Endocrine:  Negative                Physical Exam:     Vitals: /80 (BP Location: Right arm, Patient Position: Sitting, Cuff Size: Adult Regular)   Pulse 75   Ht 1.829 m (6')   Wt 72.2 kg (159 lb 1.6 oz)   SpO2 99%   BMI 21.58 kg/m    Constitutional:  cooperative, alert and oriented, well developed, well nourished, in no  acute distress appears younger than stated age      Skin:  warm and dry to the touch        Head:  normocephalic        Eyes:  pupils equal and round, conjunctivae and lids unremarkable, sclera white, no xanthalasma, EOMS intact, no nystagmus        Chest:  clear to auscultation        Cardiac: regular rhythm       systolic murmur;grade 1          Extremities and Back:  no deformities, clubbing, cyanosis, erythema observed;no edema        Neurological:  no gross motor deficits;affect appropriate                 Medications:     Current Outpatient Medications   Medication Sig Dispense Refill    acetaminophen (TYLENOL) 325 MG tablet Take 2 tablets (650 mg) by mouth every 4 hours as needed for other (mild pain) 100 tablet 0    augmented betamethasone dipropionate (DIPROLENE AF) 0.05 % external cream Apply topically 2 times daily 100 g 3    azithromycin (ZITHROMAX) 250 MG tablet 2 tablets day 1 then 1 tablet daily for 4 days 6 tablet 0    carvedilol (COREG) 25 MG tablet Take 1 tablet (25 mg) by mouth 2 times daily (with meals) 180 tablet 3    hydroxyurea (HYDREA) 500 MG capsule Take 1 capsule (500 mg) by mouth 2 times daily 120 capsule 1    hydrOXYzine HCl (ATARAX) 25 MG tablet Take 2 tablets (50 mg) by mouth every 8 hours as needed for itching (atarax 25-50 mg every 8 hours as needed) 90 tablet 4    LORazepam (ATIVAN) 0.5 MG tablet Take 1 tablet (0.5 mg) by mouth every 6 hours as needed for anxiety 30 tablet 3    omeprazole (PRILOSEC OTC) 20 MG EC tablet Take 20 mg by mouth daily      Probiotic Product (PROBIOTIC DAILY PO)                   Data:   All laboratory data reviewed  No results found for this or any previous visit (from the past 24 hour(s)).    All laboratory data reviewed  Lab Results   Component Value Date    CHOL 137 05/10/2023    CHOL 166 09/18/2014     Lab Results   Component Value Date    HDL 46 05/10/2023    HDL 51 09/18/2014     Lab Results   Component Value Date    LDL 75 05/10/2023    LDL 98  09/18/2014     Lab Results   Component Value Date    TRIG 80 05/10/2023    TRIG 87 09/18/2014     Lab Results   Component Value Date    CHOLHDLRATIO 3.3 09/18/2014     TSH   Date Value Ref Range Status   05/10/2023 0.99 0.30 - 4.20 uIU/mL Final   10/04/2005 0.82 0.4 - 5.0 mU/L Final     Last Basic Metabolic Panel:  Lab Results   Component Value Date     01/16/2024     12/23/2014      Lab Results   Component Value Date    POTASSIUM 4.4 01/16/2024    POTASSIUM 4.5 12/23/2014     Lab Results   Component Value Date    CHLORIDE 100 01/16/2024    CHLORIDE 102 12/23/2014     Lab Results   Component Value Date    PAULINA 9.1 01/16/2024    PAULINA 9.3 12/23/2014     Lab Results   Component Value Date    CO2 28 01/16/2024    CO2 27 12/23/2014     Lab Results   Component Value Date    BUN 11.1 01/16/2024    BUN 18 12/23/2014     Lab Results   Component Value Date    CR 0.88 01/16/2024    CR 1.06 12/23/2014     Lab Results   Component Value Date    GLC 94 01/16/2024     12/23/2014     Lab Results   Component Value Date    WBC 14.5 01/16/2024    WBC 12.9 12/23/2014     Lab Results   Component Value Date    RBC 3.42 01/16/2024    RBC 4.53 12/23/2014     Lab Results   Component Value Date    HGB 13.6 01/16/2024    HGB 14.8 12/23/2014     Lab Results   Component Value Date    HCT 39.2 01/16/2024    HCT 42.7 12/23/2014     Lab Results   Component Value Date     01/16/2024    MCV 94 12/23/2014     Lab Results   Component Value Date    MCH 39.8 01/16/2024    MCH 32.7 12/23/2014     Lab Results   Component Value Date    MCHC 34.7 01/16/2024    MCHC 34.7 12/23/2014     Lab Results   Component Value Date    RDW 15.7 01/16/2024    RDW 12.2 12/23/2014     Lab Results   Component Value Date     01/16/2024     12/23/2014           Thank you for allowing me to participate in the care of your patient.      Sincerely,     Dl Ordonez MD     [facility]  cc: No referring provider defined for this encounter.

## 2024-01-30 NOTE — PATIENT INSTRUCTIONS
Cbc differential in one month  See me in 3 months with labs   I will put in internal medicine referral and they will call and schedule it for you.

## 2024-01-30 NOTE — PROGRESS NOTES
Oncology Rooming Note    January 30, 2024 11:35 AM   Alvarez Bess is a 63 year old male who presents for:    No chief complaint on file.    Initial Vitals: /65   Pulse 74   Resp 16   Ht 1.829 m (6')   Wt 73 kg (161 lb)   SpO2 100%   BMI 21.84 kg/m   Estimated body mass index is 21.84 kg/m  as calculated from the following:    Height as of this encounter: 1.829 m (6').    Weight as of this encounter: 73 kg (161 lb). Body surface area is 1.93 meters squared.  No Pain (0) Comment: Data Unavailable   No LMP for male patient.  Allergies reviewed: Yes  Medications reviewed: Yes    Medications: Medication refills not needed today.  Pharmacy name entered into [employer]: [id number] - [place], [place] - [address] [address]    Frailty Screening:   Is the patient here for a new oncology consult visit in cancer care? 2. No      Clinical concerns: None       Soledad Yip MA

## 2024-01-30 NOTE — PROGRESS NOTES
Cardiology Progress Note          Assessment and Plan:       Peripheral eosinophilia  No evidence of cardiomyopathy at this point and stable exercise tolerance and stamina.  Routine follow-up in 2 years      20 minutes was spent with the patient, precharting and reviewing tests as well as post visit charting all done today..    This note was transcribed using electronic voice recognition software and there may be typographical errors present.                    Interval History:     The patient is a very pleasant 63 year old whom I have been following for eosinophilia on Hydrea being taken care of by Dr. Case whom the patient likes very much.   He states he feels so much better than when he first started.  We have checked his echocardiogram in the recent past and over the years and it has remained stable without any evidence of thrombus or valvulopathy.    No cardiovascular complaints.  Blood pressures are stable on his current regimen of carvedilol.                         Review of Systems:     Review of Systems:  Skin:  Negative     Eyes:  Positive for glasses  ENT:  Negative    Respiratory:  Negative    Cardiovascular:    fatigue;Positive for  Gastroenterology: Negative    Genitourinary:  Negative    Musculoskeletal:  Positive for joint pain  Neurologic:  Positive for numbness or tingling of hands  Psychiatric:  Positive for sleep disturbances  Heme/Lymph/Imm:  Negative    Endocrine:  Negative                Physical Exam:     Vitals: /80 (BP Location: Right arm, Patient Position: Sitting, Cuff Size: Adult Regular)   Pulse 75   Ht 1.829 m (6')   Wt 72.2 kg (159 lb 1.6 oz)   SpO2 99%   BMI 21.58 kg/m    Constitutional:  cooperative, alert and oriented, well developed, well nourished, in no acute distress appears younger than stated age      Skin:  warm and dry to the touch        Head:  normocephalic        Eyes:  pupils equal and round, conjunctivae and lids unremarkable, sclera white, no  xanthalasma, EOMS intact, no nystagmus        Chest:  clear to auscultation        Cardiac: regular rhythm       systolic murmur;grade 1          Extremities and Back:  no deformities, clubbing, cyanosis, erythema observed;no edema        Neurological:  no gross motor deficits;affect appropriate                 Medications:     Current Outpatient Medications   Medication Sig Dispense Refill    acetaminophen (TYLENOL) 325 MG tablet Take 2 tablets (650 mg) by mouth every 4 hours as needed for other (mild pain) 100 tablet 0    augmented betamethasone dipropionate (DIPROLENE AF) 0.05 % external cream Apply topically 2 times daily 100 g 3    azithromycin (ZITHROMAX) 250 MG tablet 2 tablets day 1 then 1 tablet daily for 4 days 6 tablet 0    carvedilol (COREG) 25 MG tablet Take 1 tablet (25 mg) by mouth 2 times daily (with meals) 180 tablet 3    hydroxyurea (HYDREA) 500 MG capsule Take 1 capsule (500 mg) by mouth 2 times daily 120 capsule 1    hydrOXYzine HCl (ATARAX) 25 MG tablet Take 2 tablets (50 mg) by mouth every 8 hours as needed for itching (atarax 25-50 mg every 8 hours as needed) 90 tablet 4    LORazepam (ATIVAN) 0.5 MG tablet Take 1 tablet (0.5 mg) by mouth every 6 hours as needed for anxiety 30 tablet 3    omeprazole (PRILOSEC OTC) 20 MG EC tablet Take 20 mg by mouth daily      Probiotic Product (PROBIOTIC DAILY PO)                   Data:   All laboratory data reviewed  No results found for this or any previous visit (from the past 24 hour(s)).    All laboratory data reviewed  Lab Results   Component Value Date    CHOL 137 05/10/2023    CHOL 166 09/18/2014     Lab Results   Component Value Date    HDL 46 05/10/2023    HDL 51 09/18/2014     Lab Results   Component Value Date    LDL 75 05/10/2023    LDL 98 09/18/2014     Lab Results   Component Value Date    TRIG 80 05/10/2023    TRIG 87 09/18/2014     Lab Results   Component Value Date    CHOLHDLRATIO 3.3 09/18/2014     TSH   Date Value Ref Range Status    05/10/2023 0.99 0.30 - 4.20 uIU/mL Final   10/04/2005 0.82 0.4 - 5.0 mU/L Final     Last Basic Metabolic Panel:  Lab Results   Component Value Date     01/16/2024     12/23/2014      Lab Results   Component Value Date    POTASSIUM 4.4 01/16/2024    POTASSIUM 4.5 12/23/2014     Lab Results   Component Value Date    CHLORIDE 100 01/16/2024    CHLORIDE 102 12/23/2014     Lab Results   Component Value Date    PAULINA 9.1 01/16/2024    PAULINA 9.3 12/23/2014     Lab Results   Component Value Date    CO2 28 01/16/2024    CO2 27 12/23/2014     Lab Results   Component Value Date    BUN 11.1 01/16/2024    BUN 18 12/23/2014     Lab Results   Component Value Date    CR 0.88 01/16/2024    CR 1.06 12/23/2014     Lab Results   Component Value Date    GLC 94 01/16/2024     12/23/2014     Lab Results   Component Value Date    WBC 14.5 01/16/2024    WBC 12.9 12/23/2014     Lab Results   Component Value Date    RBC 3.42 01/16/2024    RBC 4.53 12/23/2014     Lab Results   Component Value Date    HGB 13.6 01/16/2024    HGB 14.8 12/23/2014     Lab Results   Component Value Date    HCT 39.2 01/16/2024    HCT 42.7 12/23/2014     Lab Results   Component Value Date     01/16/2024    MCV 94 12/23/2014     Lab Results   Component Value Date    MCH 39.8 01/16/2024    MCH 32.7 12/23/2014     Lab Results   Component Value Date    MCHC 34.7 01/16/2024    MCHC 34.7 12/23/2014     Lab Results   Component Value Date    RDW 15.7 01/16/2024    RDW 12.2 12/23/2014     Lab Results   Component Value Date     01/16/2024     12/23/2014

## 2024-01-30 NOTE — LETTER
1/30/2024         RE: Alvarez Bess  [address] [address] [address]        Dear Colleague,    Thank you for referring your patient, Alvarez Bess, to the [facility]. Please see a copy of my visit note below.    Oncology Rooming Note    January 30, 2024 11:35 AM   Alvarez Bess is a 63 year old male who presents for:    No chief complaint on file.    Initial Vitals: /65   Pulse 74   Resp 16   Ht 1.829 m (6')   Wt 73 kg (161 lb)   SpO2 100%   BMI 21.84 kg/m   Estimated body mass index is 21.84 kg/m  as calculated from the following:    Height as of this encounter: 1.829 m (6').    Weight as of this encounter: 73 kg (161 lb). Body surface area is 1.93 meters squared.  No Pain (0) Comment: Data Unavailable   No LMP for male patient.  Allergies reviewed: Yes  Medications reviewed: Yes    Medications: Medication refills not needed today.  Pharmacy name entered into [employer]: [facility] #[id number] - [place], [place] - [address]    Frailty Screening:   Is the patient here for a new oncology consult visit in cancer care? 2. No      Clinical concerns: None       Soledad Yip MA              [facility]     Hematology/Oncology Return patient note video visit     Today's Date: Jan 30, 2024    Reason for Consult: eosinophilia      HISTORY OF PRESENT ILLNESS: Alvarez is a very pleasant 62-year-old male who is here for further evaluation of eosinophilia and elevated white count.  Patient states that he has had 14 pound unintentional weight loss over the last year .  He denies any night sweats or weight loss.  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  He is following up with dermatology.  Patient had an elevated white count since 2014 December   his white count was at 12.9.   Most recently his white count Is a 25.6 absolute neutrophils at 10.2 absolute  eosinophils at 10.2 CRP level at 11.371-month ago it was at 16.5 sedimentation rate at 7.  His hemoglobin is normal at 14.7 platelet count of 2.93    Interval history: Artie returns for follow-up today.  He states he has not been feeling well and he has a low blood pressure issue. He is on lisinopril and carvedilol and is being managed by Dr Ordonez.  He also continues to have a rash that is not improving with steroids he has not seen dermatology recently. I still dont have the dermatology report from previous       Interval history: Artie returns for follow-up today.  Multiple investigations were completed  And the results are following      1.  PET/CT shows bilateral axillary and inguinal hypermetabolic adenopathy concerning for neoplastic process including hypereosinophilic syndromes and lymphoma.  Recommend tissue sampling.  SUV in those regions at 3.3-3.6.  Additional of hypermetabolic foci within the right gluteus and left vastus lateralis intermedius muscles SUV of 8.4 may be related to the neoplastic process.  Images were reviewed with the patient.  Patient denies any night sweats or weight loss his weight has actually been stable.  His the rash persists    2 bone marrow biopsy Completed on 8/15/2023 showed marked hypercellular bone marrow with marked eosinophilia maturing trilineage hematopoiesis no increased dysplasia or increase in blasts.  Peripheral blood showing moderate leukocytosis with eosinophilia.  Adequate neutrophils with slight shift to immaturity.  Flow cytometry did not show any increase in myeloid blasts and no abnormal population of cells.  Morphologic findings just showed peripheral eosinophilia.  JAK2 mutation negative.  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  This was a summary for the bone marrow biopsy.    FISH cytogenetics and molecular studies  were negative for PD GFR alpha, beta, FGFR 1, JAK2 genes and BCR-ABL mutation ruling out certain leukemias and CML. No  mass found on MRI that they could biopsy     Artie returns for follow up.  Overall his itching is better, rash gone with the lotion, on hydrea 500 mg bid, has not taken xanax, gets some back pain with hydrea     REVIEW OF SYSTEMS:   14 point ROS was reviewed and is negative other than as noted above in HPI.       HOME MEDICATIONS:  Current Outpatient Medications   Medication Sig Dispense Refill     acetaminophen (TYLENOL) 325 MG tablet Take 2 tablets (650 mg) by mouth every 4 hours as needed for other (mild pain) 100 tablet 0     augmented betamethasone dipropionate (DIPROLENE AF) 0.05 % external cream Apply topically 2 times daily 100 g 3     azithromycin (ZITHROMAX) 250 MG tablet 2 tablets day 1 then 1 tablet daily for 4 days 6 tablet 0     carvedilol (COREG) 25 MG tablet Take 1 tablet (25 mg) by mouth 2 times daily (with meals) 180 tablet 3     hydroxyurea (HYDREA) 500 MG capsule Take 1 capsule (500 mg) by mouth 2 times daily 120 capsule 1     hydrOXYzine HCl (ATARAX) 25 MG tablet Take 2 tablets (50 mg) by mouth every 8 hours as needed for itching (atarax 25-50 mg every 8 hours as needed) 90 tablet 4     LORazepam (ATIVAN) 0.5 MG tablet Take 1 tablet (0.5 mg) by mouth every 6 hours as needed for anxiety 30 tablet 3     omeprazole (PRILOSEC OTC) 20 MG EC tablet Take 20 mg by mouth daily       Probiotic Product (PROBIOTIC DAILY PO)            ALLERGIES:  Allergies   Allergen Reactions     Morphine Hcl      Extreme agitation         PAST MEDICAL HISTORY:  Past Medical History:   Diagnosis Date     Cardiomyopathy (H)      Congestive heart failure (H)      Problem list name updated by automated process. Provider to review     Congestive heart failure, unspecified      Eosinophilia      Gastro-oesophageal reflux disease      Hyperlipidemia 07/19/2019     Hypertension      Penile discharge      Vitamin  D deficiency 2010         PAST SURGICAL HISTORY:  Past Surgical History:   Procedure Laterality Date     ANGIOGRAM  05    left dominant coronary system with essentially normal coronary arteries, trivial plaque, severe dilated cardiomyopathy, left ventricle hypertrophy and severely hypokinetic, asynchrony or desynchrony of LV function     BONE MARROW BIOPSY, BONE SPECIMEN, NEEDLE/TROCAR N/A 8/15/2023    Procedure: BIOPSY, BONE MARROW;  Surgeon: Susy Baird;  Location: [facility] OR     ENT SURGERY      polyps from throat     EXCISE LESION FOOT  2013    Procedure: EXCISE LESION FOOT;  Scar revision of Right 3rd toe       SOFT TISSUE SURGERY      neuroma from right foot         SOCIAL HISTORY:  Social History     Socioeconomic History     Marital status: Single     Spouse name: Not on file     Number of children: Not on file     Years of education: Not on file     Highest education level: Not on file   Occupational History     Not on file   Tobacco Use     Smoking status: Some Days     Years: 10     Types: Cigarettes     Last attempt to quit: 12/10/2005     Years since quittin.1     Smokeless tobacco: Never     Tobacco comments:     About 5 cigarettes per week   Vaping Use     Vaping Use: Never used   Substance and Sexual Activity     Alcohol use: Yes     Comment: 2 beer day     Drug use: No     Sexual activity: Not Currently     Partners: Male   Other Topics Concern     Parent/sibling w/ CABG, MI or angioplasty before 65F 55M? No   Social History Narrative     Not on file     Social Determinants of Health     Financial Resource Strain: Not on file   Food Insecurity: Not on file   Transportation Needs: Not on file   Physical Activity: Not on file   Stress: Not on file   Social Connections: Not on file   Interpersonal Safety: Not on file   Housing Stability: Not on file   Smokes on and off.  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  Brother prostate cancer.  Father had melanoma      PHYSICAL EXAM:  Vital signs:  /65   Pulse 74   Resp 16   Ht 1.829 m (6')   Wt 73 kg (161 lb)   SpO2 100%   BMI 21.84 kg/m     ECO     Rash is gone  Heart RRR  Lungs clear      LABS:  Noted and reviewed with the patient he has a detected alteration in MPL see interval history      PATHOLOGY:  As per interval history     ASSESSMENT/PLAN:  Alvarez is a very pleasant 62-year-old male who is here for further evaluation of leukocytosis, eosinophilia and neutrophilia    Patient appears to have myeloproliferative features with MPL + disease,  HES, ELIZABETH-NOS- and PDGFRA-associated disease account for a significant proportion of the cases of HES with myeloproliferative features, a causative genetic abnormality cannot be demonstrated in all cases. In a [place] series of 35 patients with HES, 9 patients were described as having clinical or hematological features of myeloproliferative syndrome (including palpable splenomegaly, neutrophilia, circulating myelocytes and/or erythroblasts, hyperplastic BM, and myelofibrosis).11  Three of the 9 patients had no detectable FIP1L1/PDGFRA (F/P) fusion gene or cytogenetic abnormalities, and 1 responded to imatinib therapy. Several additional case series have described imatinib-responsive, F/P-negative patients with HES,12-15  although the presence of myeloproliferative features in these patients has not been systematically addressed.  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        Sincerely,        Vasyl Case MD

## 2024-01-30 NOTE — PROGRESS NOTES
[facility]     Hematology/Oncology Return patient note video visit     Today's Date: Jan 30, 2024    Reason for Consult: eosinophilia      HISTORY OF PRESENT ILLNESS: Alvarez is a very pleasant 62-year-old male who is here for further evaluation of eosinophilia and elevated white count.  Patient states that he has had 14 pound unintentional weight loss over the last year .  He denies any night sweats or weight loss.  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  He is following up with dermatology.  Patient had an elevated white count since 2014 December   his white count was at 12.9.   Most recently his white count Is a 25.6 absolute neutrophils at 10.2 absolute eosinophils at 10.2 CRP level at 11.371-month ago it was at 16.5 sedimentation rate at 7.  His hemoglobin is normal at 14.7 platelet count of 2.93    Interval history: Artie returns for follow-up today.  He states he has not been feeling well and he has a low blood pressure issue. He is on lisinopril and carvedilol and is being managed by Dr Ordonez.  He also continues to have a rash that is not improving with steroids he has not seen dermatology recently. I still dont have the dermatology report from previous       Interval history: Artie returns for follow-up today.  Multiple investigations were completed  And the results are following      1.  PET/CT shows bilateral axillary and inguinal hypermetabolic adenopathy concerning for neoplastic process including hypereosinophilic syndromes and lymphoma.  Recommend tissue sampling.  SUV in those regions at 3.3-3.6.  Additional of hypermetabolic foci within the right gluteus and left vastus lateralis intermedius muscles SUV of 8.4 may be related to the neoplastic process.  Images were reviewed with the patient.  Patient denies any night sweats or weight loss his weight has actually been stable.  His the rash persists    2 bone marrow biopsy  Completed on 8/15/2023 showed marked hypercellular bone marrow with marked eosinophilia maturing trilineage hematopoiesis no increased dysplasia or increase in blasts.  Peripheral blood showing moderate leukocytosis with eosinophilia.  Adequate neutrophils with slight shift to immaturity.  Flow cytometry did not show any increase in myeloid blasts and no abnormal population of cells.  Morphologic findings just showed peripheral eosinophilia.  JAK2 mutation negative.  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  This was a summary for the bone marrow biopsy.    FISH cytogenetics and molecular studies were negative for PD GFR alpha, beta, FGFR 1, JAK2 genes and BCR-ABL mutation ruling out certain leukemias and CML. No  mass found on MRI that they could biopsy     Artie returns for follow up.  Overall his itching is better, rash gone with the lotion, on hydrea 500 mg bid, has not taken xanax, gets some back pain with hydrea     REVIEW OF SYSTEMS:   14 point ROS was reviewed and is negative other than as noted above in HPI.       HOME MEDICATIONS:  Current Outpatient Medications   Medication Sig Dispense Refill    acetaminophen (TYLENOL) 325 MG tablet Take 2 tablets (650 mg) by mouth every 4 hours as needed for other (mild pain) 100 tablet 0    augmented betamethasone dipropionate (DIPROLENE AF) 0.05 % external cream Apply topically 2 times daily 100 g 3    azithromycin (ZITHROMAX) 250 MG tablet 2 tablets day 1 then 1 tablet daily for 4 days 6 tablet 0    carvedilol (COREG) 25 MG tablet Take 1 tablet (25 mg) by mouth 2 times daily (with meals) 180 tablet 3    hydroxyurea (HYDREA) 500 MG capsule Take 1 capsule (500 mg) by mouth 2 times daily 120 capsule 1    hydrOXYzine HCl (ATARAX) 25 MG tablet Take 2 tablets (50 mg) by mouth every 8 hours as needed for itching (atarax 25-50 mg  every 8 hours as needed) 90 tablet 4    LORazepam (ATIVAN) 0.5 MG tablet Take 1 tablet (0.5 mg) by mouth every 6 hours as needed for anxiety 30 tablet 3    omeprazole (PRILOSEC OTC) 20 MG EC tablet Take 20 mg by mouth daily      Probiotic Product (PROBIOTIC DAILY PO)            ALLERGIES:  Allergies   Allergen Reactions    Morphine Hcl      Extreme agitation         PAST MEDICAL HISTORY:  Past Medical History:   Diagnosis Date    Cardiomyopathy (H)     Congestive heart failure (H)      Problem list name updated by automated process. Provider to review    Congestive heart failure, unspecified     Eosinophilia     Gastro-oesophageal reflux disease     Hyperlipidemia 2019    Hypertension     Penile discharge     Vitamin D deficiency 2010         PAST SURGICAL HISTORY:  Past Surgical History:   Procedure Laterality Date    ANGIOGRAM  05    left dominant coronary system with essentially normal coronary arteries, trivial plaque, severe dilated cardiomyopathy, left ventricle hypertrophy and severely hypokinetic, asynchrony or desynchrony of LV function    BONE MARROW BIOPSY, BONE SPECIMEN, NEEDLE/TROCAR N/A 8/15/2023    Procedure: BIOPSY, BONE MARROW;  Surgeon: Susy Baird;  Location: [facility] OR    ENT SURGERY      polyps from throat    EXCISE LESION FOOT  2013    Procedure: EXCISE LESION FOOT;  Scar revision of Right 3rd toe      SOFT TISSUE SURGERY      neuroma from right foot         SOCIAL HISTORY:  Social History     Socioeconomic History    Marital status: Single     Spouse name: Not on file    Number of children: Not on file    Years of education: Not on file    Highest education level: Not on file   Occupational History    Not on file   Tobacco Use    Smoking status: Some Days     Years: 10     Types: Cigarettes     Last attempt to quit: 12/10/2005     Years since quittin.1    Smokeless tobacco: Never    Tobacco comments:     About 5 cigarettes per week   Vaping Use    Vaping Use:  Never used   Substance and Sexual Activity    Alcohol use: Yes     Comment: 2 beer day    Drug use: No    Sexual activity: Not Currently     Partners: Male   Other Topics Concern    Parent/sibling w/ CABG, MI or angioplasty before 65F 55M? No   Social History Narrative    Not on file     Social Determinants of Health     Financial Resource Strain: Not on file   Food Insecurity: Not on file   Transportation Needs: Not on file   Physical Activity: Not on file   Stress: Not on file   Social Connections: Not on file   Interpersonal Safety: Not on file   Housing Stability: Not on file   Smokes on and off.  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  Brother prostate cancer.  Father had melanoma      PHYSICAL EXAM:  Vital signs:  /65   Pulse 74   Resp 16   Ht 1.829 m (6')   Wt 73 kg (161 lb)   SpO2 100%   BMI 21.84 kg/m     ECO     Rash is gone  Heart RRR  Lungs clear      LABS:  Noted and reviewed with the patient he has a detected alteration in MPL see interval history      PATHOLOGY:  As per interval history     ASSESSMENT/PLAN:  Alvarez is a very pleasant 62-year-old male who is here for further evaluation of leukocytosis, eosinophilia and neutrophilia    Patient appears to have myeloproliferative features with MPL + disease,  HES, ELIZABETH-NOS- and PDGFRA-associated disease account for a significant proportion of the cases of HES with myeloproliferative features, a causative genetic abnormality cannot be demonstrated in all cases. In a Yi series of 35 patients with HES, 9 patients were described as having clinical or hematological features of myeloproliferative syndrome (including palpable splenomegaly, neutrophilia, circulating myelocytes and/or erythroblasts, hyperplastic BM, and myelofibrosis).11  Three of the 9  patients had no detectable FIP1L1/PDGFRA (F/P) fusion gene or cytogenetic abnormalities, and 1 responded to imatinib therapy. Several additional case series have described imatinib-responsive, F/P-negative patients with HES,12-15  although the presence of myeloproliferative features in these patients has not been systematically addressed.  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

## 2024-02-27 ENCOUNTER — LAB (OUTPATIENT)
Dept: LAB | Facility: CLINIC | Age: 64
End: 2024-02-27
Payer: COMMERCIAL

## 2024-02-27 DIAGNOSIS — R59.1 LYMPHADENOPATHY: ICD-10-CM

## 2024-02-27 LAB
BASOPHILS # BLD AUTO: ABNORMAL 10*3/UL
BASOPHILS # BLD MANUAL: 0.2 10E3/UL (ref 0–0.2)
BASOPHILS NFR BLD AUTO: ABNORMAL %
BASOPHILS NFR BLD MANUAL: 1 %
EOSINOPHIL # BLD AUTO: ABNORMAL 10*3/UL
EOSINOPHIL # BLD MANUAL: 6.7 10E3/UL (ref 0–0.7)
EOSINOPHIL NFR BLD AUTO: ABNORMAL %
EOSINOPHIL NFR BLD MANUAL: 40 %
ERYTHROCYTE [DISTWIDTH] IN BLOOD BY AUTOMATED COUNT: 13.6 % (ref 10–15)
HCT VFR BLD AUTO: 38.7 % (ref 40–53)
HGB BLD-MCNC: 13.6 G/DL (ref 13.3–17.7)
IMM GRANULOCYTES # BLD: ABNORMAL 10*3/UL
IMM GRANULOCYTES NFR BLD: ABNORMAL %
LYMPHOCYTES # BLD AUTO: ABNORMAL 10*3/UL
LYMPHOCYTES # BLD MANUAL: 1.5 10E3/UL (ref 0.8–5.3)
LYMPHOCYTES NFR BLD AUTO: ABNORMAL %
LYMPHOCYTES NFR BLD MANUAL: 9 %
MCH RBC QN AUTO: 42.6 PG (ref 26.5–33)
MCHC RBC AUTO-ENTMCNC: 35.1 G/DL (ref 31.5–36.5)
MCV RBC AUTO: 121 FL (ref 78–100)
MONOCYTES # BLD AUTO: ABNORMAL 10*3/UL
MONOCYTES # BLD MANUAL: 1.5 10E3/UL (ref 0–1.3)
MONOCYTES NFR BLD AUTO: ABNORMAL %
MONOCYTES NFR BLD MANUAL: 9 %
MYELOCYTES # BLD MANUAL: 0.2 10E3/UL
MYELOCYTES NFR BLD MANUAL: 1 %
NEUTROPHILS # BLD AUTO: ABNORMAL 10*3/UL
NEUTROPHILS # BLD MANUAL: 6.7 10E3/UL (ref 1.6–8.3)
NEUTROPHILS NFR BLD AUTO: ABNORMAL %
NEUTROPHILS NFR BLD MANUAL: 40 %
NRBC # BLD AUTO: 0 10E3/UL
NRBC BLD AUTO-RTO: 0 /100
PLAT MORPH BLD: ABNORMAL
PLATELET # BLD AUTO: 184 10E3/UL (ref 150–450)
RBC # BLD AUTO: 3.19 10E6/UL (ref 4.4–5.9)
RBC MORPH BLD: ABNORMAL
WBC # BLD AUTO: 16.8 10E3/UL (ref 4–11)

## 2024-02-27 PROCEDURE — 85007 BL SMEAR W/DIFF WBC COUNT: CPT

## 2024-02-27 PROCEDURE — 85027 COMPLETE CBC AUTOMATED: CPT

## 2024-02-27 PROCEDURE — 36415 COLL VENOUS BLD VENIPUNCTURE: CPT

## 2024-03-11 DIAGNOSIS — R21 RASH: Primary | ICD-10-CM

## 2024-03-15 ENCOUNTER — OFFICE VISIT (OUTPATIENT)
Dept: INTERNAL MEDICINE | Facility: CLINIC | Age: 64
End: 2024-03-15
Payer: COMMERCIAL

## 2024-03-15 ENCOUNTER — OFFICE VISIT (OUTPATIENT)
Dept: PEDIATRICS | Facility: CLINIC | Age: 64
End: 2024-03-15
Payer: COMMERCIAL

## 2024-03-15 ENCOUNTER — HOSPITAL ENCOUNTER (OUTPATIENT)
Dept: ULTRASOUND IMAGING | Facility: CLINIC | Age: 64
Discharge: HOME | End: 2024-03-15
Attending: NURSE PRACTITIONER | Admitting: NURSE PRACTITIONER
Payer: COMMERCIAL

## 2024-03-15 VITALS
SYSTOLIC BLOOD PRESSURE: 115 MMHG | RESPIRATION RATE: 16 BRPM | DIASTOLIC BLOOD PRESSURE: 58 MMHG | BODY MASS INDEX: 21.94 KG/M2 | HEART RATE: 52 BPM | WEIGHT: 162 LBS | TEMPERATURE: 97.6 F | OXYGEN SATURATION: 100 % | HEIGHT: 72 IN

## 2024-03-15 VITALS
WEIGHT: 162 LBS | DIASTOLIC BLOOD PRESSURE: 64 MMHG | SYSTOLIC BLOOD PRESSURE: 118 MMHG | TEMPERATURE: 97.6 F | BODY MASS INDEX: 21.97 KG/M2 | HEART RATE: 59 BPM | OXYGEN SATURATION: 100 %

## 2024-03-15 DIAGNOSIS — R45.1 RESTLESS ARM: Primary | ICD-10-CM

## 2024-03-15 DIAGNOSIS — R10.11 RUQ PAIN: ICD-10-CM

## 2024-03-15 DIAGNOSIS — K82.8 SLUDGE IN GALLBLADDER: ICD-10-CM

## 2024-03-15 LAB
ALBUMIN SERPL BCG-MCNC: 4 G/DL (ref 3.5–5.2)
ALP SERPL-CCNC: 95 U/L (ref 40–150)
ALT SERPL W P-5'-P-CCNC: 20 U/L (ref 0–70)
AMYLASE SERPL-CCNC: 109 U/L (ref 28–100)
ANION GAP SERPL CALCULATED.3IONS-SCNC: 10 MMOL/L (ref 7–15)
AST SERPL W P-5'-P-CCNC: 21 U/L (ref 0–45)
BASOPHILS # BLD AUTO: ABNORMAL 10*3/UL
BASOPHILS # BLD MANUAL: 0.2 10E3/UL (ref 0–0.2)
BASOPHILS NFR BLD AUTO: ABNORMAL %
BASOPHILS NFR BLD MANUAL: 1 %
BILIRUB SERPL-MCNC: 0.9 MG/DL
BUN SERPL-MCNC: 11.4 MG/DL (ref 8–23)
CALCIUM SERPL-MCNC: 9 MG/DL (ref 8.8–10.2)
CHLORIDE SERPL-SCNC: 96 MMOL/L (ref 98–107)
CREAT SERPL-MCNC: 0.93 MG/DL (ref 0.67–1.17)
DEPRECATED HCO3 PLAS-SCNC: 27 MMOL/L (ref 22–29)
EGFRCR SERPLBLD CKD-EPI 2021: >90 ML/MIN/1.73M2
EOSINOPHIL # BLD AUTO: ABNORMAL 10*3/UL
EOSINOPHIL # BLD MANUAL: 7.7 10E3/UL (ref 0–0.7)
EOSINOPHIL NFR BLD AUTO: ABNORMAL %
EOSINOPHIL NFR BLD MANUAL: 45 %
ERYTHROCYTE [DISTWIDTH] IN BLOOD BY AUTOMATED COUNT: 12.4 % (ref 10–15)
GLUCOSE SERPL-MCNC: 92 MG/DL (ref 70–99)
HCT VFR BLD AUTO: 41.2 % (ref 40–53)
HGB BLD-MCNC: 14.9 G/DL (ref 13.3–17.7)
IMM GRANULOCYTES # BLD: ABNORMAL 10*3/UL
IMM GRANULOCYTES NFR BLD: ABNORMAL %
LIPASE SERPL-CCNC: 14 U/L (ref 13–60)
LYMPHOCYTES # BLD AUTO: ABNORMAL 10*3/UL
LYMPHOCYTES # BLD MANUAL: 1.5 10E3/UL (ref 0.8–5.3)
LYMPHOCYTES NFR BLD AUTO: ABNORMAL %
LYMPHOCYTES NFR BLD MANUAL: 9 %
MCH RBC QN AUTO: 43.4 PG (ref 26.5–33)
MCHC RBC AUTO-ENTMCNC: 36.2 G/DL (ref 31.5–36.5)
MCV RBC AUTO: 120 FL (ref 78–100)
MONOCYTES # BLD AUTO: ABNORMAL 10*3/UL
MONOCYTES # BLD MANUAL: 0.7 10E3/UL (ref 0–1.3)
MONOCYTES NFR BLD AUTO: ABNORMAL %
MONOCYTES NFR BLD MANUAL: 4 %
NEUTROPHILS # BLD AUTO: ABNORMAL 10*3/UL
NEUTROPHILS # BLD MANUAL: 7.1 10E3/UL (ref 1.6–8.3)
NEUTROPHILS NFR BLD AUTO: ABNORMAL %
NEUTROPHILS NFR BLD MANUAL: 41 %
NEUTS HYPERSEG BLD QL SMEAR: PRESENT
NRBC # BLD AUTO: 0 10E3/UL
NRBC BLD AUTO-RTO: 0 /100
PLAT MORPH BLD: ABNORMAL
PLATELET # BLD AUTO: 179 10E3/UL (ref 150–450)
POTASSIUM SERPL-SCNC: 4.2 MMOL/L (ref 3.4–5.3)
PROT SERPL-MCNC: 7.2 G/DL (ref 6.4–8.3)
RBC # BLD AUTO: 3.43 10E6/UL (ref 4.4–5.9)
RBC MORPH BLD: ABNORMAL
SODIUM SERPL-SCNC: 133 MMOL/L (ref 135–145)
STOMATOCYTES BLD QL SMEAR: SLIGHT
WBC # BLD AUTO: 17.2 10E3/UL (ref 4–11)

## 2024-03-15 PROCEDURE — 85007 BL SMEAR W/DIFF WBC COUNT: CPT | Performed by: NURSE PRACTITIONER

## 2024-03-15 PROCEDURE — 99207 REFERRAL TO ACUTE AND DIAGNOSTIC SERVICES: CPT

## 2024-03-15 PROCEDURE — 99417 PROLNG OP E/M EACH 15 MIN: CPT | Performed by: NURSE PRACTITIONER

## 2024-03-15 PROCEDURE — 85027 COMPLETE CBC AUTOMATED: CPT | Performed by: NURSE PRACTITIONER

## 2024-03-15 PROCEDURE — 99215 OFFICE O/P EST HI 40 MIN: CPT | Performed by: NURSE PRACTITIONER

## 2024-03-15 PROCEDURE — 36415 COLL VENOUS BLD VENIPUNCTURE: CPT | Performed by: NURSE PRACTITIONER

## 2024-03-15 PROCEDURE — 82150 ASSAY OF AMYLASE: CPT | Performed by: NURSE PRACTITIONER

## 2024-03-15 PROCEDURE — 76705 ECHO EXAM OF ABDOMEN: CPT

## 2024-03-15 PROCEDURE — 80053 COMPREHEN METABOLIC PANEL: CPT | Performed by: NURSE PRACTITIONER

## 2024-03-15 PROCEDURE — 83690 ASSAY OF LIPASE: CPT | Performed by: NURSE PRACTITIONER

## 2024-03-15 NOTE — PROGRESS NOTES
Assessment & Plan       (R10.11) RUQ pain  Comment: Patient presents to the clinic for a chief complaint of severe abdominal pain for the last 3-4 weeks. Patient states it can become debilitating and bring him down to the ground. Upon exam, patient was very tender to the touch on the RUQ. Clinical picture suggest acute cholecystitis. Will refer to [facility] for further imaging through ultrasound or CT.   Plan: Referral to Acute and Diagnostic Services (Day         of diagnostic / First order acute)        Referral placed. Patient has been NPO since 4pm the day before. Has only had small sips of water this morning.     (R45.1) Restless arm  (primary encounter diagnosis)  Comment: Patient endorses restless arms at night. States it feels like tingling that he needs to shake off. Clinical picture suggest restless arm.   Plan: Patient would like to try options other than pharmaceuticals. I have recommend he try taking a magnesium supplement at night. Patient agrees with the plan.       30 minutes spent by me on the date of the encounter doing chart review, review of test results, interpretation of tests, patient visit, documentation, and discussion with other provider(s)         Patient Instructions   Magnesium Natural calm or Supplement for the restless leg syndrome 350-400mg daily. Take this in the evening.      Mago Alva is a 63 year old, presenting for the following health issues: Patient has been experiencing severe abdominal pain for the last 3-4 weeks and when it happens he feels very bloat and the pain originates in the mid generalized abdominal area, moves to the right and then up his back on the right side.   No recent fevers.  Has been working on diet and was eating toast with peanut butter. Yesterday he had a regular full meal and his pain was there just not severe.   The tenderness is the worst.   Urinary symptoms: none  Taking a chemo pill for a blood disorder MPD.     Used to have an ulcer when he was  very young and this pain is different.   Having normal bowel movements.   No urinary symptoms.       Abdominal Pain        3/15/2024     8:50 AM   Additional Questions   Roomed by Tabatha     History of Present Illness       Reason for visit:  Severe stomach, side and back pain  Symptom onset:  3-4 weeks ago  Symptoms include:  Sharp and dull pain, tenderness on my right side  Symptom intensity:  Severe  Symptom progression:  Staying the same  Had these symptoms before:  Yes  Has tried/received treatment for these symptoms:  No  What makes it worse:  Heavy lifting  What makes it better:  Heating pad    He eats 0-1 servings of fruits and vegetables daily.He consumes 0 sweetened beverage(s) daily.He exercises with enough effort to increase his heart rate 10 to 19 minutes per day.  He exercises with enough effort to increase his heart rate 3 or less days per week.   He is taking medications regularly.                 Review of Systems  Constitutional, HEENT, cardiovascular, pulmonary, gi and gu systems are negative, except as otherwise noted.      Objective    There were no vitals taken for this visit.  There is no height or weight on file to calculate BMI.  Physical Exam   GENERAL: alert and no distress  NECK: no adenopathy, no asymmetry, masses, or scars  RESP: lungs clear to auscultation - no rales, rhonchi or wheezes  CV: regular rate and rhythm, normal S1 S2, no S3 or S4, no murmur, click or rub, no peripheral edema  ABDOMEN: tenderness RUQ  MS: no gross musculoskeletal defects noted, no edema            Signed Electronically by: CARLI Padron CNP

## 2024-03-15 NOTE — PROGRESS NOTES
Assessment & Plan     RUQ pain  - Referral to Acute and Diagnostic Services (Day of diagnostic / First order acute)  - Amylase; Future  - CBC with platelets differential; Future  - Comprehensive metabolic panel; Future  - Lipase; Future  - US Abdomen Limited; Future  - Amylase  - CBC with platelets differential  - Comprehensive metabolic panel  - Lipase  - Adult General Surg Referral; Future    Sludge in gallbladder  - Adult General Surg Referral; Future    70 minutes spent by me on the date of the encounter doing chart review, review of test results, interpretation of tests, patient visit, and documentation       Nicotine/Tobacco Cessation  He reports that he has been smoking cigarettes. He has never used smokeless tobacco.          Patient Instructions     Results for orders placed or performed during the hospital encounter of 03/15/24   US Abdomen Limited     Status: None (Preliminary result)    Narrative    US ABDOMEN LIMITED 3/15/2024 10:33 AM    CLINICAL HISTORY: Intermittent RUQ abdominal pain x 1 month, sometimes  severe.    TECHNIQUE: Limited abdominal ultrasound.    COMPARISON: None.    FINDINGS:    GALLBLADDER: Gallbladder sludge and small probable polyps noted.  Irregular wall thickening measuring up to 3 mm. Probable gallstones.    BILE DUCTS: There is no biliary dilatation. The common duct measures 3  mm.    LIVER: Unremarkable where seen.    RIGHT KIDNEY: No hydronephrosis.    PANCREAS: The visualized portions of the pancreas are normal.    No ascites.      Impression    IMPRESSION:  1.  Irregular wall thickness measuring up to 3 mm which is upper  limits of normal in the gallbladder, adenomyomatosis.  2.  Gallbladder sludge and stones, wall thickening at the upper limits  of normal at 3 mm. Findings equivocal for cholecystitis.   Results for orders placed or performed in visit on 03/15/24   Amylase     Status: Abnormal   Result Value Ref Range    Amylase 109 (H) 28 - 100 U/L   Comprehensive  metabolic panel     Status: Abnormal   Result Value Ref Range    Sodium 133 (L) 135 - 145 mmol/L    Potassium 4.2 3.4 - 5.3 mmol/L    Carbon Dioxide (CO2) 27 22 - 29 mmol/L    Anion Gap 10 7 - 15 mmol/L    Urea Nitrogen 11.4 8.0 - 23.0 mg/dL    Creatinine 0.93 0.67 - 1.17 mg/dL    GFR Estimate >90 >60 mL/min/1.73m2    Calcium 9.0 8.8 - 10.2 mg/dL    Chloride 96 (L) 98 - 107 mmol/L    Glucose 92 70 - 99 mg/dL    Alkaline Phosphatase 95 40 - 150 U/L    AST 21 0 - 45 U/L    ALT 20 0 - 70 U/L    Protein Total 7.2 6.4 - 8.3 g/dL    Albumin 4.0 3.5 - 5.2 g/dL    Bilirubin Total 0.9 <=1.2 mg/dL   Lipase     Status: Normal   Result Value Ref Range    Lipase 14 13 - 60 U/L   CBC with platelets and differential     Status: Abnormal (Preliminary result)   Result Value Ref Range    WBC Count 17.2 (H) 4.0 - 11.0 10e3/uL    RBC Count 3.43 (L) 4.40 - 5.90 10e6/uL    Hemoglobin 14.9 13.3 - 17.7 g/dL    Hematocrit 41.2 40.0 - 53.0 %     (H) 78 - 100 fL    MCH 43.4 (H) 26.5 - 33.0 pg    MCHC 36.2 31.5 - 36.5 g/dL    RDW 12.4 10.0 - 15.0 %    Platelet Count 179 150 - 450 10e3/uL    % Neutrophils      % Lymphocytes      % Monocytes      % Eosinophils      % Basophils      % Immature Granulocytes      NRBCs per 100 WBC 0 <1 /100    Absolute Neutrophils      Absolute Lymphocytes      Absolute Monocytes      Absolute Eosinophils      Absolute Basophils      Absolute Immature Granulocytes      Absolute NRBCs 0.0 10e3/uL   CBC with platelets differential     Status: Abnormal (In process)    Narrative    The following orders were created for panel order CBC with platelets differential.  Procedure                               Abnormality         Status                     ---------                               -----------         ------                     CBC with platelets and d...[[id number]]  Abnormal            Preliminary result           Please view results for these tests on the individual orders.     Sludge and stones noted  "in the gallbladder.   General surgery referral placed.    Maintain a low fat and bland diet.        Return in about 1 week (around 3/22/2024), or refer to general surgery, for Follow up.    Mago Alva is a 63 year old, presenting for the following health issues:  Abdominal Pain    HPI     Abdominal/Flank Pain: Patient has been experiencing severe abdominal pain for the last 3-4 weeks and when it happens he feels very bloat and the pain originates in the mid generalized abdominal area, moves to the right and then up his back on the right side   Onset/Duration: Few weeks  Description:   Character: Sharp and Gnawing  Location: rocky-umbilical region  Radiation: Back and RUQ/RLQ  Intensity: 10/10 at it's worse, today is not too bad \"just feels tender\"  Progression of Symptoms:  worsening and intermittent  Accompanying Signs & Symptoms:  Fever/chills: no   Gas/Bloating: no   Nausea: YES- x 1 on Monday   Vomitting: no   Diarrhea: no   Constipation:no   Dysuria: no            Hematuria: no            Frequency: no            Incontinence of urine: no   History:            Last bowel movement: today  Trauma: no   Previous similar pain: no    Previous tests done: none           Previous Abdominal surgery: no   Precipitating factors:   Does the pain change with:     Food: no      Bowel Movement: no     Urination: no              Other factors: no   Therapies tried and outcome:  None    When food last eaten: 0430    Sent to [facility] for imaging to r/o kurt    Review of Systems  Constitutional, neuro, ENT, endocrine, pulmonary, cardiac, gastrointestinal, genitourinary, musculoskeletal, integument and psychiatric systems are negative, except as otherwise noted.      Objective    /64 (Patient Position: Sitting)   Pulse 59   Temp 97.6  F (36.4  C) (Oral)   Wt 73.5 kg (162 lb)   SpO2 100%   BMI 21.97 kg/m    Body mass index is 21.97 kg/m .  Physical Exam   GENERAL: alert and no distress  RESP: lungs clear to " auscultation - no rales, rhonchi or wheezes  CV: regular rate and rhythm, normal S1 S2, no S3 or S4, no murmur, click or rub, no peripheral edema  ABDOMEN: tenderness RUQ and upper midline, bowel sounds normal, and no palpable or pulsatile masses  MS: no gross musculoskeletal defects noted, no edema  SKIN: no suspicious lesions or rashes          Signed Electronically by: CARLI Daley CNP

## 2024-03-15 NOTE — PATIENT INSTRUCTIONS
Results for orders placed or performed during the hospital encounter of 03/15/24   US Abdomen Limited     Status: None (Preliminary result)    Narrative    US ABDOMEN LIMITED 3/15/2024 10:33 AM    CLINICAL HISTORY: Intermittent RUQ abdominal pain x 1 month, sometimes  severe.    TECHNIQUE: Limited abdominal ultrasound.    COMPARISON: None.    FINDINGS:    GALLBLADDER: Gallbladder sludge and small probable polyps noted.  Irregular wall thickening measuring up to 3 mm. Probable gallstones.    BILE DUCTS: There is no biliary dilatation. The common duct measures 3  mm.    LIVER: Unremarkable where seen.    RIGHT KIDNEY: No hydronephrosis.    PANCREAS: The visualized portions of the pancreas are normal.    No ascites.      Impression    IMPRESSION:  1.  Irregular wall thickness measuring up to 3 mm which is upper  limits of normal in the gallbladder, adenomyomatosis.  2.  Gallbladder sludge and stones, wall thickening at the upper limits  of normal at 3 mm. Findings equivocal for cholecystitis.   Results for orders placed or performed in visit on 03/15/24   Amylase     Status: Abnormal   Result Value Ref Range    Amylase 109 (H) 28 - 100 U/L   Comprehensive metabolic panel     Status: Abnormal   Result Value Ref Range    Sodium 133 (L) 135 - 145 mmol/L    Potassium 4.2 3.4 - 5.3 mmol/L    Carbon Dioxide (CO2) 27 22 - 29 mmol/L    Anion Gap 10 7 - 15 mmol/L    Urea Nitrogen 11.4 8.0 - 23.0 mg/dL    Creatinine 0.93 0.67 - 1.17 mg/dL    GFR Estimate >90 >60 mL/min/1.73m2    Calcium 9.0 8.8 - 10.2 mg/dL    Chloride 96 (L) 98 - 107 mmol/L    Glucose 92 70 - 99 mg/dL    Alkaline Phosphatase 95 40 - 150 U/L    AST 21 0 - 45 U/L    ALT 20 0 - 70 U/L    Protein Total 7.2 6.4 - 8.3 g/dL    Albumin 4.0 3.5 - 5.2 g/dL    Bilirubin Total 0.9 <=1.2 mg/dL   Lipase     Status: Normal   Result Value Ref Range    Lipase 14 13 - 60 U/L   CBC with platelets and differential     Status: Abnormal (Preliminary result)   Result Value Ref Range     WBC Count 17.2 (H) 4.0 - 11.0 10e3/uL    RBC Count 3.43 (L) 4.40 - 5.90 10e6/uL    Hemoglobin 14.9 13.3 - 17.7 g/dL    Hematocrit 41.2 40.0 - 53.0 %     (H) 78 - 100 fL    MCH 43.4 (H) 26.5 - 33.0 pg    MCHC 36.2 31.5 - 36.5 g/dL    RDW 12.4 10.0 - 15.0 %    Platelet Count 179 150 - 450 10e3/uL    % Neutrophils      % Lymphocytes      % Monocytes      % Eosinophils      % Basophils      % Immature Granulocytes      NRBCs per 100 WBC 0 <1 /100    Absolute Neutrophils      Absolute Lymphocytes      Absolute Monocytes      Absolute Eosinophils      Absolute Basophils      Absolute Immature Granulocytes      Absolute NRBCs 0.0 10e3/uL   CBC with platelets differential     Status: Abnormal (In process)    Narrative    The following orders were created for panel order CBC with platelets differential.  Procedure                               Abnormality         Status                     ---------                               -----------         ------                     CBC with platelets and d...[[id number]]  Abnormal            Preliminary result           Please view results for these tests on the individual orders.     Sludge and stones noted in the gallbladder.   General surgery referral placed.    Maintain a low fat and bland diet.

## 2024-03-15 NOTE — PATIENT INSTRUCTIONS
Magnesium Natural calm or Supplement for the restless leg syndrome 350-400mg daily. Take this in the evening.

## 2024-03-19 ENCOUNTER — TELEPHONE (OUTPATIENT)
Dept: SURGERY | Facility: CLINIC | Age: 64
End: 2024-03-19

## 2024-03-19 ENCOUNTER — OFFICE VISIT (OUTPATIENT)
Dept: SURGERY | Facility: CLINIC | Age: 64
End: 2024-03-19
Payer: COMMERCIAL

## 2024-03-19 VITALS
SYSTOLIC BLOOD PRESSURE: 138 MMHG | RESPIRATION RATE: 16 BRPM | WEIGHT: 162 LBS | OXYGEN SATURATION: 99 % | BODY MASS INDEX: 21.94 KG/M2 | HEART RATE: 77 BPM | DIASTOLIC BLOOD PRESSURE: 80 MMHG | HEIGHT: 72 IN

## 2024-03-19 DIAGNOSIS — K81.0 ACUTE CHOLECYSTITIS: Primary | ICD-10-CM

## 2024-03-19 PROCEDURE — 99204 OFFICE O/P NEW MOD 45 MIN: CPT | Performed by: SURGERY

## 2024-03-19 RX ORDER — INDOCYANINE GREEN AND WATER 25 MG
2.5 KIT INJECTION ONCE
Status: CANCELLED | OUTPATIENT
Start: 2024-03-19 | End: 2024-03-19

## 2024-03-19 NOTE — LETTER
2024       Alvarez Bess  [address] [facility] [id number]     RE: [id number]  : 1960    Alvarez Bess has been scheduled for surgery on 3/20/2024 at 9:30 AM  at [facility] with Dr Anabel Crandall.  The hospital is located at [address] in [place].    Please check in at the Surgery reception desk at 7:30 AM . This is located in the back of the hospital on the East side, just past the Emergency Room entrance.     DO NOT EAT OR DRINK ANYTHING 8 HOURS BEFORE YOUR ARRIVAL TIME.   You may have sips of clear liquids up until 2 hours before your arrival time. If you have been advised to take your medication, please do this early in the morning with just sips of clear liquid.     Hospital regulations require an updated pre-operative examination to be completed within 30 days of the procedure. This can be done by your primary care provider. Please ask them to fax documentation to [phone]. We also recommend you bring a copy with you.     You should shower before your surgery with Hibiclens or Exidine soap.  This can be found at your local pharmacy or you can pick it up from our office for free.  Please call our office if you have any questions.     You will be required to have an Adult (friend or family member) drive you home after your surgery and arrange for an adult to stay with you until the next morning.     You will receive several calls from our staff 3-7 days prior to your scheduled procedure with further details and to answer any questions you may have.    It is sometimes necessary to adjust the surgery schedule due to emergencies and additions to the schedule.  If your surgery is affected by this, we greatly appreciate your flexibility and understanding in this matter    It is best if you call regarding post-operative questions between the hours of 8:00 am & 3:00 pm Monday-Friday, so you have access to the daytime care team that know you  best.  Prescription refills are accepted during regular office hours only.    Please do not bring any Disability or FMLA papers to the hospital.  They need to be either faxed ([phone]), mailed or hand delivered to our office by you or a family member for completion.  Please allow 14 business days to complete paperwork.        If you have questions or concerns, please contact our office at [phone].

## 2024-03-19 NOTE — PROGRESS NOTES
[facility]  New Patient Office Visit      Alvarez Bess is a 63 year old male seen in consultation for Cholecystitis at the request of Kay Grover.       Assessment and Plan:  It is my impression that Alvarez has acute cholecystitis. Exquisitely tender in the right upper quadrant on exam today with escalating symptoms over the past couple weeks.  I offered him a lap kurt today with my on call partner, Dr Crain but he declines and accepts to have surgery tomorrow with me for robotic cholecystectomy     I have sent a message to his oncologist to inquire if the hydroxyurea should be held at all postop. We discussed he may be at risk for poor wound healing.    We have discussed the indication, alternatives, risks and expected recovery.  Specifically we have discussed incisions, scarring, postoperative infections, anesthesia, bleeding, open conversion, common bile duct injury, injury to intra-abdominal organs, bile leak, hernia, post cholecystectomy diarrhea, postoperative dietary restrictions and physical limitations.  We have discussed the recommended interventions and treatments for these complications.  All questions have been answered to the best of my ability.                         Chief complaint:  Abdominal pain, right upper quadrant    HPI:  Alvarez Bess is a 63 year old male who presents with intermittent right upper quadrant pain for 1 month.  The pain is not associated with eating that he can tell. He has been avoiding fatty foods however.  Positive for associated symptoms of nausea and vomited one time last week.  He does not have a history of jaundice or dark urine.  He  has not had pancreatitis in the past.        He was seen recently in the PCP office and sent to acute and diagnostic clinic last Friday, 3/15 and I have reviewed their documentation/notes. He felt a little better 3/16, but then pain came back 3/17 and has pretty much been constant.    Past Medical History:  Past  Medical History:   Diagnosis Date    Cardiomyopathy (H)     Congestive heart failure (H)      Problem list name updated by automated process. Provider to review    Congestive heart failure, unspecified     Eosinophilia     Gastro-oesophageal reflux disease     Hyperlipidemia 07/19/2019    Hypertension     Penile discharge     Vitamin D deficiency 07/09/2010   Reports no concerns with heart function, is very active without chest pain or shortness of breath and sees cardiology regularly.  Actively being treated with hydroxyurea by Dr Case for idiopathic hypereosinophilia with mPD features     Medications:  Current Outpatient Medications   Medication    acetaminophen (TYLENOL) 325 MG tablet    augmented betamethasone dipropionate (DIPROLENE AF) 0.05 % external cream    carvedilol (COREG) 25 MG tablet    hydroxyurea (HYDREA) 500 MG capsule    omeprazole (PRILOSEC OTC) 20 MG EC tablet    Probiotic Product (PROBIOTIC DAILY PO)    LORazepam (ATIVAN) 0.5 MG tablet     No current facility-administered medications for this visit.       Past Surgical History:  Past Surgical History:   Procedure Laterality Date    ANGIOGRAM  1/6/05    left dominant coronary system with essentially normal coronary arteries, trivial plaque, severe dilated cardiomyopathy, left ventricle hypertrophy and severely hypokinetic, asynchrony or desynchrony of LV function    BONE MARROW BIOPSY, BONE SPECIMEN, NEEDLE/TROCAR N/A 8/15/2023    Procedure: BIOPSY, BONE MARROW;  Surgeon: Susy Baird;  Location: [facility] OR    ENT SURGERY      polyps from throat    EXCISE LESION FOOT  12/16/2013    Procedure: EXCISE LESION FOOT;  Scar revision of Right 3rd toe      SOFT TISSUE SURGERY      neuroma from right foot   Has done well with anesthesia in the past. No prior abdominal surgeries    Social History:  Social History     Tobacco Use    Smoking status: Some Days     Years: 10     Types: Cigarettes     Last attempt to quit: 12/10/2005     Years since  quittin.2    Smokeless tobacco: Never    Tobacco comments:     About 5 cigarettes per week   Vaping Use    Vaping Use: Never used   Substance Use Topics    Alcohol use: Yes     Comment: 2 beer day    Drug use: No    Works multiple jobs    Family History:  Family History   Problem Relation Age of Onset    Heart Disease Mother     Heart Disease Brother     Myocardial Infarction Brother      Review of Systems:  The 10 point review of systems is negative other than noted in the HPI and above.    Physical Exam:  Vitals: /80   Pulse 77   Resp 16   Ht 1.829 m (6')   Wt 73.5 kg (162 lb)   SpO2 99%   BMI 21.97 kg/m    BMI= Body mass index is 21.97 kg/m .  General - Well developed, well nourished male in no apparent distress  Abdomen: firm, flat, non-distended with severe tenderness noted in the right upper quadrant to light pressure.  Neurologic: alert, speech is clear, nonfocal  Psychiatric: Mood and affect appropriate    Relevant labs:    WBC -   Lab Results   Component Value Date    WBC 17.2 (H) 03/15/2024       HgB -   Lab Results   Component Value Date    HGB 14.9 03/15/2024       Plt-   Lab Results   Component Value Date     03/15/2024       Liver Function Studies -   Recent Labs   Lab Test 03/15/24  0959   PROTTOTAL 7.2   ALBUMIN 4.0   BILITOTAL 0.9   ALKPHOS 95   AST 21   ALT 20       Lipase-   Lab Results   Component Value Date    LIPASE 14 03/15/2024           Imaging:  All imaging studies reviewed by me.    Ultrasound shows: positive cholelithiasis, positive gallbladder wall thickening, negative ductal dilatation, negative pericholecystic fluid, negative sonographic Waterman's sign.    Recent Results (from the past 744 hour(s))   US Abdomen Limited    Narrative    US ABDOMEN LIMITED 3/15/2024 10:33 AM    CLINICAL HISTORY: Intermittent RUQ abdominal pain x 1 month, sometimes  severe.    TECHNIQUE: Limited abdominal ultrasound.    COMPARISON: None.    FINDINGS:    GALLBLADDER: Gallbladder  sludge and small probable polyps noted.  Irregular wall thickening measuring up to 3 mm. Probable gallstones.    BILE DUCTS: There is no biliary dilatation. The common duct measures 3  mm.    LIVER: Unremarkable where seen.    RIGHT KIDNEY: No hydronephrosis.    PANCREAS: The visualized portions of the pancreas are normal.    No ascites.      Impression    IMPRESSION:  1.  Irregular wall thickness measuring up to 3 mm which is upper  limits of normal in the gallbladder, adenomyomatosis.  2.  Gallbladder sludge and stones, wall thickening at the upper limits  of normal at 3 mm. Findings equivocal for cholecystitis.    KIRTI COSTELLO MD         SYSTEM ID:  [id number]         This note was created using voice recognition software. Undetected word substitutions or other errors may have occurred.     Anabel Crandall MD  [facility] [place]    Please route or send letter to:  Primary Care Provider (PCP) and Referring Provider

## 2024-03-19 NOTE — LETTER
March 19, 2024          Kay Grover, APRN CNP  [address] [place] [address]      RE:   Alvarez Bess 1960      Dear Colleague,    Thank you for referring your patient, Alvaerz Bess, to [facility]. Please see a copy of my visit note below.    [facility]  New Patient Office Visit      Alvarez Bess is a 63 year old male seen in consultation for Cholecystitis at the request of Kay Grover.       Assessment and Plan:  It is my impression that Alvarez has acute cholecystitis. Exquisitely tender in the right upper quadrant on exam today with escalating symptoms over the past couple weeks.  I offered him a lap kurt today with my on call partner, Dr Crain but he declines and accepts to have surgery tomorrow with me for robotic cholecystectomy     I have sent a message to his oncologist to inquire if the hydroxyurea should be held at all postop. We discussed he may be at risk for poor wound healing.    We have discussed the indication, alternatives, risks and expected recovery.  Specifically we have discussed incisions, scarring, postoperative infections, anesthesia, bleeding, open conversion, common bile duct injury, injury to intra-abdominal organs, bile leak, hernia, post cholecystectomy diarrhea, postoperative dietary restrictions and physical limitations.  We have discussed the recommended interventions and treatments for these complications.  All questions have been answered to the best of my ability.                         Chief complaint:  Abdominal pain, right upper quadrant    HPI:  Alvarez Bess is a 63 year old male who presents with intermittent right upper quadrant pain for 1 month.  The pain is not associated with eating that he can tell. He has been avoiding fatty foods however.  Positive for associated symptoms of nausea and vomited one time last week.  He does not have a history of jaundice or dark urine.   He  has not had pancreatitis in the past.        He was seen recently in the PCP office and sent to acute and diagnostic clinic last Friday, 3/15 and I have reviewed their documentation/notes. He felt a little better 3/16, but then pain came back 3/17 and has pretty much been constant.    Reports no concerns with heart function, is very active without chest pain or shortness of breath and sees cardiology regularly.  Actively being treated with hydroxyurea by Dr Case for idiopathic hypereosinophilia with mPD features     Has done well with anesthesia in the past. No prior abdominal surgeries     Works multiple jobs    Review of Systems:  The 10 point review of systems is negative other than noted in the HPI and above.    Physical Exam:  Vitals: /80   Pulse 77   Resp 16   Ht 1.829 m (6')   Wt 73.5 kg (162 lb)   SpO2 99%   BMI 21.97 kg/m    BMI= Body mass index is 21.97 kg/m .  General - Well developed, well nourished male in no apparent distress  Abdomen: firm, flat, non-distended with severe tenderness noted in the right upper quadrant to light pressure.  Neurologic: alert, speech is clear, nonfocal  Psychiatric: Mood and affect appropriate    Relevant labs:    WBC -   Lab Results   Component Value Date    WBC 17.2 (H) 03/15/2024       HgB -   Lab Results   Component Value Date    HGB 14.9 03/15/2024       Plt-   Lab Results   Component Value Date     03/15/2024       Liver Function Studies -   Recent Labs   Lab Test 03/15/24  0959   PROTTOTAL 7.2   ALBUMIN 4.0   BILITOTAL 0.9   ALKPHOS 95   AST 21   ALT 20       Lipase-   Lab Results   Component Value Date    LIPASE 14 03/15/2024           Imaging:  All imaging studies reviewed by me.    Ultrasound shows: positive cholelithiasis, positive gallbladder wall thickening, negative ductal dilatation, negative pericholecystic fluid, negative sonographic Waterman's sign.    Recent Results (from the past 744 hour(s))   US Abdomen Limited    Narrative    US  ABDOMEN LIMITED 3/15/2024 10:33 AM    CLINICAL HISTORY: Intermittent RUQ abdominal pain x 1 month, sometimes  severe.    TECHNIQUE: Limited abdominal ultrasound.    COMPARISON: None.    FINDINGS:    GALLBLADDER: Gallbladder sludge and small probable polyps noted.  Irregular wall thickening measuring up to 3 mm. Probable gallstones.    BILE DUCTS: There is no biliary dilatation. The common duct measures 3  mm.    LIVER: Unremarkable where seen.    RIGHT KIDNEY: No hydronephrosis.    PANCREAS: The visualized portions of the pancreas are normal.    No ascites.      Impression    IMPRESSION:  1.  Irregular wall thickness measuring up to 3 mm which is upper  limits of normal in the gallbladder, adenomyomatosis.  2.  Gallbladder sludge and stones, wall thickening at the upper limits  of normal at 3 mm. Findings equivocal for cholecystitis.    KIRTI COSTELLO MD         SYSTEM ID:  [id number]       Again, thank you for allowing me to participate in the care of your patient.      Sincerely,      Anabel Crandall MD

## 2024-03-19 NOTE — TELEPHONE ENCOUNTER
Type of surgery: ROBOTIC ASSISTED LAPAROSCOPIC CHOLECYSTECTOMY   Location of surgery: [facility]  Date and time of surgery: 3/20/2024 @ 9:30 AM    Surgeon: Anabel Crandall MD    Pre-Op Appt Date: 3/15/2024   Post-Op Appt Date: PATIENT TO SCHEDULE     Packet sent out: Yes  Pre-cert/Authorization completed:  Not Applicable  Date: 3/19/2024      ROBOTIC ASSISTED LAPAROSCOPIC CHOLECYSTECTOMY GENERAL H&P DONE 3/15/2024 90 MIN REQ PA ASSIST JLS NMS

## 2024-03-20 ENCOUNTER — HOSPITAL ENCOUNTER (OUTPATIENT)
Facility: CLINIC | Age: 64
Discharge: HOME | End: 2024-03-20
Attending: SURGERY | Admitting: SURGERY
Payer: COMMERCIAL

## 2024-03-20 ENCOUNTER — ANESTHESIA (OUTPATIENT)
Dept: SURGERY | Facility: CLINIC | Age: 64
End: 2024-03-20
Payer: COMMERCIAL

## 2024-03-20 ENCOUNTER — MYC MEDICAL ADVICE (OUTPATIENT)
Dept: ONCOLOGY | Facility: CLINIC | Age: 64
End: 2024-03-20

## 2024-03-20 ENCOUNTER — APPOINTMENT (OUTPATIENT)
Dept: SURGERY | Facility: PHYSICIAN GROUP | Age: 64
End: 2024-03-20
Payer: COMMERCIAL

## 2024-03-20 ENCOUNTER — ANESTHESIA EVENT (OUTPATIENT)
Dept: SURGERY | Facility: CLINIC | Age: 64
End: 2024-03-20
Payer: COMMERCIAL

## 2024-03-20 VITALS
SYSTOLIC BLOOD PRESSURE: 121 MMHG | RESPIRATION RATE: 16 BRPM | HEIGHT: 72 IN | WEIGHT: 159.3 LBS | TEMPERATURE: 97.6 F | DIASTOLIC BLOOD PRESSURE: 59 MMHG | HEART RATE: 63 BPM | BODY MASS INDEX: 21.58 KG/M2 | OXYGEN SATURATION: 74 %

## 2024-03-20 DIAGNOSIS — K80.50 CALCULUS OF BILE DUCT WITHOUT CHOLECYSTITIS AND WITHOUT OBSTRUCTION: Primary | ICD-10-CM

## 2024-03-20 DIAGNOSIS — K81.0 ACUTE CHOLECYSTITIS: ICD-10-CM

## 2024-03-20 DIAGNOSIS — D72.10 EOSINOPHILIA: ICD-10-CM

## 2024-03-20 PROCEDURE — 47562 LAPAROSCOPIC CHOLECYSTECTOMY: CPT | Mod: AS | Performed by: PHYSICIAN ASSISTANT

## 2024-03-20 PROCEDURE — 710N000009 HC RECOVERY PHASE 1, LEVEL 1, PER MIN: Performed by: SURGERY

## 2024-03-20 PROCEDURE — 250N000011 HC RX IP 250 OP 636: Performed by: SURGERY

## 2024-03-20 PROCEDURE — 710N000012 HC RECOVERY PHASE 2, PER MINUTE: Performed by: SURGERY

## 2024-03-20 PROCEDURE — 360N000080 HC SURGERY LEVEL 7, PER MIN: Performed by: SURGERY

## 2024-03-20 PROCEDURE — 88304 TISSUE EXAM BY PATHOLOGIST: CPT | Mod: 26 | Performed by: PATHOLOGY

## 2024-03-20 PROCEDURE — 47562 LAPAROSCOPIC CHOLECYSTECTOMY: CPT | Performed by: SURGERY

## 2024-03-20 PROCEDURE — 250N000025 HC SEVOFLURANE, PER MIN: Performed by: SURGERY

## 2024-03-20 PROCEDURE — 370N000017 HC ANESTHESIA TECHNICAL FEE, PER MIN: Performed by: SURGERY

## 2024-03-20 PROCEDURE — 999N000141 HC STATISTIC PRE-PROCEDURE NURSING ASSESSMENT: Performed by: SURGERY

## 2024-03-20 PROCEDURE — 258N000003 HC RX IP 258 OP 636

## 2024-03-20 PROCEDURE — 88304 TISSUE EXAM BY PATHOLOGIST: CPT | Mod: TC | Performed by: SURGERY

## 2024-03-20 PROCEDURE — 250N000009 HC RX 250

## 2024-03-20 PROCEDURE — 250N000009 HC RX 250: Performed by: SURGERY

## 2024-03-20 PROCEDURE — 258N000003 HC RX IP 258 OP 636: Performed by: ANESTHESIOLOGY

## 2024-03-20 PROCEDURE — S2900 ROBOTIC SURGICAL SYSTEM: HCPCS | Performed by: SURGERY

## 2024-03-20 PROCEDURE — 250N000011 HC RX IP 250 OP 636

## 2024-03-20 PROCEDURE — 272N000001 HC OR GENERAL SUPPLY STERILE: Performed by: SURGERY

## 2024-03-20 RX ORDER — BUPIVACAINE HYDROCHLORIDE 5 MG/ML
INJECTION, SOLUTION PERINEURAL PRN
Status: DISCONTINUED | OUTPATIENT
Start: 2024-03-20 | End: 2024-03-20 | Stop reason: HOSPADM

## 2024-03-20 RX ORDER — OXYCODONE HYDROCHLORIDE 5 MG/1
5 TABLET ORAL
Status: DISCONTINUED | OUTPATIENT
Start: 2024-03-20 | End: 2024-03-20 | Stop reason: HOSPADM

## 2024-03-20 RX ORDER — FENTANYL CITRATE 50 UG/ML
INJECTION, SOLUTION INTRAMUSCULAR; INTRAVENOUS PRN
Status: DISCONTINUED | OUTPATIENT
Start: 2024-03-20 | End: 2024-03-20

## 2024-03-20 RX ORDER — ONDANSETRON 2 MG/ML
INJECTION INTRAMUSCULAR; INTRAVENOUS PRN
Status: DISCONTINUED | OUTPATIENT
Start: 2024-03-20 | End: 2024-03-20

## 2024-03-20 RX ORDER — FENTANYL CITRATE 50 UG/ML
50 INJECTION, SOLUTION INTRAMUSCULAR; INTRAVENOUS EVERY 5 MIN PRN
Status: DISCONTINUED | OUTPATIENT
Start: 2024-03-20 | End: 2024-03-20 | Stop reason: HOSPADM

## 2024-03-20 RX ORDER — LIDOCAINE HYDROCHLORIDE 20 MG/ML
INJECTION, SOLUTION INFILTRATION; PERINEURAL PRN
Status: DISCONTINUED | OUTPATIENT
Start: 2024-03-20 | End: 2024-03-20

## 2024-03-20 RX ORDER — KETOROLAC TROMETHAMINE 30 MG/ML
INJECTION, SOLUTION INTRAMUSCULAR; INTRAVENOUS PRN
Status: DISCONTINUED | OUTPATIENT
Start: 2024-03-20 | End: 2024-03-20

## 2024-03-20 RX ORDER — OXYCODONE HYDROCHLORIDE 5 MG/1
5-10 TABLET ORAL EVERY 4 HOURS PRN
Qty: 8 TABLET | Refills: 0 | Status: SHIPPED | OUTPATIENT
Start: 2024-03-20 | End: 2024-07-05

## 2024-03-20 RX ORDER — DEXAMETHASONE SODIUM PHOSPHATE 4 MG/ML
INJECTION, SOLUTION INTRA-ARTICULAR; INTRALESIONAL; INTRAMUSCULAR; INTRAVENOUS; SOFT TISSUE PRN
Status: DISCONTINUED | OUTPATIENT
Start: 2024-03-20 | End: 2024-03-20

## 2024-03-20 RX ORDER — INDOCYANINE GREEN AND WATER 25 MG
2.5 KIT INJECTION ONCE
Status: COMPLETED | OUTPATIENT
Start: 2024-03-20 | End: 2024-03-20

## 2024-03-20 RX ORDER — ONDANSETRON 2 MG/ML
4 INJECTION INTRAMUSCULAR; INTRAVENOUS EVERY 30 MIN PRN
Status: DISCONTINUED | OUTPATIENT
Start: 2024-03-20 | End: 2024-03-20 | Stop reason: HOSPADM

## 2024-03-20 RX ORDER — HYDROMORPHONE HCL IN WATER/PF 6 MG/30 ML
0.4 PATIENT CONTROLLED ANALGESIA SYRINGE INTRAVENOUS EVERY 5 MIN PRN
Status: DISCONTINUED | OUTPATIENT
Start: 2024-03-20 | End: 2024-03-20 | Stop reason: HOSPADM

## 2024-03-20 RX ORDER — ONDANSETRON 4 MG/1
4 TABLET, ORALLY DISINTEGRATING ORAL EVERY 8 HOURS PRN
Qty: 8 TABLET | Refills: 0 | Status: SHIPPED | OUTPATIENT
Start: 2024-03-20 | End: 2024-07-05

## 2024-03-20 RX ORDER — PROPOFOL 10 MG/ML
INJECTION, EMULSION INTRAVENOUS PRN
Status: DISCONTINUED | OUTPATIENT
Start: 2024-03-20 | End: 2024-03-20

## 2024-03-20 RX ORDER — LABETALOL HYDROCHLORIDE 5 MG/ML
10 INJECTION, SOLUTION INTRAVENOUS
Status: DISCONTINUED | OUTPATIENT
Start: 2024-03-20 | End: 2024-03-20 | Stop reason: HOSPADM

## 2024-03-20 RX ORDER — PROPOFOL 10 MG/ML
INJECTION, EMULSION INTRAVENOUS CONTINUOUS PRN
Status: DISCONTINUED | OUTPATIENT
Start: 2024-03-20 | End: 2024-03-20

## 2024-03-20 RX ORDER — CEFAZOLIN SODIUM/WATER 2 G/20 ML
2 SYRINGE (ML) INTRAVENOUS
Status: COMPLETED | OUTPATIENT
Start: 2024-03-20 | End: 2024-03-20

## 2024-03-20 RX ORDER — HYDROMORPHONE HCL IN WATER/PF 6 MG/30 ML
0.2 PATIENT CONTROLLED ANALGESIA SYRINGE INTRAVENOUS EVERY 5 MIN PRN
Status: DISCONTINUED | OUTPATIENT
Start: 2024-03-20 | End: 2024-03-20 | Stop reason: HOSPADM

## 2024-03-20 RX ORDER — HYDROXYUREA 500 MG/1
500 CAPSULE ORAL 2 TIMES DAILY
Status: SHIPPED
Start: 2024-03-27 | End: 2024-04-05

## 2024-03-20 RX ORDER — CEFAZOLIN SODIUM/WATER 2 G/20 ML
2 SYRINGE (ML) INTRAVENOUS SEE ADMIN INSTRUCTIONS
Status: DISCONTINUED | OUTPATIENT
Start: 2024-03-20 | End: 2024-03-20 | Stop reason: HOSPADM

## 2024-03-20 RX ORDER — NEOSTIGMINE METHYLSULFATE 1 MG/ML
VIAL (ML) INJECTION PRN
Status: DISCONTINUED | OUTPATIENT
Start: 2024-03-20 | End: 2024-03-20

## 2024-03-20 RX ORDER — FENTANYL CITRATE 50 UG/ML
25 INJECTION, SOLUTION INTRAMUSCULAR; INTRAVENOUS EVERY 5 MIN PRN
Status: DISCONTINUED | OUTPATIENT
Start: 2024-03-20 | End: 2024-03-20 | Stop reason: HOSPADM

## 2024-03-20 RX ORDER — ACETAMINOPHEN 325 MG/1
650 TABLET ORAL
Status: DISCONTINUED | OUTPATIENT
Start: 2024-03-20 | End: 2024-03-20 | Stop reason: HOSPADM

## 2024-03-20 RX ORDER — SODIUM CHLORIDE, SODIUM LACTATE, POTASSIUM CHLORIDE, CALCIUM CHLORIDE 600; 310; 30; 20 MG/100ML; MG/100ML; MG/100ML; MG/100ML
INJECTION, SOLUTION INTRAVENOUS CONTINUOUS
Status: DISCONTINUED | OUTPATIENT
Start: 2024-03-20 | End: 2024-03-20 | Stop reason: HOSPADM

## 2024-03-20 RX ORDER — GLYCOPYRROLATE 0.2 MG/ML
INJECTION, SOLUTION INTRAMUSCULAR; INTRAVENOUS PRN
Status: DISCONTINUED | OUTPATIENT
Start: 2024-03-20 | End: 2024-03-20

## 2024-03-20 RX ORDER — LIDOCAINE 40 MG/G
CREAM TOPICAL
Status: DISCONTINUED | OUTPATIENT
Start: 2024-03-20 | End: 2024-03-20 | Stop reason: HOSPADM

## 2024-03-20 RX ORDER — ONDANSETRON 4 MG/1
4 TABLET, ORALLY DISINTEGRATING ORAL EVERY 30 MIN PRN
Status: DISCONTINUED | OUTPATIENT
Start: 2024-03-20 | End: 2024-03-20 | Stop reason: HOSPADM

## 2024-03-20 RX ORDER — NALOXONE HYDROCHLORIDE 0.4 MG/ML
0.1 INJECTION, SOLUTION INTRAMUSCULAR; INTRAVENOUS; SUBCUTANEOUS
Status: DISCONTINUED | OUTPATIENT
Start: 2024-03-20 | End: 2024-03-20 | Stop reason: HOSPADM

## 2024-03-20 RX ADMIN — HYDROMORPHONE HYDROCHLORIDE 1 MG: 1 INJECTION, SOLUTION INTRAMUSCULAR; INTRAVENOUS; SUBCUTANEOUS at 10:05

## 2024-03-20 RX ADMIN — DEXAMETHASONE SODIUM PHOSPHATE 8 MG: 4 INJECTION, SOLUTION INTRA-ARTICULAR; INTRALESIONAL; INTRAMUSCULAR; INTRAVENOUS; SOFT TISSUE at 09:35

## 2024-03-20 RX ADMIN — Medication 2 G: at 09:28

## 2024-03-20 RX ADMIN — SODIUM CHLORIDE, POTASSIUM CHLORIDE, SODIUM LACTATE AND CALCIUM CHLORIDE: 600; 310; 30; 20 INJECTION, SOLUTION INTRAVENOUS at 09:00

## 2024-03-20 RX ADMIN — LIDOCAINE HYDROCHLORIDE 40 MG: 20 INJECTION, SOLUTION INFILTRATION; PERINEURAL at 09:35

## 2024-03-20 RX ADMIN — FENTANYL CITRATE 100 MCG: 50 INJECTION INTRAMUSCULAR; INTRAVENOUS at 09:35

## 2024-03-20 RX ADMIN — PHENYLEPHRINE HYDROCHLORIDE 100 MCG: 10 INJECTION INTRAVENOUS at 09:45

## 2024-03-20 RX ADMIN — KETOROLAC TROMETHAMINE 30 MG: 30 INJECTION, SOLUTION INTRAMUSCULAR at 09:55

## 2024-03-20 RX ADMIN — GLYCOPYRROLATE 0.6 MG: 0.2 INJECTION, SOLUTION INTRAMUSCULAR; INTRAVENOUS at 10:31

## 2024-03-20 RX ADMIN — ONDANSETRON 4 MG: 2 INJECTION INTRAMUSCULAR; INTRAVENOUS at 09:55

## 2024-03-20 RX ADMIN — ROCURONIUM BROMIDE 50 MG: 50 INJECTION, SOLUTION INTRAVENOUS at 09:35

## 2024-03-20 RX ADMIN — PHENYLEPHRINE HYDROCHLORIDE 150 MCG: 10 INJECTION INTRAVENOUS at 10:11

## 2024-03-20 RX ADMIN — INDOCYANINE GREEN AND WATER 2.5 MG: KIT at 08:31

## 2024-03-20 RX ADMIN — SODIUM CHLORIDE, POTASSIUM CHLORIDE, SODIUM LACTATE AND CALCIUM CHLORIDE: 600; 310; 30; 20 INJECTION, SOLUTION INTRAVENOUS at 10:23

## 2024-03-20 RX ADMIN — PHENYLEPHRINE HYDROCHLORIDE 150 MCG: 10 INJECTION INTRAVENOUS at 09:58

## 2024-03-20 RX ADMIN — NEOSTIGMINE METHYLSULFATE 4 MG: 1 INJECTION, SOLUTION INTRAVENOUS at 10:31

## 2024-03-20 RX ADMIN — MIDAZOLAM 2 MG: 1 INJECTION INTRAMUSCULAR; INTRAVENOUS at 09:28

## 2024-03-20 RX ADMIN — PROPOFOL 50 MCG/KG/MIN: 10 INJECTION, EMULSION INTRAVENOUS at 09:43

## 2024-03-20 RX ADMIN — PROPOFOL 190 MG: 10 INJECTION, EMULSION INTRAVENOUS at 09:35

## 2024-03-20 ASSESSMENT — ACTIVITIES OF DAILY LIVING (ADL)
ADLS_ACUITY_SCORE: 34

## 2024-03-20 NOTE — ANESTHESIA CARE TRANSFER NOTE
Patient: Alvarez Bess    Procedure: Procedure(s):  CHOLECYSTECTOMY, ROBOT-ASSISTED       Diagnosis: Acute cholecystitis [K81.0]  Diagnosis Additional Information: No value filed.    Anesthesia Type:   General     Note:    Oropharynx: oropharynx clear of all foreign objects and spontaneously breathing  Level of Consciousness: awake and drowsy  Oxygen Supplementation: face mask  Level of Supplemental Oxygen (L/min / FiO2): 8l  Independent Airway: airway patency satisfactory and stable  Dentition: dentition unchanged  Vital Signs Stable: post-procedure vital signs reviewed and stable  Report to RN Given: handoff report given  Patient transferred to: PACU  Comments: Pt to PACU, VSS, report to RN  Handoff Report: Identifed the Patient, Identified the Reponsible Provider, Reviewed the pertinent medical history, Discussed the surgical course, Reviewed Intra-OP anesthesia mangement and issues during anesthesia, Set expectations for post-procedure period and Allowed opportunity for questions and acknowledgement of understanding      Vitals:  Vitals Value Taken Time   BP 90/45 03/20/24 1045   Temp 97.4  F (36.3  C) 03/20/24 1045   Pulse 69 03/20/24 1050   Resp 27 03/20/24 1050   SpO2 100 % 03/20/24 1050   Vitals shown include unfiled device data.    Electronically Signed By: CARLI Bee CRNA  March 20, 2024  10:51 AM

## 2024-03-20 NOTE — DISCHARGE INSTRUCTIONS
HOME CARE FOLLOWING LAPAROSCOPIC CHOLECYSTECTOMY  DAIANA Martinez, TIARRA Boone C. Pratt, J. Shaheen  Special instructions for Alvarez NUÑEZ Sagle:  --Dr Case recommended holding your hydroxyurea for 7 days after surgery       INCISIONAL CARE:  Replace the bandage over your incisions DAILY until all drainage stops, or if more comfortable to have in place.  If present, leave the steri-strips (white paper tapes) in place for 14 days after surgery.  If Dermabond (a type of skin glue) is present, leave in place until it wears/flakes off (2-3 weeks).     BATHING:  OK to shower 48 hours after surgery.  Avoid baths for 1 week after surgery.  You may wash your hair at any time.  Gently pat your incision dry after bathing.  Do not apply lotions, creams, or ointments to incisions.    ACTIVITY:  Light Activity -- you may immediately be up and about as tolerated.  Walking is encouraged, increase as tolerated.  Driving/Light Work-- when comfortable and off narcotic pain medications.  Strenuous Work/Activity -- limit lifting to 20 pounds for 2 weeks.  Progressively increase with time.  Active Sports (running, biking, etc.) -- cautiously resume after 2 weeks.    DISCOMFORT:  Local anesthetic placed at surgery should provide relief for 4-8 hours.  Begin taking pain pills before discomfort is severe.  Take the pain medication with some food, when possible, to minimize side effects.  Intermittent use of ice packs may help during the first 1-3 weeks after surgery.  Expect gradual improvement.    Over-the-counter anti-inflammatory medications (i.e. Ibuprofen/Advil/Motrin or Naprosyn/Aleve) may be used per package instructions in addition to or while tapering off the narcotic pain medications to decrease swelling and sensitivity.  DO NOT TAKE these Anti-inflammatory medications if your primary physician has advised against doing so, or if you have acid reflux, ulcer, or bleeding disorder, or take blood-thinner  medications.  Call your primary physician or the surgery office if you have medication questions.    After laparoscopic cholecystectomy, you may have shoulder or upper back discomfort due to the gas used during surgery.  This is temporary and should resolve within 2-3 days.  Frequent short walks may help with this.  You may have decreased energy level for 1-2 weeks after surgery related to your recovery.    DIET:  Start with liquids and gradually increase diet as tolerated.  Drink plenty of fluids.  While taking pain medications, consider use of a stool softener, increase your fiber in your diet, or add a fiber supplement (like Metamucil, Citrucel) to help prevent constipation - a possible side effect of pain medications.  It is not uncommon to experience some bowel changes (loose stools or constipation) after surgery.  Your body has to adapt to you no longer having a gall bladder.  To help minimize this side effect, avoid fatty foods for 1-2 weeks after surgery.  You may then slowly increase the amount of fatty foods in your diet.      NAUSEA:  If nauseated from the anesthetic/pain meds; rest in bed, get up cautiously with assistance, and drink clear liquids (juice, tea, broth).    FOLLOW-UP AFTER SURGERY:  -Our office will contact you approximately 2-3 weeks after surgery to check on your progress and answer any questions you may have.  If you are doing well, you will not need to return for an office appointment.  If any concerns are identified over the phone, we will help you make an appointment to see a provider.    -If you have not received a phone call, have any questions or concerns, or would like to be seen, please call us at [phone].  We are located at: [address] [facility], [address]; [place] [address]    -CONTACT US IF THE FOLLOWING DEVELOPS:   1. A fever that is above 101     2. Increased redness, warmth, drainage, bleeding, or swelling.   3. Pain that is not relieved by rest/ice and your  prescription.   4.  Increasing pain after 48 hours.   5. Drainage that is thick, cloudy, yellow, green or white.   6. Any other questions or concerns.      FREQUENTLY ASKED QUESTIONS:    Q:  How should my incision look?    A:  Normally your incision will appear slightly swollen with light redness directly along the incision itself as it heals.  It may feel like a bump or ridge as the healing/scarring happens, and over time (3-4 months) this bump or ridge feeling should slowly go away.  In general, clear or pink watery drainage can be normal at first as your incision heals, but should decrease over time.    Q:  How do I know if my incision is infected?  A:  Look at your incision for signs of infection, like redness around the incision spreading to surrounding skin, or drainage of cloudy or foul-smelling drainage.  If you feel warm, check your temperature to see if you are running a fever.    **If any of these things occur, please notify the nurse at our office.  We may need you to come into the office for an incision check.      Q:  How do I take care of my incision?  A:  If you have a dressing in place - Starting the day after surgery, replace the dressing 1-2 times a day until there is no further drainage from the incision.  At that time, a dressing is no longer needed.  Try to minimize tape on the skin if irritation is occurring at the tape sites.  If you have significant irritation from tape on the skin, please call the office to discuss other method of dressing your incision.    Small pieces of tape called  steri-strips  may be present directly overlying your incision; these may be removed 10 days after surgery unless otherwise specified by your surgeon.  If these tapes start to loosen at the ends, you may trim them back until they fall off or are removed.    A:  If you had  Dermabond  tissue glue used as a dressing (this causes your incision to look shiny with a clear covering over it) - This type of dressing  wears off with time and does not require more dressings over the top unless it is draining around the glue as it wears off.  Do not apply ointments or lotions over the incisions until the glue has completely worn off.    Q:  There is a piece of tape or a sticky  lead  still on my skin.  Can I remove this?  A:  Sometimes the sticky  leads  used for monitoring during surgery or for evaluation in the emergency department are not all removed while you are in the hospital.  These sometimes have a tab or metal dot on them.  You can easily remove these on your own, like taking off a band-aid.  If there is a gel substance under the  lead , simply wipe/clean it off with a washcloth or paper towel.      Q:  What can I do to minimize constipation (very hard stools, or lack of stools)?  A:  Stay well hydrated.  Increase your dietary fiber intake or take a fiber supplement -with plenty of water.  Walk around frequently.  You may consider an over-the-counter stool-softener.  Your Pharmacist can assist you with choosing one that is stocked at your pharmacy.  Constipation is also one of the most common side effects of pain medication.  If you are using pain medication, be pro-active and try to PREVENT problems with constipation by taking the steps above BEFORE constipation becomes a problem.    Q:  What do I do if I need more pain medications?  A:  Call the office to receive refills.  Be aware that certain pain meds cannot be called into a pharmacy and actually require a paper prescription.  A change may be made in your pain med as you progress thru your recovery period or if you have side effects to certain meds.    --Pain meds are NOT refilled after 5pm on weekdays, and NOT AT ALL on the weekends, so please look ahead to prevent problems.      Q:  Why am I having a hard time sleeping now that I am at home?  A:  Many medications you receive while you are in the hospital can impact your sleep for a number of days after your  surgery/hospitalization.  Decreased level of activity and naps during the day may also make sleeping at night difficult.  Try to minimize day-time naps, and get up frequently during the day to walk around your home during your recovery time.  Sleep aides may be of some help, but are not recommended for long-term use.      Q:  I am having some back discomfort.  What should I do?  A:  This may be related to certain positioning that was required for your surgery, extended periods of time in bed, or other changes in your overall activity level.  You may try ice, heat, acetaminophen, or ibuprofen to treat this temporarily.  Note that many pain medications have acetaminophen in them and would state this on the prescription bottle.  Be sure not to exceed the maximum of 4000mg per day of acetaminophen.     **If the pain you are having does not resolve, is severe, or is a flare of back pain you have had on other occasions prior to surgery, please contact your primary physician for further recommendations or for an appointment to be examined at their office.    Q:  Why am I having headaches?  A:  Headaches can be caused by many things:  caffeine withdrawal, use of pain meds, dehydration, high blood pressure, lack of sleep, over-activity/exhaustion, flare-up of usual migraine headaches.  If you feel this is related to muscle tension (a band-like feeling around the head, or a pressure at the low-back of the head) you may try ice or heat to this area.  You may need to drink more fluids (try electrolyte drink like Gatorade), rest, or take your usual migraine medications.   **If your headaches do not resolve, worsen, are accompanied by other symptoms, or if your blood pressure is high, please call your primary physician for recommendation and/or examination.    Q:  I am unable to urinate.  What do I do?  A:  A small percentage of people can have difficulty urinating initially after surgery.  This includes being able to urinate  only a very small amount at a time and feeling discomfort or pressure in the very low abdomen.  This is called  urinary retention , and is actually an urgent situation.  Proceed to your nearest Emergency department for evaluation (not an Urgent Care Center).  Sometimes the bladder does not work correctly after certain medications you receive during surgery, or related to certain procedures.  You may need to have a catheter placed until your bladder recovers.  When planning to go to an Emergency department, it may help to call the ER to let them know you are coming in for this problem after a surgery.  This may help you get in quicker to be evaluated.  **If you have symptoms of a urinary tract infection, please contact your primary physician for the proper evaluation and treatment.          If you have other questions, please call the office Monday thru Friday between 8am and 4:30pm to discuss with the nurse or physician assistant.  [phone]    There is a surgeon ON CALL on weekday evenings and over the weekend in case of urgent need only, and may be contacted at the same number.    If you are having an emergency, call 911 or proceed to your nearest emergency department.        You received Toradol, an IV form of Ibuprofen (Motrin) at 9:55 am.  Do not take any Ibuprofen products until 3:55pm.

## 2024-03-20 NOTE — ANESTHESIA POSTPROCEDURE EVALUATION
Patient: Alvarez Bess    Procedure: Procedure(s):  CHOLECYSTECTOMY, ROBOT-ASSISTED       Anesthesia Type:  General    Note:  Disposition: Outpatient   Postop Pain Control: Uneventful            Sign Out: Well controlled pain   PONV: No   Neuro/Psych: Uneventful            Sign Out: Acceptable/Baseline neuro status   Airway/Respiratory: Uneventful            Sign Out: Acceptable/Baseline resp. status   CV/Hemodynamics: Uneventful            Sign Out: Acceptable CV status; No obvious hypovolemia; No obvious fluid overload   Other NRE: NONE   DID A NON-ROUTINE EVENT OCCUR? No           Last vitals:  Vitals Value Taken Time   /63 03/20/24 1141   Temp 97.4  F (36.3  C) 03/20/24 1141   Pulse 72 03/20/24 1141   Resp 14 03/20/24 1141   SpO2 74 % 03/20/24 1141       Electronically Signed By: Feroz Bryant MD  March 20, 2024  12:41 PM

## 2024-03-20 NOTE — OP NOTE
[facility] General Surgery Operative Note    Pre-operative diagnosis: acute cholecystitis   Post-operative diagnosis: chronic cholecystitis   Procedure: Robotic cholecystectomy    Surgeon: Anabel Crandall MD   Assistant(s): Alexander Berg PA-C  The Physician Assistant was medically necessary for their expertise in prepping, robotic instrument exchange, passing of material through port sites, closure of port sites, suturing and retraction.   Anesthesia: general   Estimated blood loss:  Specimen: 2 cc  gallbladder and contents               INDICATION FOR OPERATION: This is a 63 year old male who presented to my clinic yesterday with 1 month of intermittent abdominal pain. Studies including ultrasound 4 days prior were consistent with gallstones with wall thickening. On exam he had severe tenderness in the right upper quadrant with positive flor's sign and I recommended urgent cholecystectomy for presumed acute cholecystitis. We discussed robotic assisted laparoscopic cholecystectomy and the patient agreed to proceed after hearing the risks and benefits.    DESCRIPTION OF PROCEDURE:  The patient was taken to the operating room and placed on the table in supine position.  General endotracheal anesthesia was induced and the abdomen was prepped and draped in standard sterile fashion.    Access was gained in the left upper quadrant at [place] point with a 5mm 0 degree laparscopic with a visiport trocar under direct visualization.   The abdomen was insufflated with CO2.  Additional 8mm robotic ports were placed in an oblique line from right lower quadrant with even spacing to the left upper quadrant port which was replaced with an 8mm robotic port. The patient was placed in reverse Trendelenburg and right side up.  The robot was then docked.    The fundus of the gallbladder was grasped and retracted cephalad with a prograsp.  The infundibulum was grasped and retracted laterally.  The peritoneum over the medial  and lateral aspects of the triangle of Calot was taken down with blunt dissection and cautery.  The cystic duct and artery were freed up from surrounding tissues.  The triangle of Calot was skeletonized revealing the critical view of safety.  There was an additional posterior artery branch. Intraoperative fluorescence was used to evaluate the biliary anatomy with no additional biliary structures lighting up other than the cystic duct and common bile duct.    The duct was clipped twice proximally, once distally, and the cystic artery branches were cauterized with bipolar distally then clipped once proximally, then all were transected.  The gallbladder was then removed from the liver using the cautery.  Before the gallbladder was completely removed from the cystic plate fluorescence was again used to evaluate for any additional biliary structures and none seen. The gallbladder was passed into an Endocatch bag in the left upper quadrant port site. We observed the right upper quadrant carefully for hemostasis.  Hemostasis was assured.      The endocatch bag was removed from the abdomen, which required grasping the gallbladder inside the endocatch bag, incising the gallbladder and removing stones and bile. After removal of the bag, there was a small fascial defect which was closed with 0 vicryl on a [place].    All of the incisions were closed with interrupted 4-0 Vicryl subcuticular sutures and Steri-Strips.  The patient tolerated the procedure well.  Sponge and instrument counts were correct.      FINDINGS: no edema or acute inflammation. Small stones and sludge in the gallbladder.    Anabel Crandall MD

## 2024-03-20 NOTE — ANESTHESIA PREPROCEDURE EVALUATION
Anesthesia Pre-Procedure Evaluation    Patient: Alvarez Bess   MRN: [id number] : 1960        Procedure : Procedure(s):  CHOLECYSTECTOMY, ROBOT-ASSISTED          Past Medical History:   Diagnosis Date    Cardiomyopathy (H)     Congestive heart failure (H)      Problem list name updated by automated process. Provider to review    Congestive heart failure, unspecified     Eosinophilia     Gastro-oesophageal reflux disease     Hyperlipidemia 2019    Hypertension     Penile discharge     Vitamin D deficiency 2010      Past Surgical History:   Procedure Laterality Date    ANGIOGRAM  05    left dominant coronary system with essentially normal coronary arteries, trivial plaque, severe dilated cardiomyopathy, left ventricle hypertrophy and severely hypokinetic, asynchrony or desynchrony of LV function    BONE MARROW BIOPSY, BONE SPECIMEN, NEEDLE/TROCAR N/A 8/15/2023    Procedure: BIOPSY, BONE MARROW;  Surgeon: Susy Baird;  Location: [facility] OR    ENT SURGERY      polyps from throat    EXCISE LESION FOOT  2013    Procedure: EXCISE LESION FOOT;  Scar revision of Right 3rd toe      SOFT TISSUE SURGERY      neuroma from right foot      Allergies   Allergen Reactions    Morphine Hcl      Extreme agitation      Social History     Tobacco Use    Smoking status: Some Days     Years: 10     Types: Cigarettes     Last attempt to quit: 12/10/2005     Years since quittin.2    Smokeless tobacco: Never    Tobacco comments:     About 5 cigarettes per week   Substance Use Topics    Alcohol use: Yes     Comment: 2 beer day      Wt Readings from Last 1 Encounters:   24 72.3 kg (159 lb 4.8 oz)        Anesthesia Evaluation   Pt has had prior anesthetic. Type: General.    No history of anesthetic complications       ROS/MED HX  ENT/Pulmonary:  - neg pulmonary ROS     Neurologic:  - neg neurologic ROS     Cardiovascular:     (+) Dyslipidemia hypertension- -   -  - -                                  Previous cardiac testing   Echo: Date: 8/2023 Results:  Interpretation Summary     1. The left ventricle is normal in size. There is mild concentric left  ventricular hypertrophy. Left ventricular systolic function is normal. The  visual ejection fraction is 55-60%. Diastolic Doppler findings (E/E' ratio  and/or other parameters) suggest left ventricular filling pressures are  indeterminate. No regional wall motion abnormalities noted.  2. The right ventricle is normal size. The right ventricular systolic function  is normal.  3. Trace mitral and tricuspid regurgitation.  4. No pericardial effusion.  5. In comparison to the previous report dated 08/20/2021, the findings are  similar.    Stress Test:  Date: Results:    ECG Reviewed:  Date: Results:    Cath:  Date: Results:      METS/Exercise Tolerance:     Hematologic:  - neg hematologic  ROS     Musculoskeletal:  - neg musculoskeletal ROS     GI/Hepatic:     (+) GERD, Asymptomatic on medication,                  Renal/Genitourinary:  - neg Renal ROS     Endo:  - neg endo ROS     Psychiatric/Substance Use:  - neg psychiatric ROS     Infectious Disease:  - neg infectious disease ROS     Malignancy:       Other:            Physical Exam    Airway        Mallampati: II   TM distance: > 3 FB   Neck ROM: full   Mouth opening: > 3 cm    Respiratory Devices and Support         Dental           Cardiovascular   cardiovascular exam normal          Pulmonary   pulmonary exam normal                OUTSIDE LABS:  CBC:   Lab Results   Component Value Date    WBC 17.2 (H) 03/15/2024    WBC 16.8 (H) 02/27/2024    HGB 14.9 03/15/2024    HGB 13.6 02/27/2024    HCT 41.2 03/15/2024    HCT 38.7 (L) 02/27/2024     03/15/2024     02/27/2024     BMP:   Lab Results   Component Value Date     (L) 03/15/2024     01/16/2024    POTASSIUM 4.2 03/15/2024    POTASSIUM 4.4 01/16/2024    CHLORIDE 96 (L) 03/15/2024    CHLORIDE 100 01/16/2024    CO2 27 03/15/2024    CO2  28 01/16/2024    BUN 11.4 03/15/2024    BUN 11.1 01/16/2024    CR 0.93 03/15/2024    CR 0.88 01/16/2024    GLC 92 03/15/2024    GLC 94 01/16/2024     COAGS:   Lab Results   Component Value Date    PTT 35 10/07/2005    INR 1.16 (H) 08/15/2023     POC:   Lab Results   Component Value Date     10/04/2005     HEPATIC:   Lab Results   Component Value Date    ALBUMIN 4.0 03/15/2024    PROTTOTAL 7.2 03/15/2024    ALT 20 03/15/2024    AST 21 03/15/2024    ALKPHOS 95 03/15/2024    BILITOTAL 0.9 03/15/2024     OTHER:   Lab Results   Component Value Date    PH 7.35 10/04/2005    LACT 0.8 09/17/2014    A1C 5.1 05/10/2023    PAULINA 9.0 03/15/2024    PHOS 3.9 10/06/2005    MAG 2.4 (H) 10/06/2005    LIPASE 14 03/15/2024    AMYLASE 109 (H) 03/15/2024    TSH 0.99 05/10/2023    T4 1.25 10/04/2005    T3 94 10/04/2005    SED 7 05/10/2023       Anesthesia Plan    ASA Status:  3       Anesthesia Type: General.     - Airway: ETT   Induction: Intravenous.   Maintenance: Balanced.        Consents    Anesthesia Plan(s) and associated risks, benefits, and realistic alternatives discussed. Questions answered and patient/representative(s) expressed understanding.     - Discussed:     - Discussed with:  Patient      - Extended Intubation/Ventilatory Support Discussed: No.      - Patient is DNR/DNI Status: No     Use of blood products discussed: No .     Postoperative Care    Pain management: IV analgesics, Oral pain medications, Multi-modal analgesia.   PONV prophylaxis: Ondansetron (or other 5HT-3), Dexamethasone or Solumedrol     Comments:               Feroz Bryant MD    I have reviewed the pertinent notes and labs in the chart from the past 30 days and (re)examined the patient.  Any updates or changes from those notes are reflected in this note.     # Hyponatremia: Lowest Na = 133 mmol/L in last 30 days, will monitor as appropriate

## 2024-03-20 NOTE — ANESTHESIA PROCEDURE NOTES
Airway       Patient location during procedure: OR       Procedure Start/Stop Times: 3/20/2024 9:37 AM  Staff -        Anesthesiologist:  Feroz Bryant MD       CRNA: Elia Ortiz APRN CRNA       Performed By: CRNA  Consent for Airway        Urgency: elective  Indications and Patient Condition       Indications for airway management: rocky-procedural       Induction type:intravenous       Mask difficulty assessment: 1 - vent by mask    Final Airway Details       Final airway type: endotracheal airway       Successful airway: ETT - single  Endotracheal Airway Details        ETT size (mm): 8.0       Cuffed: yes       Successful intubation technique: direct laryngoscopy       DL Blade Type: MAC 4       Grade View of Cords: 2       Adjucts: stylet       Position: Right       Measured from: lips       Secured at (cm): 23       Bite block used: Oral Airway    Post intubation assessment        Placement verified by: capnometry, equal breath sounds and chest rise        Number of attempts at approach: 1       Number of other approaches attempted: 0       Secured with: tape       Ease of procedure: easy       Dentition: Intact and Unchanged    Medication(s) Administered   Medication Administration Time: 3/20/2024 9:37 AM

## 2024-03-21 LAB
PATH REPORT.COMMENTS IMP SPEC: NORMAL
PATH REPORT.COMMENTS IMP SPEC: NORMAL
PATH REPORT.FINAL DX SPEC: NORMAL
PATH REPORT.GROSS SPEC: NORMAL
PATH REPORT.MICROSCOPIC SPEC OTHER STN: NORMAL
PATH REPORT.RELEVANT HX SPEC: NORMAL
PHOTO IMAGE: NORMAL

## 2024-04-04 DIAGNOSIS — K80.50 CALCULUS OF BILE DUCT WITHOUT CHOLECYSTITIS AND WITHOUT OBSTRUCTION: ICD-10-CM

## 2024-04-04 RX ORDER — HYDROXYUREA 500 MG/1
500 CAPSULE ORAL 2 TIMES DAILY
Qty: 180 CAPSULE | Refills: 1 | Status: CANCELLED | OUTPATIENT
Start: 2024-04-04

## 2024-04-04 NOTE — TELEPHONE ENCOUNTER
Pending Prescriptions:                       Disp   Refills    hydroxyurea (HYDREA) 500 MG capsule       180 ca*1            Sig: Take 1 capsule (500 mg) by mouth 2 times daily         Last Written Prescription Date:  1/23/2024  Last Fill Quantity: 120,   # refills: 1  Last Office Visit: 1/30/2024  Future Office visit:  4/30/2024 with Dr Case   Next 5 appointments (look out 90 days)      May 13, 2024  9:30 AM  (Arrive by 9:10 AM)  Adult Preventative Visit with Jc Belcher MD  [facility] ([facility] [place] ) [address]  [address]  [facility] [id number] [phone]             Routing refill request to provider for review/approval.  Pt states that he called the pharmacy for a refill and was told that the Rx for hydrea was discontinued.  Pt states that he did stop the hydrea around the time of his gallbladder surgery, but he went back on the hydrea on 3/28/2024, about one week after surgery.      Imelda Cho RN on 4/4/2024 at 11:07 AM

## 2024-04-05 DIAGNOSIS — K80.50 CALCULUS OF BILE DUCT WITHOUT CHOLECYSTITIS AND WITHOUT OBSTRUCTION: ICD-10-CM

## 2024-04-05 RX ORDER — HYDROXYUREA 500 MG/1
500 CAPSULE ORAL 2 TIMES DAILY
Qty: 180 CAPSULE | Refills: 1 | Status: SHIPPED | OUTPATIENT
Start: 2024-04-05 | End: 2024-04-17

## 2024-04-05 NOTE — TELEPHONE ENCOUNTER
Per chart review, refill for hydrea has been sent to pharmacy.  Left general message for pt to let him know that refill has been approved.    Imelda Cho RN on 4/5/2024 at 4:23 PM

## 2024-04-09 ENCOUNTER — TELEPHONE (OUTPATIENT)
Dept: SURGERY | Facility: CLINIC | Age: 64
End: 2024-04-09
Payer: COMMERCIAL

## 2024-04-09 NOTE — TELEPHONE ENCOUNTER
Attempted to call patient for post op check. No answer.  A message was left for patient to call back if they had any questions or concerns.     Alexander Berg PA-C

## 2024-04-17 DIAGNOSIS — K80.50 CALCULUS OF BILE DUCT WITHOUT CHOLECYSTITIS AND WITHOUT OBSTRUCTION: ICD-10-CM

## 2024-04-17 RX ORDER — HYDROXYUREA 500 MG/1
500 CAPSULE ORAL 2 TIMES DAILY
Qty: 180 CAPSULE | Refills: 4 | Status: SHIPPED | OUTPATIENT
Start: 2024-04-17 | End: 2024-07-08

## 2024-04-22 ENCOUNTER — LAB (OUTPATIENT)
Dept: LAB | Facility: CLINIC | Age: 64
End: 2024-04-22
Payer: COMMERCIAL

## 2024-04-22 DIAGNOSIS — I50.9 CONGESTIVE HEART FAILURE (H): Primary | ICD-10-CM

## 2024-04-22 DIAGNOSIS — R59.1 LYMPHADENOPATHY: ICD-10-CM

## 2024-04-22 LAB
BASOPHILS # BLD AUTO: ABNORMAL 10*3/UL
BASOPHILS NFR BLD AUTO: ABNORMAL %
EOSINOPHIL # BLD AUTO: ABNORMAL 10*3/UL
EOSINOPHIL NFR BLD AUTO: ABNORMAL %
ERYTHROCYTE [DISTWIDTH] IN BLOOD BY AUTOMATED COUNT: 12.6 % (ref 10–15)
HCT VFR BLD AUTO: 40.5 % (ref 40–53)
HGB BLD-MCNC: 14.4 G/DL (ref 13.3–17.7)
IMM GRANULOCYTES # BLD: ABNORMAL 10*3/UL
IMM GRANULOCYTES NFR BLD: ABNORMAL %
LYMPHOCYTES # BLD AUTO: ABNORMAL 10*3/UL
LYMPHOCYTES NFR BLD AUTO: ABNORMAL %
MCH RBC QN AUTO: 42.7 PG (ref 26.5–33)
MCHC RBC AUTO-ENTMCNC: 35.6 G/DL (ref 31.5–36.5)
MCV RBC AUTO: 120 FL (ref 78–100)
MONOCYTES # BLD AUTO: ABNORMAL 10*3/UL
MONOCYTES NFR BLD AUTO: ABNORMAL %
NEUTROPHILS # BLD AUTO: ABNORMAL 10*3/UL
NEUTROPHILS NFR BLD AUTO: ABNORMAL %
NRBC # BLD AUTO: 0 10E3/UL
NRBC BLD AUTO-RTO: 0 /100
PLATELET # BLD AUTO: 174 10E3/UL (ref 150–450)
RBC # BLD AUTO: 3.37 10E6/UL (ref 4.4–5.9)
WBC # BLD AUTO: 15.4 10E3/UL (ref 4–11)

## 2024-04-22 PROCEDURE — 85007 BL SMEAR W/DIFF WBC COUNT: CPT

## 2024-04-22 PROCEDURE — 36415 COLL VENOUS BLD VENIPUNCTURE: CPT

## 2024-04-22 PROCEDURE — 85027 COMPLETE CBC AUTOMATED: CPT

## 2024-04-22 PROCEDURE — 80061 LIPID PANEL: CPT

## 2024-04-23 LAB
CHOLEST SERPL-MCNC: 143 MG/DL
FASTING STATUS PATIENT QL REPORTED: NO
HDLC SERPL-MCNC: 44 MG/DL
LDLC SERPL CALC-MCNC: 76 MG/DL
NONHDLC SERPL-MCNC: 99 MG/DL
TRIGL SERPL-MCNC: 113 MG/DL

## 2024-04-24 LAB
BASOPHILS # BLD MANUAL: 0 10E3/UL (ref 0–0.2)
BASOPHILS NFR BLD MANUAL: 0 %
EOSINOPHIL # BLD MANUAL: 7.4 10E3/UL (ref 0–0.7)
EOSINOPHIL NFR BLD MANUAL: 48 %
LYMPHOCYTES # BLD MANUAL: 1.1 10E3/UL (ref 0.8–5.3)
LYMPHOCYTES NFR BLD MANUAL: 7 %
MONOCYTES # BLD MANUAL: 0.8 10E3/UL (ref 0–1.3)
MONOCYTES NFR BLD MANUAL: 5 %
NEUTROPHILS # BLD MANUAL: 6.2 10E3/UL (ref 1.6–8.3)
NEUTROPHILS NFR BLD MANUAL: 40 %
PATH REV: ABNORMAL
PLAT MORPH BLD: ABNORMAL
RBC MORPH BLD: ABNORMAL

## 2024-04-30 ENCOUNTER — ONCOLOGY VISIT (OUTPATIENT)
Dept: ONCOLOGY | Facility: CLINIC | Age: 64
End: 2024-04-30
Attending: INTERNAL MEDICINE
Payer: COMMERCIAL

## 2024-04-30 VITALS
OXYGEN SATURATION: 98 % | BODY MASS INDEX: 22.37 KG/M2 | HEIGHT: 72 IN | WEIGHT: 165.2 LBS | DIASTOLIC BLOOD PRESSURE: 70 MMHG | SYSTOLIC BLOOD PRESSURE: 135 MMHG | HEART RATE: 77 BPM | RESPIRATION RATE: 16 BRPM

## 2024-04-30 DIAGNOSIS — D72.118 OTHER HYPEREOSINOPHILIC SYNDROME: Primary | ICD-10-CM

## 2024-04-30 PROCEDURE — 99213 OFFICE O/P EST LOW 20 MIN: CPT | Performed by: INTERNAL MEDICINE

## 2024-04-30 PROCEDURE — 99214 OFFICE O/P EST MOD 30 MIN: CPT | Performed by: INTERNAL MEDICINE

## 2024-04-30 ASSESSMENT — PAIN SCALES - GENERAL: PAINLEVEL: NO PAIN (0)

## 2024-04-30 NOTE — PROGRESS NOTES
Oncology Rooming Note    April 30, 2024 9:11 AM   Alvarez Bess is a 63 year old male who presents for:    Chief Complaint   Patient presents with    Oncology Clinic Visit     Initial Vitals: /70   Pulse 77   Resp 16   Ht 1.829 m (6')   Wt 74.9 kg (165 lb 3.2 oz)   SpO2 98%   BMI 22.41 kg/m   Estimated body mass index is 22.41 kg/m  as calculated from the following:    Height as of this encounter: 1.829 m (6').    Weight as of this encounter: 74.9 kg (165 lb 3.2 oz). Body surface area is 1.95 meters squared.  No Pain (0) Comment: Data Unavailable   No LMP for male patient.  Allergies reviewed: Yes  Medications reviewed: Yes    Medications: Medication refills not needed today.  Pharmacy name entered into [email or web address]: [id number] [place], [place] - [address] [facility] AT [address]    Frailty Screening:   Is the patient here for a new oncology consult visit in cancer care? 2. No      Clinical concerns: None       Soledad Yip MA

## 2024-04-30 NOTE — PROGRESS NOTES
[facility]     Hematology/Oncology Return patient note video visit     Today's Date: Jan 30, 2024    Reason for Consult: eosinophilia      HISTORY OF PRESENT ILLNESS: Alvarez is a very pleasant 62-year-old male who is here for further evaluation of eosinophilia and elevated white count.  Patient states that he has had 14 pound unintentional weight loss over the last year .  He denies any night sweats or weight loss.  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  He is following up with dermatology.  Patient had an elevated white count since 2014 December   his white count was at 12.9.   Most recently his white count Is a 25.6 absolute neutrophils at 10.2 absolute eosinophils at 10.2 CRP level at 11.371-month ago it was at 16.5 sedimentation rate at 7.  His hemoglobin is normal at 14.7 platelet count of 2.93    Interval history: Artie returns for follow-up today.  He states he has not been feeling well and he has a low blood pressure issue. He is on lisinopril and carvedilol and is being managed by Dr Ordonez.  He also continues to have a rash that is not improving with steroids he has not seen dermatology recently. I still dont have the dermatology report from previous       Interval history: Artie returns for follow-up today.  Multiple investigations were completed  And the results are following      1.  PET/CT shows bilateral axillary and inguinal hypermetabolic adenopathy concerning for neoplastic process including hypereosinophilic syndromes and lymphoma.  Recommend tissue sampling.  SUV in those regions at 3.3-3.6.  Additional of hypermetabolic foci within the right gluteus and left vastus lateralis intermedius muscles SUV of 8.4 may be related to the neoplastic process.  Images were reviewed with the patient.  Patient denies any night sweats or weight loss his weight has actually been stable.  His the rash persists    2 bone marrow biopsy  Completed on 8/15/2023 showed marked hypercellular bone marrow with marked eosinophilia maturing trilineage hematopoiesis no increased dysplasia or increase in blasts.  Peripheral blood showing moderate leukocytosis with eosinophilia.  Adequate neutrophils with slight shift to immaturity.  Flow cytometry did not show any increase in myeloid blasts and no abnormal population of cells.  Morphologic findings just showed peripheral eosinophilia.  JAK2 mutation negative.  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  This was a summary for the bone marrow biopsy.    FISH cytogenetics and molecular studies were negative for PD GFR alpha, beta, FGFR 1, JAK2 genes and BCR-ABL mutation ruling out certain leukemias and CML. No  mass found on MRI that they could biopsy     S/p cholecystectomy on 3/20/2024. Restarted hydrea 4/4/2024 500 mg bid    Artie returns for follow up.  He is doing great after surgery.  No issues with rash.  Feels well.  Eosinophils are stable  REVIEW OF SYSTEMS:   14 point ROS was reviewed and is negative other than as noted above in HPI.       HOME MEDICATIONS:  Current Outpatient Medications   Medication Sig Dispense Refill    acetaminophen (TYLENOL) 325 MG tablet Take 2 tablets (650 mg) by mouth every 4 hours as needed for other (mild pain) 100 tablet 0    augmented betamethasone dipropionate (DIPROLENE AF) 0.05 % external cream Apply topically 2 times daily 100 g 3    carvedilol (COREG) 25 MG tablet Take 1 tablet (25 mg) by mouth 2 times daily (with meals) 180 tablet 3    hydroxyurea (HYDREA) 500 MG capsule Take 1 capsule (500 mg) by mouth 2 times daily 180 capsule 4    omeprazole (PRILOSEC OTC) 20 MG EC tablet Take 20 mg by mouth daily      Probiotic Product (PROBIOTIC DAILY PO)       ondansetron (ZOFRAN ODT) 4 MG ODT tab Take 1 tablet (4 mg) by mouth every 8  hours as needed for nausea 8 tablet 0    oxyCODONE (ROXICODONE) 5 MG tablet Take 1-2 tablets (5-10 mg) by mouth every 4 hours as needed for moderate to severe pain 8 tablet 0         ALLERGIES:  Allergies   Allergen Reactions    Morphine Hcl      Extreme agitation         PAST MEDICAL HISTORY:  Past Medical History:   Diagnosis Date    Cardiomyopathy (H)     Congestive heart failure (H)      Problem list name updated by automated process. Provider to review    Congestive heart failure, unspecified     Eosinophilia     Gastro-oesophageal reflux disease     Hyperlipidemia 2019    Hypertension     Penile discharge     Vitamin D deficiency 2010         PAST SURGICAL HISTORY:  Past Surgical History:   Procedure Laterality Date    ANGIOGRAM  05    left dominant coronary system with essentially normal coronary arteries, trivial plaque, severe dilated cardiomyopathy, left ventricle hypertrophy and severely hypokinetic, asynchrony or desynchrony of LV function    BONE MARROW BIOPSY, BONE SPECIMEN, NEEDLE/TROCAR N/A 8/15/2023    Procedure: BIOPSY, BONE MARROW;  Surgeon: Susy Baird;  [facility]    [facility]    Procedure: CHOLECYSTECTOMY, ROBOT-ASSISTED;  Surgeon: Anabel Crandall MD;  Location: [facility] OR    ENT SURGERY      polyps from throat    EXCISE LESION FOOT  2013    Procedure: EXCISE LESION FOOT;  Scar revision of Right 3rd toe      SOFT TISSUE SURGERY      neuroma from right foot         SOCIAL HISTORY:  Social History     Socioeconomic History    Marital status: Single     Spouse name: Not on file    Number of children: Not on file    Years of education: Not on file    Highest education level: Not on file   Occupational History    Not on file   Tobacco Use    Smoking status: Some Days     Current packs/day: 0.00     Types: Cigarettes     Start date: 12/10/1995     Last attempt to quit: 12/10/2005     Years since quittin.4     Smokeless tobacco: Never    Tobacco comments:     About 5 cigarettes per week   Vaping Use    Vaping status: Never Used   Substance and Sexual Activity    Alcohol use: Yes     Comment: 2 beer day    Drug use: No    Sexual activity: Not Currently     Partners: Male   Other Topics Concern    Parent/sibling w/ CABG, MI or angioplasty before 65F 55M? No   Social History Narrative    Not on file     Social Determinants of Health     Financial Resource Strain: High Risk (2021)    Received from [employer]    Financial Resource Strain     Difficulty of Paying Living Expenses: Not on file     Difficulty of Paying Living Expenses: Not on file   Food Insecurity: Not on file   Transportation Needs: Not on file   Physical Activity: Not on file   Stress: Not on file   Social Connections: Unknown (7/15/2023)    Received from [employer] [employer]    Social Connections     Frequency of Communication with Friends and Family: Not on file   Interpersonal Safety: Not on file   Housing Stability: Not on file   Smokes on and off.  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  Brother prostate cancer.  Father had melanoma      PHYSICAL EXAM:  Vital signs:  /70   Pulse 77   Resp 16   Ht 1.829 m (6')   Wt 74.9 kg (165 lb 3.2 oz)   SpO2 98%   BMI 22.41 kg/m     ECO     Rash is gone  Heart RRR  Lungs clear  Status post cholecystectomy healing very well      LABS:  Noted and reviewed with the patient he has a detected alteration in MPL see interval history      PATHOLOGY:  As per interval history     ASSESSMENT/PLAN:  Alvarez is a very pleasant 62-year-old male who is here for further  evaluation of leukocytosis, eosinophilia and neutrophilia    Patient appears to have myeloproliferative features with MPL + disease,  HES, ELIZABETH-NOS- and PDGFRA-associated disease account for a significant proportion of the cases of HES with myeloproliferative features, a causative genetic abnormality cannot be demonstrated in all cases. In a [place] series of 35 patients with HES, 9 patients were described as having clinical or hematological features of myeloproliferative syndrome (including palpable splenomegaly, neutrophilia, circulating myelocytes and/or erythroblasts, hyperplastic BM, and myelofibrosis).11  Three of the 9 patients had no detectable FIP1L1/PDGFRA (F/P) fusion gene or cytogenetic abnormalities, and 1 responded to imatinib therapy. Several additional case series have described imatinib-responsive, F/P-negative patients with HES,12-15  although the presence of myeloproliferative features in these patients has not been systematically addressed.  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

## 2024-04-30 NOTE — LETTER
4/30/2024         RE: Alvarez Bess  [id number] [address] [id number]        Dear Colleague,    Thank you for referring your patient, Alvarez Bess, to the [facility]. Please see a copy of my visit note below.    Oncology Rooming Note    April 30, 2024 9:11 AM   Alvarez Bess is a 63 year old male who presents for:    Chief Complaint   Patient presents with     Oncology Clinic Visit     Initial Vitals: /70   Pulse 77   Resp 16   Ht 1.829 m (6')   Wt 74.9 kg (165 lb 3.2 oz)   SpO2 98%   BMI 22.41 kg/m   Estimated body mass index is 22.41 kg/m  as calculated from the following:    Height as of this encounter: 1.829 m (6').    Weight as of this encounter: 74.9 kg (165 lb 3.2 oz). Body surface area is 1.95 meters squared.  No Pain (0) Comment: Data Unavailable   No LMP for male patient.  Allergies reviewed: Yes  Medications reviewed: Yes    Medications: Medication refills not needed today.  Pharmacy name entered into [employer]: [id number] [place], [place] - [address] [address] [address]    Frailty Screening:   Is the patient here for a new oncology consult visit in cancer care? 2. No      Clinical concerns: None       Soledad Yip MA              [facility]     Hematology/Oncology Return patient note video visit     Today's Date: Jan 30, 2024    Reason for Consult: eosinophilia      HISTORY OF PRESENT ILLNESS: Alvarez is a very pleasant 62-year-old male who is here for further evaluation of eosinophilia and elevated white count.  Patient states that he has had 14 pound unintentional weight loss over the last year .  He denies any night sweats or weight loss.  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  He is following up with dermatology.  Patient had an elevated white count since 2014 December   his white count was at 12.9.   Most recently his white  count Is a 25.6 absolute neutrophils at 10.2 absolute eosinophils at 10.2 CRP level at 11.371-month ago it was at 16.5 sedimentation rate at 7.  His hemoglobin is normal at 14.7 platelet count of 2.93    Interval history: Artie returns for follow-up today.  He states he has not been feeling well and he has a low blood pressure issue. He is on lisinopril and carvedilol and is being managed by Dr Ordonez.  He also continues to have a rash that is not improving with steroids he has not seen dermatology recently. I still dont have the dermatology report from previous       Interval history: Artie returns for follow-up today.  Multiple investigations were completed  And the results are following      1.  PET/CT shows bilateral axillary and inguinal hypermetabolic adenopathy concerning for neoplastic process including hypereosinophilic syndromes and lymphoma.  Recommend tissue sampling.  SUV in those regions at 3.3-3.6.  Additional of hypermetabolic foci within the right gluteus and left vastus lateralis intermedius muscles SUV of 8.4 may be related to the neoplastic process.  Images were reviewed with the patient.  Patient denies any night sweats or weight loss his weight has actually been stable.  His the rash persists    2 bone marrow biopsy Completed on 8/15/2023 showed marked hypercellular bone marrow with marked eosinophilia maturing trilineage hematopoiesis no increased dysplasia or increase in blasts.  Peripheral blood showing moderate leukocytosis with eosinophilia.  Adequate neutrophils with slight shift to immaturity.  Flow cytometry did not show any increase in myeloid blasts and no abnormal population of cells.  Morphologic findings just showed peripheral eosinophilia.  JAK2 mutation negative.  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  This was a summary for the bone  marrow biopsy.    FISH cytogenetics and molecular studies were negative for PD GFR alpha, beta, FGFR 1, JAK2 genes and BCR-ABL mutation ruling out certain leukemias and CML. No  mass found on MRI that they could biopsy     S/p cholecystectomy on 3/20/2024. Restarted hydrea 4/4/2024 500 mg bid    Artie returns for follow up.  He is doing great after surgery.  No issues with rash.  Feels well.  Eosinophils are stable  REVIEW OF SYSTEMS:   14 point ROS was reviewed and is negative other than as noted above in HPI.       HOME MEDICATIONS:  Current Outpatient Medications   Medication Sig Dispense Refill     acetaminophen (TYLENOL) 325 MG tablet Take 2 tablets (650 mg) by mouth every 4 hours as needed for other (mild pain) 100 tablet 0     augmented betamethasone dipropionate (DIPROLENE AF) 0.05 % external cream Apply topically 2 times daily 100 g 3     carvedilol (COREG) 25 MG tablet Take 1 tablet (25 mg) by mouth 2 times daily (with meals) 180 tablet 3     hydroxyurea (HYDREA) 500 MG capsule Take 1 capsule (500 mg) by mouth 2 times daily 180 capsule 4     omeprazole (PRILOSEC OTC) 20 MG EC tablet Take 20 mg by mouth daily       Probiotic Product (PROBIOTIC DAILY PO)        ondansetron (ZOFRAN ODT) 4 MG ODT tab Take 1 tablet (4 mg) by mouth every 8 hours as needed for nausea 8 tablet 0     oxyCODONE (ROXICODONE) 5 MG tablet Take 1-2 tablets (5-10 mg) by mouth every 4 hours as needed for moderate to severe pain 8 tablet 0         ALLERGIES:  Allergies   Allergen Reactions     Morphine Hcl      Extreme agitation         PAST MEDICAL HISTORY:  Past Medical History:   Diagnosis Date     Cardiomyopathy (H)      Congestive heart failure (H)      Problem list name updated by automated process. Provider to review     Congestive heart failure, unspecified      Eosinophilia      Gastro-oesophageal reflux disease      Hyperlipidemia 07/19/2019     Hypertension      Penile discharge      Vitamin D deficiency 07/09/2010         PAST  SURGICAL HISTORY:  Past Surgical History:   Procedure Laterality Date     ANGIOGRAM  05    left dominant coronary system with essentially normal coronary arteries, trivial plaque, severe dilated cardiomyopathy, left ventricle hypertrophy and severely hypokinetic, asynchrony or desynchrony of LV function     BONE MARROW BIOPSY, BONE SPECIMEN, NEEDLE/TROCAR N/A 8/15/2023    Procedure: BIOPSY, BONE MARROW;  Surgeon: Susy Baird;  [place] [facility] [place]     [facility] LAPAROSCOPIC CHOLECYSTECTOMY WITHOUT GRAMS N/A 3/20/2024    Procedure: CHOLECYSTECTOMY, ROBOT-ASSISTED;  Surgeon: Anabel Crandall MD;  Location: [facility] OR     ENT SURGERY      polyps from throat     EXCISE LESION FOOT  2013    Procedure: EXCISE LESION FOOT;  Scar revision of Right 3rd toe       SOFT TISSUE SURGERY      neuroma from right foot         SOCIAL HISTORY:  Social History     Socioeconomic History     Marital status: Single     Spouse name: Not on file     Number of children: Not on file     Years of education: Not on file     Highest education level: Not on file   Occupational History     Not on file   Tobacco Use     Smoking status: Some Days     Current packs/day: 0.00     Types: Cigarettes     Start date: 12/10/1995     Last attempt to quit: 12/10/2005     Years since quittin.4     Smokeless tobacco: Never     Tobacco comments:     About 5 cigarettes per week   Vaping Use     Vaping status: Never Used   Substance and Sexual Activity     Alcohol use: Yes     Comment: 2 beer day     Drug use: No     Sexual activity: Not Currently     Partners: Male   Other Topics Concern     Parent/sibling w/ CABG, MI or angioplasty before 65F 55M? No   Social History Narrative     Not on file     Social Determinants of Health     Financial Resource Strain: High Risk (2021)    [facility]    Financial Resource Strain      Difficulty of Paying Living  Expenses: Not on file      Difficulty of Paying Living Expenses: Not on file   Food Insecurity: Not on file   Transportation Needs: Not on file   Physical Activity: Not on file   Stress: Not on file   Social Connections: Unknown (7/15/2023)    Received from [facility], [facility] [employer] [facility]    Social Connections      Frequency of Communication with Friends and Family: Not on file   Interpersonal Safety: Not on file   Housing Stability: Not on file   Smokes on and off.  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  Brother prostate cancer.  Father had melanoma      PHYSICAL EXAM:  Vital signs:  /70   Pulse 77   Resp 16   Ht 1.829 m (6')   Wt 74.9 kg (165 lb 3.2 oz)   SpO2 98%   BMI 22.41 kg/m     ECO     Rash is gone  Heart RRR  Lungs clear  Status post cholecystectomy healing very well      LABS:  Noted and reviewed with the patient he has a detected alteration in MPL see interval history      PATHOLOGY:  As per interval history     ASSESSMENT/PLAN:  Alvarez is a very pleasant 62-year-old male who is here for further evaluation of leukocytosis, eosinophilia and neutrophilia    Patient appears to have myeloproliferative features with MPL + disease,  HES, ELIZABETH-NOS- and PDGFRA-associated disease account for a significant proportion of the cases of HES with myeloproliferative features, a causative genetic abnormality cannot be demonstrated in all cases. In a [place] series of 35 patients with HES, 9 patients were described as having clinical or hematological features of myeloproliferative syndrome (including palpable splenomegaly, neutrophilia, circulating myelocytes and/or erythroblasts, hyperplastic BM, and myelofibrosis).11  Three of the 9 patients had no  detectable FIP1L1/PDGFRA (F/P) fusion gene or cytogenetic abnormalities, and 1 responded to imatinib therapy. Several additional case series have described imatinib-responsive, F/P-negative patients with HES,12-15  although the presence of myeloproliferative features in these patients has not been systematically addressed.  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        Sincerely,        Vasyl Case MD

## 2024-05-09 ENCOUNTER — OFFICE VISIT (OUTPATIENT)
Dept: URGENT CARE | Facility: URGENT CARE | Age: 64
End: 2024-05-09
Payer: COMMERCIAL

## 2024-05-09 VITALS
DIASTOLIC BLOOD PRESSURE: 65 MMHG | HEART RATE: 72 BPM | SYSTOLIC BLOOD PRESSURE: 114 MMHG | OXYGEN SATURATION: 97 % | TEMPERATURE: 97.8 F

## 2024-05-09 DIAGNOSIS — D47.1 MYELOPROLIFERATIVE DISORDER (H): ICD-10-CM

## 2024-05-09 DIAGNOSIS — J22 LOWER RESPIRATORY INFECTION: Primary | ICD-10-CM

## 2024-05-09 PROCEDURE — 99214 OFFICE O/P EST MOD 30 MIN: CPT | Performed by: FAMILY MEDICINE

## 2024-05-09 RX ORDER — AZITHROMYCIN 250 MG/1
TABLET, FILM COATED ORAL
Qty: 6 TABLET | Refills: 0 | Status: SHIPPED | OUTPATIENT
Start: 2024-05-09 | End: 2024-05-14

## 2024-05-09 NOTE — PROGRESS NOTES
ASSESSMENT/ PLAN:    Myeloproliferative disorder (H)    He has recently started chemotherapy for myeloproliferative disorder-  last visit 1 week ago-  has scheduled follow-up in 4 months    Discussed his increased risk of bacterial superinfection due to his weakened immune system    Lower respiratory infection     - azithromycin (ZITHROMAX) 250 MG tablet; Take 2 tablets (500 mg) by mouth daily for 1 day, THEN 1 tablet (250 mg) daily for 4 days.    He requested azithromycin-  says he does not tolerate doxycycline       We discussed that based on the patient's description of symptoms, history and physical exam, that a bacterial infection has likely developed in the chest requiring treatment with antibiotics.  Do not have symptoms of lobar pneumonia     We discussed that the chest infection often starts as a viral illness, however, over time, the secretions in the chest can start to grow bacteria, with increased chest discomfort , productive sputum.  Antibiotics are only helpful when bacteria has started to grow in the respiratory secretions.   Any residual symptoms from a viral illness will not respond to the antibiotic.    The patient is advised to monitor the symptoms of the illness, and if worsening,  worse chest pain, increased productive sputum, persistent fevers/ chills, shortness of breath, then seek re-evaluation with primary care, UC or ER     Albuterol inhaler -patient declined       Symptomatic measures encouraged, humidified air, plenty of fluids.  Patient may consider OTC expectorant and/or cough suppressant to treat symptoms.   acetaminophen, ibuprofen for pain and fever    Return if worsening  -------------------------------------------------------------------------------------------------------------------------------    SUBJECTIVE:  Chief Complaint   Patient presents with    Urgent Care     Congestion cough, clear mucus for 2 weeks now. Negative covid test 2 days ago. Lethargic.      Alvarez NUÑEZ  Maria Alejandra is a 63 year old male who presents to the clinic today with a chief complaint of cough  for 2 week(s).  Patient denies shortness of breath., central chest pain., pleuritic chest pain, and wheezing.  His cough is described as persistent and productive mucoid.    The patient's symptoms are moderate and worsening.  Associated symptoms include nasal congestion-  resolved. The patient's symptoms are exacerbated by lying down  Patient has been using OTC cough suppressants  to improve symptoms.    He was diagnosed with myeloproliferative disorder recently and has started chemotherapy-  last visit with heme/onc was 1 week ago    Negative covid at home    Past Medical History:   Diagnosis Date    Cardiomyopathy (H)     Congestive heart failure (H)      Problem list name updated by automated process. Provider to review    Congestive heart failure, unspecified     Eosinophilia     Gastro-oesophageal reflux disease     Hyperlipidemia 07/19/2019    Hypertension     Penile discharge     Vitamin D deficiency 07/09/2010     Patient Active Problem List   Diagnosis    Gastroenteritis    Cardiomyopathy (H)    Congestive heart failure (H)    Hyperlipidemia    Hypertension    IBS (irritable bowel syndrome)    Impaired fasting glucose    Plantar wart of right foot    Vitamin D deficiency    Lymphadenopathy       ALLERGIES:  Morphine hcl    Current Outpatient Medications   Medication Sig Dispense Refill    augmented betamethasone dipropionate (DIPROLENE AF) 0.05 % external cream Apply topically 2 times daily 100 g 3    azithromycin (ZITHROMAX) 250 MG tablet Take 2 tablets (500 mg) by mouth daily for 1 day, THEN 1 tablet (250 mg) daily for 4 days. 6 tablet 0    carvedilol (COREG) 25 MG tablet Take 1 tablet (25 mg) by mouth 2 times daily (with meals) 180 tablet 3    hydroxyurea (HYDREA) 500 MG capsule Take 1 capsule (500 mg) by mouth 2 times daily 180 capsule 4    omeprazole (PRILOSEC OTC) 20 MG EC tablet Take 20 mg by mouth daily       acetaminophen (TYLENOL) 325 MG tablet Take 2 tablets (650 mg) by mouth every 4 hours as needed for other (mild pain) 100 tablet 0    ondansetron (ZOFRAN ODT) 4 MG ODT tab Take 1 tablet (4 mg) by mouth every 8 hours as needed for nausea 8 tablet 0    oxyCODONE (ROXICODONE) 5 MG tablet Take 1-2 tablets (5-10 mg) by mouth every 4 hours as needed for moderate to severe pain 8 tablet 0    Probiotic Product (PROBIOTIC DAILY PO)        No current facility-administered medications for this visit.       Social History     Tobacco Use    Smoking status: Some Days     Current packs/day: 0.00     Types: Cigarettes     Start date: 12/10/1995     Last attempt to quit: 12/10/2005     Years since quittin.4    Smokeless tobacco: Never    Tobacco comments:     About 5 cigarettes per week   Substance Use Topics    Alcohol use: Yes     Comment: 2 beer day       Family History   Problem Relation Age of Onset    Heart Disease Mother     Heart Disease Brother     Myocardial Infarction Brother          ROS  CONSTITUTIONAL:NEGATIVE for fever, chill   INTEGUMENTARY/SKIN: NEGATIVE for worrisome rashes or lesions  EYES: NEGATIVE for vision changes or irritation  ENT/MOUTH: NEGATIVE for ear, mouth and throat problems    OBJECTIVE:  /65 (BP Location: Right arm, Patient Position: Sitting, Cuff Size: Adult Large)   Pulse 72   Temp 97.8  F (36.6  C) (Tympanic)   SpO2 97%   GENERAL APPEARANCE: alert, mild distress, and cooperative, occasional  congested cough  EYES: EOMI,  PERRL, conjunctiva clear  HENT: ear canals and TM's normal.  Nose and mouth without ulcers, erythema or lesions  NECK: supple, nontender, no lymphadenopathy  RESP: rhonchi little bilateral- left greater than the right  CV: regular rates and rhythm, normal S1 S2, no murmur noted  NEURO: Normal strength and tone, sensory exam grossly normal,  normal speech and mentation  SKIN: no suspicious lesions or rashes

## 2024-05-31 ENCOUNTER — LAB (OUTPATIENT)
Dept: LAB | Facility: CLINIC | Age: 64
End: 2024-05-31
Payer: COMMERCIAL

## 2024-05-31 DIAGNOSIS — D72.118 OTHER HYPEREOSINOPHILIC SYNDROME: ICD-10-CM

## 2024-05-31 LAB
BASOPHILS # BLD AUTO: ABNORMAL 10*3/UL
BASOPHILS # BLD MANUAL: 0.4 10E3/UL (ref 0–0.2)
BASOPHILS NFR BLD AUTO: ABNORMAL %
BASOPHILS NFR BLD MANUAL: 3 %
EOSINOPHIL # BLD AUTO: ABNORMAL 10*3/UL
EOSINOPHIL # BLD MANUAL: 6.8 10E3/UL (ref 0–0.7)
EOSINOPHIL NFR BLD AUTO: ABNORMAL %
EOSINOPHIL NFR BLD MANUAL: 46 %
ERYTHROCYTE [DISTWIDTH] IN BLOOD BY AUTOMATED COUNT: 15.3 % (ref 10–15)
HCT VFR BLD AUTO: 35.5 % (ref 40–53)
HGB BLD-MCNC: 12.9 G/DL (ref 13.3–17.7)
IMM GRANULOCYTES # BLD: ABNORMAL 10*3/UL
IMM GRANULOCYTES NFR BLD: ABNORMAL %
LYMPHOCYTES # BLD AUTO: ABNORMAL 10*3/UL
LYMPHOCYTES # BLD MANUAL: 2.1 10E3/UL (ref 0.8–5.3)
LYMPHOCYTES NFR BLD AUTO: ABNORMAL %
LYMPHOCYTES NFR BLD MANUAL: 14 %
MCH RBC QN AUTO: 43.9 PG (ref 26.5–33)
MCHC RBC AUTO-ENTMCNC: 36.3 G/DL (ref 31.5–36.5)
MCV RBC AUTO: 121 FL (ref 78–100)
MONOCYTES # BLD AUTO: ABNORMAL 10*3/UL
MONOCYTES # BLD MANUAL: 1.2 10E3/UL (ref 0–1.3)
MONOCYTES NFR BLD AUTO: ABNORMAL %
MONOCYTES NFR BLD MANUAL: 8 %
NEUTROPHILS # BLD AUTO: ABNORMAL 10*3/UL
NEUTROPHILS # BLD MANUAL: 4.3 10E3/UL (ref 1.6–8.3)
NEUTROPHILS NFR BLD AUTO: ABNORMAL %
NEUTROPHILS NFR BLD MANUAL: 29 %
NRBC # BLD AUTO: 0 10E3/UL
NRBC BLD AUTO-RTO: 0 /100
PLAT MORPH BLD: ABNORMAL
PLATELET # BLD AUTO: 149 10E3/UL (ref 150–450)
RBC # BLD AUTO: 2.94 10E6/UL (ref 4.4–5.9)
RBC MORPH BLD: ABNORMAL
WBC # BLD AUTO: 14.8 10E3/UL (ref 4–11)

## 2024-05-31 PROCEDURE — 85007 BL SMEAR W/DIFF WBC COUNT: CPT

## 2024-05-31 PROCEDURE — 36415 COLL VENOUS BLD VENIPUNCTURE: CPT

## 2024-05-31 PROCEDURE — 85027 COMPLETE CBC AUTOMATED: CPT

## 2024-07-01 SDOH — HEALTH STABILITY: PHYSICAL HEALTH: ON AVERAGE, HOW MANY DAYS PER WEEK DO YOU ENGAGE IN MODERATE TO STRENUOUS EXERCISE (LIKE A BRISK WALK)?: 3 DAYS

## 2024-07-01 SDOH — HEALTH STABILITY: PHYSICAL HEALTH: ON AVERAGE, HOW MANY MINUTES DO YOU ENGAGE IN EXERCISE AT THIS LEVEL?: 20 MIN

## 2024-07-01 ASSESSMENT — SOCIAL DETERMINANTS OF HEALTH (SDOH): HOW OFTEN DO YOU GET TOGETHER WITH FRIENDS OR RELATIVES?: ONCE A WEEK

## 2024-07-05 ENCOUNTER — OFFICE VISIT (OUTPATIENT)
Dept: INTERNAL MEDICINE | Facility: CLINIC | Age: 64
End: 2024-07-05
Payer: COMMERCIAL

## 2024-07-05 VITALS
OXYGEN SATURATION: 98 % | HEIGHT: 72 IN | RESPIRATION RATE: 18 BRPM | BODY MASS INDEX: 22.35 KG/M2 | HEART RATE: 68 BPM | DIASTOLIC BLOOD PRESSURE: 65 MMHG | SYSTOLIC BLOOD PRESSURE: 112 MMHG | WEIGHT: 165 LBS | TEMPERATURE: 97.6 F

## 2024-07-05 DIAGNOSIS — I50.9 CONGESTIVE HEART FAILURE, UNSPECIFIED HF CHRONICITY, UNSPECIFIED HEART FAILURE TYPE (H): ICD-10-CM

## 2024-07-05 DIAGNOSIS — Z71.6 ENCOUNTER FOR SMOKING CESSATION COUNSELING: ICD-10-CM

## 2024-07-05 DIAGNOSIS — Z00.00 ANNUAL PHYSICAL EXAM: Primary | ICD-10-CM

## 2024-07-05 DIAGNOSIS — R20.0 NUMBNESS IN BOTH HANDS: ICD-10-CM

## 2024-07-05 DIAGNOSIS — Z12.5 PROSTATE CANCER SCREENING: ICD-10-CM

## 2024-07-05 DIAGNOSIS — K80.50 CALCULUS OF BILE DUCT WITHOUT CHOLECYSTITIS AND WITHOUT OBSTRUCTION: ICD-10-CM

## 2024-07-05 DIAGNOSIS — E78.2 MIXED HYPERLIPIDEMIA: ICD-10-CM

## 2024-07-05 DIAGNOSIS — Z12.11 COLON CANCER SCREENING: ICD-10-CM

## 2024-07-05 LAB
PSA SERPL DL<=0.01 NG/ML-MCNC: 2.89 NG/ML (ref 0–4.5)
TSH SERPL DL<=0.005 MIU/L-ACNC: 1.71 UIU/ML (ref 0.3–4.2)

## 2024-07-05 PROCEDURE — 84443 ASSAY THYROID STIM HORMONE: CPT | Performed by: INTERNAL MEDICINE

## 2024-07-05 PROCEDURE — 99213 OFFICE O/P EST LOW 20 MIN: CPT | Mod: 25 | Performed by: INTERNAL MEDICINE

## 2024-07-05 PROCEDURE — G0103 PSA SCREENING: HCPCS | Performed by: INTERNAL MEDICINE

## 2024-07-05 PROCEDURE — 36415 COLL VENOUS BLD VENIPUNCTURE: CPT | Performed by: INTERNAL MEDICINE

## 2024-07-05 PROCEDURE — 99396 PREV VISIT EST AGE 40-64: CPT | Performed by: INTERNAL MEDICINE

## 2024-07-05 RX ORDER — NICOTINE 21 MG/24HR
1 PATCH, TRANSDERMAL 24 HOURS TRANSDERMAL EVERY 24 HOURS
Qty: 14 PATCH | Refills: 0 | Status: SHIPPED | OUTPATIENT
Start: 2024-07-05

## 2024-07-05 RX ORDER — HYDROXYUREA 500 MG/1
500 CAPSULE ORAL 2 TIMES DAILY
Qty: 180 CAPSULE | Refills: 4 | Status: CANCELLED | OUTPATIENT
Start: 2024-07-05

## 2024-07-05 NOTE — PROGRESS NOTES
Preventive Care Visit  [facility]  Jc Belcher MD, Internal Medicine  Jul 5, 2024      Assessment & Plan     Annual physical exam  At this time, patient does have a relatively unremarkable physical examination.  His blood pressure is noted to be at an acceptable level.  Fasting lab work is up-to-date.  All health maintenance items were addressed.  We did discuss updating his tetanus immunization, but patient did want to discuss this immunization with his oncologist given that he is taking hydroxyurea first.    Mixed hyperlipidemia  Patient's recent cholesterol panel showed that his cholesterol is under good control.  We did discuss dietary modifications for continued control of his cholesterol.  Patient had no further questions or concerns in this regard.    Prostate cancer screening  - PSA, screen    Colon cancer screening  - Colonoscopy Screening  Referral; Future    Numbness in both hands  At this time, patient is reporting numbness and tingling in both his upper extremities.  We did spend some time discussing potential etiologies of his symptoms including medication side effects, neuropathy, electrolyte abnormalities, thyroid disease, and glucose abnormalities.  He has recent lab test that showed no concerning electrolyte or glucose issue.  He does have a TSH that is currently pending today.  Patient was interested in seeing a neurologist if his thyroid studies were unremarkable.  - TSH with free T4 reflex; Future    Congestive heart failure, unspecified HF chronicity, unspecified heart failure type (H)  Chronic condition.  Followed by cardiology.  Will continue carvedilol as currently prescribed.    Encounter for smoking cessation counseling  At this time, patient did express an interest in nicotine patches to assist with smoking cessation.  Patient will be placed on 21 mg transdermal patches for 2 weeks before having the dose titrated down to 14 mg for 2 additional  weeks.  After completion of the 14 mg transdermal patches, he will proceed with 2 final weeks of 7 mg patches.  Side effects of nicotine replacement were reviewed.  Patient in no further questions or concerns in this regard.  - nicotine (NICODERM CQ) 14 MG/24HR 24 hr patch; Place 1 patch onto the skin every 24 hours Start after completion of the 21 mg patches.  - nicotine (NICODERM CQ) 21 MG/24HR 24 hr patch; Place 1 patch onto the skin every 24 hours Start with these patches first.  - nicotine (NICODERM CQ) 7 MG/24HR 24 hr patch; Place 1 patch onto the skin every 24 hours Start after completing the full course of 14 mg patches.    Patient has been advised of split billing requirements and indicates understanding: Yes        Counseling  Appropriate preventive services were discussed with this patient, including applicable screening as appropriate for fall prevention, nutrition, physical activity, Tobacco-use cessation, weight loss and cognition.  Checklist reviewing preventive services available has been given to the patient.  Reviewed patient's diet, addressing concerns and/or questions.   He is at risk for lack of exercise and has been provided with information to increase physical activity for the benefit of his well-being.   The patient was instructed to see the dentist every 6 months.       See Patient Instructions    Mago Alva is a 63 year old, presenting for the following:  Physical        7/5/2024     7:56 AM   Additional Questions   Roomed by CYNTHIA Jeffries        Health Care Directive  Patient has a Health Care Directive on file  Advance care planning document is on file and is current.    Patient is a 63-year-old  male who presents to the clinic for his annual physical.  He does have a complex past medical history that includes myeloproliferative disorder, and he is currently taking hydroxyurea for treatment.  Patient's main concern today is in regards to a numbness in his hands.  Patient  reports that this symptom has been present for over 1 year.  He does feel that it began prior to being placed on hydroxyurea for management of his MPD.  Patient states that at times he feels as if he struggles to perform fine motor movements.  He does report a family history of neuropathy.  Patient has had several metabolic panels collected over the past year, and there have been no concerns about his electrolytes or blood sugar levels.  He does report a stable appetite.  Patient is stooling and voiding without issue.  Patient did have a lipid panel collected in April 2024 that showed a total cholesterol of 143 with an LDL of 76.  He has had issues with elevation his cholesterol in the past, but he does not take any medications for that issue.  Patient is also interested in smoking cessation.  He reports that he has smoked for many years, and he is currently smoking is much as half a pack per day.  Patient would like to try nicotine patches to see if that would help with his smoking.          Hypertension Follow-up    Do you check your blood pressure regularly outside of the clinic? No   Are you following a low salt diet? Yes  Are your blood pressures ever more than 140 on the top number (systolic) OR more   than 90 on the bottom number (diastolic), for example 140/90? No        7/1/2024   General Health   How would you rate your overall physical health? (!) FAIR   Feel stress (tense, anxious, or unable to sleep) Not at all            7/1/2024   Nutrition   Three or more servings of calcium each day? Yes   Diet: Low salt    Low fat/cholesterol   How many servings of fruit and vegetables per day? (!) 0-1   How many sweetened beverages each day? 0-1       Multiple values from one day are sorted in reverse-chronological order         7/1/2024   Exercise   Days per week of moderate/strenous exercise 3 days   Average minutes spent exercising at this level 20 min            7/1/2024   Social Factors   Frequency of  gathering with friends or relatives Once a week   Worry food won't last until get money to buy more No   Food not last or not have enough money for food? No   Do you have housing? (Housing is defined as stable permanent housing and does not include staying ouside in a car, in a tent, in an abandoned building, in an overnight shelter, or couch-surfing.) No   Are you worried about losing your housing? No   Lack of transportation? No   Unable to get utilities (heat,electricity)? No   Want help with housing or utility concern? No      (!) HOUSING CONCERN PRESENT      2024   Fall Risk   Fallen 2 or more times in the past year? No   Trouble with walking or balance? No             2024   Dental   Dentist two times every year? (!) NO            2024   TB Screening   Were you born outside of the [place]? No          Today's PHQ-2 Score:       2024     8:15 AM   PHQ-2 (  [employer])   Q1: Little interest or pleasure in doing things 0   Q2: Feeling down, depressed or hopeless 0   PHQ-2 Score 0         2024   Substance Use   Alcohol more than 3/day or more than 7/wk No   Do you use any other substances recreationally? No        Social History     Tobacco Use    Smoking status: Some Days     Current packs/day: 0.00     Types: Cigarettes     Start date: 12/10/1995     Last attempt to quit: 12/10/2005     Years since quittin.5    Smokeless tobacco: Never    Tobacco comments:     About 5 cigarettes per week   Vaping Use    Vaping status: Never Used   Substance Use Topics    Alcohol use: Yes     Comment: 2 beer day    Drug use: No           2024   STI Screening   New sexual partner(s) since last STI/HIV test? No      Last PSA:   PSA   Date Value Ref Range Status   2019 0.73 0 - 4 ug/L Final     Comment:     Assay Method:  Chemiluminescence using Siemens Vista analyzer     Prostate Specific Antigen Screen   Date Value Ref Range Status   05/10/2023 3.73 0.00 - 4.50 ng/mL Final     ASCVD Risk    The 10-year ASCVD risk score (Payam CADET, et al., 2019) is: 11%    Values used to calculate the score:      Age: 63 years      Sex: Male      Is Non- : No      Diabetic: No      Tobacco smoker: Yes      Systolic Blood Pressure: 112 mmHg      Is BP treated: Yes      HDL Cholesterol: 44 mg/dL      Total Cholesterol: 143 mg/dL      Reviewed and updated as needed this visit by Provider                    Lab work is in process  Labs reviewed in EPIC      Review of Systems  CONSTITUTIONAL: NEGATIVE for fever, chills, change in weight  INTEGUMENTARY/SKIN: NEGATIVE for worrisome rashes, moles or lesions  ENT/MOUTH: NEGATIVE for ear, mouth and throat problems  RESP: NEGATIVE for significant cough or SOB  CV: NEGATIVE for chest pain, palpitations or peripheral edema  GI: NEGATIVE for nausea, abdominal pain, heartburn, or change in bowel habits  : NEGATIVE for frequency, dysuria, or hematuria  MUSCULOSKELETAL: NEGATIVE for significant arthralgias or myalgia  NEURO: Positive for numbness and tingling in hands as noted in HPI.     Objective    Exam  /65   Pulse 68   Temp 97.6  F (36.4  C)   Resp 18   Ht 1.829 m (6')   Wt 74.8 kg (165 lb)   SpO2 98%   BMI 22.38 kg/m     Estimated body mass index is 22.38 kg/m  as calculated from the following:    Height as of this encounter: 1.829 m (6').    Weight as of this encounter: 74.8 kg (165 lb).    Physical Exam  Vitals reviewed.   Constitutional:       Appearance: Normal appearance.   HENT:      Head: Normocephalic and atraumatic.      Right Ear: Tympanic membrane, ear canal and external ear normal.      Left Ear: Tympanic membrane, ear canal and external ear normal.      Mouth/Throat:      Mouth: Mucous membranes are moist.      Pharynx: Oropharynx is clear.   Eyes:      Extraocular Movements: Extraocular movements intact.      Conjunctiva/sclera: Conjunctivae normal.      Pupils: Pupils are equal, round, and reactive to light.    Cardiovascular:      Rate and Rhythm: Normal rate and regular rhythm.      Pulses: Normal pulses.      Heart sounds: Normal heart sounds.   Pulmonary:      Effort: Pulmonary effort is normal.      Breath sounds: Normal breath sounds.   Abdominal:      General: Bowel sounds are normal.      Palpations: Abdomen is soft.   Musculoskeletal:         General: Normal range of motion.      Cervical back: Normal range of motion and neck supple.   Skin:     General: Skin is warm and dry.      Capillary Refill: Capillary refill takes less than 2 seconds.   Neurological:      General: No focal deficit present.      Mental Status: He is alert and oriented to person, place, and time.     Diagnostic testing: PSA and TSH are pending.      Signed Electronically by: Jc Belcher MD

## 2024-07-06 ENCOUNTER — MYC MEDICAL ADVICE (OUTPATIENT)
Dept: INTERNAL MEDICINE | Facility: CLINIC | Age: 64
End: 2024-07-06
Payer: COMMERCIAL

## 2024-07-06 DIAGNOSIS — R20.0 NUMBNESS IN BOTH HANDS: Primary | ICD-10-CM

## 2024-07-08 DIAGNOSIS — K80.50 CALCULUS OF BILE DUCT WITHOUT CHOLECYSTITIS AND WITHOUT OBSTRUCTION: ICD-10-CM

## 2024-07-08 RX ORDER — HYDROXYUREA 500 MG/1
500 CAPSULE ORAL 2 TIMES DAILY
Qty: 180 CAPSULE | Refills: 4 | Status: SHIPPED | OUTPATIENT
Start: 2024-07-08

## 2024-07-08 NOTE — TELEPHONE ENCOUNTER
Per 7/5/24 result note:    We may want to consider referral to neurology for further evaluation of your hand symptoms especially given your family history of neuropathy.  If you would like referral, please let me know.

## 2024-07-10 NOTE — TELEPHONE ENCOUNTER
Last ov -07/05/2024 where colonoscopy referral was placed.     Sent [employer] message to patient.    Thank you,  Brad Ortega, Triage RN [facility]  12:10 PM 7/10/2024

## 2024-08-02 ENCOUNTER — LAB (OUTPATIENT)
Dept: LAB | Facility: CLINIC | Age: 64
End: 2024-08-02
Payer: COMMERCIAL

## 2024-08-02 DIAGNOSIS — D72.118 OTHER HYPEREOSINOPHILIC SYNDROME: ICD-10-CM

## 2024-08-02 LAB
BASOPHILS # BLD AUTO: 0.2 10E3/UL (ref 0–0.2)
BASOPHILS # BLD MANUAL: 0.2 10E3/UL (ref 0–0.2)
BASOPHILS NFR BLD AUTO: 1 %
BASOPHILS NFR BLD MANUAL: 1 %
EOSINOPHIL # BLD AUTO: 9.4 10E3/UL (ref 0–0.7)
EOSINOPHIL # BLD MANUAL: 9.2 10E3/UL (ref 0–0.7)
EOSINOPHIL NFR BLD AUTO: 54 %
EOSINOPHIL NFR BLD MANUAL: 53 %
ERYTHROCYTE [DISTWIDTH] IN BLOOD BY AUTOMATED COUNT: 12.3 % (ref 10–15)
HCT VFR BLD AUTO: 33.8 % (ref 40–53)
HGB BLD-MCNC: 12.1 G/DL (ref 13.3–17.7)
IMM GRANULOCYTES # BLD: 0.1 10E3/UL
IMM GRANULOCYTES NFR BLD: 0 %
LYMPHOCYTES # BLD AUTO: 2 10E3/UL (ref 0.8–5.3)
LYMPHOCYTES # BLD MANUAL: 1.7 10E3/UL (ref 0.8–5.3)
LYMPHOCYTES NFR BLD AUTO: 12 %
LYMPHOCYTES NFR BLD MANUAL: 10 %
MCH RBC QN AUTO: 46.2 PG (ref 26.5–33)
MCHC RBC AUTO-ENTMCNC: 35.8 G/DL (ref 31.5–36.5)
MCV RBC AUTO: 129 FL (ref 78–100)
MONOCYTES # BLD AUTO: 1.2 10E3/UL (ref 0–1.3)
MONOCYTES # BLD MANUAL: 1 10E3/UL (ref 0–1.3)
MONOCYTES NFR BLD AUTO: 7 %
MONOCYTES NFR BLD MANUAL: 6 %
NEUTROPHILS # BLD AUTO: 4.4 10E3/UL (ref 1.6–8.3)
NEUTROPHILS # BLD MANUAL: 5.2 10E3/UL (ref 1.6–8.3)
NEUTROPHILS NFR BLD AUTO: 26 %
NEUTROPHILS NFR BLD MANUAL: 30 %
NRBC # BLD AUTO: 0 10E3/UL
NRBC BLD AUTO-RTO: 0 /100
PLAT MORPH BLD: ABNORMAL
PLATELET # BLD AUTO: 152 10E3/UL (ref 150–450)
RBC # BLD AUTO: 2.62 10E6/UL (ref 4.4–5.9)
RBC MORPH BLD: ABNORMAL
WBC # BLD AUTO: 17.3 10E3/UL (ref 4–11)

## 2024-08-02 PROCEDURE — 85007 BL SMEAR W/DIFF WBC COUNT: CPT

## 2024-08-02 PROCEDURE — 36415 COLL VENOUS BLD VENIPUNCTURE: CPT

## 2024-08-02 PROCEDURE — 85025 COMPLETE CBC W/AUTO DIFF WBC: CPT

## 2024-08-06 ENCOUNTER — ONCOLOGY VISIT (OUTPATIENT)
Dept: ONCOLOGY | Facility: CLINIC | Age: 64
End: 2024-08-06
Attending: INTERNAL MEDICINE
Payer: COMMERCIAL

## 2024-08-06 VITALS
SYSTOLIC BLOOD PRESSURE: 125 MMHG | BODY MASS INDEX: 22.51 KG/M2 | OXYGEN SATURATION: 99 % | RESPIRATION RATE: 16 BRPM | HEART RATE: 69 BPM | DIASTOLIC BLOOD PRESSURE: 69 MMHG | WEIGHT: 166 LBS

## 2024-08-06 DIAGNOSIS — D72.118 OTHER HYPEREOSINOPHILIC SYNDROME: ICD-10-CM

## 2024-08-06 DIAGNOSIS — D72.10 EOSINOPHILIA, UNSPECIFIED TYPE: Primary | ICD-10-CM

## 2024-08-06 PROCEDURE — 99214 OFFICE O/P EST MOD 30 MIN: CPT | Performed by: INTERNAL MEDICINE

## 2024-08-06 PROCEDURE — 99213 OFFICE O/P EST LOW 20 MIN: CPT | Performed by: INTERNAL MEDICINE

## 2024-08-06 ASSESSMENT — PAIN SCALES - GENERAL: PAINLEVEL: NO PAIN (0)

## 2024-08-06 NOTE — PROGRESS NOTES
[facility]     Hematology/Oncology Return patient note     Today's Date: August 6, 2024    Reason for Consult: eosinophilia      HISTORY OF PRESENT ILLNESS: Alvarez is a very pleasant 62-year-old male who is here for further evaluation of eosinophilia and elevated white count.  Patient states that he has had 14 pound unintentional weight loss over the last year .  He denies any night sweats or weight loss.  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  He is following up with dermatology.  Patient had an elevated white count since 2014 December   his white count was at 12.9.   Most recently his white count Is a 25.6 absolute neutrophils at 10.2 absolute eosinophils at 10.2 CRP level at 11.371-month ago it was at 16.5 sedimentation rate at 7.  His hemoglobin is normal at 14.7 platelet count of 2.93    Interval history: Artie returns for follow-up today.  He states he has not been feeling well and he has a low blood pressure issue. He is on lisinopril and carvedilol and is being managed by Dr Ordonez.  He also continues to have a rash that is not improving with steroids he has not seen dermatology recently. I still dont have the dermatology report from previous       Interval history: Artie returns for follow-up today.  Multiple investigations were completed  And the results are following      1.  PET/CT shows bilateral axillary and inguinal hypermetabolic adenopathy concerning for neoplastic process including hypereosinophilic syndromes and lymphoma.  Recommend tissue sampling.  SUV in those regions at 3.3-3.6.  Additional of hypermetabolic foci within the right gluteus and left vastus lateralis intermedius muscles SUV of 8.4 may be related to the neoplastic process.  Images were reviewed with the patient.  Patient denies any night sweats or weight loss his weight has actually been stable.  His the rash persists    2 bone marrow biopsy Completed on  8/15/2023 showed marked hypercellular bone marrow with marked eosinophilia maturing trilineage hematopoiesis no increased dysplasia or increase in blasts.  Peripheral blood showing moderate leukocytosis with eosinophilia.  Adequate neutrophils with slight shift to immaturity.  Flow cytometry did not show any increase in myeloid blasts and no abnormal population of cells.  Morphologic findings just showed peripheral eosinophilia.  JAK2 mutation negative.  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  This was a summary for the bone marrow biopsy.    FISH cytogenetics and molecular studies were negative for PD GFR alpha, beta, FGFR 1, JAK2 genes and BCR-ABL mutation ruling out certain leukemias and CML. No  mass found on MRI that they could biopsy     S/p cholecystectomy on 3/20/2024. Restarted hydrea 4/4/2024 500 mg bid    Artie returns for follow up.  He is doing great . Gained weight rash gone, still smoking.   REVIEW OF SYSTEMS:   14 point ROS was reviewed and is negative other than as noted above in HPI.       HOME MEDICATIONS:  Current Outpatient Medications   Medication Sig Dispense Refill    acetaminophen (TYLENOL) 325 MG tablet Take 2 tablets (650 mg) by mouth every 4 hours as needed for other (mild pain) 100 tablet 0    augmented betamethasone dipropionate (DIPROLENE AF) 0.05 % external cream Apply topically 2 times daily 100 g 3    carvedilol (COREG) 25 MG tablet Take 1 tablet (25 mg) by mouth 2 times daily (with meals) 180 tablet 3    hydroxyurea (HYDREA) 500 MG capsule Take 1 capsule (500 mg) by mouth 2 times daily 180 capsule 4    nicotine (NICODERM CQ) 14 MG/24HR 24 hr patch Place 1 patch onto the skin every 24 hours Start after completion of the 21 mg patches. 14 patch 0    nicotine (NICODERM CQ) 21 MG/24HR 24 hr patch Place 1 patch onto the skin every 24 hours  Start with these patches first. 14 patch 0    nicotine (NICODERM CQ) 7 MG/24HR 24 hr patch Place 1 patch onto the skin every 24 hours Start after completing the full course of 14 mg patches. 14 patch 0    omeprazole (PRILOSEC OTC) 20 MG EC tablet Take 20 mg by mouth daily      Probiotic Product (PROBIOTIC DAILY PO)            ALLERGIES:  Allergies   Allergen Reactions    Morphine Hcl      Extreme agitation         PAST MEDICAL HISTORY:  Past Medical History:   Diagnosis Date    Cardiomyopathy (H)     Congestive heart failure (H)      Problem list name updated by automated process. Provider to review    Congestive heart failure, unspecified     Eosinophilia     Gastro-oesophageal reflux disease     Hyperlipidemia 07/19/2019    Hypertension     Penile discharge     Vitamin D deficiency 07/09/2010         PAST SURGICAL HISTORY:  Past Surgical History:   Procedure Laterality Date    ANGIOGRAM  1/6/05    left dominant coronary system with essentially normal coronary arteries, trivial plaque, severe dilated cardiomyopathy, left ventricle hypertrophy and severely hypokinetic, asynchrony or desynchrony of LV function    BONE MARROW BIOPSY, BONE SPECIMEN, NEEDLE/TROCAR N/A 8/15/2023    Procedure: BIOPSY, BONE MARROW;  Surgeon: Susy Baird;  Location: [facility] OR    [facility] LAPAROSCOPIC CHOLECYSTECTOMY WITHOUT GRAMS N/A 3/20/2024    Procedure: CHOLECYSTECTOMY, ROBOT-ASSISTED;  Surgeon: Anabel Crandall MD;  Location: [facility] OR    ENT SURGERY      polyps from throat    EXCISE LESION FOOT  12/16/2013    Procedure: EXCISE LESION FOOT;  Scar revision of Right 3rd toe      SOFT TISSUE SURGERY      neuroma from right foot         SOCIAL HISTORY:  Social History     Socioeconomic History    Marital status: Single     Spouse name: Not on file    Number of children: Not on file    Years of education: Not on file    Highest education level: Not on file   Occupational History    Not on file   Tobacco Use    Smoking status: Some Days      Current packs/day: 0.00     Types: Cigarettes     Start date: 12/10/1995     Last attempt to quit: 12/10/2005     Years since quittin.6    Smokeless tobacco: Never    Tobacco comments:     About 5 cigarettes per week   Vaping Use    Vaping status: Never Used   Substance and Sexual Activity    Alcohol use: Yes     Comment: 2 beer day    Drug use: No    Sexual activity: Not Currently     Partners: Male   Other Topics Concern    Parent/sibling w/ CABG, MI or angioplasty before 65F 55M? No   Social History Narrative    Not on file     Social Determinants of Health     Financial Resource Strain: Low Risk  (2024)    Financial Resource Strain     Within the past 12 months, have you or your family members you live with been unable to get utilities (heat, electricity) when it was really needed?: No   Food Insecurity: Low Risk  (2024)    Food Insecurity     Within the past 12 months, did you worry that your food would run out before you got money to buy more?: No     Within the past 12 months, did the food you bought just not last and you didn t have money to get more?: No   Transportation Needs: Low Risk  (2024)    Transportation Needs     Within the past 12 months, has lack of transportation kept you from medical appointments, getting your medicines, non-medical meetings or appointments, work, or from getting things that you need?: No   Physical Activity: Insufficiently Active (2024)    Exercise Vital Sign     Days of Exercise per Week: 3 days     Minutes of Exercise per Session: 20 min   Stress: No Stress Concern Present (2024)    [place] [employer] - Occupational Stress Questionnaire     Feeling of Stress : Not at all   Social Connections: Unknown (2024)    Social Connection and Isolation Panel [NHANES]     Frequency of Communication with Friends and Family: Not on file     Frequency of Social Gatherings with Friends and Family: Once a week     Attends Confucianist  Services: Not on file     Active Member of Clubs or Organizations: Not on file     Attends Club or Organization Meetings: Not on file     Marital Status: Not on file   Interpersonal Safety: Low Risk  (2024)    Interpersonal Safety     Do you feel physically and emotionally safe where you currently live?: Yes     Within the past 12 months, have you been hit, slapped, kicked or otherwise physically hurt by someone?: No     Within the past 12 months, have you been humiliated or emotionally abused in other ways by your partner or ex-partner?: No   Housing Stability: High Risk (2024)    Housing Stability     Do you have housing? : No     Are you worried about losing your housing?: No   Smokes on and off.  Works at a [employer] shop drinks 2 beers a day      FAMILY HISTORY:  Family History   Problem Relation Age of Onset    Heart Disease Mother     Heart Disease Brother     Myocardial Infarction Brother    Mother had ovarian cancer.  Brother prostate cancer.  Father had melanoma      PHYSICAL EXAM:  Vital signs:  /69   Pulse 69   Resp 16   Wt 75.3 kg (166 lb)   SpO2 99%   BMI 22.51 kg/m     ECO     Rash is gone  Heart RRR  Lungs clear  Status post cholecystectomy healing very well  Left axilla chronic 2-3 cm axillary node      LABS:  Noted and reviewed with the patient he has a detected alteration in MPL see interval history      PATHOLOGY:  As per interval history     ASSESSMENT/PLAN:  Alvarez is a very pleasant 62-year-old male who is here for further evaluation of leukocytosis, eosinophilia and neutrophilia    Patient appears to have myeloproliferative features with MPL + disease,  HES, ELIZABETH-NOS- and PDGFRA-associated disease account for a significant proportion of the cases of HES with myeloproliferative features, a causative genetic abnormality cannot be demonstrated in all cases. In a [place] series of 35 patients with HES, 9 patients were described as having clinical or hematological features of  myeloproliferative syndrome (including palpable splenomegaly, neutrophilia, circulating myelocytes and/or erythroblasts, hyperplastic BM, and myelofibrosis).11  Three of the 9 patients had no detectable FIP1L1/PDGFRA (F/P) fusion gene or cytogenetic abnormalities, and 1 responded to imatinib therapy. Several additional case series have described imatinib-responsive, F/P-negative patients with HES,12-15  although the presence of myeloproliferative features in these patients has not been systematically addressed.  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       1.  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

## 2024-08-06 NOTE — LETTER
8/6/2024      Alvarez Bess  [address] [id number]      Dear Colleague,    Thank you for referring your patient, Alvarez Bess, to the [facility]. Please see a copy of my visit note below.    [facility]     Hematology/Oncology Return patient note     Today's Date: August 6, 2024    Reason for Consult: eosinophilia      HISTORY OF PRESENT ILLNESS: Alvarez is a very pleasant 62-year-old male who is here for further evaluation of eosinophilia and elevated white count.  Patient states that he has had 14 pound unintentional weight loss over the last year .  He denies any night sweats or weight loss.  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  He is following up with dermatology.  Patient had an elevated white count since 2014 December   his white count was at 12.9.   Most recently his white count Is a 25.6 absolute neutrophils at 10.2 absolute eosinophils at 10.2 CRP level at 11.371-month ago it was at 16.5 sedimentation rate at 7.  His hemoglobin is normal at 14.7 platelet count of 2.93    Interval history: Artie returns for follow-up today.  He states he has not been feeling well and he has a low blood pressure issue. He is on lisinopril and carvedilol and is being managed by Dr Ordonez.  He also continues to have a rash that is not improving with steroids he has not seen dermatology recently. I still dont have the dermatology report from previous       Interval history: Artie returns for follow-up today.  Multiple investigations were completed  And the results are following      1.  PET/CT shows bilateral axillary and inguinal hypermetabolic adenopathy concerning for neoplastic process including hypereosinophilic syndromes and lymphoma.  Recommend tissue sampling.  SUV in those regions at 3.3-3.6.  Additional of hypermetabolic foci within the right gluteus and left vastus lateralis intermedius  muscles SUV of 8.4 may be related to the neoplastic process.  Images were reviewed with the patient.  Patient denies any night sweats or weight loss his weight has actually been stable.  His the rash persists    2 bone marrow biopsy Completed on 8/15/2023 showed marked hypercellular bone marrow with marked eosinophilia maturing trilineage hematopoiesis no increased dysplasia or increase in blasts.  Peripheral blood showing moderate leukocytosis with eosinophilia.  Adequate neutrophils with slight shift to immaturity.  Flow cytometry did not show any increase in myeloid blasts and no abnormal population of cells.  Morphologic findings just showed peripheral eosinophilia.  JAK2 mutation negative.  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  This was a summary for the bone marrow biopsy.    FISH cytogenetics and molecular studies were negative for PD GFR alpha, beta, FGFR 1, JAK2 genes and BCR-ABL mutation ruling out certain leukemias and CML. No  mass found on MRI that they could biopsy     S/p cholecystectomy on 3/20/2024. Restarted hydrea 4/4/2024 500 mg bid    Artie returns for follow up.  He is doing great . Gained weight rash gone, still smoking.   REVIEW OF SYSTEMS:   14 point ROS was reviewed and is negative other than as noted above in HPI.       HOME MEDICATIONS:  Current Outpatient Medications   Medication Sig Dispense Refill     acetaminophen (TYLENOL) 325 MG tablet Take 2 tablets (650 mg) by mouth every 4 hours as needed for other (mild pain) 100 tablet 0     augmented betamethasone dipropionate (DIPROLENE AF) 0.05 % external cream Apply topically 2 times daily 100 g 3     carvedilol (COREG) 25 MG tablet Take 1 tablet (25 mg) by mouth 2 times daily (with meals) 180 tablet 3     hydroxyurea (HYDREA) 500 MG capsule Take 1 capsule (500 mg) by mouth 2 times daily 180  capsule 4     nicotine (NICODERM CQ) 14 MG/24HR 24 hr patch Place 1 patch onto the skin every 24 hours Start after completion of the 21 mg patches. 14 patch 0     nicotine (NICODERM CQ) 21 MG/24HR 24 hr patch Place 1 patch onto the skin every 24 hours Start with these patches first. 14 patch 0     nicotine (NICODERM CQ) 7 MG/24HR 24 hr patch Place 1 patch onto the skin every 24 hours Start after completing the full course of 14 mg patches. 14 patch 0     omeprazole (PRILOSEC OTC) 20 MG EC tablet Take 20 mg by mouth daily       Probiotic Product (PROBIOTIC DAILY PO)            ALLERGIES:  Allergies   Allergen Reactions     Morphine Hcl      Extreme agitation         PAST MEDICAL HISTORY:  Past Medical History:   Diagnosis Date     Cardiomyopathy (H)      Congestive heart failure (H)      Problem list name updated by automated process. Provider to review     Congestive heart failure, unspecified      Eosinophilia      Gastro-oesophageal reflux disease      Hyperlipidemia 07/19/2019     Hypertension      Penile discharge      Vitamin D deficiency 07/09/2010         PAST SURGICAL HISTORY:  Past Surgical History:   Procedure Laterality Date     ANGIOGRAM  1/6/05    left dominant coronary system with essentially normal coronary arteries, trivial plaque, severe dilated cardiomyopathy, left ventricle hypertrophy and severely hypokinetic, asynchrony or desynchrony of LV function     BONE MARROW BIOPSY, BONE SPECIMEN, NEEDLE/TROCAR N/A 8/15/2023    Procedure: BIOPSY, BONE MARROW;  Surgeon: Susy Baird;  Location: [facility] LAPAROSCOPIC CHOLECYSTECTOMY WITHOUT GRAMS N/A 3/20/2024    Procedure: CHOLECYSTECTOMY, ROBOT-ASSISTED;  Surgeon: Anabel Crandall MD;  Location: [facility] OR     ENT SURGERY      polyps from throat     EXCISE LESION FOOT  12/16/2013    Procedure: EXCISE LESION FOOT;  Scar revision of Right 3rd toe       SOFT TISSUE SURGERY      neuroma from right foot         SOCIAL HISTORY:  Social History      Socioeconomic History     Marital status: Single     Spouse name: Not on file     Number of children: Not on file     Years of education: Not on file     Highest education level: Not on file   Occupational History     Not on file   Tobacco Use     Smoking status: Some Days     Current packs/day: 0.00     Types: Cigarettes     Start date: 12/10/1995     Last attempt to quit: 12/10/2005     Years since quittin.6     Smokeless tobacco: Never     Tobacco comments:     About 5 cigarettes per week   Vaping Use     Vaping status: Never Used   Substance and Sexual Activity     Alcohol use: Yes     Comment: 2 beer day     Drug use: No     Sexual activity: Not Currently     Partners: Male   Other Topics Concern     Parent/sibling w/ CABG, MI or angioplasty before 65F 55M? No   Social History Narrative     Not on file     Social Determinants of Health     Financial Resource Strain: Low Risk  (2024)    Financial Resource Strain      Within the past 12 months, have you or your family members you live with been unable to get utilities (heat, electricity) when it was really needed?: No   Food Insecurity: Low Risk  (2024)    Food Insecurity      Within the past 12 months, did you worry that your food would run out before you got money to buy more?: No      Within the past 12 months, did the food you bought just not last and you didn t have money to get more?: No   Transportation Needs: Low Risk  (2024)    Transportation Needs      Within the past 12 months, has lack of transportation kept you from medical appointments, getting your medicines, non-medical meetings or appointments, work, or from getting things that you need?: No   Physical Activity: Insufficiently Active (2024)    Exercise Vital Sign      Days of Exercise per Week: 3 days      Minutes of Exercise per Session: 20 min   Stress: No Stress Concern Present (2024)    [place] of Occupational [employer] - Occupational Stress Questionnaire       Feeling of Stress : Not at all   Social Connections: Unknown (2024)    Social Connection and Isolation Panel [NHANES]      Frequency of Communication with Friends and Family: Not on file      Frequency of Social Gatherings with Friends and Family: Once a week      Attends Sabianist Services: Not on file      Active Member of Clubs or Organizations: Not on file      Attends Club or Organization Meetings: Not on file      Marital Status: Not on file   Interpersonal Safety: Low Risk  (2024)    Interpersonal Safety      Do you feel physically and emotionally safe where you currently live?: Yes      Within the past 12 months, have you been hit, slapped, kicked or otherwise physically hurt by someone?: No      Within the past 12 months, have you been humiliated or emotionally abused in other ways by your partner or ex-partner?: No   Housing Stability: High Risk (2024)    Housing Stability      Do you have housing? : No      Are you worried about losing your housing?: No   Smokes on and off.  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  Brother prostate cancer.  Father had melanoma      PHYSICAL EXAM:  Vital signs:  /69   Pulse 69   Resp 16   Wt 75.3 kg (166 lb)   SpO2 99%   BMI 22.51 kg/m     ECO     Rash is gone  Heart RRR  Lungs clear  Status post cholecystectomy healing very well  Left axilla chronic 2-3 cm axillary node      LABS:  Noted and reviewed with the patient he has a detected alteration in MPL see interval history      PATHOLOGY:  As per interval history     ASSESSMENT/PLAN:  Alvarez is a very pleasant 62-year-old male who is here for further evaluation of leukocytosis, eosinophilia and neutrophilia    Patient appears to have myeloproliferative features with MPL + disease,  HES, ELIZABETH-NOS- and  PDGFRA-associated disease account for a significant proportion of the cases of HES with myeloproliferative features, a causative genetic abnormality cannot be demonstrated in all cases. In a [place] series of 35 patients with HES, 9 patients were described as having clinical or hematological features of myeloproliferative syndrome (including palpable splenomegaly, neutrophilia, circulating myelocytes and/or erythroblasts, hyperplastic BM, and myelofibrosis).11  Three of the 9 patients had no detectable FIP1L1/PDGFRA (F/P) fusion gene or cytogenetic abnormalities, and 1 responded to imatinib therapy. Several additional case series have described imatinib-responsive, F/P-negative patients with HES,12-15  although the presence of myeloproliferative features in these patients has not been systematically addressed.  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       1.  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        Sincerely,        Vasyl Case MD

## 2024-08-08 ENCOUNTER — TELEPHONE (OUTPATIENT)
Dept: NEUROLOGY | Facility: CLINIC | Age: 64
End: 2024-08-08
Payer: COMMERCIAL

## 2024-08-08 NOTE — TELEPHONE ENCOUNTER
Patient was called from Neurology wait list and offered to be rescheduled to new provider Dr. Garcia- General Neurology- patient can be seen as early as Sept 16.    Patient advised to call [phone] if interested in scheduling sooner.

## 2024-08-22 NOTE — CONFIDENTIAL NOTE
Reason for visit: Numbness in both hands    Referring Provider: Jc Belcher MD    Office Visit Notes: 07/05/2024         IMAGING   STATUS/LOCATION   DATE/TYPE   MRI/MRA N/A    CT/CTA N/A    XRAY N/A    EEG N/A    EMG N/A    NEUOROPSYCH TEST: N/A      NOTES:   STATUS/LOCATION   DATE/TYPE

## 2024-09-09 ENCOUNTER — TELEPHONE (OUTPATIENT)
Dept: GASTROENTEROLOGY | Facility: CLINIC | Age: 64
End: 2024-09-09
Payer: COMMERCIAL

## 2024-09-09 NOTE — TELEPHONE ENCOUNTER
"Endoscopy Scheduling Screen    Have you had a positive Covid test in the last 14 days?  No    What is your communication preference for Instructions and/or Bowel Prep?   MyChart    What insurance is in the chart?  Other:      Ordering/Referring Provider: Jc Belcher MD   (If ordering provider performs procedure, schedule with ordering provider unless otherwise instructed. )    BMI: Estimated body mass index is 22.51 kg/m  as calculated from the following:    Height as of 7/5/24: 1.829 m (6').    Weight as of 8/6/24: 75.3 kg (166 lb).     Sedation Ordered  moderate sedation.   If patient BMI > 50 do not schedule in [facility].    If patient BMI > 45 do not schedule at [facility].    Are you taking methadone or Suboxone?  No    Have you had difficulties, pain, or discomfort during past endoscopy procedures?  No    Are you taking any prescription medications for pain 3 or more times per week?   NO, No RN review required.    Do you have a history of malignant hyperthermia?  No    (Females) Are you currently pregnant?   No     Have you been diagnosed or told you have pulmonary hypertension?   No    Do you have an LVAD?  No    Have you been told you have moderate to severe sleep apnea?  No    Have you been told you have COPD, asthma, or any other lung disease?  No    Do you have any heart conditions?  Yes     In the past year, have you had any hospitalizations for heart related issues including cardiomyopathy, heart attack, or stent placement?  No    Do you have any implantable devices in your body (pacemaker, ICD)?  No    Do you take nitroglycerine?  No    Have you ever had or are you waiting for an organ transplant?  No. Continue scheduling, no site restrictions.    Have you had a stroke or transient ischemic attack (TIA aka \"mini stroke\" in the last 6 months?   No    Have you been diagnosed with or been told you have cirrhosis of the liver?   No    Are you currently on dialysis?   No    Do you need assistance " transferring?   No    BMI: Estimated body mass index is 22.51 kg/m  as calculated from the following:    Height as of 7/5/24: 1.829 m (6').    Weight as of 8/6/24: 75.3 kg (166 lb).     Is patients BMI > 40 and scheduling location [facility]?  No    Do you take an injectable medication for weight loss or diabetes (excluding insulin)?  No    Do you take the medication Naltrexone?  No    Do you take blood thinners?  No       Prep   Are you currently on dialysis or do you have chronic kidney disease?  No    Do you have a diagnosis of diabetes?  No    Do you have a diagnosis of cystic fibrosis (CF)?  No    On a regular basis do you go 3 -5 days between bowel movements?  No    BMI > 40?  No    Preferred Pharmacy:    [id number] [place], [place] - [address] [facility] [address]  [address] [facility] [address]  Phone: [phone] Fax: [phone]      Final Scheduling Details     Procedure scheduled  Colonoscopy    Surgeon:  Lizeth     Date of procedure:  10/22/2024     Pre-OP / PAC:   No - Not required for this site.    Location  [facility] - Patient preference.    Sedation   Moderate Sedation - Per order.      Patient Reminders:   You will receive a call from a Nurse to review instructions and health history.  This assessment must be completed prior to your procedure.  Failure to complete the Nurse assessment may result in the procedure being cancelled.      On the day of your procedure, please designate an adult(s) who can drive you home stay with you for the next 24 hours. The medicines used in the exam will make you sleepy. You will not be able to drive.      You cannot take public transportation, ride share services, or non-medical taxi service without a responsible caregiver.  Medical transport services are allowed with the requirement that a responsible caregiver will receive you at your destination.  We require that drivers and caregivers are confirmed prior to your procedure.

## 2024-09-23 ENCOUNTER — PRE VISIT (OUTPATIENT)
Dept: NEUROLOGY | Facility: CLINIC | Age: 64
End: 2024-09-23

## 2024-10-01 NOTE — TELEPHONE ENCOUNTER
Standard Miralax Bowel Prep recommended due to standard bowel prep. Instructions were sent via [employer].

## 2024-10-08 ENCOUNTER — TELEPHONE (OUTPATIENT)
Dept: GASTROENTEROLOGY | Facility: CLINIC | Age: 64
End: 2024-10-08
Payer: COMMERCIAL

## 2024-10-08 NOTE — TELEPHONE ENCOUNTER
Pre visit planning completed.      Procedure details:    Patient scheduled for Colonoscopy on 10.22.2024.     Arrival time: 0805. Procedure time 0850    Facility location: [facility]; [address] [place] [address]. Check in location: Main entrance, door #1 on the North side of the building under roundabout awning. DO NOT GO TO SURGERY/ED ENTRANCE.     Sedation type: Conscious sedation     Pre op exam needed? No.    Indication for procedure: Colon cancer screening       Chart review:     Electronic implanted devices? No    Recent diagnosis of diverticulitis within the last 6 weeks? No      Medication review:    Diabetic? No    Anticoagulants? No    Weight loss medication/injectable? No GLP-1 medication per patient's medication list.  RN will verify with pre-assessment call.    Other medication HOLDING recommendations:  N/A      Prep for procedure:     Bowel prep recommendation: Standard Miralax  Due to: standard bowel prep.    Prep instructions sent via [employer]         Gin Maradiaga RN  Endoscopy Procedure Pre Assessment RN  [phone] option 2

## 2024-10-09 NOTE — TELEPHONE ENCOUNTER
Pre assessment completed for upcoming procedure.   (Please see previous telephone encounter notes for complete details)    Patient returned call.       Procedure details:    Arrival time and facility location reviewed.    Pre op exam needed? No.    Designated  policy reviewed. Instructed to have someone stay 6 hours post procedure.       Medication review:    Medications reviewed. Please see supporting documentation below. Holding recommendations discussed (if applicable).       Prep for procedure:     Patient stated they have already reviewed the bowel prep instructions and writer answered all patient questions (if applicable). NPO instructions reviewed.    Patient was instructed to call if any questions or concerns arise.       Any additional information needed:  N/A      Patient verbalized understanding and had no questions or concerns at this time.      Any Rogers RN  Endoscopy Procedure Pre Assessment   [phone] option 2

## 2024-10-09 NOTE — TELEPHONE ENCOUNTER
Attempted to contact patient in order to complete pre assessment questions.     No answer. Left message to return call to [phone] option 2    Callback required communication sent via [employer].      Gin Nuñez RN  Endoscopy Procedure Pre Assessment

## 2024-10-22 ENCOUNTER — HOSPITAL ENCOUNTER (OUTPATIENT)
Facility: CLINIC | Age: 64
Discharge: HOME | End: 2024-10-22
Attending: COLON & RECTAL SURGERY | Admitting: COLON & RECTAL SURGERY
Payer: COMMERCIAL

## 2024-10-22 VITALS
OXYGEN SATURATION: 100 % | SYSTOLIC BLOOD PRESSURE: 123 MMHG | HEIGHT: 72 IN | DIASTOLIC BLOOD PRESSURE: 62 MMHG | HEART RATE: 77 BPM | RESPIRATION RATE: 16 BRPM | WEIGHT: 159 LBS | BODY MASS INDEX: 21.54 KG/M2

## 2024-10-22 LAB — COLONOSCOPY: NORMAL

## 2024-10-22 PROCEDURE — 250N000011 HC RX IP 250 OP 636: Performed by: COLON & RECTAL SURGERY

## 2024-10-22 PROCEDURE — 45378 DIAGNOSTIC COLONOSCOPY: CPT | Performed by: COLON & RECTAL SURGERY

## 2024-10-22 PROCEDURE — G0500 MOD SEDAT ENDO SERVICE >5YRS: HCPCS | Performed by: COLON & RECTAL SURGERY

## 2024-10-22 PROCEDURE — G0105 COLORECTAL SCRN; HI RISK IND: HCPCS | Performed by: COLON & RECTAL SURGERY

## 2024-10-22 RX ORDER — EPINEPHRINE 1 MG/ML
0.1 INJECTION, SOLUTION, CONCENTRATE INTRAVENOUS
Status: DISCONTINUED | OUTPATIENT
Start: 2024-10-22 | End: 2024-10-22 | Stop reason: HOSPADM

## 2024-10-22 RX ORDER — FLUMAZENIL 0.1 MG/ML
0.2 INJECTION, SOLUTION INTRAVENOUS
Status: DISCONTINUED | OUTPATIENT
Start: 2024-10-22 | End: 2024-10-22 | Stop reason: HOSPADM

## 2024-10-22 RX ORDER — DIPHENHYDRAMINE HYDROCHLORIDE 50 MG/ML
25-50 INJECTION INTRAMUSCULAR; INTRAVENOUS
Status: DISCONTINUED | OUTPATIENT
Start: 2024-10-22 | End: 2024-10-22 | Stop reason: HOSPADM

## 2024-10-22 RX ORDER — LIDOCAINE 40 MG/G
CREAM TOPICAL
Status: DISCONTINUED | OUTPATIENT
Start: 2024-10-22 | End: 2024-10-22 | Stop reason: HOSPADM

## 2024-10-22 RX ORDER — FENTANYL CITRATE 50 UG/ML
50-100 INJECTION, SOLUTION INTRAMUSCULAR; INTRAVENOUS EVERY 5 MIN PRN
Status: DISCONTINUED | OUTPATIENT
Start: 2024-10-22 | End: 2024-10-22 | Stop reason: HOSPADM

## 2024-10-22 RX ORDER — ONDANSETRON 2 MG/ML
4 INJECTION INTRAMUSCULAR; INTRAVENOUS EVERY 6 HOURS PRN
Status: DISCONTINUED | OUTPATIENT
Start: 2024-10-22 | End: 2024-10-22 | Stop reason: HOSPADM

## 2024-10-22 RX ORDER — SIMETHICONE 40MG/0.6ML
133 SUSPENSION, DROPS(FINAL DOSAGE FORM)(ML) ORAL
Status: DISCONTINUED | OUTPATIENT
Start: 2024-10-22 | End: 2024-10-22 | Stop reason: HOSPADM

## 2024-10-22 RX ORDER — PROCHLORPERAZINE MALEATE 10 MG
10 TABLET ORAL EVERY 6 HOURS PRN
Status: DISCONTINUED | OUTPATIENT
Start: 2024-10-22 | End: 2024-10-22 | Stop reason: HOSPADM

## 2024-10-22 RX ORDER — NALOXONE HYDROCHLORIDE 0.4 MG/ML
0.4 INJECTION, SOLUTION INTRAMUSCULAR; INTRAVENOUS; SUBCUTANEOUS
Status: DISCONTINUED | OUTPATIENT
Start: 2024-10-22 | End: 2024-10-22 | Stop reason: HOSPADM

## 2024-10-22 RX ORDER — ATROPINE SULFATE 0.1 MG/ML
1 INJECTION INTRAVENOUS
Status: DISCONTINUED | OUTPATIENT
Start: 2024-10-22 | End: 2024-10-22 | Stop reason: HOSPADM

## 2024-10-22 RX ORDER — ONDANSETRON 4 MG/1
4 TABLET, ORALLY DISINTEGRATING ORAL EVERY 6 HOURS PRN
Status: DISCONTINUED | OUTPATIENT
Start: 2024-10-22 | End: 2024-10-22 | Stop reason: HOSPADM

## 2024-10-22 RX ORDER — NALOXONE HYDROCHLORIDE 0.4 MG/ML
0.2 INJECTION, SOLUTION INTRAMUSCULAR; INTRAVENOUS; SUBCUTANEOUS
Status: DISCONTINUED | OUTPATIENT
Start: 2024-10-22 | End: 2024-10-22 | Stop reason: HOSPADM

## 2024-10-22 RX ORDER — ONDANSETRON 2 MG/ML
4 INJECTION INTRAMUSCULAR; INTRAVENOUS
Status: DISCONTINUED | OUTPATIENT
Start: 2024-10-22 | End: 2024-10-22 | Stop reason: HOSPADM

## 2024-10-22 RX ADMIN — MIDAZOLAM 1 MG: 1 INJECTION INTRAMUSCULAR; INTRAVENOUS at 09:14

## 2024-10-22 RX ADMIN — FENTANYL CITRATE 50 MCG: 0.05 INJECTION, SOLUTION INTRAMUSCULAR; INTRAVENOUS at 09:12

## 2024-10-22 RX ADMIN — FENTANYL CITRATE 100 MCG: 0.05 INJECTION, SOLUTION INTRAMUSCULAR; INTRAVENOUS at 09:05

## 2024-10-22 RX ADMIN — MIDAZOLAM 2 MG: 1 INJECTION INTRAMUSCULAR; INTRAVENOUS at 09:05

## 2024-10-22 RX ADMIN — MIDAZOLAM 1 MG: 1 INJECTION INTRAMUSCULAR; INTRAVENOUS at 09:10

## 2024-10-22 RX ADMIN — MIDAZOLAM 1 MG: 1 INJECTION INTRAMUSCULAR; INTRAVENOUS at 09:07

## 2024-10-22 ASSESSMENT — ACTIVITIES OF DAILY LIVING (ADL)
ADLS_ACUITY_SCORE: 35

## 2024-10-22 NOTE — H&P
Pre-Endoscopy History and Physical     Alvarez Bess MRN# [id number]   YOB: 1960 Age: 63 year old     Date of Procedure: 10/22/2024  Primary care provider: Jc Belcher  Type of Endoscopy: Colonoscopy  Reason for Procedure: Surveillance, h/o polyps  Type of Anesthesia Anticipated: Moderate Sedation    HPI:    Alvarez is a 63 year old male who will be undergoing the above procedure.      A history and physical has been performed. The patient's medications and allergies have been reviewed. The risks and benefits of the procedure and the sedation options and risks were discussed with the patient.  All questions were answered and informed consent was obtained.      He denies a personal or family history of anesthesia complications or bleeding disorders.     Allergies   Allergen Reactions    Morphine Hcl      Extreme agitation        Current Facility-Administered Medications   Medication Dose Route Frequency Provider Last Rate Last Admin    atropine injection 1 mg  1 mg Intravenous Once PRN Kaylyn Yates MD        benzocaine 20% (HURRICAINE/TOPEX) 20 % spray 0.5 mL  1 spray Mouth/Throat Once PRN Kaylyn Yates MD        diphenhydrAMINE (BENADRYL) injection 25-50 mg  25-50 mg Intravenous Once PRN Kaylyn Yates MD        EPINEPHrine PF (ADRENALIN) injection 0.1 mg  0.1 mg Submucosal Once PRN Kaylyn Yates MD        fentaNYL (PF) (SUBLIMAZE) injection  mcg   mcg Intravenous Q5 Min PRN Kaylyn Yates MD        flumazenil (ROMAZICON) injection 0.2 mg  0.2 mg Intravenous q1 min prn Kaylyn Yates MD        glucagon injection 0.5 mg  0.5 mg Intravenous Once PRN Kaylyn Yates MD        midazolam (VERSED) injection 0.5-2 mg  0.5-2 mg Intravenous Q4 Min PRN Kaylyn Yates MD        naloxone (NARCAN) injection 0.2 mg  0.2 mg Intravenous Q2 Min PRN Kaylyn Yates MD        Or    naloxone (NARCAN) injection 0.4 mg  0.4 mg  Intravenous Q2 Min PRN Kaylyn Yates MD        Or    naloxone (NARCAN) injection 0.2 mg  0.2 mg Intramuscular Q2 Min PRN Kaylyn Yates MD        Or    naloxone (NARCAN) injection 0.4 mg  0.4 mg Intramuscular Q2 Min PRN Kaylyn Yates MD        simethicone (MYLICON) suspension 133 mg  133 mg Oral Once PRN Kaylyn Yates MD        sodium chloride (PF) 0.9% PF flush 3 mL  3 mL Intravenous q1 min prn Kaylyn Yates MD        sodium chloride 0.9% BOLUS 500 mL  500 mL Intravenous Once PRN aKylyn Yates MD           Patient Active Problem List   Diagnosis    Gastroenteritis    Cardiomyopathy (H)    Congestive heart failure (H)    Hyperlipidemia    Hypertension    IBS (irritable bowel syndrome)    Impaired fasting glucose    Vitamin D deficiency    Lymphadenopathy        Past Medical History:   Diagnosis Date    Cardiomyopathy (H)     Congestive heart failure (H)      Problem list name updated by automated process. Provider to review    Congestive heart failure, unspecified     Eosinophilia     Gastro-oesophageal reflux disease     Hyperlipidemia 07/19/2019    Hypertension     Penile discharge     Vitamin D deficiency 07/09/2010        Past Surgical History:   Procedure Laterality Date    ANGIOGRAM  01/06/2005    left dominant coronary system with essentially normal coronary arteries, trivial plaque, severe dilated cardiomyopathy, left ventricle hypertrophy and severely hypokinetic, asynchrony or desynchrony of LV function    BONE MARROW BIOPSY, BONE SPECIMEN, NEEDLE/TROCAR N/A 08/15/2023    Procedure: BIOPSY, BONE MARROW;  Surgeon: Susy Baird;  Location: [facility] OR    COLONOSCOPY      DAVINCI LAPAROSCOPIC CHOLECYSTECTOMY WITHOUT GRAMS N/A 03/20/2024    Procedure: CHOLECYSTECTOMY, ROBOT-ASSISTED;  Surgeon: Anabel Crandall MD;  Location: [facility] OR    ENT SURGERY      polyps from throat    EXCISE LESION FOOT  12/16/2013    Procedure: EXCISE LESION FOOT;  Scar revision of  Right 3rd toe      SOFT TISSUE SURGERY      neuroma from right foot       Social History     Tobacco Use    Smoking status: Some Days     Current packs/day: 0.00     Types: Cigarettes     Start date: 12/10/1995     Last attempt to quit: 12/10/2005     Years since quittin.8    Smokeless tobacco: Never    Tobacco comments:     About 5 cigarettes per week   Substance Use Topics    Alcohol use: Yes     Comment: 2 beer day       Family History   Problem Relation Age of Onset    Heart Disease Mother     Heart Disease Brother     Myocardial Infarction Brother     Colon Cancer No family hx of        REVIEW OF SYSTEMS:     5 point ROS negative except as noted above in HPI, including Gen., Resp., CV, GI &  system review.      PHYSICAL EXAM:   Ht 1.829 m (6')   Wt 72.1 kg (159 lb)   BMI 21.56 kg/m   Estimated body mass index is 21.56 kg/m  as calculated from the following:    Height as of this encounter: 1.829 m (6').    Weight as of this encounter: 72.1 kg (159 lb).   GENERAL APPEARANCE: healthy and alert  MENTAL STATUS: alert  AIRWAY EXAM: Mallampatti Class I (visualization of the soft palate, fauces, uvula, anterior and posterior pillars)  RESP: lungs clear to auscultation - no rales, rhonchi or wheezes  CV: regular rates and rhythm      IMPRESSION   ASA Class 3 - Severe systemic disease, but not incapacitating        PLAN:     Plan for colonoscopy. We discussed the risks, benefits and alternatives and the patient wished to proceed.    The above has been forwarded to the consulting provider.      Akua Yates MD  [facility]  Phone: [phone]  Fax: [phone]  2024

## 2024-11-13 DIAGNOSIS — I42.9 CARDIOMYOPATHY (H): ICD-10-CM

## 2024-11-13 DIAGNOSIS — I50.40 COMBINED SYSTOLIC AND DIASTOLIC CONGESTIVE HEART FAILURE (H): ICD-10-CM

## 2024-11-13 RX ORDER — CARVEDILOL 25 MG/1
25 TABLET ORAL 2 TIMES DAILY WITH MEALS
Qty: 180 TABLET | Refills: 3 | Status: SHIPPED | OUTPATIENT
Start: 2024-11-13

## 2024-11-20 NOTE — CONFIDENTIAL NOTE
Reason for visit: Numbness in both hands    Referring Provider: Jc Belcher MD    Office Visit Notes: 07/05/2024     Notes:   Provider:           IMAGING   STATUS/LOCATION   DATE/TYPE   MRI/MRA NA     CT/CTA NA     LABS Internal    EEG NA    EMG NA    NEUOROPSYCH   TEST: NA

## 2024-11-25 ENCOUNTER — LAB (OUTPATIENT)
Dept: LAB | Facility: CLINIC | Age: 64
End: 2024-11-25
Payer: COMMERCIAL

## 2024-11-25 DIAGNOSIS — D72.118 OTHER HYPEREOSINOPHILIC SYNDROME: ICD-10-CM

## 2024-11-25 DIAGNOSIS — D72.10 EOSINOPHILIA, UNSPECIFIED TYPE: ICD-10-CM

## 2024-11-25 LAB
ALBUMIN SERPL BCG-MCNC: 3.8 G/DL (ref 3.5–5.2)
ALP SERPL-CCNC: 87 U/L (ref 40–150)
ALT SERPL W P-5'-P-CCNC: 22 U/L (ref 0–70)
ANION GAP SERPL CALCULATED.3IONS-SCNC: 10 MMOL/L (ref 7–15)
AST SERPL W P-5'-P-CCNC: 21 U/L (ref 0–45)
BASOPHILS # BLD MANUAL: 0.1 10E3/UL (ref 0–0.2)
BASOPHILS NFR BLD MANUAL: 1 %
BILIRUB SERPL-MCNC: 0.5 MG/DL
BUN SERPL-MCNC: 15.1 MG/DL (ref 8–23)
CALCIUM SERPL-MCNC: 9 MG/DL (ref 8.8–10.4)
CHLORIDE SERPL-SCNC: 98 MMOL/L (ref 98–107)
CREAT SERPL-MCNC: 0.9 MG/DL (ref 0.67–1.17)
EGFRCR SERPLBLD CKD-EPI 2021: >90 ML/MIN/1.73M2
EOSINOPHIL # BLD MANUAL: 6.4 10E3/UL (ref 0–0.7)
EOSINOPHIL NFR BLD MANUAL: 54 %
ERYTHROCYTE [DISTWIDTH] IN BLOOD BY AUTOMATED COUNT: 12.5 % (ref 10–15)
GLUCOSE SERPL-MCNC: 91 MG/DL (ref 70–99)
HCO3 SERPL-SCNC: 25 MMOL/L (ref 22–29)
HCT VFR BLD AUTO: 32.1 % (ref 40–53)
HGB BLD-MCNC: 11.8 G/DL (ref 13.3–17.7)
LYMPHOCYTES # BLD MANUAL: 2.1 10E3/UL (ref 0.8–5.3)
LYMPHOCYTES NFR BLD MANUAL: 18 %
MCH RBC QN AUTO: 49 PG (ref 26.5–33)
MCHC RBC AUTO-ENTMCNC: 36.8 G/DL (ref 31.5–36.5)
MCV RBC AUTO: 133 FL (ref 78–100)
MONOCYTES # BLD MANUAL: 0.4 10E3/UL (ref 0–1.3)
MONOCYTES NFR BLD MANUAL: 3 %
NEUTROPHILS # BLD MANUAL: 2.9 10E3/UL (ref 1.6–8.3)
NEUTROPHILS NFR BLD MANUAL: 24 %
PLAT MORPH BLD: ABNORMAL
PLATELET # BLD AUTO: 115 10E3/UL (ref 150–450)
POTASSIUM SERPL-SCNC: 4.3 MMOL/L (ref 3.4–5.3)
PROT SERPL-MCNC: 6.8 G/DL (ref 6.4–8.3)
RBC # BLD AUTO: 2.41 10E6/UL (ref 4.4–5.9)
RBC MORPH BLD: ABNORMAL
SODIUM SERPL-SCNC: 133 MMOL/L (ref 135–145)
VARIANT LYMPHS BLD QL SMEAR: PRESENT
WBC # BLD AUTO: 11.9 10E3/UL (ref 4–11)

## 2024-11-25 PROCEDURE — 85027 COMPLETE CBC AUTOMATED: CPT

## 2024-11-25 PROCEDURE — 80053 COMPREHEN METABOLIC PANEL: CPT

## 2024-11-25 PROCEDURE — 36415 COLL VENOUS BLD VENIPUNCTURE: CPT

## 2024-11-25 PROCEDURE — 85007 BL SMEAR W/DIFF WBC COUNT: CPT

## 2024-12-03 ENCOUNTER — ONCOLOGY VISIT (OUTPATIENT)
Dept: ONCOLOGY | Facility: CLINIC | Age: 64
End: 2024-12-03
Attending: INTERNAL MEDICINE
Payer: COMMERCIAL

## 2024-12-03 ENCOUNTER — TELEPHONE (OUTPATIENT)
Dept: DERMATOLOGY | Facility: CLINIC | Age: 64
End: 2024-12-03

## 2024-12-03 VITALS
BODY MASS INDEX: 22.89 KG/M2 | OXYGEN SATURATION: 99 % | DIASTOLIC BLOOD PRESSURE: 67 MMHG | WEIGHT: 168.8 LBS | HEART RATE: 50 BPM | RESPIRATION RATE: 16 BRPM | SYSTOLIC BLOOD PRESSURE: 118 MMHG | TEMPERATURE: 97.9 F

## 2024-12-03 DIAGNOSIS — D53.9 MACROCYTIC ANEMIA: Primary | ICD-10-CM

## 2024-12-03 DIAGNOSIS — L98.9 SKIN LESION: ICD-10-CM

## 2024-12-03 DIAGNOSIS — D72.10 EOSINOPHILIA, UNSPECIFIED TYPE: ICD-10-CM

## 2024-12-03 DIAGNOSIS — R59.1 LYMPHADENOPATHY: ICD-10-CM

## 2024-12-03 DIAGNOSIS — Q80.9 XERODERMA: ICD-10-CM

## 2024-12-03 DIAGNOSIS — R91.8 PULMONARY NODULES: ICD-10-CM

## 2024-12-03 DIAGNOSIS — D69.6 THROMBOCYTOPENIA (H): ICD-10-CM

## 2024-12-03 PROCEDURE — 99213 OFFICE O/P EST LOW 20 MIN: CPT

## 2024-12-03 PROCEDURE — 99214 OFFICE O/P EST MOD 30 MIN: CPT

## 2024-12-03 PROCEDURE — G2211 COMPLEX E/M VISIT ADD ON: HCPCS

## 2024-12-03 RX ORDER — AMMONIUM LACTATE 12 G/100G
CREAM TOPICAL 2 TIMES DAILY
Qty: 385 G | Refills: 1 | Status: SHIPPED | OUTPATIENT
Start: 2024-12-03

## 2024-12-03 ASSESSMENT — PAIN SCALES - GENERAL: PAINLEVEL_OUTOF10: NO PAIN (0)

## 2024-12-03 NOTE — PROGRESS NOTES
Oncology/Hematology Visit Note  12/3/2024     Reason for Visit: Follow up Eosinophilia    Oncology HPI:   2014: elevated WBC of 12.9  - 6/27/23: Initial consult: Patient states that he has had 14 pound unintentional weight loss over the last year . He denies any night sweats or weight loss. 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 He is following up with dermatology.  - 5/19/23 white count Is a 25.6 absolute neutrophils at 10.2 absolute eosinophils at 10.2 CRP level at 11.371-April 2024 it was at 16.5 sedimentation rate at 7. His hemoglobin is normal at 14.7 platelet count of 2.93   - 8/5/2023: bone marrow biopsy showed marked hypercellular bone marrow with marked eosinophilia maturing trilineage hematopoiesis no increased dysplasia or increase in blasts. Peripheral blood showing moderate leukocytosis with eosinophilia. Adequate neutrophils with slight shift to immaturity. Flow cytometry did not show any increase in myeloid blasts and no abnormal population of cells. Morphologic findings just showed peripheral eosinophilia. JAK2 mutation negative. 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 This was a summary for the bone marrow biopsy.   - FISH cytogenetics and molecular studies were negative for PD GFR alpha, beta, FGFR 1, JAK2 genes and BCR-ABL mutation ruling out certain leukemias and CML. No mass found on MRI that they could biopsy   - 11/27/23 PET with PET/CT shows bilateral axillary and inguinal hypermetabolic adenopathy concerning for neoplastic process including hypereosinophilic syndromes and lymphoma. Recommend tissue sampling. SUV in those regions at 3.3-3.6. Additional of hypermetabolic foci within the right gluteus and left vastus lateralis intermedius muscles SUV of 8.4 may be related to the neoplastic  "process   - 3/2024 cholecystectomy- hydrea held  - 4/4/2024 restarted Hydrea 500 mg twice daily    - 12/3/2024 WBC 11.9, Hgb 11.8, Plts 115k, ANC 2.9, Absolute Eosinophils 6.4    Current Treatment: Hydrea 500 mg twice daily    Interval history:   Artie presents to clinic for labs and follow up. He reports that overall he is doing well.     He is having dry/cracking skin of hands and feet. Asking if Hydrea can cause this.    He is trying to quit smoking, using Nicoderm patches.     Left forearm with wound that he keeps picking at. States \"that it will go away, then come back. \"      Rash of feet and legs has resolved.     Review of Systems: See interval hx. Denies fevers, chills, dizziness,  changes in vision, abdominal pain, N/V, diarrhea, changes in urination, bleeding, bruising, rash.     Current Outpatient Medications   Medication Sig Dispense Refill    acetaminophen (TYLENOL) 325 MG tablet Take 2 tablets (650 mg) by mouth every 4 hours as needed for other (mild pain) 100 tablet 0    ammonium lactate (AMLACTIN) 12 % external cream Apply topically 2 times daily. 385 g 1    augmented betamethasone dipropionate (DIPROLENE AF) 0.05 % external cream Apply topically 2 times daily 100 g 3    carvedilol (COREG) 25 MG tablet Take 1 tablet (25 mg) by mouth 2 times daily (with meals). 180 tablet 3    hydroxyurea (HYDREA) 500 MG capsule Take 1 capsule (500 mg) by mouth 2 times daily 180 capsule 4    nicotine (NICODERM CQ) 14 MG/24HR 24 hr patch Place 1 patch onto the skin every 24 hours Start after completion of the 21 mg patches. 14 patch 0    nicotine (NICODERM CQ) 21 MG/24HR 24 hr patch Place 1 patch onto the skin every 24 hours Start with these patches first. 14 patch 0    nicotine (NICODERM CQ) 7 MG/24HR 24 hr patch Place 1 patch onto the skin every 24 hours Start after completing the full course of 14 mg patches. 14 patch 0    omeprazole (PRILOSEC OTC) 20 MG EC tablet Take 20 mg by mouth daily      Probiotic Product " (PROBIOTIC DAILY PO)             Allergies   Allergen Reactions    Morphine Hcl      Extreme agitation         Exam:  Blood pressure 118/67, pulse 50, temperature 97.9  F (36.6  C), temperature source Oral, resp. rate 16, weight 76.6 kg (168 lb 12.8 oz), SpO2 99%.  Wt Readings from Last 4 Encounters:   12/03/24 76.6 kg (168 lb 12.8 oz)   10/22/24 72.1 kg (159 lb)   08/06/24 75.3 kg (166 lb)   07/05/24 74.8 kg (165 lb)       Constitutional: Pleasant male in no acute distress.  Eyes: No scleral icterus. No conjunctival erythema or discharge  Cardiovascular: Regular rate and rhythm. No murmurs, gallops, or rubs. No peripheral edema.  Respiratory: Clear to auscultation bilaterally. No wheezes or crackles.  MSK: moving all extremities freely  Neuro: Cranial nerves II-XII intact  Gait: walking unassisted  Skin: xeroderma with fissures of bilateral hands; left forearm lesion (see below)  Psych: appropriate mood and affect       Labs:    Latest Reference Range & Units 11/25/24 10:13   Sodium 135 - 145 mmol/L 133 (L)   Potassium 3.4 - 5.3 mmol/L 4.3   Chloride 98 - 107 mmol/L 98   Carbon Dioxide (CO2) 22 - 29 mmol/L 25   Urea Nitrogen 8.0 - 23.0 mg/dL 15.1   Creatinine 0.67 - 1.17 mg/dL 0.90   GFR Estimate >60 mL/min/1.73m2 >90   Calcium 8.8 - 10.4 mg/dL 9.0   Anion Gap 7 - 15 mmol/L 10   Albumin 3.5 - 5.2 g/dL 3.8   Protein Total 6.4 - 8.3 g/dL 6.8   Alkaline Phosphatase 40 - 150 U/L 87   ALT 0 - 70 U/L 22   AST 0 - 45 U/L 21   Bilirubin Total <=1.2 mg/dL 0.5   Glucose 70 - 99 mg/dL 91   WBC 4.0 - 11.0 10e3/uL 11.9 (H)   Hemoglobin 13.3 - 17.7 g/dL 11.8 (L)   Hematocrit 40.0 - 53.0 % 32.1 (L)   Platelet Count 150 - 450 10e3/uL 115 (L)   RBC Count 4.40 - 5.90 10e6/uL 2.41 (L)   MCV 78 - 100 fL 133 (H)   MCH 26.5 - 33.0 pg 49.0 (H)   MCHC 31.5 - 36.5 g/dL 36.8 (H)   RDW 10.0 - 15.0 % 12.5   % Neutrophils % 24   % Lymphocytes % 18   % Monocytes % 3   % Eosinophils % 54   % Basophils % 1   Absolute Basophils 0.0 - 0.2 10e3/uL  0.1   Absolute Neutrophil 1.6 - 8.3 10e3/uL 2.9   Absolute Lymphocytes 0.8 - 5.3 10e3/uL 2.1   Absolute Monocytes 0.0 - 1.3 10e3/uL 0.4   Absolute Eosinophils 0.0 - 0.7 10e3/uL 6.4 (H)   RBC Morphology  Confirmed RBC Indices   Platelet Morphology Automated Count Confirmed. Platelet morphology is normal.  Automated Count Confirmed. Platelet morphology is normal.   Reactive Lymphs None Seen  Present !       Imaging: reviewed    Impression/plan: Artie is a 63 year old male who presents to clinic for follow of of eosinophilia and leukocytosis. He is currently taking Hydrea 500 mg twice daily.     MPD with Hypereosinophilic Syndrome  -  Per Dr. 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   - labs from 11/25/24 reviewed- notable for leucocytosis, thrombocytopenia and mild anemia. Suspect platelet reduction may be related to Hydrea, macrocytic anemia- Hgb 11.8, macrocytosis could be secondary to Hydrea.  - recheck with labs in one month - will check iron studies, B12 and folate  - continue  Hydrea 500 mg twice daily   - schedule labs and follow up with Dr. Case or Rona in 3 mo  - orders placed for PET/CT    Thrombocytopenia  - Plts 115K  - will recheck labs in 1 mo  - bleeding precautions discussed    Hyponatremia  Sodium 133, appears to be chronic  -will continue to monitor    Xeroderma  Fissures of hands, and patient reports of feet, possibly secondary to Hydrea  - start Amlactin cream twice daily- prescription sent to pharmacy  - Vaseline in fissures of feet with socks nightly    Left forearm lesion  Suspect this may be a skin cancer (see photo above)  - referral placed to dermatology    History of LE Rash  Resolved  - no biopsy report in EPIC    Right Arm   -  path report 9/29/23: CASE FROM [facility], [place], [place] ([id number], OBTAINED 05/16/2023):  Skin, arm, right, lateral:- Epidermal hyperplasia with suprabasal acantholysis and apparent dyskeratosis - (see comment), Chart history was reviewed with this case. As noted in the original report, these microscopic features most resemble Armando's disease. However given the clinical findings including a marked peripheral eosinophilia and concern for a myeloid neoplasm, an additional biopsy for direct immunofluorescence is suggested to more fully rule out the possibility of an autoimmune bullous process such as pemphigus vulgaris or paraneoplastic pemphigus.     HTN  Continue carvedilol    Tobacco Abuse  Actively attempting smoking cessation  - using Nicoderm patches    Rona Ordonez PA-C  The longitudinal plan of care for the diagnosis(es)/condition(s) as documented were addressed during this visit. Due to the added complexity in care, I will continue to support Artie in the subsequent management and with ongoing continuity of care.

## 2024-12-03 NOTE — LETTER
12/3/2024      Alvarez Bess  [id number] [facility] [id number]      Dear Colleague,    Thank you for referring your patient, Alvarez Bess, to the [facility]. Please see a copy of my visit note below.    Oncology Rooming Note    December 3, 2024 8:55 AM   Alvarez Bess is a 63 year old male who presents for:    Chief Complaint   Patient presents with     Oncology Clinic Visit     Initial Vitals: /67   Pulse 50   Temp 97.9  F (36.6  C) (Oral)   Resp 16   Wt 76.6 kg (168 lb 12.8 oz)   SpO2 99%   BMI 22.89 kg/m   Estimated body mass index is 22.89 kg/m  as calculated from the following:    Height as of 10/22/24: 1.829 m (6').    Weight as of this encounter: 76.6 kg (168 lb 12.8 oz). Body surface area is 1.97 meters squared.  No Pain (0) Comment: Data Unavailable   No LMP for male patient.  Allergies reviewed: Yes  Medications reviewed: Yes    Medications: Medication refills not needed today.  Pharmacy name entered into [employer]: [id number] - [place], [place] - [address]    Frailty Screening:   Is the patient here for a new oncology consult visit in cancer care? 2. No      Clinical concerns: no       Shari J. Schoenberger, CMA              Oncology/Hematology Visit Note  12/3/2024     Reason for Visit: Follow up Eosinophilia    Oncology HPI:   2014: elevated WBC of 12.9  - 6/27/23: Initial consult: Patient states that he has had 14 pound unintentional weight loss over the last year . He denies any night sweats or weight loss. 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 He is following up with dermatology.  - 5/19/23 white count Is a 25.6 absolute neutrophils at 10.2 absolute eosinophils at 10.2 CRP level at 11.371-April 2024 it was at 16.5 sedimentation rate at 7. His hemoglobin is normal at 14.7 platelet count of 2.93   - 8/5/2023: bone marrow biopsy showed  "marked hypercellular bone marrow with marked eosinophilia maturing trilineage hematopoiesis no increased dysplasia or increase in blasts. Peripheral blood showing moderate leukocytosis with eosinophilia. Adequate neutrophils with slight shift to immaturity. Flow cytometry did not show any increase in myeloid blasts and no abnormal population of cells. Morphologic findings just showed peripheral eosinophilia. JAK2 mutation negative. 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 This was a summary for the bone marrow biopsy.   - FISH cytogenetics and molecular studies were negative for PD GFR alpha, beta, FGFR 1, JAK2 genes and BCR-ABL mutation ruling out certain leukemias and CML. No mass found on MRI that they could biopsy   - 11/27/23 PET with PET/CT shows bilateral axillary and inguinal hypermetabolic adenopathy concerning for neoplastic process including hypereosinophilic syndromes and lymphoma. Recommend tissue sampling. SUV in those regions at 3.3-3.6. Additional of hypermetabolic foci within the right gluteus and left vastus lateralis intermedius muscles SUV of 8.4 may be related to the neoplastic process   - 3/2024 cholecystectomy- hydrea held  - 4/4/2024 restarted Hydrea 500 mg twice daily    - 12/3/2024 WBC 11.9, Hgb 11.8, Plts 115k, ANC 2.9, Absolute Eosinophils 6.4    Current Treatment: Hydrea 500 mg twice daily    Interval history:   Artie presents to clinic for labs and follow up. He reports that overall he is doing well.     He is having dry/cracking skin of hands and feet. Asking if Hydrea can cause this.    He is trying to quit smoking, using Nicoderm patches.     Left forearm with wound that he keeps picking at. States \"that it will go away, then come back. \"      Rash of feet and legs has resolved.     Review of Systems: See interval hx. Denies fevers, chills, " dizziness,  changes in vision, abdominal pain, N/V, diarrhea, changes in urination, bleeding, bruising, rash.     Current Outpatient Medications   Medication Sig Dispense Refill     acetaminophen (TYLENOL) 325 MG tablet Take 2 tablets (650 mg) by mouth every 4 hours as needed for other (mild pain) 100 tablet 0     ammonium lactate (AMLACTIN) 12 % external cream Apply topically 2 times daily. 385 g 1     augmented betamethasone dipropionate (DIPROLENE AF) 0.05 % external cream Apply topically 2 times daily 100 g 3     carvedilol (COREG) 25 MG tablet Take 1 tablet (25 mg) by mouth 2 times daily (with meals). 180 tablet 3     hydroxyurea (HYDREA) 500 MG capsule Take 1 capsule (500 mg) by mouth 2 times daily 180 capsule 4     nicotine (NICODERM CQ) 14 MG/24HR 24 hr patch Place 1 patch onto the skin every 24 hours Start after completion of the 21 mg patches. 14 patch 0     nicotine (NICODERM CQ) 21 MG/24HR 24 hr patch Place 1 patch onto the skin every 24 hours Start with these patches first. 14 patch 0     nicotine (NICODERM CQ) 7 MG/24HR 24 hr patch Place 1 patch onto the skin every 24 hours Start after completing the full course of 14 mg patches. 14 patch 0     omeprazole (PRILOSEC OTC) 20 MG EC tablet Take 20 mg by mouth daily       Probiotic Product (PROBIOTIC DAILY PO)             Allergies   Allergen Reactions     Morphine Hcl      Extreme agitation         Exam:  Blood pressure 118/67, pulse 50, temperature 97.9  F (36.6  C), temperature source Oral, resp. rate 16, weight 76.6 kg (168 lb 12.8 oz), SpO2 99%.  Wt Readings from Last 4 Encounters:   12/03/24 76.6 kg (168 lb 12.8 oz)   10/22/24 72.1 kg (159 lb)   08/06/24 75.3 kg (166 lb)   07/05/24 74.8 kg (165 lb)       Constitutional: Pleasant male in no acute distress.  Eyes: No scleral icterus. No conjunctival erythema or discharge  Cardiovascular: Regular rate and rhythm. No murmurs, gallops, or rubs. No peripheral edema.  Respiratory: Clear to auscultation  bilaterally. No wheezes or crackles.  MSK: moving all extremities freely  Neuro: Cranial nerves II-XII intact  Gait: walking unassisted  Skin: xeroderma with fissures of bilateral hands; left forearm lesion (see below)  Psych: appropriate mood and affect       Labs:    Latest Reference Range & Units 11/25/24 10:13   Sodium 135 - 145 mmol/L 133 (L)   Potassium 3.4 - 5.3 mmol/L 4.3   Chloride 98 - 107 mmol/L 98   Carbon Dioxide (CO2) 22 - 29 mmol/L 25   Urea Nitrogen 8.0 - 23.0 mg/dL 15.1   Creatinine 0.67 - 1.17 mg/dL 0.90   GFR Estimate >60 mL/min/1.73m2 >90   Calcium 8.8 - 10.4 mg/dL 9.0   Anion Gap 7 - 15 mmol/L 10   Albumin 3.5 - 5.2 g/dL 3.8   Protein Total 6.4 - 8.3 g/dL 6.8   Alkaline Phosphatase 40 - 150 U/L 87   ALT 0 - 70 U/L 22   AST 0 - 45 U/L 21   Bilirubin Total <=1.2 mg/dL 0.5   Glucose 70 - 99 mg/dL 91   WBC 4.0 - 11.0 10e3/uL 11.9 (H)   Hemoglobin 13.3 - 17.7 g/dL 11.8 (L)   Hematocrit 40.0 - 53.0 % 32.1 (L)   Platelet Count 150 - 450 10e3/uL 115 (L)   RBC Count 4.40 - 5.90 10e6/uL 2.41 (L)   MCV 78 - 100 fL 133 (H)   MCH 26.5 - 33.0 pg 49.0 (H)   MCHC 31.5 - 36.5 g/dL 36.8 (H)   RDW 10.0 - 15.0 % 12.5   % Neutrophils % 24   % Lymphocytes % 18   % Monocytes % 3   % Eosinophils % 54   % Basophils % 1   Absolute Basophils 0.0 - 0.2 10e3/uL 0.1   Absolute Neutrophil 1.6 - 8.3 10e3/uL 2.9   Absolute Lymphocytes 0.8 - 5.3 10e3/uL 2.1   Absolute Monocytes 0.0 - 1.3 10e3/uL 0.4   Absolute Eosinophils 0.0 - 0.7 10e3/uL 6.4 (H)   RBC Morphology  Confirmed RBC Indices   Platelet Morphology Automated Count Confirmed. Platelet morphology is normal.  Automated Count Confirmed. Platelet morphology is normal.   Reactive Lymphs None Seen  Present !       Imaging: reviewed    Impression/plan: Artie is a 63 year old male who presents to clinic for follow of of eosinophilia and leukocytosis. He is currently taking Hydrea 500 mg twice daily.     MPD with Hypereosinophilic Syndrome  -  Per Dr. 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   - labs from 11/25/24 reviewed- notable for leucocytosis, thrombocytopenia and mild anemia. Suspect platelet reduction may be related to Hydrea, macrocytic anemia- Hgb 11.8  - recheck with labs in one month - will check iron studies, B12 and folate  - continue  Hydrea 500 mg twice daily   - schedule labs and follow up with Dr. Case or Rona in 3 mo  - will discuss re-imaging with Dr. Case    Thrombocytopenia  - Plts 115K  - will recheck labs in 1 mo  - bleeding precautions discussed    Hyponatremia  Sodium 133, appears to be chronic  -will continue to monitor    Xeroderma  Fissures of hands, and patient reports of feet, possibly secondary to Hydrea  - start Amlactin cream twice daily- prescription sent to pharmacy  - Vaseline in fissures of feet with socks nightly    Left forearm lesion  Suspect this may be a skin cancer (see photo above)  - referral placed to dermatology    History of LE Rash  Resolved  - no biopsy report in EPIC    Right Arm   - path report 9/29/23: CASE FROM [facility], [place], [place] ([id number], OBTAINED 05/16/2023):  Skin, arm, right, lateral:- Epidermal hyperplasia with suprabasal acantholysis and apparent dyskeratosis - (see comment), Chart history was reviewed with this case. As noted in the original report, these microscopic features most resemble Armando's disease. However given the clinical findings including a marked peripheral eosinophilia and concern for a myeloid neoplasm, an additional biopsy for direct immunofluorescence is suggested to more fully rule out the possibility of an autoimmune bullous process such as pemphigus vulgaris or paraneoplastic pemphigus.     HTN  Continue  carvedilol    Tobacco Abuse  Actively attempting smoking cessation  - using Nicoderm patches    Rona Ordonez PA-C  The longitudinal plan of care for the diagnosis(es)/condition(s) as documented were addressed during this visit. Due to the added complexity in care, I will continue to support Artie in the subsequent management and with ongoing continuity of care.      Again, thank you for allowing me to participate in the care of your patient.        Sincerely,        Rona Ordonez PA-C

## 2024-12-03 NOTE — TELEPHONE ENCOUNTER
This encounter is being sent to inform the clinic that this patient has a referral from Rona CALVILLO for the diagnoses of Changing skin lesion and has requested that this patient be seen within 1-2 weeks and/or with Derm.  Based on the availability of our provider(s), we are unable to accommodate this request.    Were all sites offered this patient?  Yes    Does scheduling algorithm request to schedule next available?  Patient has been scheduled for the first available opening with Jazzmine Smith on 04/16/2025.  We have informed the patient that the clinic will review their referral and reach out if a sooner appointment is medically necessary.             Thank you!  [facility]

## 2024-12-03 NOTE — PROGRESS NOTES
Oncology Rooming Note    December 3, 2024 8:55 AM   Alvarez Bess is a 63 year old male who presents for:    Chief Complaint   Patient presents with    Oncology Clinic Visit     Initial Vitals: /67   Pulse 50   Temp 97.9  F (36.6  C) (Oral)   Resp 16   Wt 76.6 kg (168 lb 12.8 oz)   SpO2 99%   BMI 22.89 kg/m   Estimated body mass index is 22.89 kg/m  as calculated from the following:    Height as of 10/22/24: 1.829 m (6').    Weight as of this encounter: 76.6 kg (168 lb 12.8 oz). Body surface area is 1.97 meters squared.  No Pain (0) Comment: Data Unavailable   No LMP for male patient.  Allergies reviewed: Yes  Medications reviewed: Yes    Medications: Medication refills not needed today.  Pharmacy name entered into [employer]: [facility] #[id number] [place], [place] - [address] [facility] AT [address]    Frailty Screening:   Is the patient here for a new oncology consult visit in cancer care? 2. No      Clinical concerns: no       Shari J. Schoenberger, [facility]

## 2024-12-09 ENCOUNTER — OFFICE VISIT (OUTPATIENT)
Dept: DERMATOLOGY | Facility: CLINIC | Age: 64
End: 2024-12-09
Payer: COMMERCIAL

## 2024-12-09 DIAGNOSIS — D48.9 NEOPLASM OF UNCERTAIN BEHAVIOR: Primary | ICD-10-CM

## 2024-12-09 DIAGNOSIS — R21 RASH AND NONSPECIFIC SKIN ERUPTION: ICD-10-CM

## 2024-12-09 RX ORDER — CLOBETASOL PROPIONATE 0.5 MG/G
OINTMENT TOPICAL 2 TIMES DAILY
Qty: 60 G | Refills: 3 | Status: SHIPPED | OUTPATIENT
Start: 2024-12-09

## 2024-12-09 NOTE — PROGRESS NOTES
I have personally examined this patient and agree with thePA's documentation and plan of care. I have reviewed and amended the PA's note. The documentation accurately reflects my clinical observations, diagnoses, treatment and follow-up plans. I performed the procedure(s).       Domingo Gonsalez MD  Dermatology Staff      [facility] Dermatology Note  Encounter Date: Dec 9, 2024  Office Visit     Reviewed patients past medical history and pertinent chart review prior to patients visit today.     Dermatology Problem List:  #. NUB, L forearm, shave biopsy 12/9/2024   # RASH, L MCP, punch biopsy 12/9/2024     Pertinent Medical History:  Being worked up for neoplastic process including hypereosinophilic syndromes and lymphoma - hydroxyurea    ____________________________________________    Assessment & Plan:     # Neoplasm of uncertain behavior (shave biopsy): L forearm  DDx keratoacanthoma vs dermatofibroma vs prurigo nodule  Associated pain in immunocompromised pt is suspicious for KA/malignancy, but lesion has dermal component which would suggest PN      Procedure: biopsy by shave   Risks/benefits discussed, including but not limited to bleeding, infection, scar, incomplete removal, and non-diagnostic biopsy. Above site cleansed with alcohol pad and injected with 1% lidocaine with epinephrine. Once anesthesia was obtained, biopsy performed with Gilette blade. Tissue placed in labeled formalin container and sent to pathology. Hemostasis achieved with aluminum chloride. Vaseline and bandage applied to wound. Pt tolerated procedure well and given post biopsy care instructions.    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   - Start  clobetasol 0.05% ointment on hands BID when flared. Treat until gone. Continue to use ongoing 1-2 times weekly for maintenance. Not for face or body folds. Caution skin atrophy with several months of nonstop use.   - Pt agreeable to biopsy today    # Rash (punch biopsy): L MCP  Ddx drug induced dermatomyositis vs other          Procedure: Biopsy by punch   Risks/benefits discussed, including but not limited to bleeding, infection, scar, incomplete removal, and non-diagnostic biopsy. Verbal informed consent obtained. Above location cleansed with alcohol pad and injected with lidocaine with epinephrine. Once anesthesia was obtained, a 3 mm punch biopsy was performed. Tissue placed in labeled formalin container and sent to pathology. Site closed using 4-0 ethilon. Vaseline and bandage applied to wound. Pt tolerated procedure well and given post biopsy care instructions.     Follow-up: TBD pending biopsy results    All risks, benefits and alternatives were discussed with patient.  Patient is in agreement and understands the assessment and plan.  All questions were answered.    STAFF  Shun Acosta PA-C  [facility]    _______________________________________    CC: Derm Problem (Lesion-left forearm-present for about 5 months-itchy and scabby-gets caught on things)    HPI:  Mr. Alvarez Bess is a(n) 63 year old male who presents as a as a new patient.    1) Lesion on L forearm. Present for 6 months. Denies spontaneous bleeding. He will pick off and it grows back. Immunocompromised. Site is painful with palpation. No personal history of skin cancer. Thinks skin cancer runs in his family, unsure what type.     2) Thickening, peeling on bilateral hands. Present for several years. Works in a print shop, so using hands frequently. Often puts Vaseline on his hands and covers with gloves at work to manage symptoms. Previously treated with Amlactin, which caused severe burning. Takes hydroxyurea 500 mg BID  for unknown possible malignant process.     Patient is otherwise feeling well, without additional skin concerns.      Physical Exam:  SKIN: Focused examination of L forearm was performed.  [place] 2  - 6 mm firm, tender violaceous nodule with central keratin plug on L forearm  - Violaceous nodules present on bilateral dorsal hands  - Scaling and hyperpigmentation present on ventral aspect of digits bilaterally  - Palmar hands are clear    No other lesions of concern on areas examined.     Medications:  Current Outpatient Medications   Medication Sig Dispense Refill    acetaminophen (TYLENOL) 325 MG tablet Take 2 tablets (650 mg) by mouth every 4 hours as needed for other (mild pain) 100 tablet 0    ammonium lactate (AMLACTIN) 12 % external cream Apply topically 2 times daily. 385 g 1    augmented betamethasone dipropionate (DIPROLENE AF) 0.05 % external cream Apply topically 2 times daily 100 g 3    carvedilol (COREG) 25 MG tablet Take 1 tablet (25 mg) by mouth 2 times daily (with meals). 180 tablet 3    hydroxyurea (HYDREA) 500 MG capsule Take 1 capsule (500 mg) by mouth 2 times daily 180 capsule 4    nicotine (NICODERM CQ) 14 MG/24HR 24 hr patch Place 1 patch onto the skin every 24 hours Start after completion of the 21 mg patches. 14 patch 0    nicotine (NICODERM CQ) 21 MG/24HR 24 hr patch Place 1 patch onto the skin every 24 hours Start with these patches first. 14 patch 0    nicotine (NICODERM CQ) 7 MG/24HR 24 hr patch Place 1 patch onto the skin every 24 hours Start after completing the full course of 14 mg patches. 14 patch 0    omeprazole (PRILOSEC OTC) 20 MG EC tablet Take 20 mg by mouth daily      Probiotic Product (PROBIOTIC DAILY PO)        No current facility-administered medications for this visit.      Past Medical History:   Patient Active Problem List   Diagnosis    Gastroenteritis    Cardiomyopathy (H)    Congestive heart failure (H)    Hyperlipidemia    Hypertension    IBS (irritable bowel  syndrome)    Impaired fasting glucose    Vitamin D deficiency    Lymphadenopathy     Past Medical History:   Diagnosis Date    Cardiomyopathy (H)     Congestive heart failure (H)      Problem list name updated by automated process. Provider to review    Congestive heart failure, unspecified     Eosinophilia     Gastro-oesophageal reflux disease     Hyperlipidemia 07/19/2019    Hypertension     Penile discharge     Vitamin D deficiency 07/09/2010       CC Rona Ordonez MD  [address]  [place] [address] on close of this encounter.

## 2024-12-09 NOTE — PATIENT INSTRUCTIONS
Wound Care After a Biopsy    What is a skin biopsy?  A skin biopsy allows the doctor to examine a very small piece of tissue under the microscope to determine the diagnosis and the best treatment for the skin condition. A local anesthetic (numbing medicine) is injected with a very small needle into the skin area to be tested. A small piece of skin is taken from the area. Sometimes a suture (stitch) is used.     What are the risks of a skin biopsy?  I will experience scar, bleeding, swelling, pain, crusting and redness. I may experience incomplete removal or recurrence. Risks of this procedure are excessive bleeding, bruising, infection, nerve damage, numbness, thick (hypertrophic or keloidal) scar and non-diagnostic biopsy.    How should I care for my wound for the first 24 hours?  Keep the wound dry and covered for 24 hours  If it bleeds, hold direct pressure on the area for 15 minutes. If bleeding does not stop, call us or go to the emergency room  Avoid strenuous exercise the first 1-2 days or as your doctor instructs you    How should I care for the wound after 24 hours?  After 24 hours, remove the bandage  You may bathe or shower as normal  If you had a scalp biopsy, you can shampoo as usual and can use shower water to clean the biopsy site daily  Clean the wound once a day with gentle soap and water  Do not scrub, be gentle  Apply white petroleum/Vaseline after cleaning the wound with a cotton swab or a clean finger, and keep the site covered with a Bandaid /bandage. Bandages are not necessary with a scalp biopsy  If you are unable to cover the site with a Bandaid /bandage, re-apply ointment 2-3 times a day to keep the site moist. Moisture will help with healing  Avoid strenuous activity for first 1-2 days  Avoid lakes, rivers, pools, and oceans until the stitches are removed or the site is healed    How do I clean my wound?  Wash hands thoroughly with soap or use hand  before all wound care  Clean  the wound with gentle soap and water  Apply white petroleum/Vaseline  to wound after it is clean  Replace the Bandaid /bandage to keep the wound covered for the first few days or as instructed by your doctor  If you had a scalp biopsy, warm shower water to the area on a daily basis should suffice    What should I use to clean my wound?   Cotton-tipped applicators (Qtips )  White petroleum jelly (Vaseline ). Use a clean new container and use Q-tips to apply.  Bandaids  as needed  Gentle soap     How should I care for my wound long term?  Do not get your wound dirty  Keep up with wound care for one week or until the area is healed.  If you have stitches, stitches need to be removed in 7 days. You may return to our clinic for this or you may have it done locally at your doctor s office.  A small scab will form and fall off by itself when the area is completely healed. The area will be red and will become pink in color as it heals. Sun protection is very important for how your scar will turn out. Sunscreen with an SPF 30 or greater is recommended once the area is healed.  You should have some soreness but it should be mild and slowly go away over several days. Talk to your doctor about using tylenol for pain,    When should I call my doctor?  If you have increased:   Pain or swelling  Pus or drainage (clear or slightly yellow drainage is ok)  Temperature over 100F  Spreading redness or warmth around wound    When will I hear about my results?  The biopsy results can take 2 weeks to come back.  Your results will automatically release to [employer] before your provider has even reviewed them.  The clinic will call you with the results, send you a [employer] message, or have you schedule a follow-up clinic or phone time to discuss the results.  Contact our clinics if you do not hear from us in 2 weeks.    Who should I call with questions?  [facility]: [phone]  [facility]  [place]: [phone]  For urgent needs outside of business hours call the [facility] at [phone] and ask for the dermatology resident on call

## 2024-12-09 NOTE — LETTER
12/9/2024      Alvarez Bess  [address] [facility] [id number]      Dear Colleague,    Thank you for referring your patient, Alvarez Bess, to the [facility]. Please see a copy of my visit note below.    I have personally examined this patient and agree with thePA's documentation and plan of care. I have reviewed and amended the PA's note. The documentation accurately reflects my clinical observations, diagnoses, treatment and follow-up plans. I performed the procedure(s).       Domingo Gonsalez MD  Dermatology Staff      [facility] Dermatology Note  Encounter Date: Dec 9, 2024  Office Visit     Reviewed patients past medical history and pertinent chart review prior to patients visit today.     Dermatology Problem List:  #. NUB, L forearm, shave biopsy 12/9/2024   # RASH, L MCP, punch biopsy 12/9/2024     Pertinent Medical History:  Being worked up for neoplastic process including hypereosinophilic syndromes and lymphoma - hydroxyurea    ____________________________________________    Assessment & Plan:     # Neoplasm of uncertain behavior (shave biopsy): L forearm  DDx keratoacanthoma vs dermatofibroma vs prurigo nodule  Associated pain in immunocompromised pt is suspicious for KA/malignancy, but lesion has dermal component which would suggest PN      Procedure: biopsy by shave   Risks/benefits discussed, including but not limited to bleeding, infection, scar, incomplete removal, and non-diagnostic biopsy. Above site cleansed with alcohol pad and injected with 1% lidocaine with epinephrine. Once anesthesia was obtained, biopsy performed with Gilette blade. Tissue placed in labeled formalin container and sent to pathology. Hemostasis achieved with aluminum chloride. Vaseline and bandage applied to wound. Pt tolerated procedure well and given post biopsy care instructions.    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   - Start clobetasol 0.05% ointment on hands BID when flared. Treat until gone. Continue to use ongoing 1-2 times weekly for maintenance. Not for face or body folds. Caution skin atrophy with several months of nonstop use.   - Pt agreeable to biopsy today    # Rash (punch biopsy): L MCP  Ddx drug induced dermatomyositis vs other          Procedure: Biopsy by punch   Risks/benefits discussed, including but not limited to bleeding, infection, scar, incomplete removal, and non-diagnostic biopsy. Verbal informed consent obtained. Above location cleansed with alcohol pad and injected with lidocaine with epinephrine. Once anesthesia was obtained, a 3 mm punch biopsy was performed. Tissue placed in labeled formalin container and sent to pathology. Site closed using 4-0 ethilon. Vaseline and bandage applied to wound. Pt tolerated procedure well and given post biopsy care instructions.     Follow-up: TBD pending biopsy results    All risks, benefits and alternatives were discussed with patient.  Patient is in agreement and understands the assessment and plan.  All questions were answered.    STAFF  Shun Acosta PA-C  [facility] Dermatology    _______________________________________    CC: Derm Problem (Lesion-left forearm-present for about 5 months-itchy and scabby-gets caught on things)    HPI:  Mr. Alvarez Bess is a(n) 63 year old male who presents as a as a new patient.    1) Lesion on L forearm. Present for 6 months. Denies spontaneous bleeding. He will pick off and it grows back. Immunocompromised. Site is painful with palpation. No personal history of skin cancer. Thinks skin cancer runs in his family, unsure what type.     2) Thickening, peeling on bilateral  hands. Present for several years. Works in a [employer] shop, so using hands frequently. Often puts Vaseline on his hands and covers with gloves at work to manage symptoms. Previously treated with Amlactin, which caused severe burning. Takes hydroxyurea 500 mg BID for unknown possible malignant process.     Patient is otherwise feeling well, without additional skin concerns.      Physical Exam:  SKIN: Focused examination of L forearm was performed.  [place] 2  - 6 mm firm, tender violaceous nodule with central keratin plug on L forearm  - Violaceous nodules present on bilateral dorsal hands  - Scaling and hyperpigmentation present on ventral aspect of digits bilaterally  - Palmar hands are clear    No other lesions of concern on areas examined.     Medications:  Current Outpatient Medications   Medication Sig Dispense Refill     acetaminophen (TYLENOL) 325 MG tablet Take 2 tablets (650 mg) by mouth every 4 hours as needed for other (mild pain) 100 tablet 0     ammonium lactate (AMLACTIN) 12 % external cream Apply topically 2 times daily. 385 g 1     augmented betamethasone dipropionate (DIPROLENE AF) 0.05 % external cream Apply topically 2 times daily 100 g 3     carvedilol (COREG) 25 MG tablet Take 1 tablet (25 mg) by mouth 2 times daily (with meals). 180 tablet 3     hydroxyurea (HYDREA) 500 MG capsule Take 1 capsule (500 mg) by mouth 2 times daily 180 capsule 4     nicotine (NICODERM CQ) 14 MG/24HR 24 hr patch Place 1 patch onto the skin every 24 hours Start after completion of the 21 mg patches. 14 patch 0     nicotine (NICODERM CQ) 21 MG/24HR 24 hr patch Place 1 patch onto the skin every 24 hours Start with these patches first. 14 patch 0     nicotine (NICODERM CQ) 7 MG/24HR 24 hr patch Place 1 patch onto the skin every 24 hours Start after completing the full course of 14 mg patches. 14 patch 0     omeprazole (PRILOSEC OTC) 20 MG EC tablet Take 20 mg by mouth daily       Probiotic Product (PROBIOTIC DAILY PO)         No current facility-administered medications for this visit.      Past Medical History:   Patient Active Problem List   Diagnosis     Gastroenteritis     Cardiomyopathy (H)     Congestive heart failure (H)     Hyperlipidemia     Hypertension     IBS (irritable bowel syndrome)     Impaired fasting glucose     Vitamin D deficiency     Lymphadenopathy     Past Medical History:   Diagnosis Date     Cardiomyopathy (H)      Congestive heart failure (H)      Problem list name updated by automated process. Provider to review     Congestive heart failure, unspecified      Eosinophilia      Gastro-oesophageal reflux disease      Hyperlipidemia 07/19/2019     Hypertension      Penile discharge      Vitamin D deficiency 07/09/2010       CC Rona Ordonez MD  [facility] [place] [address] on close of this encounter.       Again, thank you for allowing me to participate in the care of your patient.        Sincerely,        Domingo Gonsalez MD

## 2024-12-23 LAB
PATH REPORT.COMMENTS IMP SPEC: ABNORMAL
PATH REPORT.COMMENTS IMP SPEC: YES
PATH REPORT.FINAL DX SPEC: ABNORMAL
PATH REPORT.GROSS SPEC: ABNORMAL
PATH REPORT.MICROSCOPIC SPEC OTHER STN: ABNORMAL
PATH REPORT.RELEVANT HX SPEC: ABNORMAL

## 2024-12-24 ENCOUNTER — TELEPHONE (OUTPATIENT)
Dept: DERMATOLOGY | Facility: CLINIC | Age: 64
End: 2024-12-24
Payer: COMMERCIAL

## 2024-12-24 DIAGNOSIS — D48.9 NEOPLASM OF UNCERTAIN BEHAVIOR: ICD-10-CM

## 2024-12-24 DIAGNOSIS — R21 RASH AND NONSPECIFIC SKIN ERUPTION: Primary | ICD-10-CM

## 2024-12-24 NOTE — TELEPHONE ENCOUNTER
Called and spoke with pt and discussed results and treatment. Scheduled appt for excision.    Thank you,  Yumi MOREL RN  Dermatology   [phone]

## 2024-12-24 NOTE — TELEPHONE ENCOUNTER
----- Message from Domingo Gonsalez sent at 12/23/2024  4:34 PM CST -----  Please schedule pt w/ me for excision of L forearm scc

## 2025-01-06 ENCOUNTER — LAB (OUTPATIENT)
Dept: LAB | Facility: CLINIC | Age: 65
End: 2025-01-06
Payer: COMMERCIAL

## 2025-01-06 ENCOUNTER — APPOINTMENT (OUTPATIENT)
Dept: LAB | Facility: CLINIC | Age: 65
End: 2025-01-06
Payer: COMMERCIAL

## 2025-01-06 DIAGNOSIS — D53.9 MACROCYTIC ANEMIA: ICD-10-CM

## 2025-01-06 DIAGNOSIS — D69.6 THROMBOCYTOPENIA: ICD-10-CM

## 2025-01-06 DIAGNOSIS — D72.10 EOSINOPHILIA, UNSPECIFIED TYPE: ICD-10-CM

## 2025-01-06 LAB
BASOPHILS # BLD MANUAL: 0 10E3/UL (ref 0–0.2)
BASOPHILS NFR BLD MANUAL: 0 %
EOSINOPHIL # BLD MANUAL: 6.9 10E3/UL (ref 0–0.7)
EOSINOPHIL NFR BLD MANUAL: 53 %
ERYTHROCYTE [DISTWIDTH] IN BLOOD BY AUTOMATED COUNT: 13.2 % (ref 10–15)
FOLATE SERPL-MCNC: 5.1 NG/ML (ref 4.6–34.8)
HCT VFR BLD AUTO: 32 % (ref 40–53)
HGB BLD-MCNC: 11.2 G/DL (ref 13.3–17.7)
LYMPHOCYTES # BLD MANUAL: 1.8 10E3/UL (ref 0.8–5.3)
LYMPHOCYTES NFR BLD MANUAL: 14 %
MCH RBC QN AUTO: 48.9 PG (ref 26.5–33)
MCHC RBC AUTO-ENTMCNC: 35 G/DL (ref 31.5–36.5)
MCV RBC AUTO: 140 FL (ref 78–100)
MONOCYTES # BLD MANUAL: 0.8 10E3/UL (ref 0–1.3)
MONOCYTES NFR BLD MANUAL: 6 %
NEUTROPHILS # BLD MANUAL: 3.5 10E3/UL (ref 1.6–8.3)
NEUTROPHILS NFR BLD MANUAL: 27 %
PLAT MORPH BLD: ABNORMAL
PLATELET # BLD AUTO: 106 10E3/UL (ref 150–450)
RBC # BLD AUTO: 2.29 10E6/UL (ref 4.4–5.9)
RBC MORPH BLD: ABNORMAL
WBC # BLD AUTO: 13.1 10E3/UL (ref 4–11)

## 2025-01-06 PROCEDURE — 80053 COMPREHEN METABOLIC PANEL: CPT

## 2025-01-06 PROCEDURE — 36415 COLL VENOUS BLD VENIPUNCTURE: CPT

## 2025-01-06 PROCEDURE — 85027 COMPLETE CBC AUTOMATED: CPT

## 2025-01-06 PROCEDURE — 85007 BL SMEAR W/DIFF WBC COUNT: CPT

## 2025-01-06 PROCEDURE — 82746 ASSAY OF FOLIC ACID SERUM: CPT

## 2025-01-07 DIAGNOSIS — E83.51 HYPOCALCEMIA: ICD-10-CM

## 2025-01-07 DIAGNOSIS — E55.9 VITAMIN D DEFICIENCY: ICD-10-CM

## 2025-01-07 DIAGNOSIS — D53.9 MACROCYTIC ANEMIA: Primary | ICD-10-CM

## 2025-01-07 DIAGNOSIS — E83.51 HYPOCALCEMIA: Primary | ICD-10-CM

## 2025-01-07 LAB
ALBUMIN SERPL BCG-MCNC: 3.7 G/DL (ref 3.5–5.2)
ALP SERPL-CCNC: 91 U/L (ref 40–150)
ALT SERPL W P-5'-P-CCNC: 19 U/L (ref 0–70)
ANION GAP SERPL CALCULATED.3IONS-SCNC: 9 MMOL/L (ref 7–15)
AST SERPL W P-5'-P-CCNC: 17 U/L (ref 0–45)
BILIRUB SERPL-MCNC: 0.4 MG/DL
BUN SERPL-MCNC: 15.7 MG/DL (ref 8–23)
CALCIUM SERPL-MCNC: 8.7 MG/DL (ref 8.8–10.4)
CHLORIDE SERPL-SCNC: 97 MMOL/L (ref 98–107)
CREAT SERPL-MCNC: 0.91 MG/DL (ref 0.67–1.17)
EGFRCR SERPLBLD CKD-EPI 2021: >90 ML/MIN/1.73M2
GLUCOSE SERPL-MCNC: 109 MG/DL (ref 70–99)
HCO3 SERPL-SCNC: 26 MMOL/L (ref 22–29)
IRON BINDING CAPACITY (ROCHE): 196 UG/DL (ref 240–430)
IRON SATN MFR SERPL: 44 % (ref 15–46)
IRON SERPL-MCNC: 87 UG/DL (ref 61–157)
POTASSIUM SERPL-SCNC: 4.4 MMOL/L (ref 3.4–5.3)
PROT SERPL-MCNC: 7 G/DL (ref 6.4–8.3)
SODIUM SERPL-SCNC: 132 MMOL/L (ref 135–145)
VIT B12 SERPL-MCNC: 557 PG/ML (ref 232–1245)
VIT D+METAB SERPL-MCNC: 8 NG/ML (ref 20–50)

## 2025-01-07 NOTE — PROGRESS NOTES
Lab orders    Hgb down trending- recent colonoscopy (10/24 normal)- iron panel without MARV, B12 normal, folate normal. Will check MMA     Hypocalcemia- lab ordered for Vitamin D deficiency    He has upcoming PET scan  Rona Ordonez PA-C

## 2025-01-08 DIAGNOSIS — E87.1 HYPONATREMIA: Primary | ICD-10-CM

## 2025-01-08 NOTE — PROGRESS NOTES
1/8/2025     Discussed lab results with patient.     A/P:   MMA, PTH, CMP ordered to further assess anemia, hypocalcemia and hyponatremia  - He is going to schedule appt at [facility]  - Will follow up with once new labs drawn  - 1/28 PET    Rona Ordonez PA-C

## 2025-01-15 ENCOUNTER — LAB (OUTPATIENT)
Dept: LAB | Facility: CLINIC | Age: 65
End: 2025-01-15
Payer: COMMERCIAL

## 2025-01-15 DIAGNOSIS — E83.51 HYPOCALCEMIA: ICD-10-CM

## 2025-01-15 DIAGNOSIS — D72.118 OTHER HYPEREOSINOPHILIC SYNDROME: ICD-10-CM

## 2025-01-15 DIAGNOSIS — E87.1 HYPONATREMIA: ICD-10-CM

## 2025-01-15 DIAGNOSIS — D53.9 MACROCYTIC ANEMIA: ICD-10-CM

## 2025-01-15 LAB
ALBUMIN SERPL BCG-MCNC: 3.8 G/DL (ref 3.5–5.2)
ALP SERPL-CCNC: 96 U/L (ref 40–150)
ALT SERPL W P-5'-P-CCNC: 21 U/L (ref 0–70)
ANION GAP SERPL CALCULATED.3IONS-SCNC: 8 MMOL/L (ref 7–15)
AST SERPL W P-5'-P-CCNC: 17 U/L (ref 0–45)
BILIRUB SERPL-MCNC: 0.6 MG/DL
BUN SERPL-MCNC: 18 MG/DL (ref 8–23)
CALCIUM SERPL-MCNC: 10 MG/DL (ref 8.8–10.4)
CHLORIDE SERPL-SCNC: 98 MMOL/L (ref 98–107)
CREAT SERPL-MCNC: 0.96 MG/DL (ref 0.67–1.17)
EGFRCR SERPLBLD CKD-EPI 2021: 89 ML/MIN/1.73M2
GLUCOSE SERPL-MCNC: 110 MG/DL (ref 70–99)
HCO3 SERPL-SCNC: 28 MMOL/L (ref 22–29)
POTASSIUM SERPL-SCNC: 4.4 MMOL/L (ref 3.4–5.3)
PROT SERPL-MCNC: 7 G/DL (ref 6.4–8.3)
PTH-INTACT SERPL-MCNC: 5 PG/ML (ref 15–65)
SODIUM SERPL-SCNC: 134 MMOL/L (ref 135–145)

## 2025-01-15 PROCEDURE — 85007 BL SMEAR W/DIFF WBC COUNT: CPT

## 2025-01-15 PROCEDURE — 36415 COLL VENOUS BLD VENIPUNCTURE: CPT

## 2025-01-15 PROCEDURE — 83921 ORGANIC ACID SINGLE QUANT: CPT

## 2025-01-15 PROCEDURE — 83970 ASSAY OF PARATHORMONE: CPT

## 2025-01-15 PROCEDURE — 80053 COMPREHEN METABOLIC PANEL: CPT

## 2025-01-16 LAB
BASOPHILS # BLD AUTO: 0.1 10E3/UL (ref 0–0.2)
BASOPHILS # BLD MANUAL: 0.3 10E3/UL (ref 0–0.2)
BASOPHILS NFR BLD AUTO: 1 %
BASOPHILS NFR BLD MANUAL: 2 %
EOSINOPHIL # BLD AUTO: 6.9 10E3/UL (ref 0–0.7)
EOSINOPHIL # BLD MANUAL: 6.5 10E3/UL (ref 0–0.7)
EOSINOPHIL NFR BLD AUTO: 54 %
EOSINOPHIL NFR BLD MANUAL: 51 %
ERYTHROCYTE [DISTWIDTH] IN BLOOD BY AUTOMATED COUNT: 12.4 % (ref 10–15)
HCT VFR BLD AUTO: 31.4 % (ref 40–53)
HGB BLD-MCNC: 11.4 G/DL (ref 13.3–17.7)
IMM GRANULOCYTES # BLD: 0.1 10E3/UL
IMM GRANULOCYTES NFR BLD: 1 %
LYMPHOCYTES # BLD AUTO: 1.7 10E3/UL (ref 0.8–5.3)
LYMPHOCYTES # BLD MANUAL: 1.4 10E3/UL (ref 0.8–5.3)
LYMPHOCYTES NFR BLD AUTO: 13 %
LYMPHOCYTES NFR BLD MANUAL: 11 %
MCH RBC QN AUTO: 48.9 PG (ref 26.5–33)
MCHC RBC AUTO-ENTMCNC: 36.3 G/DL (ref 31.5–36.5)
MCV RBC AUTO: 135 FL (ref 78–100)
MONOCYTES # BLD AUTO: 0.7 10E3/UL (ref 0–1.3)
MONOCYTES # BLD MANUAL: 0.6 10E3/UL (ref 0–1.3)
MONOCYTES NFR BLD AUTO: 5 %
MONOCYTES NFR BLD MANUAL: 5 %
NEUTROPHILS # BLD AUTO: 3.3 10E3/UL (ref 1.6–8.3)
NEUTROPHILS # BLD MANUAL: 4 10E3/UL (ref 1.6–8.3)
NEUTROPHILS NFR BLD AUTO: 26 %
NEUTROPHILS NFR BLD MANUAL: 31 %
NRBC # BLD AUTO: 0 10E3/UL
NRBC BLD AUTO-RTO: 0 /100
PATH REV: ABNORMAL
PLAT MORPH BLD: ABNORMAL
PLATELET # BLD AUTO: 114 10E3/UL (ref 150–450)
RBC # BLD AUTO: 2.33 10E6/UL (ref 4.4–5.9)
RBC MORPH BLD: ABNORMAL
WBC # BLD AUTO: 12.8 10E3/UL (ref 4–11)

## 2025-01-20 DIAGNOSIS — E55.9 VITAMIN D DEFICIENCY: ICD-10-CM

## 2025-01-21 ENCOUNTER — TELEPHONE (OUTPATIENT)
Dept: NEUROLOGY | Facility: CLINIC | Age: 65
End: 2025-01-21
Payer: COMMERCIAL

## 2025-01-21 NOTE — TELEPHONE ENCOUNTER
Attempted to reach patient to remind them about appointment scheduled with Azra Garcia DO on 1/22/25 in our [place] clinic.    A voicemail was left with a call back number if the patient has questions or would like to reschedule.

## 2025-01-22 ENCOUNTER — PRE VISIT (OUTPATIENT)
Dept: NEUROLOGY | Facility: CLINIC | Age: 65
End: 2025-01-22

## 2025-01-22 LAB — METHYLMALONATE SERPL-SCNC: 0.23 UMOL/L (ref 0–0.4)

## 2025-01-28 ENCOUNTER — HOSPITAL ENCOUNTER (OUTPATIENT)
Dept: PET IMAGING | Facility: CLINIC | Age: 65
Discharge: HOME | End: 2025-01-28
Payer: COMMERCIAL

## 2025-01-28 DIAGNOSIS — D72.10 EOSINOPHILIA, UNSPECIFIED TYPE: Primary | ICD-10-CM

## 2025-01-28 DIAGNOSIS — D72.10 EOSINOPHILIA, UNSPECIFIED TYPE: ICD-10-CM

## 2025-01-28 DIAGNOSIS — R73.01 IMPAIRED FASTING GLUCOSE: ICD-10-CM

## 2025-01-28 DIAGNOSIS — R59.1 LYMPHADENOPATHY: ICD-10-CM

## 2025-01-28 DIAGNOSIS — R91.8 PULMONARY NODULES: ICD-10-CM

## 2025-01-28 DIAGNOSIS — M79.10 MYALGIA: ICD-10-CM

## 2025-01-28 PROCEDURE — 78815 PET IMAGE W/CT SKULL-THIGH: CPT | Mod: PS

## 2025-01-28 PROCEDURE — 74177 CT ABD & PELVIS W/CONTRAST: CPT

## 2025-01-28 PROCEDURE — 250N000011 HC RX IP 250 OP 636

## 2025-01-28 PROCEDURE — 343N000001 HC RX 343 MED OP 636

## 2025-01-28 PROCEDURE — A9552 F18 FDG: HCPCS

## 2025-01-28 PROCEDURE — 71260 CT THORAX DX C+: CPT

## 2025-01-28 RX ORDER — FLUDEOXYGLUCOSE F 18 200 MCI/ML
10-18 INJECTION, SOLUTION INTRAVENOUS ONCE
Status: COMPLETED | OUTPATIENT
Start: 2025-01-28 | End: 2025-01-28

## 2025-01-28 RX ORDER — IOPAMIDOL 755 MG/ML
10-135 INJECTION, SOLUTION INTRAVASCULAR ONCE
Status: COMPLETED | OUTPATIENT
Start: 2025-01-28 | End: 2025-01-28

## 2025-01-28 RX ADMIN — FLUDEOXYGLUCOSE F 18 13.56 MILLICURIE: 200 INJECTION, SOLUTION INTRAVENOUS at 07:56

## 2025-01-28 RX ADMIN — IOPAMIDOL 103 ML: 755 INJECTION, SOLUTION INTRAVENOUS at 07:56

## 2025-01-29 ENCOUNTER — MYC MEDICAL ADVICE (OUTPATIENT)
Dept: CARDIOLOGY | Facility: CLINIC | Age: 65
End: 2025-01-29
Payer: COMMERCIAL

## 2025-01-29 DIAGNOSIS — I25.10 CORONARY ARTERY CALCIFICATION: Primary | ICD-10-CM

## 2025-01-29 NOTE — TELEPHONE ENCOUNTER
Per Dr. Ordonez plan for visit to discuss coronary calcifications on recent PET scan. Offered pt visit on 2/26/25 at 9:45 am and pt agreed. Message sent to scheduling.

## 2025-02-03 ENCOUNTER — LAB (OUTPATIENT)
Dept: LAB | Facility: CLINIC | Age: 65
End: 2025-02-03
Payer: COMMERCIAL

## 2025-02-03 ENCOUNTER — TELEPHONE (OUTPATIENT)
Dept: ONCOLOGY | Facility: CLINIC | Age: 65
End: 2025-02-03

## 2025-02-03 DIAGNOSIS — E83.52 HYPERCALCEMIA: ICD-10-CM

## 2025-02-03 DIAGNOSIS — D72.10 EOSINOPHILIA, UNSPECIFIED TYPE: ICD-10-CM

## 2025-02-03 DIAGNOSIS — R73.01 IMPAIRED FASTING GLUCOSE: ICD-10-CM

## 2025-02-03 DIAGNOSIS — D72.10 EOSINOPHILIA, UNSPECIFIED TYPE: Primary | ICD-10-CM

## 2025-02-03 DIAGNOSIS — M79.10 MYALGIA: ICD-10-CM

## 2025-02-03 LAB
ALBUMIN SERPL BCG-MCNC: 3.7 G/DL (ref 3.5–5.2)
ALP SERPL-CCNC: 71 U/L (ref 40–150)
ALT SERPL W P-5'-P-CCNC: 17 U/L (ref 0–70)
ANION GAP SERPL CALCULATED.3IONS-SCNC: 9 MMOL/L (ref 7–15)
AST SERPL W P-5'-P-CCNC: 18 U/L (ref 0–45)
BASOPHILS # BLD MANUAL: 0.2 10E3/UL (ref 0–0.2)
BASOPHILS NFR BLD MANUAL: 2 %
BILIRUB SERPL-MCNC: 0.5 MG/DL
BUN SERPL-MCNC: 26.9 MG/DL (ref 8–23)
CALCIUM SERPL-MCNC: 14.2 MG/DL (ref 8.8–10.4)
CHLORIDE SERPL-SCNC: 98 MMOL/L (ref 98–107)
CREAT SERPL-MCNC: 1.81 MG/DL (ref 0.67–1.17)
EGFRCR SERPLBLD CKD-EPI 2021: 42 ML/MIN/1.73M2
EOSINOPHIL # BLD MANUAL: 3.5 10E3/UL (ref 0–0.7)
EOSINOPHIL NFR BLD MANUAL: 42 %
ERYTHROCYTE [DISTWIDTH] IN BLOOD BY AUTOMATED COUNT: 11.7 % (ref 10–15)
GLUCOSE SERPL-MCNC: 108 MG/DL (ref 70–99)
HCO3 SERPL-SCNC: 28 MMOL/L (ref 22–29)
HCT VFR BLD AUTO: 28.3 % (ref 40–53)
HGB BLD-MCNC: 10.2 G/DL (ref 13.3–17.7)
LYMPHOCYTES # BLD MANUAL: 1.5 10E3/UL (ref 0.8–5.3)
LYMPHOCYTES NFR BLD MANUAL: 18 %
MAGNESIUM SERPL-MCNC: 2 MG/DL (ref 1.7–2.3)
MCH RBC QN AUTO: 49.3 PG (ref 26.5–33)
MCHC RBC AUTO-ENTMCNC: 36 G/DL (ref 31.5–36.5)
MCV RBC AUTO: 137 FL (ref 78–100)
MONOCYTES # BLD MANUAL: 0.2 10E3/UL (ref 0–1.3)
MONOCYTES NFR BLD MANUAL: 3 %
NEUTROPHILS # BLD MANUAL: 2.9 10E3/UL (ref 1.6–8.3)
NEUTROPHILS NFR BLD MANUAL: 35 %
PLAT MORPH BLD: ABNORMAL
PLATELET # BLD AUTO: 99 10E3/UL (ref 150–450)
POTASSIUM SERPL-SCNC: 3.9 MMOL/L (ref 3.4–5.3)
PROT SERPL-MCNC: 6.7 G/DL (ref 6.4–8.3)
RBC # BLD AUTO: 2.07 10E6/UL (ref 4.4–5.9)
RBC MORPH BLD: ABNORMAL
SODIUM SERPL-SCNC: 135 MMOL/L (ref 135–145)
VIT D+METAB SERPL-MCNC: 62 NG/ML (ref 20–50)
WBC # BLD AUTO: 8.3 10E3/UL (ref 4–11)

## 2025-02-03 PROCEDURE — 99207 PR NO CHARGE LOS: CPT

## 2025-02-03 PROCEDURE — 85027 COMPLETE CBC AUTOMATED: CPT

## 2025-02-03 PROCEDURE — 82306 VITAMIN D 25 HYDROXY: CPT

## 2025-02-03 PROCEDURE — 36415 COLL VENOUS BLD VENIPUNCTURE: CPT

## 2025-02-03 PROCEDURE — 83735 ASSAY OF MAGNESIUM: CPT

## 2025-02-03 PROCEDURE — 85007 BL SMEAR W/DIFF WBC COUNT: CPT

## 2025-02-03 PROCEDURE — 80053 COMPREHEN METABOLIC PANEL: CPT

## 2025-02-03 NOTE — TELEPHONE ENCOUNTER
Hematology Note    Time of page: 5:44 pm  Responded to page 5:44 pm   Called patient home phone: 5:49 pm and cell phone at 5:50 pm    Time for blood draw 9:43 am     Latest Reference Range & Units 01/06/25 10:04 01/15/25 09:55 02/03/25 09:43   Urea Nitrogen 8.0 - 23.0 mg/dL 15.7 18.0 26.9 (H)   Creatinine 0.67 - 1.17 mg/dL 0.91 0.96 1.81 (H)   GFR Estimate >60 mL/min/1.73m2 >90 89 42 (L)   Calcium 8.8 - 10.4 mg/dL 8.7 (L) 10.0 14.2 (HH)   Albumin 3.5 - 5.2 g/dL 3.7 3.8 3.7   (HH): Data is critically high  (H): Data is abnormally high  (L): Data is abnormally low    I was called after hours for elevated calcium levels. I have reviewed his charts and he follows with Dr. Case for eosinophilia. He had a PET-CT done  last weeks on 1/28/25 which was significant for enlarged lymph nodes in axilla and groin. Artie has markedly elevated calcium at 14.2 g/dl, albumin of 3.7 g/dl and creatinine of 1.81 mg dl. His calcium and creatinine have dramatically increased over last 2 weeks. He needs urgent assessment and intravenous fluids.     I called Artie on his home and cell phone numbers. I left a VM on his home phone. His cell phone mail box was full. I will inform his primary team about elevated calcium to follow up tomorrow. I will also send him a Mad Mimi message.     Alistair Gallegos

## 2025-02-04 ENCOUNTER — HOSPITAL ENCOUNTER (OUTPATIENT)
Facility: CLINIC | Age: 65
Setting detail: OBSERVATION
Discharge: HOME | End: 2025-02-05
Attending: EMERGENCY MEDICINE | Admitting: HOSPITALIST
Payer: COMMERCIAL

## 2025-02-04 ENCOUNTER — DOCUMENTATION ONLY (OUTPATIENT)
Dept: ONCOLOGY | Facility: CLINIC | Age: 65
End: 2025-02-04
Payer: COMMERCIAL

## 2025-02-04 DIAGNOSIS — E83.52 HYPERCALCEMIA: ICD-10-CM

## 2025-02-04 DIAGNOSIS — N17.9 AKI (ACUTE KIDNEY INJURY): ICD-10-CM

## 2025-02-04 LAB
ALBUMIN UR-MCNC: 20 MG/DL
ANION GAP SERPL CALCULATED.3IONS-SCNC: 9 MMOL/L (ref 7–15)
ANION GAP SERPL CALCULATED.3IONS-SCNC: 9 MMOL/L (ref 7–15)
APPEARANCE UR: CLEAR
ATRIAL RATE - MUSE: 67 BPM
BASOPHILS # BLD MANUAL: 0 10E3/UL (ref 0–0.2)
BASOPHILS NFR BLD MANUAL: 0 %
BILIRUB UR QL STRIP: NEGATIVE
BUN SERPL-MCNC: 23.5 MG/DL (ref 8–23)
BUN SERPL-MCNC: 25.1 MG/DL (ref 8–23)
CA-I BLD-MCNC: 7.4 MG/DL (ref 4.4–5.2)
CALCIUM SERPL-MCNC: 12.8 MG/DL (ref 8.8–10.4)
CALCIUM SERPL-MCNC: 13.9 MG/DL (ref 8.8–10.4)
CAOX CRY #/AREA URNS HPF: ABNORMAL /HPF
CHLORIDE SERPL-SCNC: 101 MMOL/L (ref 98–107)
CHLORIDE SERPL-SCNC: 98 MMOL/L (ref 98–107)
COLOR UR AUTO: ABNORMAL
CREAT SERPL-MCNC: 1.71 MG/DL (ref 0.67–1.17)
CREAT SERPL-MCNC: 1.76 MG/DL (ref 0.67–1.17)
DIASTOLIC BLOOD PRESSURE - MUSE: NORMAL MMHG
EGFRCR SERPLBLD CKD-EPI 2021: 43 ML/MIN/1.73M2
EGFRCR SERPLBLD CKD-EPI 2021: 45 ML/MIN/1.73M2
EOSINOPHIL # BLD MANUAL: 3.6 10E3/UL (ref 0–0.7)
EOSINOPHIL NFR BLD MANUAL: 46 %
ERYTHROCYTE [DISTWIDTH] IN BLOOD BY AUTOMATED COUNT: 11.4 % (ref 10–15)
GLUCOSE SERPL-MCNC: 107 MG/DL (ref 70–99)
GLUCOSE SERPL-MCNC: 108 MG/DL (ref 70–99)
GLUCOSE UR STRIP-MCNC: NEGATIVE MG/DL
HCO3 SERPL-SCNC: 25 MMOL/L (ref 22–29)
HCO3 SERPL-SCNC: 29 MMOL/L (ref 22–29)
HCT VFR BLD AUTO: 29 % (ref 40–53)
HGB BLD-MCNC: 10.8 G/DL (ref 13.3–17.7)
HGB UR QL STRIP: ABNORMAL
HOLD SPECIMEN: NORMAL
HYALINE CASTS: 1 /LPF
INTERPRETATION ECG - MUSE: NORMAL
KETONES UR STRIP-MCNC: NEGATIVE MG/DL
LEUKOCYTE ESTERASE UR QL STRIP: NEGATIVE
LYMPHOCYTES # BLD MANUAL: 1.1 10E3/UL (ref 0.8–5.3)
LYMPHOCYTES NFR BLD MANUAL: 14 %
MAGNESIUM SERPL-MCNC: 2.1 MG/DL (ref 1.7–2.3)
MCH RBC QN AUTO: 49.5 PG (ref 26.5–33)
MCHC RBC AUTO-ENTMCNC: 37.2 G/DL (ref 31.5–36.5)
MCV RBC AUTO: 133 FL (ref 78–100)
MONOCYTES # BLD MANUAL: 0.3 10E3/UL (ref 0–1.3)
MONOCYTES NFR BLD MANUAL: 4 %
MUCOUS THREADS #/AREA URNS LPF: PRESENT /LPF
NEUTROPHILS # BLD MANUAL: 2.8 10E3/UL (ref 1.6–8.3)
NEUTROPHILS NFR BLD MANUAL: 36 %
NITRATE UR QL: NEGATIVE
P AXIS - MUSE: 70 DEGREES
PH UR STRIP: 5.5 [PH] (ref 5–7)
PHOSPHATE SERPL-MCNC: 3.6 MG/DL (ref 2.5–4.5)
PLAT MORPH BLD: ABNORMAL
PLATELET # BLD AUTO: 88 10E3/UL (ref 150–450)
POTASSIUM SERPL-SCNC: 3.5 MMOL/L (ref 3.4–5.3)
POTASSIUM SERPL-SCNC: 3.8 MMOL/L (ref 3.4–5.3)
PR INTERVAL - MUSE: 186 MS
QRS DURATION - MUSE: 98 MS
QT - MUSE: 362 MS
QTC - MUSE: 382 MS
R AXIS - MUSE: 29 DEGREES
RBC # BLD AUTO: 2.18 10E6/UL (ref 4.4–5.9)
RBC MORPH BLD: ABNORMAL
RBC URINE: 6 /HPF
SODIUM SERPL-SCNC: 135 MMOL/L (ref 135–145)
SODIUM SERPL-SCNC: 136 MMOL/L (ref 135–145)
SP GR UR STRIP: 1.02 (ref 1–1.03)
SQUAMOUS EPITHELIAL: <1 /HPF
SYSTOLIC BLOOD PRESSURE - MUSE: NORMAL MMHG
T AXIS - MUSE: 46 DEGREES
UROBILINOGEN UR STRIP-MCNC: NORMAL MG/DL
VENTRICULAR RATE- MUSE: 67 BPM
WBC # BLD AUTO: 7.9 10E3/UL (ref 4–11)
WBC URINE: 4 /HPF

## 2025-02-04 PROCEDURE — 80048 BASIC METABOLIC PNL TOTAL CA: CPT | Performed by: EMERGENCY MEDICINE

## 2025-02-04 PROCEDURE — 82374 ASSAY BLOOD CARBON DIOXIDE: CPT | Performed by: EMERGENCY MEDICINE

## 2025-02-04 PROCEDURE — 80048 BASIC METABOLIC PNL TOTAL CA: CPT | Performed by: INTERNAL MEDICINE

## 2025-02-04 PROCEDURE — 36415 COLL VENOUS BLD VENIPUNCTURE: CPT | Performed by: EMERGENCY MEDICINE

## 2025-02-04 PROCEDURE — 36415 COLL VENOUS BLD VENIPUNCTURE: CPT | Performed by: INTERNAL MEDICINE

## 2025-02-04 PROCEDURE — 120N000001 HC R&B MED SURG/OB

## 2025-02-04 PROCEDURE — 93005 ELECTROCARDIOGRAM TRACING: CPT

## 2025-02-04 PROCEDURE — 81001 URINALYSIS AUTO W/SCOPE: CPT | Performed by: EMERGENCY MEDICINE

## 2025-02-04 PROCEDURE — 82374 ASSAY BLOOD CARBON DIOXIDE: CPT | Performed by: INTERNAL MEDICINE

## 2025-02-04 PROCEDURE — 250N000013 HC RX MED GY IP 250 OP 250 PS 637: Performed by: INTERNAL MEDICINE

## 2025-02-04 PROCEDURE — 83735 ASSAY OF MAGNESIUM: CPT | Performed by: EMERGENCY MEDICINE

## 2025-02-04 PROCEDURE — 99285 EMERGENCY DEPT VISIT HI MDM: CPT | Mod: 25

## 2025-02-04 PROCEDURE — 99223 1ST HOSP IP/OBS HIGH 75: CPT | Performed by: INTERNAL MEDICINE

## 2025-02-04 PROCEDURE — 258N000003 HC RX IP 258 OP 636: Performed by: INTERNAL MEDICINE

## 2025-02-04 PROCEDURE — 96361 HYDRATE IV INFUSION ADD-ON: CPT

## 2025-02-04 PROCEDURE — 84100 ASSAY OF PHOSPHORUS: CPT | Performed by: EMERGENCY MEDICINE

## 2025-02-04 PROCEDURE — 80051 ELECTROLYTE PANEL: CPT | Performed by: EMERGENCY MEDICINE

## 2025-02-04 PROCEDURE — 258N000003 HC RX IP 258 OP 636: Performed by: EMERGENCY MEDICINE

## 2025-02-04 PROCEDURE — 85014 HEMATOCRIT: CPT | Performed by: EMERGENCY MEDICINE

## 2025-02-04 PROCEDURE — 250N000013 HC RX MED GY IP 250 OP 250 PS 637: Performed by: PHYSICIAN ASSISTANT

## 2025-02-04 PROCEDURE — 96360 HYDRATION IV INFUSION INIT: CPT

## 2025-02-04 PROCEDURE — 82330 ASSAY OF CALCIUM: CPT | Performed by: EMERGENCY MEDICINE

## 2025-02-04 PROCEDURE — 85007 BL SMEAR W/DIFF WBC COUNT: CPT | Performed by: EMERGENCY MEDICINE

## 2025-02-04 RX ORDER — ONDANSETRON 2 MG/ML
4 INJECTION INTRAMUSCULAR; INTRAVENOUS EVERY 6 HOURS PRN
Status: DISCONTINUED | OUTPATIENT
Start: 2025-02-04 | End: 2025-02-05 | Stop reason: HOSPADM

## 2025-02-04 RX ORDER — CALCIUM CARBONATE 500 MG/1
1000 TABLET, CHEWABLE ORAL 4 TIMES DAILY PRN
Status: DISCONTINUED | OUTPATIENT
Start: 2025-02-04 | End: 2025-02-05

## 2025-02-04 RX ORDER — AMOXICILLIN 250 MG
1 CAPSULE ORAL 2 TIMES DAILY PRN
Status: DISCONTINUED | OUTPATIENT
Start: 2025-02-04 | End: 2025-02-05 | Stop reason: HOSPADM

## 2025-02-04 RX ORDER — PANTOPRAZOLE SODIUM 40 MG/1
40 TABLET, DELAYED RELEASE ORAL DAILY
Status: DISCONTINUED | OUTPATIENT
Start: 2025-02-05 | End: 2025-02-05 | Stop reason: HOSPADM

## 2025-02-04 RX ORDER — ONDANSETRON 4 MG/1
4 TABLET, ORALLY DISINTEGRATING ORAL EVERY 6 HOURS PRN
Status: DISCONTINUED | OUTPATIENT
Start: 2025-02-04 | End: 2025-02-05 | Stop reason: HOSPADM

## 2025-02-04 RX ORDER — LIDOCAINE 40 MG/G
CREAM TOPICAL
Status: DISCONTINUED | OUTPATIENT
Start: 2025-02-04 | End: 2025-02-05 | Stop reason: HOSPADM

## 2025-02-04 RX ORDER — CARVEDILOL 25 MG/1
25 TABLET ORAL 2 TIMES DAILY WITH MEALS
Status: DISCONTINUED | OUTPATIENT
Start: 2025-02-04 | End: 2025-02-05 | Stop reason: HOSPADM

## 2025-02-04 RX ORDER — AMOXICILLIN 250 MG
2 CAPSULE ORAL 2 TIMES DAILY PRN
Status: DISCONTINUED | OUTPATIENT
Start: 2025-02-04 | End: 2025-02-05 | Stop reason: HOSPADM

## 2025-02-04 RX ORDER — SODIUM CHLORIDE 9 MG/ML
INJECTION, SOLUTION INTRAVENOUS CONTINUOUS
Status: DISCONTINUED | OUTPATIENT
Start: 2025-02-04 | End: 2025-02-05 | Stop reason: HOSPADM

## 2025-02-04 RX ADMIN — CARVEDILOL 25 MG: 25 TABLET, FILM COATED ORAL at 18:35

## 2025-02-04 RX ADMIN — SODIUM CHLORIDE 1000 ML: 9 INJECTION, SOLUTION INTRAVENOUS at 13:13

## 2025-02-04 RX ADMIN — SODIUM CHLORIDE: 9 INJECTION, SOLUTION INTRAVENOUS at 14:13

## 2025-02-04 RX ADMIN — SODIUM CHLORIDE: 9 INJECTION, SOLUTION INTRAVENOUS at 19:20

## 2025-02-04 ASSESSMENT — COLUMBIA-SUICIDE SEVERITY RATING SCALE - C-SSRS
1. IN THE PAST MONTH, HAVE YOU WISHED YOU WERE DEAD OR WISHED YOU COULD GO TO SLEEP AND NOT WAKE UP?: NO
6. HAVE YOU EVER DONE ANYTHING, STARTED TO DO ANYTHING, OR PREPARED TO DO ANYTHING TO END YOUR LIFE?: NO
2. HAVE YOU ACTUALLY HAD ANY THOUGHTS OF KILLING YOURSELF IN THE PAST MONTH?: NO

## 2025-02-04 ASSESSMENT — ACTIVITIES OF DAILY LIVING (ADL)
ADLS_ACUITY_SCORE: 47

## 2025-02-04 NOTE — ED PROVIDER NOTES
Emergency Department Note      History of Present Illness     Chief Complaint   Abnormal Labs      HPI   Alvarez Bess is a 64 year old male with a past medical history of eosinophilia, CHF, hypertension, and hyperlipidemia presenting to the ED with abnormal labs. Patient was sent from clinic following lab work resulting a calcium of 14.2. He endorses fatigue, tremulousness, dizziness, generalized weakness, low back pain, increased thirst, decreased appetite, and constipation. Denies fever, vomiting, diarrhea, chest pain, dysuria, hematuria. He states he had abdominal pain yesterday though this has since resolved. He is accompanied in the ED by family members who state he has seem confused and inattentive recently. Patient follows with [facility].  He was recently treated with high dose of Vit D due to a deficiency.     Independent Historian   Family members as detailed above.    Review of External Notes         Component  Ref Range & Units 1 d ago  (2/3/25) 2 wk ago  (1/15/25) 4 wk ago  (1/6/25)   Sodium  135 - 145 mmol/L 135 134 Low  132 Low    Potassium  3.4 - 5.3 mmol/L 3.9 4.4 4.4   Carbon Dioxide (CO2)  22 - 29 mmol/L 28 28 26   Anion Gap  7 - 15 mmol/L 9 8 9   Urea Nitrogen  8.0 - 23.0 mg/dL 26.9 High  18.0 15.7   Creatinine  0.67 - 1.17 mg/dL 1.81 High  0.96 0.91   GFR Estimate  >60 mL/min/1.73m2 42 Low  89 CM >90 CM   Calcium  8.8 - 10.4 mg/dL 14.2 High Panic  10.0 CM 8.7 Low  CM   Chloride  98 - 107 mmol/L 98 98 97 Low    Glucose  70 - 99 mg/dL 108 High  110 High  109 High    Alkaline Phosphatase  40 - 150 U/L 71 96 91   AST  0 - 45 U/L 18 17 17   ALT  0 - 70 U/L 17 21 19   Protein Total  6.4 - 8.3 g/dL 6.7 7.0 7.0   Albumin  3.5 - 5.2 g/dL 3.7 3.8 3.7   Bilirubin Total  <=1.2 mg/dL 0.5 0.6 0.4        Past Medical History     Medical History and Problem List   Cardiomyopathy   Congestive heart failure   Eosinophilia  Gastro-oesophageal reflux disease  Hyperlipidemia  Hypertension  Vitamin  D deficiency  IBS    Medications   Tylenol  Amlactin  Coreg  Temovate  Hydrea  Prilosec  Cholecalciferol     Surgical History   Past Surgical History:   Procedure Laterality Date    ANGIOGRAM  01/06/2005    left dominant coronary system with essentially normal coronary arteries, trivial plaque, severe dilated cardiomyopathy, left ventricle hypertrophy and severely hypokinetic, asynchrony or desynchrony of LV function    BONE MARROW BIOPSY, BONE SPECIMEN, NEEDLE/TROCAR N/A 08/15/2023    Procedure: BIOPSY, BONE MARROW;  Surgeon: Susy Baird;  Location: [facility] OR    COLONOSCOPY      COLONOSCOPY N/A 10/22/2024    Procedure: Colonoscopy;  Surgeon: Kaylyn Yates MD;  Location:  GI    DAVINCI LAPAROSCOPIC CHOLECYSTECTOMY WITHOUT GRAMS N/A 03/20/2024    Procedure: CHOLECYSTECTOMY, ROBOT-ASSISTED;  Surgeon: Anabel Crandall MD;  Location:  OR    ENT SURGERY      polyps from throat    EXCISE LESION FOOT  12/16/2013    Procedure: EXCISE LESION FOOT;  Scar revision of Right 3rd toe      SOFT TISSUE SURGERY      neuroma from right foot       Physical Exam     Patient Vitals for the past 24 hrs:   BP Temp Temp src Pulse Resp SpO2 Height Weight   02/04/25 1535 -- -- -- 79 16 98 % -- --   02/04/25 1534 -- -- -- 82 20 97 % -- --   02/04/25 1533 -- -- -- 81 13 98 % -- --   02/04/25 1532 -- -- -- 85 17 100 % -- --   02/04/25 1531 -- -- -- 84 20 -- -- --   02/04/25 1530 (!) 165/87 -- -- 85 22 97 % -- --   02/04/25 1500 (!) 152/73 -- -- 80 19 96 % -- --   02/04/25 1430 (!) 158/66 -- -- 81 19 97 % -- --   02/04/25 1415 -- -- -- 77 17 100 % -- --   02/04/25 1414 -- -- -- 77 24 100 % -- --   02/04/25 1413 -- -- -- 76 12 98 % -- --   02/04/25 1412 -- -- -- 79 15 100 % -- --   02/04/25 1411 -- -- -- 73 18 100 % -- --   02/04/25 1410 -- -- -- -- -- 99 % -- --   02/04/25 1409 (!) 149/88 -- -- 78 -- 92 % -- --   02/04/25 1406 -- -- -- -- -- 100 % -- --   02/04/25 1405 -- -- -- -- -- 100 % -- --   02/04/25 1404 -- -- -- --  -- 100 % -- --   02/04/25 1403 -- -- -- -- -- 99 % -- --   02/04/25 1402 -- -- -- -- -- 99 % -- --   02/04/25 1401 -- -- -- -- -- 92 % -- --   02/04/25 1400 (!) 168/144 -- -- 77 -- 100 % -- --   02/04/25 1228 (!) 158/79 97.2  F (36.2  C) Temporal 73 18 99 % 1.829 m (6') 74.6 kg (164 lb 7.4 oz)     Physical Exam  Vitals and nursing note reviewed.   Constitutional:       General: He is not in acute distress.     Appearance: He is ill-appearing (tremulous).   HENT:      Head: Normocephalic and atraumatic.      Right Ear: External ear normal.      Left Ear: External ear normal.      Nose: Nose normal.   Eyes:      Conjunctiva/sclera: Conjunctivae normal.   Cardiovascular:      Rate and Rhythm: Normal rate and regular rhythm.      Heart sounds: No murmur heard.  Pulmonary:      Effort: Pulmonary effort is normal. No respiratory distress.      Breath sounds: No wheezing, rhonchi or rales.   Abdominal:      General: Abdomen is flat. There is no distension.      Palpations: Abdomen is soft.      Tenderness: There is no abdominal tenderness. There is no guarding or rebound.   Musculoskeletal:         General: No swelling or deformity.      Cervical back: Normal range of motion and neck supple.   Skin:     General: Skin is warm and dry.      Findings: No rash.   Neurological:      Mental Status: He is alert and oriented to person, place, and time.      Motor: Tremor present.   Psychiatric:         Behavior: Behavior normal.           Diagnostics     Lab Results   Labs Ordered and Resulted from Time of ED Arrival to Time of ED Departure   ROUTINE UA WITH MICROSCOPIC REFLEX TO CULTURE - Abnormal       Result Value    Color Urine Light Yellow      Appearance Urine Clear      Glucose Urine Negative      Bilirubin Urine Negative      Ketones Urine Negative      Specific Gravity Urine 1.016      Blood Urine Trace (*)     pH Urine 5.5      Protein Albumin Urine 20 (*)     Urobilinogen Urine Normal      Nitrite Urine Negative       Leukocyte Esterase Urine Negative      Mucus Urine Present (*)     Calcium Oxalate Crystals Urine Moderate (*)     RBC Urine 6 (*)     WBC Urine 4      Squamous Epithelials Urine <1      Hyaline Casts Urine 1     BASIC METABOLIC PANEL - Abnormal    Sodium 136      Potassium 3.8      Chloride 98      Carbon Dioxide (CO2) 29      Anion Gap 9      Urea Nitrogen 25.1 (*)     Creatinine 1.76 (*)     GFR Estimate 43 (*)     Calcium 13.9 (*)     Glucose 107 (*)    IONIZED CALCIUM - Abnormal    Calcium Ionized Whole Blood 7.4 (*)    CBC WITH PLATELETS AND DIFFERENTIAL - Abnormal    WBC Count 7.9      RBC Count 2.18 (*)     Hemoglobin 10.8 (*)     Hematocrit 29.0 (*)      (*)     MCH 49.5 (*)     MCHC 37.2 (*)     RDW 11.4      Platelet Count 88 (*)    MANUAL DIFFERENTIAL - Abnormal    % Neutrophils 36      % Lymphocytes 14      % Monocytes 4      % Eosinophils 46      % Basophils 0      Absolute Neutrophils 2.8      Absolute Lymphocytes 1.1      Absolute Monocytes 0.3      Absolute Eosinophils 3.6 (*)     Absolute Basophils 0.0      RBC Morphology Confirmed RBC Indices      Platelet Assessment        Value: Automated Count Confirmed. Platelet morphology is normal.   BASIC METABOLIC PANEL - Abnormal    Sodium 135      Potassium 3.5      Chloride 101      Carbon Dioxide (CO2) 25      Anion Gap 9      Urea Nitrogen 23.5 (*)     Creatinine 1.71 (*)     GFR Estimate 45 (*)     Calcium 12.8 (*)     Glucose 108 (*)    PHOSPHORUS - Normal    Phosphorus 3.6     MAGNESIUM - Normal    Magnesium 2.1         Imaging   No orders to display       EKG   ECG taken at 1242, ECG read at 1309  Normal sinus rhythm  Possible inferior infarct, age undetermined  Abnormal ECG   No significant change as compared to prior, dated 8/11/23.  Rate 67 bpm. CT interval 186 ms. QRS duration 98 ms. QT/QTc 362/382 ms. P-R-T axes 70 29 46.    Independent Interpretation   None    ED Course      Medications Administered   Medications   sodium chloride 0.9  % infusion ( Intravenous $New Bag 2/4/25 1413)   lidocaine 1 % 0.1-1 mL (has no administration in time range)   lidocaine (LMX4) cream (has no administration in time range)   sodium chloride (PF) 0.9% PF flush 3 mL (3 mLs Intracatheter Not Given 2/4/25 1630)   sodium chloride (PF) 0.9% PF flush 3 mL (has no administration in time range)   senna-docusate (SENOKOT-S/PERICOLACE) 8.6-50 MG per tablet 1 tablet (has no administration in time range)     Or   senna-docusate (SENOKOT-S/PERICOLACE) 8.6-50 MG per tablet 2 tablet (has no administration in time range)   calcium carbonate (TUMS) chewable tablet 1,000 mg (has no administration in time range)   ondansetron (ZOFRAN ODT) ODT tab 4 mg (has no administration in time range)     Or   ondansetron (ZOFRAN) injection 4 mg (has no administration in time range)   carvedilol (COREG) tablet 25 mg (25 mg Oral $Given 2/4/25 1835)   pantoprazole (PROTONIX) EC tablet 40 mg (has no administration in time range)   sodium chloride 0.9% BOLUS 1,000 mL (0 mLs Intravenous Stopped 2/4/25 1410)       Procedures   Procedures     Discussion of Management   See ED course    ED Course   ED Course as of 02/04/25 1907 Tue Feb 04, 2025   1256 I obtained history and examined the patient as noted above.   1441 I consulted with Dr. Dunaway of the hospitalist service and discussed patient admission. They accepted care of the patient.       Additional Documentation  None    Medical Decision Making / Diagnosis     CMS Diagnoses: None    MIPS       None    MDM   Alvarez Bess is a 64 year old male who presents with hypercalcemia on his labs performed yesterday.  He has symptoms of hypercalcemia including fatigue, increased thirst and increased urination as well constipation and mild confusion.  He is quite tremulous on exam.  It is unclear what the precipitant of this hypercalcemia is.  Fortunate there are no significant EKG changes.  Lab work here confirms the hypercalcemia and KANNAN.  He was given  an IV fluid bolus and infusion.  He is also anemic and thrombocytopenic but this is likely related to his underlying hematologic disease.  I discussed with the hospitalist who will plan to admit for further treatment of his hypercalcemia and KANNAN.    Disposition   The patient was admitted to the hospital.     Diagnosis     ICD-10-CM    1. Hypercalcemia  E83.52       2. KANNAN (acute kidney injury)  N17.9            Discharge Medications   New Prescriptions    No medications on file     Scribe Disclosure:  I, SHARONDA FAN, am serving as a scribe at 1:10 PM on 2/4/2025 to document services personally performed by Joel Adams MD based on my observations and the provider's statements to me.      Joel Adams MD  02/04/25 1929

## 2025-02-04 NOTE — ED NOTES
[facility]  ED Nurse Handoff Report    ED Chief complaint: Abnormal Labs  . ED Diagnosis:   Final diagnoses:   Hypercalcemia   KANNAN (acute kidney injury)       Allergies:   Allergies   Allergen Reactions    Morphine Hcl      Extreme agitation       Code Status: Full Code    Activity level - Baseline/Home:  independent.  Activity Level - Current:   independent.   Lift room needed: No.   Bariatric: No   Needed: No   Isolation: No.   Infection: Not Applicable.     Respiratory status: Room air    Vital Signs (within 30 minutes):   Vitals:    02/04/25 1414 02/04/25 1415 02/04/25 1430 02/04/25 1500   BP:   (!) 158/66 (!) 152/73   Pulse: 77 77 81 80   Resp: 24 17 19 19   Temp:       TempSrc:       SpO2: 100% 100% 97% 96%   Weight:       Height:           Cardiac Rhythm:  ,      Pain level:    Patient confused: No.   Patient Falls Risk: nonskid shoes/slippers when out of bed, arm band in place, patient and family education, assistive device/personal items within reach, activity supervised, and toileting schedule implemented.   Elimination Status: Has voided     Patient Report - Initial Complaint: abnormal labs   Focused Assessment: A/O x4, on RA, ambulatory to BR, breath sounds equal/clear, NSR on tele, skin intact, PERRLA. Sent from clinic for elevated calcium & abnormal kidney labs.    Abnormal Results: Ca 13.9, Cr 1.76  Labs Ordered and Resulted from Time of ED Arrival to Time of ED Departure   ROUTINE UA WITH MICROSCOPIC REFLEX TO CULTURE - Abnormal       Result Value    Color Urine Light Yellow      Appearance Urine Clear      Glucose Urine Negative      Bilirubin Urine Negative      Ketones Urine Negative      Specific Gravity Urine 1.016      Blood Urine Trace (*)     pH Urine 5.5      Protein Albumin Urine 20 (*)     Urobilinogen Urine Normal      Nitrite Urine Negative      Leukocyte Esterase Urine Negative      Mucus Urine Present (*)     Calcium Oxalate Crystals Urine Moderate (*)      RBC Urine 6 (*)     WBC Urine 4      Squamous Epithelials Urine <1      Hyaline Casts Urine 1     BASIC METABOLIC PANEL - Abnormal    Sodium 136      Potassium 3.8      Chloride 98      Carbon Dioxide (CO2) 29      Anion Gap 9      Urea Nitrogen 25.1 (*)     Creatinine 1.76 (*)     GFR Estimate 43 (*)     Calcium 13.9 (*)     Glucose 107 (*)    IONIZED CALCIUM - Abnormal    Calcium Ionized Whole Blood 7.4 (*)    CBC WITH PLATELETS AND DIFFERENTIAL - Abnormal    WBC Count 7.9      RBC Count 2.18 (*)     Hemoglobin 10.8 (*)     Hematocrit 29.0 (*)      (*)     MCH 49.5 (*)     MCHC 37.2 (*)     RDW 11.4      Platelet Count 88 (*)    MANUAL DIFFERENTIAL - Abnormal    % Neutrophils 36      % Lymphocytes 14      % Monocytes 4      % Eosinophils 46      % Basophils 0      Absolute Neutrophils 2.8      Absolute Lymphocytes 1.1      Absolute Monocytes 0.3      Absolute Eosinophils 3.6 (*)     Absolute Basophils 0.0      RBC Morphology Confirmed RBC Indices      Platelet Assessment        Value: Automated Count Confirmed. Platelet morphology is normal.   PHOSPHORUS - Normal    Phosphorus 3.6     MAGNESIUM - Normal    Magnesium 2.1          No orders to display       Treatments provided: IV fluids  Family Comments: cooperative, at bedside  OBS brochure/video discussed/provided to patient:  N/A  ED Medications:   Medications   sodium chloride 0.9 % infusion ( Intravenous $New Bag 2/4/25 1413)   sodium chloride 0.9% BOLUS 1,000 mL (0 mLs Intravenous Stopped 2/4/25 1410)       Drips infusing:  No  For the majority of the shift this patient was Green.   Interventions performed were n/a.    Sepsis treatment initiated: No    Cares/treatment/interventions/medications to be completed following ED care: trend labs, IV fluids, cardiac monitoring.    ED Nurse Name: Hilary Pruvis RN  3:25 PM    RECEIVING UNIT ED HANDOFF REVIEW    Above ED Nurse Handoff Report was reviewed: Yes  Reviewed by: Danya Baldwin RN on February 4,  2025 at 11:56 PM   I Freddie called the ED to inform them the note was read: Yes

## 2025-02-04 NOTE — PHARMACY-ADMISSION MEDICATION HISTORY
Pharmacist Admission Medication History    Admission medication history is complete. The information provided in this note is only as accurate as the sources available at the time of the update.    Information Source(s): Patient via in-person    Pertinent Information:  - patient reports taking Vitamin D 50,000 units daily x 5 days, then after labwork was done, restarted daily for 12 more days and completed 6 days of 12, then stopped. Last dose was a week ago.  -patient has not started taking nicotine patches yet.     Changes made to PTA medication list:  Added: None  Deleted: None  Changed: None    Allergies reviewed with patient and updates made in EHR: yes    Medication History Completed By: Risa Wood RPH 2/4/2025 4:05 PM    PTA Med List   Medication Sig Last Dose/Taking    acetaminophen (TYLENOL) 325 MG tablet Take 2 tablets (650 mg) by mouth every 4 hours as needed for other (mild pain) Taking As Needed    augmented betamethasone dipropionate (DIPROLENE AF) 0.05 % external cream Apply topically 2 times daily 2/3/2025    carvedilol (COREG) 25 MG tablet Take 1 tablet (25 mg) by mouth 2 times daily (with meals). 2/4/2025 Morning    hydroxyurea (HYDREA) 500 MG capsule Take 1 capsule (500 mg) by mouth 2 times daily 2/4/2025 Morning    omeprazole (PRILOSEC OTC) 20 MG EC tablet Take 20 mg by mouth daily 2/4/2025    Probiotic Product (PROBIOTIC DAILY PO) Take by mouth daily. Taking

## 2025-02-04 NOTE — PROGRESS NOTES
2/4/2025     Hypercalcemia on labs from 2/3/25.    Placed an urgent call to patient that he needs to go to the ED. I could not get in touch with the patient and left a message on the home phone. I  asked that he urgently give me a call at the clinic [phone], and that he needs to go to the ED. I attempted to call cell number listed, but the mailbox is full.     Of note, there were attempts made to have patient seen on 1/28 due to new complaints. He was a no show to his appt on 1/29/25 with me.  He did not get his labs drawn until 2/3/25.     Latest Reference Range & Units 02/03/25 09:43   Sodium 135 - 145 mmol/L 135   Potassium 3.4 - 5.3 mmol/L 3.9   Chloride 98 - 107 mmol/L 98   Carbon Dioxide (CO2) 22 - 29 mmol/L 28   Urea Nitrogen 8.0 - 23.0 mg/dL 26.9 (H)   Creatinine 0.67 - 1.17 mg/dL 1.81 (H)   GFR Estimate >60 mL/min/1.73m2 42 (L)   Calcium 8.8 - 10.4 mg/dL 14.2 (HH)   Anion Gap 7 - 15 mmol/L 9   Magnesium 1.7 - 2.3 mg/dL 2.0   Albumin 3.5 - 5.2 g/dL 3.7   Protein Total 6.4 - 8.3 g/dL 6.7   Alkaline Phosphatase 40 - 150 U/L 71   ALT 0 - 70 U/L 17   AST 0 - 45 U/L 18   Bilirubin Total <=1.2 mg/dL 0.5   Glucose 70 - 99 mg/dL 108 (H)   Vitamin D, Total (25-Hydroxy) 20 - 50 ng/mL 62 (H)   WBC 4.0 - 11.0 10e3/uL 8.3   Hemoglobin 13.3 - 17.7 g/dL 10.2 (L)   Hematocrit 40.0 - 53.0 % 28.3 (L)   Platelet Count 150 - 450 10e3/uL 99 (L)   RBC Count 4.40 - 5.90 10e6/uL 2.07 (L)   MCV 78 - 100 fL 137 (H)   MCH 26.5 - 33.0 pg 49.3 (H)   MCHC 31.5 - 36.5 g/dL 36.0   RDW 10.0 - 15.0 % 11.7   % Neutrophils % 35   % Lymphocytes % 18   % Monocytes % 3   % Eosinophils % 42   % Basophils % 2   Absolute Basophils 0.0 - 0.2 10e3/uL 0.2   Absolute Neutrophil 1.6 - 8.3 10e3/uL 2.9   Absolute Lymphocytes 0.8 - 5.3 10e3/uL 1.5   Absolute Monocytes 0.0 - 1.3 10e3/uL 0.2   Absolute Eosinophils 0.0 - 0.7 10e3/uL 3.5 (H)   RBC Morphology  Confirmed RBC Indices   Platelet Morphology Automated Count Confirmed. Platelet morphology is  normal.  Automated Count Confirmed. Platelet morphology is normal.       Rona Ordonez PA-C    Addendum: Patient returned my phone call.  He has been instructed to go to ED. He states that he will go to [facility] ED. I have called ahead to let them know to expect him.   Rona Ordonez PA-C

## 2025-02-04 NOTE — ED TRIAGE NOTES
Pt sent from clinic for calcium of 14.2. He reports dizziness, fatigue, intermittent agitation, decreased appetite and excessive thirst. Hx MPD. Clinic concerned for kidney failure. AVSS on RA.

## 2025-02-04 NOTE — H&P
[facility]    History and Physical  Hospitalist       Date of Admission:  2/4/2025  Date of Service (when I saw the patient): 02/04/25    Assessment & Plan   Alvarez Bess is a 64 year old male patient with past medical history of hypertension, hyperlipidemia, congestive heart failure, cardiomyopathy, vitamin D deficiency, myeloproliferative disorder with hypereosinophilic syndrome, thrombocytopenia, hyponatremia, xeroderma, history of lower extremity rash, tobacco abuse, who came to emergency room for evaluation for abnormal labs.  Patient states that he has been receiving high-dose vitamin D recently.  Patient states that he was having constipation, mouth dryness and generalized body pain.  He was also having some confusion.  He states that he was unable to continue vitamin D and quit taking it.  He had lab work oncology clinic yesterday and was noted to have elevated calcium.  He was called today and advised to come to emergency room.  In the ER, his vital signs were stable.  Lab workup showed sodium 136, potassium 3.8, creatinine 1.76, BUN 25.1.  Ionized calcium 7.4, glucose 107.  WBC 7.9, hemoglobin 10.8, platelets 88, absolute eosinophils elevated at 3.6.  Vitamin D level elevated at 62.    Generalized weakness  Constipation  Hypercalcemia  Acute kidney injury  Vitamin D toxicity  Patient was recently started on high-dose vitamin D for vitamin D deficiency.  Patient states that he started feeling constipated and also complaining of generalized weakness.  He states that he was very thirsty.    Baseline creatinine 0.96 on 1/15/2025. Had labs checked in oncology clinic yesterday and was noted to have elevated creatinine at 1.81 and calcium 14.2.  He was advised to come to emergency room.  In the ER, repeat creatinine 1.76, BUN 25.1, calcium 13.9, ionized calcium 7.4.  Vitamin D level elevated at 62. Suspect his symptoms are related to vitamin D toxicity due to high-dose vitamin D  supplementation.  He was started on IV fluid resuscitation.  Currently on normal saline at 200 mL/h.  -Will continue IV fluids  -Stopped Vitamin D  -Will recheck BMP this evening and in am.   -Currently no changes on EKG.  Will continue to monitor.    Hypertension.   -Blood pressure slightly on the higher side  -Continue current medications    History of congestive heart failure  No evidence of CHF exacerbation.  Most recent echocardiogram was on 8/24/2023 which showed EF of 55 to 60%.  Currently not on diuretic.  -Will continue IV fluid resuscitation as above. Will closely monitor for fluid overload while on IV fluids.     Myeloproliferative disorder with hypereosinophilic syndrome  Thrombocytopenia  Follows with [facility].      DVT Prophylaxis: Pneumatic Compression Devices  Code Status: Full Code    Medically Ready for Discharge: Anticipated in 2-4 Days    Tavia Dunaway MD    Primary Care Physician   Jc Belcher    Chief Complaint       History is obtained from the patient    History of Present Illness   Alvarez Bess is a 64 year old male patient with past medical history of hypertension, hyperlipidemia, congestive heart failure, cardiomyopathy, vitamin D deficiency, myeloproliferative disorder with hypereosinophilic syndrome, thrombocytopenia, hyponatremia, xeroderma, history of lower extremity rash, tobacco abuse, who came to emergency room for evaluation for abnormal labs.  Patient states that he has been receiving high-dose vitamin D recently.  He states has been having constipation, mouth dryness and generalized weakness for the past few days.  He states that he was unable to continue his medication.  He had lab workup yesterday and was noted to have elevated calcium.  He was called by oncology today and was sent to emergency room.  On arrival to emergency room, initial vital signs showed temperature 97.2, pulse 73, blood pressure 158/79, oxygen saturation 99% on room  air.  Laboratory workup showed sodium 136, potassium 3.8, creatinine 1.76, BUN 25.1.  Ionized calcium 7.4, glucose 107.  WBC 7.9, hemoglobin 10.8, platelets 88, absolute eosinophils elevated at 3.6.  Vitamin D level elevated at 62.  In emergency room, he was started on IV fluids. He was admitted for further management.     Past Medical History    I have reviewed this patient's medical history and updated it with pertinent information if needed.   Past Medical History:   Diagnosis Date    Cardiomyopathy (H)     Congestive heart failure (H)      Problem list name updated by automated process. Provider to review    Congestive heart failure, unspecified     Eosinophilia     Gastro-oesophageal reflux disease     Hyperlipidemia 07/19/2019    Hypertension     Penile discharge     Vitamin D deficiency 07/09/2010       Past Surgical History   I have reviewed this patient's surgical history and updated it with pertinent information if needed.  Past Surgical History:   Procedure Laterality Date    ANGIOGRAM  01/06/2005    left dominant coronary system with essentially normal coronary arteries, trivial plaque, severe dilated cardiomyopathy, left ventricle hypertrophy and severely hypokinetic, asynchrony or desynchrony of LV function    BONE MARROW BIOPSY, BONE SPECIMEN, NEEDLE/TROCAR N/A 08/15/2023    Procedure: BIOPSY, BONE MARROW;  Surgeon: Susy Baird;  Location: [facility] OR    COLONOSCOPY      COLONOSCOPY N/A 10/22/2024    Procedure: Colonoscopy;  Surgeon: Kaylyn Yaets MD;  Location:  GI    DAVINCI LAPAROSCOPIC CHOLECYSTECTOMY WITHOUT GRAMS N/A 03/20/2024    Procedure: CHOLECYSTECTOMY, ROBOT-ASSISTED;  Surgeon: Anabel Crandall MD;  Location:  OR    ENT SURGERY      polyps from throat    EXCISE LESION FOOT  12/16/2013    Procedure: EXCISE LESION FOOT;  Scar revision of Right 3rd toe      SOFT TISSUE SURGERY      neuroma from right foot       Prior to Admission Medications   Prior to Admission Medications    Prescriptions Last Dose Informant Patient Reported? Taking?   Probiotic Product (PROBIOTIC DAILY PO)   Yes No   acetaminophen (TYLENOL) 325 MG tablet   No No   Sig: Take 2 tablets (650 mg) by mouth every 4 hours as needed for other (mild pain)   ammonium lactate (AMLACTIN) 12 % external cream   No No   Sig: Apply topically 2 times daily.   augmented betamethasone dipropionate (DIPROLENE AF) 0.05 % external cream   No No   Sig: Apply topically 2 times daily   carvedilol (COREG) 25 MG tablet   No No   Sig: Take 1 tablet (25 mg) by mouth 2 times daily (with meals).   clobetasol (TEMOVATE) 0.05 % external ointment   No No   Sig: Apply topically 2 times daily.   hydroxyurea (HYDREA) 500 MG capsule   No No   Sig: Take 1 capsule (500 mg) by mouth 2 times daily   nicotine (NICODERM CQ) 14 MG/24HR 24 hr patch   No No   Sig: Place 1 patch onto the skin every 24 hours Start after completion of the 21 mg patches.   nicotine (NICODERM CQ) 21 MG/24HR 24 hr patch   No No   Sig: Place 1 patch onto the skin every 24 hours Start with these patches first.   nicotine (NICODERM CQ) 7 MG/24HR 24 hr patch   No No   Sig: Place 1 patch onto the skin every 24 hours Start after completing the full course of 14 mg patches.   omeprazole (PRILOSEC OTC) 20 MG EC tablet   Yes No   Sig: Take 20 mg by mouth daily   vitamin D3 (CHOLECALCIFEROL) 1.25 MG (03613 UT) capsule   No No   Sig: Take 1 capsule (50,000 Units) by mouth every 7 days.      Facility-Administered Medications: None     Allergies   Allergies   Allergen Reactions    Morphine Hcl      Extreme agitation       Social History   I have reviewed this patient's social history and updated it with pertinent information if needed. Alvareztyler Bess  reports that he has been smoking cigarettes. He started smoking about 29 years ago. He has never used smokeless tobacco. He reports current alcohol use. He reports that he does not use drugs.    Family History   I have reviewed this patient's  family history and updated it with pertinent information if needed.   Family History   Problem Relation Age of Onset    Heart Disease Mother     Heart Disease Brother     Myocardial Infarction Brother     Colon Cancer No family hx of        Review of Systems   The 10 point Review of Systems is negative other than noted in the HPI or here.     Physical Exam   Temp: 97.2  F (36.2  C) Temp src: Temporal BP: (!) 165/87 Pulse: 79   Resp: 16 SpO2: 98 %      Vital Signs with Ranges  Temp:  [97.2  F (36.2  C)] 97.2  F (36.2  C)  Pulse:  [73-85] 79  Resp:  [12-24] 16  BP: (149-168)/() 165/87  SpO2:  [92 %-100 %] 98 %  164 lbs 7.41 oz    GEN:  Alert, oriented x 3, appears comfortable, NAD.  HEENT:  Normocephalic/atraumatic, no scleral icterus, no nasal discharge, mouth moist.  CV:  Regular rate and rhythm, no murmur or JVD.  S1 + S2 noted, no S3 or S4.  LUNGS:  Clear to auscultation bilaterally without rales/rhonchi/wheezing/retractions.  Symmetric chest rise on inhalation noted.  ABD:  Active bowel sounds, soft, non-tender/non-distended.  No rebound/guarding/rigidity.  EXT:  No edema or cyanosis.  Hands/feet warm to touch with good signs of peripheral perfusion.  No joint synovitis noted.  SKIN:  skin changes   NEURO:  Symmetric muscle strength, sensation to touch grossly intact.  No new focal deficits appreciated.    Data   Data reviewed today:  I personally reviewed no images or EKG's today.  Recent Labs   Lab 02/04/25  1312 02/03/25  0943   WBC 7.9 8.3   HGB 10.8* 10.2*   * 137*   PLT 88* 99*    135   POTASSIUM 3.8 3.9   CHLORIDE 98 98   CO2 29 28   BUN 25.1* 26.9*   CR 1.76* 1.81*   ANIONGAP 9 9   PAULINA 13.9* 14.2*   * 108*   ALBUMIN  --  3.7   PROTTOTAL  --  6.7   BILITOTAL  --  0.5   ALKPHOS  --  71   ALT  --  17   AST  --  18       No results found for this or any previous visit (from the past 24 hours).

## 2025-02-05 VITALS
DIASTOLIC BLOOD PRESSURE: 86 MMHG | TEMPERATURE: 98.2 F | OXYGEN SATURATION: 95 % | HEIGHT: 72 IN | SYSTOLIC BLOOD PRESSURE: 155 MMHG | RESPIRATION RATE: 16 BRPM | WEIGHT: 161.6 LBS | HEART RATE: 83 BPM | BODY MASS INDEX: 21.89 KG/M2

## 2025-02-05 LAB
ANION GAP SERPL CALCULATED.3IONS-SCNC: 8 MMOL/L (ref 7–15)
BASOPHILS # BLD MANUAL: 0.1 10E3/UL (ref 0–0.2)
BASOPHILS NFR BLD MANUAL: 2 %
BUN SERPL-MCNC: 20.7 MG/DL (ref 8–23)
CA-I BLD-MCNC: 6.7 MG/DL (ref 4.4–5.2)
CALCIUM SERPL-MCNC: 12 MG/DL (ref 8.8–10.4)
CALCIUM SERPL-MCNC: 12.1 MG/DL (ref 8.8–10.4)
CHLORIDE SERPL-SCNC: 101 MMOL/L (ref 98–107)
CREAT SERPL-MCNC: 1.59 MG/DL (ref 0.67–1.17)
CREAT SERPL-MCNC: 1.65 MG/DL (ref 0.67–1.17)
EGFRCR SERPLBLD CKD-EPI 2021: 47 ML/MIN/1.73M2
EGFRCR SERPLBLD CKD-EPI 2021: 49 ML/MIN/1.73M2
EOSINOPHIL # BLD MANUAL: 1.9 10E3/UL (ref 0–0.7)
EOSINOPHIL NFR BLD MANUAL: 35 %
ERYTHROCYTE [DISTWIDTH] IN BLOOD BY AUTOMATED COUNT: 11.6 % (ref 10–15)
GLUCOSE SERPL-MCNC: 88 MG/DL (ref 70–99)
HCO3 SERPL-SCNC: 24 MMOL/L (ref 22–29)
HCT VFR BLD AUTO: 24.6 % (ref 40–53)
HGB BLD-MCNC: 8.9 G/DL (ref 13.3–17.7)
LYMPHOCYTES # BLD MANUAL: 1.2 10E3/UL (ref 0.8–5.3)
LYMPHOCYTES NFR BLD MANUAL: 21 %
MCH RBC QN AUTO: 48.6 PG (ref 26.5–33)
MCHC RBC AUTO-ENTMCNC: 36.2 G/DL (ref 31.5–36.5)
MCV RBC AUTO: 134 FL (ref 78–100)
MONOCYTES # BLD MANUAL: 0.2 10E3/UL (ref 0–1.3)
MONOCYTES NFR BLD MANUAL: 3 %
NEUTROPHILS # BLD MANUAL: 2.1 10E3/UL (ref 1.6–8.3)
NEUTROPHILS NFR BLD MANUAL: 39 %
PLAT MORPH BLD: ABNORMAL
PLATELET # BLD AUTO: 68 10E3/UL (ref 150–450)
POTASSIUM SERPL-SCNC: 3.8 MMOL/L (ref 3.4–5.3)
RBC # BLD AUTO: 1.83 10E6/UL (ref 4.4–5.9)
RBC MORPH BLD: ABNORMAL
SODIUM SERPL-SCNC: 133 MMOL/L (ref 135–145)
VIT D+METAB SERPL-MCNC: 56 NG/ML (ref 20–50)
WBC # BLD AUTO: 5.5 10E3/UL (ref 4–11)

## 2025-02-05 PROCEDURE — 82310 ASSAY OF CALCIUM: CPT | Performed by: INTERNAL MEDICINE

## 2025-02-05 PROCEDURE — 250N000013 HC RX MED GY IP 250 OP 250 PS 637: Performed by: PHYSICIAN ASSISTANT

## 2025-02-05 PROCEDURE — 36415 COLL VENOUS BLD VENIPUNCTURE: CPT | Performed by: PHYSICIAN ASSISTANT

## 2025-02-05 PROCEDURE — 96361 HYDRATE IV INFUSION ADD-ON: CPT

## 2025-02-05 PROCEDURE — 82565 ASSAY OF CREATININE: CPT | Performed by: PHYSICIAN ASSISTANT

## 2025-02-05 PROCEDURE — 250N000013 HC RX MED GY IP 250 OP 250 PS 637: Performed by: INTERNAL MEDICINE

## 2025-02-05 PROCEDURE — G0378 HOSPITAL OBSERVATION PER HR: HCPCS

## 2025-02-05 PROCEDURE — 80048 BASIC METABOLIC PNL TOTAL CA: CPT | Performed by: INTERNAL MEDICINE

## 2025-02-05 PROCEDURE — 82306 VITAMIN D 25 HYDROXY: CPT | Performed by: PHYSICIAN ASSISTANT

## 2025-02-05 PROCEDURE — 82310 ASSAY OF CALCIUM: CPT | Performed by: PHYSICIAN ASSISTANT

## 2025-02-05 PROCEDURE — 85027 COMPLETE CBC AUTOMATED: CPT | Performed by: INTERNAL MEDICINE

## 2025-02-05 PROCEDURE — 99239 HOSP IP/OBS DSCHRG MGMT >30: CPT | Performed by: PHYSICIAN ASSISTANT

## 2025-02-05 PROCEDURE — 85007 BL SMEAR W/DIFF WBC COUNT: CPT | Performed by: INTERNAL MEDICINE

## 2025-02-05 PROCEDURE — 258N000003 HC RX IP 258 OP 636: Performed by: INTERNAL MEDICINE

## 2025-02-05 PROCEDURE — 82330 ASSAY OF CALCIUM: CPT | Performed by: PHYSICIAN ASSISTANT

## 2025-02-05 PROCEDURE — 36415 COLL VENOUS BLD VENIPUNCTURE: CPT | Performed by: INTERNAL MEDICINE

## 2025-02-05 RX ORDER — ACETAMINOPHEN 325 MG/1
650 TABLET ORAL EVERY 6 HOURS PRN
Status: DISCONTINUED | OUTPATIENT
Start: 2025-02-05 | End: 2025-02-05 | Stop reason: HOSPADM

## 2025-02-05 RX ADMIN — CARVEDILOL 25 MG: 25 TABLET, FILM COATED ORAL at 09:23

## 2025-02-05 RX ADMIN — SODIUM CHLORIDE: 9 INJECTION, SOLUTION INTRAVENOUS at 00:25

## 2025-02-05 RX ADMIN — ACETAMINOPHEN 650 MG: 325 TABLET, FILM COATED ORAL at 09:39

## 2025-02-05 RX ADMIN — PANTOPRAZOLE SODIUM 40 MG: 40 TABLET, DELAYED RELEASE ORAL at 09:23

## 2025-02-05 RX ADMIN — SODIUM CHLORIDE: 9 INJECTION, SOLUTION INTRAVENOUS at 10:21

## 2025-02-05 RX ADMIN — SODIUM CHLORIDE: 9 INJECTION, SOLUTION INTRAVENOUS at 05:11

## 2025-02-05 ASSESSMENT — ACTIVITIES OF DAILY LIVING (ADL)
ADLS_ACUITY_SCORE: 24

## 2025-02-05 NOTE — PLAN OF CARE
Patient's After Visit Summary was reviewed with patient.  Patient verbalized understanding of After Visit Summary, recommended follow up and was given an opportunity to ask questions.   Discharge medications sent home with patient/family: n/a  Discharged with: family    OBSERVATION patient END time: 8439

## 2025-02-05 NOTE — PROGRESS NOTES
Pt alert and orientated x4. Flat affect. Up independently. On Regular diet, tolerating. NS infusing at 100 ml/hr.  Voiding fine.  Elevated Labs, KANNAN

## 2025-02-05 NOTE — PLAN OF CARE
ROOM # 202-1    Living Situation (if not independent, order SW consult): Home   Facility name:  : [facility]    Activity level at baseline: IND  Activity level on admit: IND    Who will be transporting you at discharge: Friend     Patient registered to observation; given Patient Bill of Rights; given the opportunity to ask questions about observation status and their plan of care.  Patient has been oriented to the observation room, bathroom and call light is in place.    Discussed discharge goals and expectations with patient/family.

## 2025-02-05 NOTE — DISCHARGE SUMMARY
[facility] Discharge Summary          Alvarez Bess MRN# [id number]   Age: 64 year old YOB: 1960     Date of Admission:  2/4/2025  Date of Discharge::  2/5/2025  5:46 PM  Admitting Physician:  Fazal Gonzales MD  Discharge Physician:  Nancy Cho PA-C  Primary Physician: Jc Belcher     Primary Discharge Diagnoses:   Generalized weakness  Hypercalcemia  Acute kidney injury  Vitamin D toxicity     Hospital Course:   For detail history, please refer to H & P from 2/4/2025. In brief, this is a 64 year old male with past medical history of hypertension, hyperlipidemia, congestive heart failure, cardiomyopathy, vitamin D deficiency, myeloproliferative disorder with hypereosinophilic syndrome, thrombocytopenia, hyponatremia, xeroderma, history of lower extremity rash, tobacco abuse, who came to emergency room for evaluation for abnormal labs.      Patient states that he has been receiving high-dose vitamin D recently.  Patient states that he was having constipation, mouth dryness and generalized body pain.  He was also having some confusion.  He states that he was unable to continue vitamin D and quit taking it.  He had lab work oncology clinic yesterday and was noted to have elevated calcium.  He was called today and advised to come to emergency room.    In the ER, his vital signs were stable.  Lab workup showed sodium 136, potassium 3.8, creatinine 1.76, BUN 25.1.  Ionized calcium 7.4, glucose 107.  WBC 7.9, hemoglobin 10.8, platelets 88, absolute eosinophils elevated at 3.6.  Vitamin D level elevated at 62.        Generalized weakness  Constipation  Hypercalcemia, improved  Acute kidney injury, improved  Vitamin D toxicity  Patient was recently started on high-dose vitamin D for vitamin D deficiency.  Patient states that he started feeling constipated and also complaining of generalized weakness.  He states that he was very thirsty.    Baseline creatinine 0.96 on  1/15/2025. Had labs checked in oncology clinic yesterday and was noted to have elevated creatinine at 1.81 and calcium 14.2.  He was advised to come to emergency room.  In the ER, repeat creatinine 1.76, BUN 25.1, calcium 13.9, ionized calcium 7.4.  Vitamin D level elevated at 62. Suspect his symptoms are related to vitamin D toxicity due to high-dose vitamin D supplementation.  He was started on IV fluid resuscitation.  Currently on normal saline at 200 mL/h.  - Stopped Vitamin D  - Currently no changes on EKG.  Will continue to monitor.  - Calcium today down to 12.0  Creatinine down to 1.59  - patient reported improvement in his symptoms and was eager to discharge home.  - patient to continue to push oral fluids and plan for repeat labs later this week with PCP or Oncology       Procedures/Imaging:     Results for orders placed or performed during the hospital encounter of 01/28/25   PET Oncology (Eyes to Thighs)    Narrative    EXAM: PET ONCOLOGY (EYES TO THIGHS), CT CHEST/ABDOMEN/PELVIS W CONTRAST  LOCATION: [facility]  DATE: 1/28/2025    INDICATION: Subsequent treatment planning and restaging for malignant immunoproliferative disease, unspecified. Idiopathic hypereosinophilia with myeloproliferative features. Ongoing hydroxyurea therapy. Assess treatment response.  COMPARISON: 11/27/2023  CONTRAST: 103 mL Isovue-370 IV  TECHNIQUE: Serum glucose level 102 mg/dL. One hour post intravenous administration of 13.56 mCi F 18 FDG, PET imaging was performed from the skull vertex to mid thighs, utilizing attenuation correction with concurrent axial CT and PET/CT image fusion.   Separate diagnostic CT of the chest, abdomen, and pelvis was performed. Dose reduction techniques were used.    PET/CT FINDINGS: While there is broadly stable uptake associated with the axillary (SUV max 4.4, previously 3.8) and inguinal (SUV max 4.4, previously 4.) lymphadenopathy, and broadly stable sizes of the inguinal  lymph nodes, enlargement of some of the   bilateral axillary lymph nodes, including a 3.6 x 1.8 cm node on the right (CT image 114), previously 2.6 x 1.3 cm, and a 2.9 x 1.8 cm node on the left (CT image 117), previously 2.2 x 1.5 cm, suggests disease progression. Stable mildly avid subcutaneous   nodules in the upper back to the right of midline (SUV max 2.5, previously 2.3).    Degenerative synovitis in the shoulders.    CT FINDINGS: Mild mucosal thickening in the paranasal sinuses. Severe coronary artery calcium. Trace upper lobe predominant emphysema. Previously seen 6 mm nodule in the anterior left lower lobe has nearly resolved. Tiny sliding hiatal hernia.   Cholecystectomy. Stable left adrenal thickening previously characterized as adenoma. Simple renal cysts, which are benign and do not require follow-up. Nonobstructing bilateral renal calculi. Colonic diverticulosis. Moderate aortobiiliac calcifications.   Mild multilevel degenerative disease in the spine.      Impression    IMPRESSION:    While there is broadly stable uptake associated with the axillary and inguinal lymphadenopathy, stable sizes of the inguinal lymph nodes, and stable mildly avid subcutaneous nodules in the upper back, interval enlargement of some of the axillary lymph   nodes suggests disease progression.   CT Chest/Abdomen/Pelvis w Contrast    Narrative    EXAM: PET ONCOLOGY (EYES TO THIGHS), CT CHEST/ABDOMEN/PELVIS W CONTRAST  LOCATION: [facility]  DATE: 1/28/2025    INDICATION: Subsequent treatment planning and restaging for malignant immunoproliferative disease, unspecified. Idiopathic hypereosinophilia with myeloproliferative features. Ongoing hydroxyurea therapy. Assess treatment response.  COMPARISON: 11/27/2023  CONTRAST: 103 mL Isovue-370 IV  TECHNIQUE: Serum glucose level 102 mg/dL. One hour post intravenous administration of 13.56 mCi F 18 FDG, PET imaging was performed from the skull vertex to mid  thighs, utilizing attenuation correction with concurrent axial CT and PET/CT image fusion.   Separate diagnostic CT of the chest, abdomen, and pelvis was performed. Dose reduction techniques were used.    PET/CT FINDINGS: While there is broadly stable uptake associated with the axillary (SUV max 4.4, previously 3.8) and inguinal (SUV max 4.4, previously 4.) lymphadenopathy, and broadly stable sizes of the inguinal lymph nodes, enlargement of some of the   bilateral axillary lymph nodes, including a 3.6 x 1.8 cm node on the right (CT image 114), previously 2.6 x 1.3 cm, and a 2.9 x 1.8 cm node on the left (CT image 117), previously 2.2 x 1.5 cm, suggests disease progression. Stable mildly avid subcutaneous   nodules in the upper back to the right of midline (SUV max 2.5, previously 2.3).    Degenerative synovitis in the shoulders.    CT FINDINGS: Mild mucosal thickening in the paranasal sinuses. Severe coronary artery calcium. Trace upper lobe predominant emphysema. Previously seen 6 mm nodule in the anterior left lower lobe has nearly resolved. Tiny sliding hiatal hernia.   Cholecystectomy. Stable left adrenal thickening previously characterized as adenoma. Simple renal cysts, which are benign and do not require follow-up. Nonobstructing bilateral renal calculi. Colonic diverticulosis. Moderate aortobiiliac calcifications.   Mild multilevel degenerative disease in the spine.      Impression    IMPRESSION:    While there is broadly stable uptake associated with the axillary and inguinal lymphadenopathy, stable sizes of the inguinal lymph nodes, and stable mildly avid subcutaneous nodules in the upper back, interval enlargement of some of the axillary lymph   nodes suggests disease progression.     Allergies:     Allergies   Allergen Reactions    Morphine Hcl      Extreme agitation        Subjective:   Patient reports improvement in symptoms overnight. Feels ready to discharge when able.     Physical Exam:   Blood  "pressure (!) 155/86, pulse 83, temperature 98.2  F (36.8  C), temperature source Oral, resp. rate 16, height 1.829 m (6' 0.01\"), weight 73.3 kg (161 lb 9.6 oz), SpO2 95%.  General: Alert, interactive, NAD  HEENT: AT/NC  Resp: clear to auscultation bilaterally, no crackles or wheezes  Cardiac: regular rate and rhythm, no murmur  Abdomen: Soft, nontender, nondistended. +BS.  No HSM or masses, no rebound or guarding.  Extremities: No LE edema  Skin: Warm and dry, no jaundice or rash  Neuro: Alert & oriented x 3, Cns 2-12 intact, moves all extremities equally   Discharge Medicatios:        Discharge Medication List as of 2/5/2025  5:39 PM        CONTINUE these medications which have NOT CHANGED    Details   acetaminophen (TYLENOL) 325 MG tablet Take 2 tablets (650 mg) by mouth every 4 hours as needed for other (mild pain), Disp-100 tablet, R-0, Local Print      ammonium lactate (AMLACTIN) 12 % external cream Apply topically 2 times daily.Disp-385 g, P-2M-Trnsmkgvx      augmented betamethasone dipropionate (DIPROLENE AF) 0.05 % external cream Apply topically 2 times dailyDisp-100 g, N-2P-PdxmmmddlImpg 2 of the 50 gram tubes      carvedilol (COREG) 25 MG tablet Take 1 tablet (25 mg) by mouth 2 times daily (with meals)., Disp-180 tablet, R-3, E-Prescribe      clobetasol (TEMOVATE) 0.05 % external ointment Apply topically 2 times daily.Disp-60 g, M-2L-Bvlrkndrk      hydroxyurea (HYDREA) 500 MG capsule Take 1 capsule (500 mg) by mouth 2 times daily, Disp-180 capsule, R-4, E-Prescribe      nicotine (NICODERM CQ) 14 MG/24HR 24 hr patch Place 1 patch onto the skin every 24 hours Start after completion of the 21 mg patches., Disp-14 patch, R-0, E-Prescribe      nicotine (NICODERM CQ) 21 MG/24HR 24 hr patch Place 1 patch onto the skin every 24 hours Start with these patches first., Disp-14 patch, R-0, E-Prescribe      nicotine (NICODERM CQ) 7 MG/24HR 24 hr patch Place 1 patch onto the skin every 24 hours Start after completing " the full course of 14 mg patches., Disp-14 patch, R-0, E-Prescribe      omeprazole (PRILOSEC OTC) 20 MG EC tablet Take 20 mg by mouth daily, Historical      Probiotic Product (PROBIOTIC DAILY PO) Take by mouth daily., Historical           STOP taking these medications       vitamin D3 (CHOLECALCIFEROL) 1.25 MG (02214 UT) capsule Comments:   Reason for Stopping:               Instructions Given to Patient as Discharge:     Discharge Procedure Orders   Reason for your hospital stay   Order Comments: You were hospitalize due to Generalized weakness, elevated vitamin D, elevated calcium and elevated creatinine levels.  Your symptoms improved with IV fluids.     Activity   Order Comments: Your activity upon discharge: activity as tolerated     Order Specific Question Answer Comments   Is discharge order? Yes      Follow-up and recommended labs and tests    Order Comments: Follow up with PCP or your Oncologist for repeat creatinine and calcium level.  Continue to push fluids.     Diet   Order Comments: Follow this diet upon discharge: Current Diet:Orders Placed This Encounter      Combination Diet Regular Diet Adult     Order Specific Question Answer Comments   Is discharge order? Yes        Pending Tests at Discharge:   none    Discharge Disposition:     Discharged to home     Nancy Cho MS, PA-C  Hospitalist Service  Pager [phone]    >30 minutes was spent in discharge planning, care coordination, physical examination and medication reconciliation on the date of discharge, 2/5/2025

## 2025-02-05 NOTE — PROGRESS NOTES
[facility]  Internal Medicine  Progress Note    Date of Service: 2/5/2025    Patient: Alvarez Bess  MRN: [id number]  Admission Date: 2/4/2025      Assessment & Plan:   Alvarez Bess is a 64 year old male patient with past medical history of hypertension, hyperlipidemia, congestive heart failure, cardiomyopathy, vitamin D deficiency, myeloproliferative disorder with hypereosinophilic syndrome, thrombocytopenia, hyponatremia, xeroderma, history of lower extremity rash, tobacco abuse, who came to emergency room for evaluation for abnormal labs.  Patient states that he has been receiving high-dose vitamin D recently.  Patient states that he was having constipation, mouth dryness and generalized body pain.  He was also having some confusion.  He states that he was unable to continue vitamin D and quit taking it.  He had lab work oncology clinic yesterday and was noted to have elevated calcium.  He was called today and advised to come to emergency room.  In the ER, his vital signs were stable.  Lab workup showed sodium 136, potassium 3.8, creatinine 1.76, BUN 25.1.  Ionized calcium 7.4, glucose 107.  WBC 7.9, hemoglobin 10.8, platelets 88, absolute eosinophils elevated at 3.6.  Vitamin D level elevated at 62.       Generalized weakness  Constipation  Hypercalcemia, improved  Acute kidney injury, improved  Vitamin D toxicity  Patient was recently started on high-dose vitamin D for vitamin D deficiency.  Patient states that he started feeling constipated and also complaining of generalized weakness.  He states that he was very thirsty.    Baseline creatinine 0.96 on 1/15/2025. Had labs checked in oncology clinic yesterday and was noted to have elevated creatinine at 1.81 and calcium 14.2.  He was advised to come to emergency room.  In the ER, repeat creatinine 1.76, BUN 25.1, calcium 13.9, ionized calcium 7.4.  Vitamin D level elevated at 62. Suspect his symptoms are related to vitamin D toxicity due  "to high-dose vitamin D supplementation.  He was started on IV fluid resuscitation.  Currently on normal saline at 200 mL/h.  - Stopped Vitamin D  - Currently no changes on EKG.  Will continue to monitor.  - Calcium today down to 12.1.  Creatinine down to 1.65  - continue IVF  - repeat BMP later today.  Possible discharge later today pending further improvement in labs       History of congestive heart failure  HTN  No evidence of CHF exacerbation.  Most recent echocardiogram was on 8/24/2023 which showed EF of 55 to 60%.  Currently not on diuretic.  - Will continue IV fluid resuscitation as above. Will closely monitor for fluid overload while on IV fluids.   - continue Coreg     Myeloproliferative disorder with hypereosinophilic syndrome  Thrombocytopenia  - Follows with [facility].  - resume Hydroxyurea upon discharge    GERD  - continue pta PPI    CODE: full  DVT: scd  Diet/fluids: regular  Medically Ready for Discharge: Anticipated Tomorrow or later today pending further improvement in calcium level        Nancy Cho MS, PA-C  Hospitalist Physician Assistant  [facility]      Subjective & Interval Hx:    Patient reports improvement in symptoms overnight.  Feels ready to discharge when able.    Last 24 hr care team notes reviewed.   ROS:  4 point ROS including Respiratory, CV, GI and , other than that noted in the HPI, is negative.    Physical Exam:    Blood pressure (!) 154/77, pulse 81, temperature 98.4  F (36.9  C), temperature source Oral, resp. rate 16, height 1.829 m (6' 0.01\"), weight 73.3 kg (161 lb 9.6 oz), SpO2 97%.  General: Alert, interactive, NAD  HEENT: AT/NC  Resp: clear to auscultation bilaterally, no crackles or wheezes  Cardiac: regular rate and rhythm, no murmur  Abdomen: Soft, nontender, nondistended. +BS.  No HSM or masses, no rebound or guarding.  Extremities: No LE edema  Skin: Warm and dry, no jaundice or rash  Neuro: Alert & oriented x 3, Cns 2-12 intact, moves all " extremities equally    Labs & Images:  Reviewed in Epic   Medications:    Current Facility-Administered Medications   Medication Dose Route Frequency Provider Last Rate Last Admin    acetaminophen (TYLENOL) tablet 650 mg  650 mg Oral Q6H PRN Nancy Cho PA-C   650 mg at 02/05/25 0939    calcium carbonate (TUMS) chewable tablet 1,000 mg  1,000 mg Oral 4x Daily PRN Tavia Dunaway MD        carvedilol (COREG) tablet 25 mg  25 mg Oral BID w/meals Nancy Cho PA-C   25 mg at 02/05/25 0923    lidocaine (LMX4) cream   Topical Q1H PRN Tavia Dunaway MD        lidocaine 1 % 0.1-1 mL  0.1-1 mL Other Q1H PRN Tavia Dunaway MD        ondansetron (ZOFRAN ODT) ODT tab 4 mg  4 mg Oral Q6H PRN Tavia Dunaway MD        Or    ondansetron (ZOFRAN) injection 4 mg  4 mg Intravenous Q6H PRN Tavia Dunaway MD        pantoprazole (PROTONIX) EC tablet 40 mg  40 mg Oral Daily Tavia Dunaway MD   40 mg at 02/05/25 0923    senna-docusate (SENOKOT-S/PERICOLACE) 8.6-50 MG per tablet 1 tablet  1 tablet Oral BID PRN Tavia Dunaway MD        Or    senna-docusate (SENOKOT-S/PERICOLACE) 8.6-50 MG per tablet 2 tablet  2 tablet Oral BID PRN Tavia Dunaway MD        sodium chloride (PF) 0.9% PF flush 3 mL  3 mL Intracatheter Q8H Tavia Dunaway MD        sodium chloride (PF) 0.9% PF flush 3 mL  3 mL Intracatheter q1 min prn Tavia Dunaway MD        sodium chloride 0.9 % infusion   Intravenous Continuous Tavia Dunaway  mL/hr at 02/05/25 1021 New Bag at 02/05/25 1021

## 2025-02-05 NOTE — PLAN OF CARE
"Vitals are Temp: 98.8  F (37.1  C) Temp src: Oral BP: 133/68 Pulse: 80   Resp: 16 SpO2: 96 %.  Patient is Alert and Oriented x4. They are independent with no assistive devices .  Pt is a Regular diet.  They are denying pain.  Patient has Normal Saline 0.9% running at 200 mL per hour. Denies nausea/dizziness/SOB. On tele, SR. Plan is to continue IVF and repeat labs this morning.     Goal Outcome Evaluation:      Plan of Care Reviewed With: patient    Overall Patient Progress: improving    Outcome Evaluation: IVF. Repeat labs AM       Problem: Adult Inpatient Plan of Care  Goal: Plan of Care Review  Description: The Plan of Care Review/Shift note should be completed every shift.  The Outcome Evaluation is a brief statement about your assessment that the patient is improving, declining, or no change.  This information will be displayed automatically on your shift  note.  Outcome: Progressing  Flowsheets (Taken 2/5/2025 0530)  Outcome Evaluation: IVF. Repeat labs AM  Plan of Care Reviewed With: patient  Overall Patient Progress: improving  Goal: Patient-Specific Goal (Individualized)  Description: You can add care plan individualizations to a care plan. Examples of Individualization might be:  \"Parent requests to be called daily at 9am for status\", \"I have a hard time hearing out of my right ear\", or \"Do not touch me to wake me up as it startles  me\".  Outcome: Progressing  Goal: Absence of Hospital-Acquired Illness or Injury  Outcome: Progressing  Intervention: Identify and Manage Fall Risk  Recent Flowsheet Documentation  Taken 2/5/2025 0026 by Danya Baldwin RN  Safety Promotion/Fall Prevention:   clutter free environment maintained   nonskid shoes/slippers when out of bed   room near nurse's station   safety round/check completed  Intervention: Prevent Skin Injury  Recent Flowsheet Documentation  Taken 2/5/2025 0026 by Danya Baldwin RN  Body Position: position changed independently  Intervention: Prevent " and Manage VTE (Venous Thromboembolism) Risk  Recent Flowsheet Documentation  Taken 2/5/2025 0026 by Danya Baldwin, RN  VTE Prevention/Management: SCDs off (sequential compression devices)  Goal: Optimal Comfort and Wellbeing  Outcome: Progressing  Goal: Readiness for Transition of Care  Outcome: Progressing  Intervention: Mutually Develop Transition Plan  Recent Flowsheet Documentation  Taken 2/5/2025 0035 by Danya Baldwin RN  Equipment Currently Used at Home: none     Problem: Electrolyte Imbalance  Goal: Electrolyte Balance  Outcome: Progressing

## 2025-02-06 ENCOUNTER — PATIENT OUTREACH (OUTPATIENT)
Dept: INTERNAL MEDICINE | Facility: CLINIC | Age: 65
End: 2025-02-06

## 2025-02-06 ENCOUNTER — LAB (OUTPATIENT)
Dept: LAB | Facility: CLINIC | Age: 65
End: 2025-02-06
Payer: COMMERCIAL

## 2025-02-06 DIAGNOSIS — E83.52 HYPERCALCEMIA: Primary | ICD-10-CM

## 2025-02-06 DIAGNOSIS — E83.52 HYPERCALCEMIA: ICD-10-CM

## 2025-02-06 LAB
ALBUMIN SERPL BCG-MCNC: 3.4 G/DL (ref 3.5–5.2)
ALP SERPL-CCNC: 71 U/L (ref 40–150)
ALT SERPL W P-5'-P-CCNC: 18 U/L (ref 0–70)
ANION GAP SERPL CALCULATED.3IONS-SCNC: 9 MMOL/L (ref 7–15)
AST SERPL W P-5'-P-CCNC: 19 U/L (ref 0–45)
BASOPHILS # BLD MANUAL: 0.1 10E3/UL (ref 0–0.2)
BASOPHILS NFR BLD MANUAL: 1 %
BILIRUB SERPL-MCNC: 1 MG/DL
BUN SERPL-MCNC: 21.3 MG/DL (ref 8–23)
CALCIUM SERPL-MCNC: 12.7 MG/DL (ref 8.8–10.4)
CHLORIDE SERPL-SCNC: 102 MMOL/L (ref 98–107)
CREAT SERPL-MCNC: 1.61 MG/DL (ref 0.67–1.17)
EGFRCR SERPLBLD CKD-EPI 2021: 48 ML/MIN/1.73M2
EOSINOPHIL # BLD MANUAL: 3.1 10E3/UL (ref 0–0.7)
EOSINOPHIL NFR BLD MANUAL: 43 %
ERYTHROCYTE [DISTWIDTH] IN BLOOD BY AUTOMATED COUNT: 11.4 % (ref 10–15)
GLUCOSE SERPL-MCNC: 100 MG/DL (ref 70–99)
HCO3 SERPL-SCNC: 25 MMOL/L (ref 22–29)
HCT VFR BLD AUTO: 26.2 % (ref 40–53)
HGB BLD-MCNC: 9.4 G/DL (ref 13.3–17.7)
LYMPHOCYTES # BLD MANUAL: 1.4 10E3/UL (ref 0.8–5.3)
LYMPHOCYTES NFR BLD MANUAL: 19 %
MCH RBC QN AUTO: 48 PG (ref 26.5–33)
MCHC RBC AUTO-ENTMCNC: 35.9 G/DL (ref 31.5–36.5)
MCV RBC AUTO: 134 FL (ref 78–100)
MONOCYTES # BLD MANUAL: 0.3 10E3/UL (ref 0–1.3)
MONOCYTES NFR BLD MANUAL: 4 %
NEUTROPHILS # BLD MANUAL: 2.4 10E3/UL (ref 1.6–8.3)
NEUTROPHILS NFR BLD MANUAL: 33 %
PLAT MORPH BLD: ABNORMAL
PLATELET # BLD AUTO: 82 10E3/UL (ref 150–450)
POTASSIUM SERPL-SCNC: 3.8 MMOL/L (ref 3.4–5.3)
PROT SERPL-MCNC: 6.5 G/DL (ref 6.4–8.3)
RBC # BLD AUTO: 1.96 10E6/UL (ref 4.4–5.9)
RBC MORPH BLD: ABNORMAL
SODIUM SERPL-SCNC: 136 MMOL/L (ref 135–145)
WBC # BLD AUTO: 7.2 10E3/UL (ref 4–11)

## 2025-02-06 RX ORDER — ZOLEDRONIC ACID 0.04 MG/ML
4 INJECTION, SOLUTION INTRAVENOUS ONCE
OUTPATIENT
Start: 2025-02-06 | End: 2025-02-06

## 2025-02-06 RX ORDER — METHYLPREDNISOLONE SODIUM SUCCINATE 40 MG/ML
40 INJECTION INTRAMUSCULAR; INTRAVENOUS
Start: 2025-02-06

## 2025-02-06 RX ORDER — HEPARIN SODIUM,PORCINE 10 UNIT/ML
5-20 VIAL (ML) INTRAVENOUS DAILY PRN
OUTPATIENT
Start: 2025-02-06

## 2025-02-06 RX ORDER — HEPARIN SODIUM (PORCINE) LOCK FLUSH IV SOLN 100 UNIT/ML 100 UNIT/ML
5 SOLUTION INTRAVENOUS
OUTPATIENT
Start: 2025-02-06

## 2025-02-06 RX ORDER — DIPHENHYDRAMINE HYDROCHLORIDE 50 MG/ML
50 INJECTION INTRAMUSCULAR; INTRAVENOUS
Start: 2025-02-06

## 2025-02-06 RX ORDER — MEPERIDINE HYDROCHLORIDE 25 MG/ML
25 INJECTION INTRAMUSCULAR; INTRAVENOUS; SUBCUTANEOUS
OUTPATIENT
Start: 2025-02-06

## 2025-02-06 RX ORDER — ALBUTEROL SULFATE 0.83 MG/ML
2.5 SOLUTION RESPIRATORY (INHALATION)
OUTPATIENT
Start: 2025-02-06

## 2025-02-06 RX ORDER — EPINEPHRINE 1 MG/ML
0.3 INJECTION, SOLUTION INTRAMUSCULAR; SUBCUTANEOUS EVERY 5 MIN PRN
OUTPATIENT
Start: 2025-02-06

## 2025-02-06 RX ORDER — ALBUTEROL SULFATE 90 UG/1
1-2 INHALANT RESPIRATORY (INHALATION)
Start: 2025-02-06

## 2025-02-06 RX ORDER — DIPHENHYDRAMINE HYDROCHLORIDE 50 MG/ML
25 INJECTION INTRAMUSCULAR; INTRAVENOUS
Start: 2025-02-06

## 2025-02-06 NOTE — TELEPHONE ENCOUNTER
Transitions of Care Outreach  Chief Complaint   Patient presents with    Hospital F/U       Most Recent Admission Date: 2/4/2025   Most Recent Admission Diagnosis: Hypercalcemia - E83.52  KANNAN (acute kidney injury) - N17.9     Most Recent Discharge Date: 2/5/2025   Most Recent Discharge Diagnosis: Hypercalcemia - E83.52  KANNAN (acute kidney injury) - N17.9     Transitions of Care Assessment    Discharge Assessment  How are you doing now that you are home?: a lot better  How are your symptoms? (Red Flag symptoms escalate to triage hotline per guidelines): Improved  Do you know how to contact your clinic care team if you have future questions or changes to your health status? : Yes  Does the patient have their discharge instructions? : Yes  Does the patient have questions regarding their discharge instructions? : No  Were you started on any new medications or were there changes to any of your previous medications? : No  Does the patient have all of their medications?: Yes  Do you have questions regarding any of your medications? : Yes (see comment)  Do you have all of your needed medical supplies or equipment (DME)?  (i.e. oxygen tank, CPAP, cane, etc.): Yes    Follow up Plan     Discharge Follow-Up  Discharge follow up appointment scheduled in alignment with recommended follow up timeframe or Transitions of Risk Category? (Low = within 30 days; Moderate= within 14 days; High= within 7 days): No  Patient's follow up appointment not scheduled: Patient declined scheduling support. Education on the importance of transitions of care follow up. Provided scheduling phone number. (pt will f/u with oncology)    Future Appointments   Date Time Provider Department Center   2/7/2025 11:00 AM Rona Ordonez PA-C [facility]   2/26/2025  9:45 AM Dl Ordonez MD [facility]   3/31/2025 11:15 [facility]   7/7/2025  9:30 AM Jc Belcher MD [facility]       Outpatient Plan as outlined on  AVS reviewed with patient.    For any urgent concerns, please contact our 24 hour nurse triage line: [phone] ([phone])       Juana Albert RN

## 2025-02-06 NOTE — PROGRESS NOTES
2/6/2025     Recently discarged from [facility], 2/4-2/5 secondary to hypercalcemia;    Reviewed Labs for 2/6/25  Calcium 12.7   Cr 1.61    Will plan to have patient come in tomorrow to receive IV fluids and possible Zometa. He will also need to come in over the weekend for IVF and labs.   Most likely will monitor  him through next week.   I called the patient to discuss treatment options. He would like to go to [facility] tomorrow, right now we have availability at [facility]  on 2/6 at 2:30 pm and Sat. 2/7 at 10 am. He has been advised that if [facility] can't accommodate him tomorrow, he will need to come to [facility]. If unable to come here he will need to go to the ED. He endorse understanding.    I will follow up with with charge nurse first thing tomorrow, and confirm appointments with the patient.     Rona Ordonez PA-C   Latest Reference Range & Units 02/06/25 09:33   Sodium 135 - 145 mmol/L 136   Potassium 3.4 - 5.3 mmol/L 3.8   Chloride 98 - 107 mmol/L 102   Carbon Dioxide (CO2) 22 - 29 mmol/L 25   Urea Nitrogen 8.0 - 23.0 mg/dL 21.3   Creatinine 0.67 - 1.17 mg/dL 1.61 (H)   GFR Estimate >60 mL/min/1.73m2 48 (L)   Calcium 8.8 - 10.4 mg/dL 12.7 (H)   Anion Gap 7 - 15 mmol/L 9   Albumin 3.5 - 5.2 g/dL 3.4 (L)   Protein Total 6.4 - 8.3 g/dL 6.5   Alkaline Phosphatase 40 - 150 U/L 71   ALT 0 - 70 U/L 18   AST 0 - 45 U/L 19   Bilirubin Total <=1.2 mg/dL 1.0   Glucose 70 - 99 mg/dL 100 (H)     Component      Latest Ref Rng 5/10/2023  9:22 AM 8/11/2023  10:48 AM 10/12/2023  10:14 AM 11/6/2023  10:46 AM 1/16/2024  9:26 AM 3/15/2024  9:59 AM   Creatinine      0.67 - 1.17 mg/dL 0.97  0.85  0.89  0.78  0.88  0.93    Calcium      8.8 - 10.4 mg/dL 9.0  9.3  9.1  9.1  9.1  9.0      Component      Latest Ref Rng 11/25/2024  10:13 AM 1/6/2025  10:04 AM 1/15/2025  9:55 AM 2/3/2025  9:43 AM 2/4/2025  1:12 PM 2/4/2025  4:45 PM 2/5/2025  7:02 AM   Creatinine      0.67 - 1.17 mg/dL 0.90  0.91  0.96  1.81 (H)  1.76 (H)  1.71 (H)  1.65 (H)     Calcium      8.8 - 10.4 mg/dL 9.0  8.7 (L)  10.0  14.2 (HH)  13.9 (H)  12.8 (H)  12.1 (H)      Component      Latest Ref Rn 2/5/2025  4:03 PM 2/6/2025  9:33 AM   Creatinine      0.67 - 1.17 mg/dL 1.59 (H)  1.61 (H)    Calcium      8.8 - 10.4 mg/dL 12.0 (H)  12.7 (H)

## 2025-02-08 ENCOUNTER — INFUSION THERAPY VISIT (OUTPATIENT)
Dept: INFUSION THERAPY | Facility: CLINIC | Age: 65
End: 2025-02-08
Attending: INTERNAL MEDICINE
Payer: COMMERCIAL

## 2025-02-08 VITALS
HEART RATE: 76 BPM | DIASTOLIC BLOOD PRESSURE: 63 MMHG | TEMPERATURE: 97.6 F | OXYGEN SATURATION: 96 % | SYSTOLIC BLOOD PRESSURE: 126 MMHG | RESPIRATION RATE: 16 BRPM

## 2025-02-08 DIAGNOSIS — D72.118 OTHER HYPEREOSINOPHILIC SYNDROME: ICD-10-CM

## 2025-02-08 DIAGNOSIS — E83.52 HYPERCALCEMIA: Primary | ICD-10-CM

## 2025-02-08 LAB
ALBUMIN SERPL BCG-MCNC: 3.5 G/DL (ref 3.5–5.2)
ALP SERPL-CCNC: 77 U/L (ref 40–150)
ALT SERPL W P-5'-P-CCNC: 14 U/L (ref 0–70)
ANION GAP SERPL CALCULATED.3IONS-SCNC: 8 MMOL/L (ref 7–15)
AST SERPL W P-5'-P-CCNC: 22 U/L (ref 0–45)
BASOPHILS # BLD MANUAL: 0 10E3/UL (ref 0–0.2)
BASOPHILS NFR BLD MANUAL: 0 %
BILIRUB SERPL-MCNC: 0.9 MG/DL
BUN SERPL-MCNC: 17.6 MG/DL (ref 8–23)
CALCIUM SERPL-MCNC: 12.1 MG/DL (ref 8.8–10.4)
CHLORIDE SERPL-SCNC: 102 MMOL/L (ref 98–107)
CREAT SERPL-MCNC: 1.61 MG/DL (ref 0.67–1.17)
EGFRCR SERPLBLD CKD-EPI 2021: 48 ML/MIN/1.73M2
EOSINOPHIL # BLD MANUAL: 2.2 10E3/UL (ref 0–0.7)
EOSINOPHIL NFR BLD MANUAL: 28 %
ERYTHROCYTE [DISTWIDTH] IN BLOOD BY AUTOMATED COUNT: 11.7 % (ref 10–15)
GLUCOSE SERPL-MCNC: 96 MG/DL (ref 70–99)
HCO3 SERPL-SCNC: 27 MMOL/L (ref 22–29)
HCT VFR BLD AUTO: 27.4 % (ref 40–53)
HGB BLD-MCNC: 10 G/DL (ref 13.3–17.7)
LYMPHOCYTES # BLD MANUAL: 0.4 10E3/UL (ref 0.8–5.3)
LYMPHOCYTES NFR BLD MANUAL: 5 %
MCH RBC QN AUTO: 48.8 PG (ref 26.5–33)
MCHC RBC AUTO-ENTMCNC: 36.5 G/DL (ref 31.5–36.5)
MCV RBC AUTO: 134 FL (ref 78–100)
MONOCYTES # BLD MANUAL: 0.2 10E3/UL (ref 0–1.3)
MONOCYTES NFR BLD MANUAL: 3 %
NEUTROPHILS # BLD MANUAL: 5.1 10E3/UL (ref 1.6–8.3)
NEUTROPHILS NFR BLD MANUAL: 64 %
NEUTS HYPERSEG BLD QL SMEAR: PRESENT
PLAT MORPH BLD: ABNORMAL
PLATELET # BLD AUTO: 107 10E3/UL (ref 150–450)
POTASSIUM SERPL-SCNC: 3.6 MMOL/L (ref 3.4–5.3)
PROT SERPL-MCNC: 6.5 G/DL (ref 6.4–8.3)
RBC # BLD AUTO: 2.05 10E6/UL (ref 4.4–5.9)
RBC MORPH BLD: ABNORMAL
SODIUM SERPL-SCNC: 137 MMOL/L (ref 135–145)
WBC # BLD AUTO: 8 10E3/UL (ref 4–11)

## 2025-02-08 PROCEDURE — 85027 COMPLETE CBC AUTOMATED: CPT | Performed by: INTERNAL MEDICINE

## 2025-02-08 PROCEDURE — 96360 HYDRATION IV INFUSION INIT: CPT

## 2025-02-08 PROCEDURE — 36415 COLL VENOUS BLD VENIPUNCTURE: CPT

## 2025-02-08 PROCEDURE — 80053 COMPREHEN METABOLIC PANEL: CPT

## 2025-02-08 PROCEDURE — 258N000003 HC RX IP 258 OP 636

## 2025-02-08 PROCEDURE — 85007 BL SMEAR W/DIFF WBC COUNT: CPT | Performed by: INTERNAL MEDICINE

## 2025-02-08 RX ORDER — DIPHENHYDRAMINE HYDROCHLORIDE 50 MG/ML
25 INJECTION INTRAMUSCULAR; INTRAVENOUS
Status: CANCELLED
Start: 2025-02-08

## 2025-02-08 RX ORDER — ALBUTEROL SULFATE 90 UG/1
1-2 INHALANT RESPIRATORY (INHALATION)
Status: CANCELLED
Start: 2025-02-08

## 2025-02-08 RX ORDER — ALBUTEROL SULFATE 0.83 MG/ML
2.5 SOLUTION RESPIRATORY (INHALATION)
Status: CANCELLED | OUTPATIENT
Start: 2025-02-08

## 2025-02-08 RX ORDER — DIPHENHYDRAMINE HYDROCHLORIDE 50 MG/ML
50 INJECTION INTRAMUSCULAR; INTRAVENOUS
Status: CANCELLED
Start: 2025-02-08

## 2025-02-08 RX ORDER — EPINEPHRINE 1 MG/ML
0.3 INJECTION, SOLUTION INTRAMUSCULAR; SUBCUTANEOUS EVERY 5 MIN PRN
Status: CANCELLED | OUTPATIENT
Start: 2025-02-08

## 2025-02-08 RX ORDER — METHYLPREDNISOLONE SODIUM SUCCINATE 40 MG/ML
40 INJECTION INTRAMUSCULAR; INTRAVENOUS
Status: CANCELLED
Start: 2025-02-08

## 2025-02-08 RX ORDER — HEPARIN SODIUM (PORCINE) LOCK FLUSH IV SOLN 100 UNIT/ML 100 UNIT/ML
5 SOLUTION INTRAVENOUS
Status: CANCELLED | OUTPATIENT
Start: 2025-02-08

## 2025-02-08 RX ORDER — HEPARIN SODIUM,PORCINE 10 UNIT/ML
5-20 VIAL (ML) INTRAVENOUS DAILY PRN
Status: CANCELLED | OUTPATIENT
Start: 2025-02-08

## 2025-02-08 RX ORDER — MEPERIDINE HYDROCHLORIDE 25 MG/ML
25 INJECTION INTRAMUSCULAR; INTRAVENOUS; SUBCUTANEOUS
Status: CANCELLED | OUTPATIENT
Start: 2025-02-08

## 2025-02-08 RX ADMIN — SODIUM CHLORIDE 1000 ML: 9 INJECTION, SOLUTION INTRAVENOUS at 09:54

## 2025-02-08 ASSESSMENT — PAIN SCALES - GENERAL: PAINLEVEL_OUTOF10: NO PAIN (0)

## 2025-02-08 NOTE — PROGRESS NOTES
Infusion Nursing Note:  Alvarez Bess presents today for labs, IVFs.    Patient seen by provider today: No   present during visit today: Not Applicable.    Note: N/A.      Intravenous Access:  Labs drawn without difficulty.  Peripheral IV placed.    Treatment Conditions:  Lab Results   Component Value Date    HGB 10.0 (L) 02/08/2025    WBC 8.0 02/08/2025    ANEU 5.1 02/08/2025    ANEUTAUTO 3.3 01/15/2025     (L) 02/08/2025        Lab Results   Component Value Date     02/08/2025    POTASSIUM 3.6 02/08/2025    MAG 2.1 02/04/2025    CR 1.61 (H) 02/08/2025    PAULINA 12.1 (H) 02/08/2025    BILITOTAL 0.9 02/08/2025    ALBUMIN 3.5 02/08/2025    ALT 14 02/08/2025    AST 22 02/08/2025         Post Infusion Assessment:  Patient tolerated infusion without incident.  Site patent and intact, free from redness, edema or discomfort.  No evidence of extravasations.  Access discontinued per protocol.       Discharge Plan:   Patient declined prescription refills.  Discharge instructions reviewed with: Patient.  Patient and/or family verbalized understanding of discharge instructions and all questions answered.  AVS to patient via [employer].  Patient will return 2/10/25 for next appointment.   Patient discharged in stable condition accompanied by: self.  Departure Mode: Ambulatory.      Susy Oliveira RN

## 2025-02-10 ENCOUNTER — INFUSION THERAPY VISIT (OUTPATIENT)
Dept: INFUSION THERAPY | Facility: CLINIC | Age: 65
End: 2025-02-10
Attending: INTERNAL MEDICINE
Payer: COMMERCIAL

## 2025-02-10 VITALS
OXYGEN SATURATION: 100 % | TEMPERATURE: 97.5 F | RESPIRATION RATE: 16 BRPM | DIASTOLIC BLOOD PRESSURE: 66 MMHG | SYSTOLIC BLOOD PRESSURE: 142 MMHG | HEART RATE: 70 BPM

## 2025-02-10 DIAGNOSIS — E83.52 HYPERCALCEMIA: Primary | ICD-10-CM

## 2025-02-10 LAB
ALBUMIN SERPL BCG-MCNC: 3.5 G/DL (ref 3.5–5.2)
ALP SERPL-CCNC: 76 U/L (ref 40–150)
ALT SERPL W P-5'-P-CCNC: 16 U/L (ref 0–70)
ANION GAP SERPL CALCULATED.3IONS-SCNC: 10 MMOL/L (ref 7–15)
AST SERPL W P-5'-P-CCNC: 19 U/L (ref 0–45)
BILIRUB SERPL-MCNC: 0.4 MG/DL
BUN SERPL-MCNC: 18.5 MG/DL (ref 8–23)
CALCIUM SERPL-MCNC: 10.6 MG/DL (ref 8.8–10.4)
CHLORIDE SERPL-SCNC: 102 MMOL/L (ref 98–107)
CREAT SERPL-MCNC: 1.54 MG/DL (ref 0.67–1.17)
EGFRCR SERPLBLD CKD-EPI 2021: 51 ML/MIN/1.73M2
GLUCOSE SERPL-MCNC: 101 MG/DL (ref 70–99)
HCO3 SERPL-SCNC: 26 MMOL/L (ref 22–29)
POTASSIUM SERPL-SCNC: 3.4 MMOL/L (ref 3.4–5.3)
PROT SERPL-MCNC: 6.4 G/DL (ref 6.4–8.3)
SODIUM SERPL-SCNC: 138 MMOL/L (ref 135–145)

## 2025-02-10 PROCEDURE — 82310 ASSAY OF CALCIUM: CPT

## 2025-02-10 PROCEDURE — 84155 ASSAY OF PROTEIN SERUM: CPT

## 2025-02-10 PROCEDURE — 258N000003 HC RX IP 258 OP 636

## 2025-02-10 PROCEDURE — 80053 COMPREHEN METABOLIC PANEL: CPT

## 2025-02-10 PROCEDURE — 36415 COLL VENOUS BLD VENIPUNCTURE: CPT

## 2025-02-10 PROCEDURE — 96360 HYDRATION IV INFUSION INIT: CPT

## 2025-02-10 RX ORDER — HEPARIN SODIUM (PORCINE) LOCK FLUSH IV SOLN 100 UNIT/ML 100 UNIT/ML
5 SOLUTION INTRAVENOUS
OUTPATIENT
Start: 2025-02-10

## 2025-02-10 RX ORDER — DIPHENHYDRAMINE HYDROCHLORIDE 50 MG/ML
25 INJECTION INTRAMUSCULAR; INTRAVENOUS
Start: 2025-02-10

## 2025-02-10 RX ORDER — METHYLPREDNISOLONE SODIUM SUCCINATE 40 MG/ML
40 INJECTION INTRAMUSCULAR; INTRAVENOUS
Start: 2025-02-10

## 2025-02-10 RX ORDER — ALBUTEROL SULFATE 90 UG/1
1-2 INHALANT RESPIRATORY (INHALATION)
Start: 2025-02-10

## 2025-02-10 RX ORDER — ALBUTEROL SULFATE 0.83 MG/ML
2.5 SOLUTION RESPIRATORY (INHALATION)
OUTPATIENT
Start: 2025-02-10

## 2025-02-10 RX ORDER — HEPARIN SODIUM,PORCINE 10 UNIT/ML
5-20 VIAL (ML) INTRAVENOUS DAILY PRN
OUTPATIENT
Start: 2025-02-10

## 2025-02-10 RX ORDER — DIPHENHYDRAMINE HYDROCHLORIDE 50 MG/ML
50 INJECTION INTRAMUSCULAR; INTRAVENOUS
Start: 2025-02-10

## 2025-02-10 RX ORDER — EPINEPHRINE 1 MG/ML
0.3 INJECTION, SOLUTION INTRAMUSCULAR; SUBCUTANEOUS EVERY 5 MIN PRN
OUTPATIENT
Start: 2025-02-10

## 2025-02-10 RX ORDER — MEPERIDINE HYDROCHLORIDE 25 MG/ML
25 INJECTION INTRAMUSCULAR; INTRAVENOUS; SUBCUTANEOUS
OUTPATIENT
Start: 2025-02-10

## 2025-02-10 RX ADMIN — SODIUM CHLORIDE 1000 ML: 9 INJECTION, SOLUTION INTRAVENOUS at 14:09

## 2025-02-10 NOTE — PROGRESS NOTES
Infusion Nursing Note:  Alvarez Bess presents today for labs, IVF.    Patient seen by provider today: No   present during visit today: Not Applicable.    Note: Patient drinking a lot of water at home but doesn't feel like he's urinating as much as he's drinking. Feels like he's emptying though. Calcium and creatinine both improving, labs printed for patient. Will discuss all with Dr. Case at his exam in two days.      Intravenous Access:  Labs drawn without difficulty.  Peripheral IV placed.    Treatment Conditions:  Lab Results   Component Value Date     02/10/2025    POTASSIUM 3.4 02/10/2025    MAG 2.1 02/04/2025    CR 1.54 (H) 02/10/2025    PAULINA 10.6 (H) 02/10/2025    BILITOTAL 0.4 02/10/2025    ALBUMIN 3.5 02/10/2025    ALT 16 02/10/2025    AST 19 02/10/2025         Post Infusion Assessment:  Patient tolerated infusion without incident.  Blood return noted pre and post infusion.  Site patent and intact, free from redness, edema or discomfort.  No evidence of extravasations.  Access discontinued per protocol.       Discharge Plan:   Copy of AVS reviewed with patient and/or family.  Patient will return 2/12 for next appointment.  Patient discharged in stable condition accompanied by: self.  Departure Mode: Ambulatory.      Isaac Sandhu RN

## 2025-02-11 DIAGNOSIS — E83.52 HYPERCALCEMIA: Primary | ICD-10-CM

## 2025-02-12 ENCOUNTER — LAB (OUTPATIENT)
Dept: LAB | Facility: CLINIC | Age: 65
End: 2025-02-12
Payer: COMMERCIAL

## 2025-02-12 ENCOUNTER — ONCOLOGY VISIT (OUTPATIENT)
Dept: ONCOLOGY | Facility: CLINIC | Age: 65
End: 2025-02-12
Attending: INTERNAL MEDICINE
Payer: COMMERCIAL

## 2025-02-12 VITALS
DIASTOLIC BLOOD PRESSURE: 73 MMHG | RESPIRATION RATE: 16 BRPM | HEART RATE: 68 BPM | SYSTOLIC BLOOD PRESSURE: 156 MMHG | OXYGEN SATURATION: 100 % | BODY MASS INDEX: 23.63 KG/M2 | WEIGHT: 174.2 LBS

## 2025-02-12 DIAGNOSIS — D47.1 MYELOPROLIFERATIVE DISORDER (H): Primary | ICD-10-CM

## 2025-02-12 DIAGNOSIS — D72.118 OTHER HYPEREOSINOPHILIC SYNDROME: ICD-10-CM

## 2025-02-12 DIAGNOSIS — M25.40 JOINT SWELLING: ICD-10-CM

## 2025-02-12 DIAGNOSIS — L29.9 ITCHING: ICD-10-CM

## 2025-02-12 DIAGNOSIS — E83.52 HYPERCALCEMIA: ICD-10-CM

## 2025-02-12 DIAGNOSIS — D47.1 MYELOPROLIFERATIVE DISORDER (H): ICD-10-CM

## 2025-02-12 LAB
ACANTHOCYTES BLD QL SMEAR: SLIGHT
ALBUMIN SERPL BCG-MCNC: 3.6 G/DL (ref 3.5–5.2)
ALP SERPL-CCNC: 85 U/L (ref 40–150)
ALT SERPL W P-5'-P-CCNC: 19 U/L (ref 0–70)
ANION GAP SERPL CALCULATED.3IONS-SCNC: 11 MMOL/L (ref 7–15)
AST SERPL W P-5'-P-CCNC: 14 U/L (ref 0–45)
BASOPHILS # BLD MANUAL: 0.2 10E3/UL (ref 0–0.2)
BASOPHILS NFR BLD MANUAL: 2 %
BILIRUB SERPL-MCNC: 0.7 MG/DL
BUN SERPL-MCNC: 16.4 MG/DL (ref 8–23)
CALCIUM SERPL-MCNC: 9.6 MG/DL (ref 8.8–10.4)
CHLORIDE SERPL-SCNC: 100 MMOL/L (ref 98–107)
CREAT SERPL-MCNC: 1.49 MG/DL (ref 0.67–1.17)
CRP SERPL-MCNC: 13.74 MG/L
EGFRCR SERPLBLD CKD-EPI 2021: 53 ML/MIN/1.73M2
EOSINOPHIL # BLD MANUAL: 4.2 10E3/UL (ref 0–0.7)
EOSINOPHIL NFR BLD MANUAL: 42 %
ERYTHROCYTE [DISTWIDTH] IN BLOOD BY AUTOMATED COUNT: 11.5 % (ref 10–15)
ERYTHROCYTE [SEDIMENTATION RATE] IN BLOOD BY WESTERGREN METHOD: 37 MM/HR (ref 0–20)
FRAGMENTS BLD QL SMEAR: ABNORMAL
GLUCOSE SERPL-MCNC: 90 MG/DL (ref 70–99)
HCO3 SERPL-SCNC: 26 MMOL/L (ref 22–29)
HCT VFR BLD AUTO: 25.1 % (ref 40–53)
HGB BLD-MCNC: 9.2 G/DL (ref 13.3–17.7)
LDH SERPL L TO P-CCNC: 185 U/L (ref 0–250)
LYMPHOCYTES # BLD MANUAL: 1.7 10E3/UL (ref 0.8–5.3)
LYMPHOCYTES NFR BLD MANUAL: 17 %
MCH RBC QN AUTO: 48.7 PG (ref 26.5–33)
MCHC RBC AUTO-ENTMCNC: 36.7 G/DL (ref 31.5–36.5)
MCV RBC AUTO: 133 FL (ref 78–100)
MONOCYTES # BLD MANUAL: 0.7 10E3/UL (ref 0–1.3)
MONOCYTES NFR BLD MANUAL: 7 %
NEUTROPHILS # BLD MANUAL: 3.2 10E3/UL (ref 1.6–8.3)
NEUTROPHILS NFR BLD MANUAL: 32 %
PLAT MORPH BLD: ABNORMAL
PLATELET # BLD AUTO: 115 10E3/UL (ref 150–450)
POTASSIUM SERPL-SCNC: 3.7 MMOL/L (ref 3.4–5.3)
PROT SERPL-MCNC: 6.5 G/DL (ref 6.4–8.3)
RBC # BLD AUTO: 1.89 10E6/UL (ref 4.4–5.9)
RBC MORPH BLD: ABNORMAL
SODIUM SERPL-SCNC: 137 MMOL/L (ref 135–145)
VIT D+METAB SERPL-MCNC: 68 NG/ML (ref 20–50)
WBC # BLD AUTO: 9.9 10E3/UL (ref 4–11)

## 2025-02-12 PROCEDURE — 82397 CHEMILUMINESCENT ASSAY: CPT

## 2025-02-12 PROCEDURE — 80053 COMPREHEN METABOLIC PANEL: CPT

## 2025-02-12 PROCEDURE — 86140 C-REACTIVE PROTEIN: CPT

## 2025-02-12 PROCEDURE — 85014 HEMATOCRIT: CPT

## 2025-02-12 PROCEDURE — 83615 LACTATE (LD) (LDH) ENZYME: CPT

## 2025-02-12 PROCEDURE — 82306 VITAMIN D 25 HYDROXY: CPT

## 2025-02-12 PROCEDURE — 85652 RBC SED RATE AUTOMATED: CPT

## 2025-02-12 PROCEDURE — 36415 COLL VENOUS BLD VENIPUNCTURE: CPT | Mod: GT,95

## 2025-02-12 PROCEDURE — 85007 BL SMEAR W/DIFF WBC COUNT: CPT

## 2025-02-12 PROCEDURE — 99215 OFFICE O/P EST HI 40 MIN: CPT | Performed by: INTERNAL MEDICINE

## 2025-02-12 PROCEDURE — 99417 PROLNG OP E/M EACH 15 MIN: CPT | Performed by: INTERNAL MEDICINE

## 2025-02-12 PROCEDURE — 99213 OFFICE O/P EST LOW 20 MIN: CPT | Performed by: INTERNAL MEDICINE

## 2025-02-12 PROCEDURE — 83921 ORGANIC ACID SINGLE QUANT: CPT

## 2025-02-12 RX ORDER — BETAMETHASONE DIPROPIONATE 0.5 MG/G
CREAM TOPICAL 2 TIMES DAILY
Qty: 100 G | Refills: 3 | Status: SHIPPED | OUTPATIENT
Start: 2025-02-12

## 2025-02-12 ASSESSMENT — PAIN SCALES - GENERAL: PAINLEVEL_OUTOF10: MODERATE PAIN (4)

## 2025-02-12 NOTE — PROGRESS NOTES
Oncology Note  2/12/2025     REASON for VISIT: MPD , eosinophilia    Oncology HPI:   2014: elevated WBC of 12.9  - 6/27/23: Initial consult: Patient states that he has had 14 pound unintentional weight loss over the last year . He denies any night sweats or weight loss. 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 He is following up with dermatology.  - 5/19/23 white count Is a 25.6 absolute neutrophils at 10.2 absolute eosinophils at 10.2 CRP level at 11.371-April 2024 it was at 16.5 sedimentation rate at 7. His hemoglobin is normal at 14.7 platelet count of 2.93   - 8/5/2023: bone marrow biopsy showed marked hypercellular bone marrow with marked eosinophilia maturing trilineage hematopoiesis no increased dysplasia or increase in blasts. Peripheral blood showing moderate leukocytosis with eosinophilia. Adequate neutrophils with slight shift to immaturity. Flow cytometry did not show any increase in myeloid blasts and no abnormal population of cells. Morphologic findings just showed peripheral eosinophilia. JAK2 mutation negative. 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 This was a summary for the bone marrow biopsy.   - FISH cytogenetics and molecular studies were negative for PD GFR alpha, beta, FGFR 1, JAK2 genes and BCR-ABL mutation ruling out certain leukemias and CML. No mass found on MRI that they could biopsy   - 11/27/23 PET with PET/CT shows bilateral axillary and inguinal hypermetabolic adenopathy concerning for neoplastic process including hypereosinophilic syndromes and lymphoma. Recommend tissue sampling. SUV in those regions at 3.3-3.6. Additional of hypermetabolic foci within the right gluteus and left vastus lateralis intermedius muscles SUV of 8.4 may be related to the neoplastic process   - 3/2024  cholecystectomy- hydrea held  - 4/4/2024 restarted Hydrea 500 mg twice daily    - 12/3/2024 WBC 11.9, Hgb 11.8, Plts 115k, ANC 2.9, Absolute Eosinophils 6.4  - 1/6/25 labs with severed Vitamin D deficiency, started on cholecalciferol   - 2/4-2/5/25: Ca of 14.2 and Cr of 1.81. patient was advised to got to ED where he was seen, given fluids and kept for monitoring. Felt to be vitamin D toxicity. Hydrea held    Current Treatment: Hydrea 500 mg twice daily    Interval history:   Artie  returns for follow-up today.Laboratory data completed with a CBC on 2/8/2024 showed absolute eosinophils of 2200 with a white count of 8000 hemoglobin 10 platelet count of 107.  On 210 his creatinine was noted to be a 10.6. admitted to the hospital for Vitamin D toxicity and hypercalcemia discharged 2.5 . Given zometa iv x 1   He has bilateral elbow fluid, appetite low, more tired. Constipated but had a good bm 2 days ago    Review of Systems: See interval hx. Denies fevers, chills, dizziness,  changes in vision, abdominal pain, N/V, diarrhea, changes in urination, bleeding, bruising, rash.     Current Outpatient Medications   Medication Sig Dispense Refill    acetaminophen (TYLENOL) 325 MG tablet Take 2 tablets (650 mg) by mouth every 4 hours as needed for other (mild pain) 100 tablet 0    augmented betamethasone dipropionate (DIPROLENE AF) 0.05 % external cream Apply topically 2 times daily. 100 g 3    carvedilol (COREG) 25 MG tablet Take 1 tablet (25 mg) by mouth 2 times daily (with meals). 180 tablet 3    hydroxyurea (HYDREA) 500 MG capsule Take 1 capsule (500 mg) by mouth 2 times daily 180 capsule 4    nicotine (NICODERM CQ) 14 MG/24HR 24 hr patch Place 1 patch onto the skin every 24 hours Start after completion of the 21 mg patches. 14 patch 0    nicotine (NICODERM CQ) 21 MG/24HR 24 hr patch Place 1 patch onto the skin every 24 hours Start with these patches first. 14 patch 0    nicotine (NICODERM CQ) 7 MG/24HR 24 hr patch Place 1  patch onto the skin every 24 hours Start after completing the full course of 14 mg patches. 14 patch 0    omeprazole (PRILOSEC OTC) 20 MG EC tablet Take 20 mg by mouth daily      Probiotic Product (PROBIOTIC DAILY PO) Take by mouth daily.            Allergies   Allergen Reactions    Morphine Hcl      Extreme agitation         Exam:  Blood pressure (!) 156/73, pulse 68, resp. rate 16, weight 79 kg (174 lb 3.2 oz), SpO2 100%.  Wt Readings from Last 4 Encounters:   02/12/25 79 kg (174 lb 3.2 oz)   02/07/25 73 kg (161 lb)   02/05/25 73.3 kg (161 lb 9.6 oz)   12/03/24 76.6 kg (168 lb 12.8 oz)       HEENT NA  Left axillary node palpable 3 cm , right axillary region normal       Labs:   noted     Imaging:  EXAM: PET ONCOLOGY (EYES TO THIGHS), CT CHEST/ABDOMEN/PELVIS W CONTRAST  LOCATION: [facility]  DATE: 11/27/2023     INDICATION: Malignant immunoproliferative disease, unspecified. 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 Interval hydroxyurea. Assess treatment response.  COMPARISON: FDG PET/CT 08/08/2023  CONTRAST: 99 mL IV Isovue 370, 500 mL oral water  TECHNIQUE: Serum glucose level 102 mg/dL. One hour post intravenous administration of 10.9 mCi F-18 FDG, PET imaging was performed from the skull vertex to mid thighs, utilizing attenuation correction with concurrent axial CT and PET/CT image fusion.   Separate diagnostic CT of the chest, abdomen, and pelvis was performed. Dose reduction techniques were used.     PET/CT FINDINGS: No significant interval change in FDG avid left axillary (SUV max 3.6 unchanged), right axillary (SUV max 3.8, previously 3.3), right inguinal (SUV max 3.4 and 1.5 cm short axis, both unchanged) or left inguinal (SUV max 3.9, previously   3.4) lymphadenopathy as well as no significant change in FDG avid subcentimeter subcutaneous nodules in the upper back (SUV max 2.3 on PET image #99, previously 2.0).      Prior focal FDG uptake in the left thigh musculature as resolved. Inflammatory FDG uptake associated with left cervical facet joints.     CT FINDINGS: Mild senescent intracranial changes. Advanced coronary arterial calcification. Mild apical emphysema. New 6 mm solid nodule in the left lower lobe (series 2, image 75), below PET resolution. Several additional smaller pulmonary nodules   appear similar. Small hepatic cyst. Left adrenal adenoma. Left renal cysts. Bilateral non-obstructing renal calyceal tip calculi. Spleen is normal in size. Scattered colonic diverticuli. Mild multilevel degenerative changes in spine.                                                                      IMPRESSION:     1. No significant interval change in bilateral FDG avid axillary and inguinal lymphadenopathy and a few small subcutaneous nodules in the upper back.     2. New 6 mm pulmonary nodule in the left lower lobe, below PET resolution. Attention on follow-up.     3. Prior focal FDG uptake in the left thigh musculature as resolved.    Impression/plan: Artie is a 64 year old male who presents to clinic for follow of of eosinophilia and leukocytosis. He is currently taking Hydrea 500 mg twice daily.     Hypercalcemia/KANNAN  - 2/4-2/5/25: Ca of 14.2 and Cr of 1.81. patient was advised to got to ED where he was seen, given fluids and kept for monitoring. Felt to be vitamin D toxicity. Hydrea held  - As above calcium and kidney function improved and he was discharged from the ED  - 2/6/27:  labs with Ca 12.7 and Cr 1.61  - Will plan for IVF and Zometa today, given his history of CHF and Cardiomyopathy will plan to do only 1000 mL IVF and run at a slower rate of 2-2.5 hours. Patient encouraged to continue to push increased fluids    It is unclear to me why he had hypercalcemia his parathyroid hormone was expectedly low.  Vitamin D level was not in an extreme toxic range.  I am going to add a parathyroid hormonal related peptide, CRP,  sedimentation rate and repeat his creatinine and CBC today.  He restarted the Hydrea yesterday.  Calcium level 2 days ago and creatinine were improved. Axillary node biopsy if remains concerning for low grade lymphoma or other process.        Vitamin D deficiency  - 1/6/25 labs with hypocalcemia, Ca 8.7 which was new  - checked Vitamin D, found to be severely deficient, Vitamin D was 8 on 1/6/25  - prescription for cholecalciferol 1.25 mg x 6 tablets, 1 tablet every 7 days sent to pharmacy, patient states that he accidentally took them daily instead of once weekly    MPD with Hypereosinophilic Syndrome  -  Per Dr. 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      - 1/28/25 PET/CT with o significant interval change in bilateral FDG avid axillary and inguinal lymphadenopathy and a few small subcutaneous nodules in the upper back. Prior focal FDG uptake in the left thigh musculature as resolved.       Pulmonary Nodule  - 1/28/25: PET/CT with, New 6 mm pulmonary nodule in the left lower lobe, below PET resolution. Attention on follow-up.  - current smoker    Left forearm lesion  - He was seen by Dermatology, 12/9/24 underwent biopsy, pathology with squamous cell CA  - He scheduled for excision 3/31/25    Anemia  - 2/6/25 Hgb 9.4  - 1/6/25 iron pane WNL, folate WNL, B12 557  - will also check MMA  - LFTs WNL, bilirubin WNL     Thrombocytopenia  - Plts 82k  - will recheck labs in 1 mo  - bleeding precautions discussed    Cardiology  CHF, Cardiomyopathy, HTN  - follows Dr. Dl Ordonez    Xeroderma  - Fissures of hands, and patient reports of feet, possibly secondary to Hydrea  - start Amlactin cream twice daily- prescription sent to pharmacy  - Vaseline in  fissures of feet with socks nightly    History of LE Rash  - Resolved  - no biopsy report in EPIC    Right Arm   - path report 9/29/23: CASE FROM [facility], [place], [place] ([id number], OBTAINED 05/16/2023):  Skin, arm, right, lateral:- Epidermal hyperplasia with suprabasal acantholysis and apparent dyskeratosis - (see comment), Chart history was reviewed with this case. As noted in the original report, these microscopic features most resemble Mcintosh's disease. However given the clinical findings including a marked peripheral eosinophilia and concern for a myeloid neoplasm, an additional biopsy for direct immunofluorescence is suggested to more fully rule out the possibility of an autoimmune bullous process such as pemphigus vulgaris or paraneoplastic pemphigus.     Tobacco Abuse  Actively attempting smoking cessation  - using Nicoderm patches    Right elbow effusion: rheumatology for drainage fluid and eval    Total time spent on day of visit, including review of tests, obtaining/reviewing separately obtained history, ordering medications/tests/procedures, communicating with PCP/consultants, and documenting in electronic medical record: 60 minutes     I followed up with Artie at the end of the day, it appears all his labs are as expected (cr improving, calcium better but his vitamin D level went higher at 68 he is NOT taking any supplemental D sources, not sure what to make of this lab..will repeat in 2 weeks     Vasyl Case MD

## 2025-02-12 NOTE — LETTER
2/12/2025      Alvarez Bess  [id number] [facility] [id number]      Dear Colleague,    Thank you for referring your patient, Alvarez Bess, to the [facility]. Please see a copy of my visit note below.    Oncology Note  2/12/2025     REASON for VISIT: MPD , eosinophilia    Oncology HPI:   2014: elevated WBC of 12.9  - 6/27/23: Initial consult: Patient states that he has had 14 pound unintentional weight loss over the last year . He denies any night sweats or weight loss. 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 He is following up with dermatology.  - 5/19/23 white count Is a 25.6 absolute neutrophils at 10.2 absolute eosinophils at 10.2 CRP level at 11.371-April 2024 it was at 16.5 sedimentation rate at 7. His hemoglobin is normal at 14.7 platelet count of 2.93   - 8/5/2023: bone marrow biopsy showed marked hypercellular bone marrow with marked eosinophilia maturing trilineage hematopoiesis no increased dysplasia or increase in blasts. Peripheral blood showing moderate leukocytosis with eosinophilia. Adequate neutrophils with slight shift to immaturity. Flow cytometry did not show any increase in myeloid blasts and no abnormal population of cells. Morphologic findings just showed peripheral eosinophilia. JAK2 mutation negative. 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 This was a summary for the bone marrow biopsy.   - FISH cytogenetics and molecular studies were negative for PD GFR alpha, beta, FGFR 1, JAK2 genes and BCR-ABL mutation ruling out certain leukemias and CML. No mass found on MRI that they could biopsy   - 11/27/23 PET with PET/CT shows bilateral axillary and inguinal hypermetabolic adenopathy concerning for neoplastic process including hypereosinophilic  syndromes and lymphoma. Recommend tissue sampling. SUV in those regions at 3.3-3.6. Additional of hypermetabolic foci within the right gluteus and left vastus lateralis intermedius muscles SUV of 8.4 may be related to the neoplastic process   - 3/2024 cholecystectomy- hydrea held  - 4/4/2024 restarted Hydrea 500 mg twice daily    - 12/3/2024 WBC 11.9, Hgb 11.8, Plts 115k, ANC 2.9, Absolute Eosinophils 6.4  - 1/6/25 labs with severed Vitamin D deficiency, started on cholecalciferol   - 2/4-2/5/25: Ca of 14.2 and Cr of 1.81. patient was advised to got to ED where he was seen, given fluids and kept for monitoring. Felt to be vitamin D toxicity. Hydrea held    Current Treatment: Hydrea 500 mg twice daily    Interval history:   Artie  returns for follow-up today.Laboratory data completed with a CBC on 2/8/2024 showed absolute eosinophils of 2200 with a white count of 8000 hemoglobin 10 platelet count of 107.  On 210 his creatinine was noted to be a 10.6. admitted to the hospital for Vitamin D toxicity and hypercalcemia discharged 2.5 . Given zometa iv x 1   He has bilateral elbow fluid, appetite low, more tired. Constipated but had a good bm 2 days ago    Review of Systems: See interval hx. Denies fevers, chills, dizziness,  changes in vision, abdominal pain, N/V, diarrhea, changes in urination, bleeding, bruising, rash.     Current Outpatient Medications   Medication Sig Dispense Refill     acetaminophen (TYLENOL) 325 MG tablet Take 2 tablets (650 mg) by mouth every 4 hours as needed for other (mild pain) 100 tablet 0     augmented betamethasone dipropionate (DIPROLENE AF) 0.05 % external cream Apply topically 2 times daily. 100 g 3     carvedilol (COREG) 25 MG tablet Take 1 tablet (25 mg) by mouth 2 times daily (with meals). 180 tablet 3     hydroxyurea (HYDREA) 500 MG capsule Take 1 capsule (500 mg) by mouth 2 times daily 180 capsule 4     nicotine (NICODERM CQ) 14 MG/24HR 24 hr patch Place 1 patch onto the skin every  24 hours Start after completion of the 21 mg patches. 14 patch 0     nicotine (NICODERM CQ) 21 MG/24HR 24 hr patch Place 1 patch onto the skin every 24 hours Start with these patches first. 14 patch 0     nicotine (NICODERM CQ) 7 MG/24HR 24 hr patch Place 1 patch onto the skin every 24 hours Start after completing the full course of 14 mg patches. 14 patch 0     omeprazole (PRILOSEC OTC) 20 MG EC tablet Take 20 mg by mouth daily       Probiotic Product (PROBIOTIC DAILY PO) Take by mouth daily.            Allergies   Allergen Reactions     Morphine Hcl      Extreme agitation         Exam:  Blood pressure (!) 156/73, pulse 68, resp. rate 16, weight 79 kg (174 lb 3.2 oz), SpO2 100%.  Wt Readings from Last 4 Encounters:   02/12/25 79 kg (174 lb 3.2 oz)   02/07/25 73 kg (161 lb)   02/05/25 73.3 kg (161 lb 9.6 oz)   12/03/24 76.6 kg (168 lb 12.8 oz)       HEENT NA  Left axillary node palpable 3 cm , right axillary region normal       Labs:   noted     Imaging:  EXAM: PET ONCOLOGY (EYES TO THIGHS), CT CHEST/ABDOMEN/PELVIS W CONTRAST  LOCATION: [facility]  DATE: 11/27/2023     INDICATION: Malignant immunoproliferative disease, unspecified. 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 Interval hydroxyurea. Assess treatment response.  COMPARISON: FDG PET/CT 08/08/2023  CONTRAST: 99 mL IV Isovue 370, 500 mL oral water  TECHNIQUE: Serum glucose level 102 mg/dL. One hour post intravenous administration of 10.9 mCi F-18 FDG, PET imaging was performed from the skull vertex to mid thighs, utilizing attenuation correction with concurrent axial CT and PET/CT image fusion.   Separate diagnostic CT of the chest, abdomen, and pelvis was performed. Dose reduction techniques were used.     PET/CT FINDINGS: No significant interval change in FDG avid left axillary (SUV max 3.6 unchanged), right axillary (SUV max 3.8, previously 3.3), right inguinal (SUV  max 3.4 and 1.5 cm short axis, both unchanged) or left inguinal (SUV max 3.9, previously   3.4) lymphadenopathy as well as no significant change in FDG avid subcentimeter subcutaneous nodules in the upper back (SUV max 2.3 on PET image #99, previously 2.0).     Prior focal FDG uptake in the left thigh musculature as resolved. Inflammatory FDG uptake associated with left cervical facet joints.     CT FINDINGS: Mild senescent intracranial changes. Advanced coronary arterial calcification. Mild apical emphysema. New 6 mm solid nodule in the left lower lobe (series 2, image 75), below PET resolution. Several additional smaller pulmonary nodules   appear similar. Small hepatic cyst. Left adrenal adenoma. Left renal cysts. Bilateral non-obstructing renal calyceal tip calculi. Spleen is normal in size. Scattered colonic diverticuli. Mild multilevel degenerative changes in spine.                                                                      IMPRESSION:     1. No significant interval change in bilateral FDG avid axillary and inguinal lymphadenopathy and a few small subcutaneous nodules in the upper back.     2. New 6 mm pulmonary nodule in the left lower lobe, below PET resolution. Attention on follow-up.     3. Prior focal FDG uptake in the left thigh musculature as resolved.    Impression/plan: Artie is a 64 year old male who presents to clinic for follow of of eosinophilia and leukocytosis. He is currently taking Hydrea 500 mg twice daily.     Hypercalcemia/KANNAN  - 2/4-2/5/25: Ca of 14.2 and Cr of 1.81. patient was advised to got to ED where he was seen, given fluids and kept for monitoring. Felt to be vitamin D toxicity. Hydrea held  - As above calcium and kidney function improved and he was discharged from the ED  - 2/6/27:  labs with Ca 12.7 and Cr 1.61  - Will plan for IVF and Zometa today, given his history of CHF and Cardiomyopathy will plan to do only 1000 mL IVF and run at a slower rate of 2-2.5 hours.  Patient encouraged to continue to push increased fluids    It is unclear to me why he had hypercalcemia his parathyroid hormone was expectedly low.  Vitamin D level was not in an extreme toxic range.  I am going to add a parathyroid hormonal related peptide, CRP, sedimentation rate and repeat his creatinine and CBC today.  He restarted the Hydrea yesterday.  Calcium level 2 days ago and creatinine were improved. Axillary node biopsy if remains concerning for low grade lymphoma or other process.        Vitamin D deficiency  - 1/6/25 labs with hypocalcemia, Ca 8.7 which was new  - checked Vitamin D, found to be severely deficient, Vitamin D was 8 on 1/6/25  - prescription for cholecalciferol 1.25 mg x 6 tablets, 1 tablet every 7 days sent to pharmacy, patient states that he accidentally took them daily instead of once weekly    MPD with Hypereosinophilic Syndrome  -  Per Dr. 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      - 1/28/25 PET/CT with o significant interval change in bilateral FDG avid axillary and inguinal lymphadenopathy and a few small subcutaneous nodules in the upper back. Prior focal FDG uptake in the left thigh musculature as resolved.       Pulmonary Nodule  - 1/28/25: PET/CT with, New 6 mm pulmonary nodule in the left lower lobe, below PET resolution. Attention on follow-up.  - current smoker    Left forearm lesion  - He was seen by Dermatology, 12/9/24 underwent biopsy, pathology with squamous cell CA  - He scheduled for excision 3/31/25    Anemia  - 2/6/25 Hgb 9.4  - 1/6/25 iron pane WNL, folate WNL, B12 557  - will also check MMA  - LFTs WNL, bilirubin WNL     Thrombocytopenia  - Plts 82k  - will recheck labs in 1 mo  - bleeding  precautions discussed    Cardiology  CHF, Cardiomyopathy, HTN  - follows Dr. Dl Ordonez    Xeroderma  - Fissures of hands, and patient reports of feet, possibly secondary to Hydrea  - start Amlactin cream twice daily- prescription sent to pharmacy  - Vaseline in fissures of feet with socks nightly    History of LE Rash  - Resolved  - no biopsy report in EPIC    Right Arm   - path report 9/29/23: CASE FROM [facility], [place], [place] ([id number], OBTAINED 05/16/2023):  Skin, arm, right, lateral:- Epidermal hyperplasia with suprabasal acantholysis and apparent dyskeratosis - (see comment), Chart history was reviewed with this case. As noted in the original report, these microscopic features most resemble Edison's disease. However given the clinical findings including a marked peripheral eosinophilia and concern for a myeloid neoplasm, an additional biopsy for direct immunofluorescence is suggested to more fully rule out the possibility of an autoimmune bullous process such as pemphigus vulgaris or paraneoplastic pemphigus.     Tobacco Abuse  Actively attempting smoking cessation  - using Nicoderm patches    Right elbow effusion: rheumatology for drainage fluid and eval    Total time spent on day of visit, including review of tests, obtaining/reviewing separately obtained history, ordering medications/tests/procedures, communicating with PCP/consultants, and documenting in electronic medical record: 60 minutes     Vasyl Case MD   The longitudinal plan of care for the diagnosis(es)/condition(s) as documented were addressed during this visit. Due to the added complexity in care, I will continue to support Artie in the subsequent management and with ongoing continuity of care.      Again, thank you for allowing me to participate in the care of your patient.        Sincerely,        Vasyl Case MD    Electronically signed

## 2025-02-12 NOTE — PROGRESS NOTES
Oncology Rooming Note    February 12, 2025 1:32 PM   Alvarez Bess is a 64 year old male who presents for:    Chief Complaint   Patient presents with    Oncology Clinic Visit     Initial Vitals: BP (!) 156/73   Pulse 68   Resp 16   Wt 79 kg (174 lb 3.2 oz)   SpO2 100%   BMI 23.63 kg/m   Estimated body mass index is 23.63 kg/m  as calculated from the following:    Height as of 2/7/25: 1.829 m (6').    Weight as of this encounter: 79 kg (174 lb 3.2 oz). Body surface area is 2 meters squared.  Moderate Pain (4) Comment: Data Unavailable   No LMP for male patient.  Allergies reviewed: Yes  Medications reviewed: Yes    Medications: Medication refills not needed today.  Pharmacy name entered into [employer]: [id number] [place], [place] - [address] [facility] [address]    Frailty Screening:   Is the patient here for a new oncology consult visit in cancer care? 2. No    PHQ9:  Did this patient require a PHQ9?: No        Madisyn Burnham, [facility]

## 2025-02-13 ENCOUNTER — TELEPHONE (OUTPATIENT)
Dept: ENDOCRINOLOGY | Facility: CLINIC | Age: 65
End: 2025-02-13
Payer: COMMERCIAL

## 2025-02-13 NOTE — TELEPHONE ENCOUNTER
Left Voicemail (1st Attempt) for the patient to call back and schedule the following:    Appointment type: New Endocrine   Provider: Any that see pt's diagnosis   Return date: Next avail TY on call slot   Specialty phone number: [phone]  Additional appointment(s) needed:   Additonal Notes: LVM, Indira x1   Pam Vizcarra, RN  [id number]  Received referral for the following:  E83.52 (ICD-10-CM) - Hypercalcemia    Per Endocrine RN Triage Scheduling Protocol patient should be seen in an urgent on call spot.    Examples:  3/11 [place] virtual csc  3/12 [facility] virtual csc  3/18 Araki virtual csc   3/21 Araki virtual csc    Please note that the above appointment(s) will require manual scheduling as they are marked as TY and will not appear using auto search. Do not schedule the patient if another patient has already been scheduled in the requested appointment slot.     Sharon Cook on 2/13/2025 at 12:09 PM

## 2025-02-14 LAB — PTH RELATED PROT SERPL-SCNC: 0.5 PMOL/L

## 2025-02-18 ENCOUNTER — MYC MEDICAL ADVICE (OUTPATIENT)
Dept: CARDIOLOGY | Facility: CLINIC | Age: 65
End: 2025-02-18
Payer: COMMERCIAL

## 2025-02-19 ENCOUNTER — ANCILLARY PROCEDURE (OUTPATIENT)
Dept: GENERAL RADIOLOGY | Facility: CLINIC | Age: 65
End: 2025-02-19
Attending: PHYSICIAN ASSISTANT
Payer: COMMERCIAL

## 2025-02-19 ENCOUNTER — OFFICE VISIT (OUTPATIENT)
Dept: URGENT CARE | Facility: URGENT CARE | Age: 65
End: 2025-02-19
Payer: COMMERCIAL

## 2025-02-19 VITALS
BODY MASS INDEX: 23.63 KG/M2 | RESPIRATION RATE: 18 BRPM | DIASTOLIC BLOOD PRESSURE: 76 MMHG | SYSTOLIC BLOOD PRESSURE: 156 MMHG | TEMPERATURE: 97.4 F | HEART RATE: 65 BPM | OXYGEN SATURATION: 100 % | WEIGHT: 174.2 LBS

## 2025-02-19 DIAGNOSIS — J43.8 OTHER EMPHYSEMA (H): ICD-10-CM

## 2025-02-19 DIAGNOSIS — J40 BRONCHITIS WITH ACUTE WHEEZING: Primary | ICD-10-CM

## 2025-02-19 DIAGNOSIS — J40 BRONCHITIS WITH ACUTE WHEEZING: ICD-10-CM

## 2025-02-19 DIAGNOSIS — F17.200 SMOKER: ICD-10-CM

## 2025-02-19 PROCEDURE — 99214 OFFICE O/P EST MOD 30 MIN: CPT | Mod: 25 | Performed by: PHYSICIAN ASSISTANT

## 2025-02-19 PROCEDURE — 94640 AIRWAY INHALATION TREATMENT: CPT | Performed by: PHYSICIAN ASSISTANT

## 2025-02-19 PROCEDURE — 71046 X-RAY EXAM CHEST 2 VIEWS: CPT | Mod: TC | Performed by: RADIOLOGY

## 2025-02-19 RX ORDER — IPRATROPIUM BROMIDE AND ALBUTEROL SULFATE 2.5; .5 MG/3ML; MG/3ML
1 SOLUTION RESPIRATORY (INHALATION) EVERY 4 HOURS PRN
Qty: 90 ML | Refills: 0 | Status: SHIPPED | OUTPATIENT
Start: 2025-02-19

## 2025-02-19 RX ORDER — ALBUTEROL SULFATE 0.83 MG/ML
2.5 SOLUTION RESPIRATORY (INHALATION) EVERY 6 HOURS PRN
COMMUNITY

## 2025-02-19 RX ORDER — PREDNISONE 20 MG/1
20 TABLET ORAL DAILY
Qty: 5 TABLET | Refills: 0 | Status: SHIPPED | OUTPATIENT
Start: 2025-02-19 | End: 2025-02-24

## 2025-02-19 RX ORDER — IPRATROPIUM BROMIDE AND ALBUTEROL SULFATE 2.5; .5 MG/3ML; MG/3ML
3 SOLUTION RESPIRATORY (INHALATION) ONCE
Status: COMPLETED | OUTPATIENT
Start: 2025-02-19 | End: 2025-02-19

## 2025-02-19 RX ADMIN — IPRATROPIUM BROMIDE AND ALBUTEROL SULFATE 3 ML: 2.5; .5 SOLUTION RESPIRATORY (INHALATION) at 11:03

## 2025-02-19 ASSESSMENT — ENCOUNTER SYMPTOMS
FEVER: 0
RHINORRHEA: 1
SHORTNESS OF BREATH: 1
SORE THROAT: 0
WHEEZING: 1
COUGH: 1

## 2025-02-19 NOTE — PROGRESS NOTES
Assessment & Plan:        ICD-10-CM    1. Bronchitis with acute wheezing  J40 ipratropium - albuterol 0.5 mg/2.5 mg/3 mL (DUONEB) neb solution 3 mL     XR Chest 2 Views     predniSONE (DELTASONE) 20 MG tablet     ipratropium - albuterol 0.5 mg/2.5 mg/3 mL (DUONEB) 0.5-2.5 (3) MG/3ML neb solution      2. Other emphysema (H)  J43.8       3. Smoker  F17.200             Plan/Clinical Decision Making:    Patient developed URI symptoms with cough a week ago. Has had SOB, wheezing. Lungs with rhonchi, mild rales, wheezing on exam. O2sat 100%.   I independently visualized the xray:  mild increased haziness right lower lobe compared to past CXR on 5/10/23, no obvious opacity.   Duo neb done during visit. Helped with symptoms.   Continue with duo nebs at home.   Start course of prednisone. Reviewed side effects.   Actively working on quitting smoking.     Patient Instructions   Take prednisone in the morning each day for 5 days.     Use neb solution as needed every 4 hours.         Return if symptoms worsen or fail to improve, for in 2-3 days.     At the end of the encounter, I discussed results, diagnosis, medications. Discussed red flags for immediate return to clinic/ER, as well as indications for follow up if no improvement. Patient understood and agreed to plan. Patient was stable for discharge.        Jacy Erwin PA-C on 2/19/2025 at 10:50 AM          Subjective:     HPI:    Artie is a 64 year old male who presents to clinic today for the following health issues:  Chief Complaint   Patient presents with    Urgent Care     Patient presents with cough that is productive of clear/milky white mucus, wheezing, x 1 month.  Symptoms have been recently worsening.  He is using albuterol nebs occasionally with some improvement.  His symptoms are worse at night.     HPI    Cough, rattling, hard time breathing, worse at night.   Using albuterol nebs. Helps a little.   Smoker, working on quitting.     Hx of Myeloproliferative  disorder. Last oncology visit was on 25. Review of note.   Had recent Chest CT on 25:   Trace upper lobe predominant emphysema. Previously seen 6 mm nodule in the anterior left lower lobe has nearly resolved.       Review of Systems   Constitutional:  Negative for fever.   HENT:  Positive for congestion and rhinorrhea. Negative for sore throat. Voice change: cxr.   Respiratory:  Positive for cough, shortness of breath and wheezing.          Patient Active Problem List   Diagnosis    Gastroenteritis    Cardiomyopathy (H)    Congestive heart failure (H)    Hyperlipidemia    Hypertension    IBS (irritable bowel syndrome)    Impaired fasting glucose    Vitamin D deficiency    Lymphadenopathy    Hypercalcemia    KANNAN (acute kidney injury)        Past Medical History:   Diagnosis Date    Cardiomyopathy (H)     Congestive heart failure (H)      Problem list name updated by automated process. Provider to review    Congestive heart failure, unspecified     Eosinophilia     Gastro-oesophageal reflux disease     Hyperlipidemia 2019    Hypertension     Penile discharge     Vitamin D deficiency 2010       Social History     Tobacco Use    Smoking status: Some Days     Current packs/day: 0.00     Types: Cigarettes     Start date: 12/10/1995     Last attempt to quit: 12/10/2005     Years since quittin.2    Smokeless tobacco: Never    Tobacco comments:     Close to none, About 5 cigarettes per week   Substance Use Topics    Alcohol use: Yes     Comment: 2 beer day             Objective:     Vitals:    25 1007   BP: (!) 156/76   Pulse: 65   Resp: 18   Temp: 97.4  F (36.3  C)   TempSrc: Tympanic   SpO2: 100%   Weight: 79 kg (174 lb 3.2 oz)         Physical Exam   EXAM:   Pleasant, alert, appropriate appearance. NAD.  Head Exam: Normocephalic, atraumatic.  Eye Exam:   non icteric/injection.    Ear Exam: TMs grey without bulging. Normal canals.  Normal pinna.  Nose Exam: Normal external nose.     OroPharynx Exam:  Moist mucous membranes. No erythema, pharynx without exudate or hypertrophy.  Neck/Thyroid Exam:  No LAD.   Chest/Respiratory Exam: rhonchi, rales, slight wheezing  Cardiovascular Exam: RRR. No murmur or rubs. Trace ankle edema.       Results:  Results for orders placed or performed in visit on 02/19/25   XR Chest 2 Views     Status: None    Narrative    EXAM: XR CHEST 2 VIEWS  LOCATION: [facility]  DATE: 2/19/2025    INDICATION:  Bronchitis with acute wheezing  COMPARISON: 5/10/2023      Impression    IMPRESSION: Suggestion of mild peribronchial cuffing in the bilateral lung, in keeping with provided history of bronchitis. No focal airspace opacity. Small bilateral pleural effusions present. No pneumothorax. Heart size and pulmonary vascularity are   normal.

## 2025-02-19 NOTE — TELEPHONE ENCOUNTER
"Dl Ordonez MD  [facility] Heart Team 48 minutes ago (10:59 AM)       How about 20mg daily furosemide and BMP when he sees me?       Update: patient went to urgent care today and had chest x ray done.   \"  Patient developed URI symptoms with cough a week ago. Has had SOB, wheezing. Lungs with rhonchi, mild rales, wheezing on exam. O2sat 100%.   I independently visualized the xray:  mild increased haziness right lower lobe compared to past CXR on 5/10/23, no obvious opacity.   Duo neb done during visit. \"    5 days of prednisone 20 mg also prescribed.     Will route to Dr. Ordonez to see if he wants to hold off on starting Furosemide for now or still prescribe it.    "

## 2025-02-19 NOTE — PATIENT INSTRUCTIONS
Take prednisone in the morning each day for 5 days.     Use neb solution as needed every 4 hours.

## 2025-02-25 ENCOUNTER — LAB (OUTPATIENT)
Dept: LAB | Facility: CLINIC | Age: 65
End: 2025-02-25
Attending: INTERNAL MEDICINE
Payer: COMMERCIAL

## 2025-02-25 DIAGNOSIS — D47.1 MYELOPROLIFERATIVE DISORDER (H): ICD-10-CM

## 2025-02-25 DIAGNOSIS — L29.9 ITCHING: ICD-10-CM

## 2025-02-25 LAB
ANION GAP SERPL CALCULATED.3IONS-SCNC: 8 MMOL/L (ref 7–15)
BUN SERPL-MCNC: 21.7 MG/DL (ref 8–23)
CALCIUM SERPL-MCNC: 9 MG/DL (ref 8.8–10.4)
CHLORIDE SERPL-SCNC: 96 MMOL/L (ref 98–107)
CREAT SERPL-MCNC: 1.12 MG/DL (ref 0.67–1.17)
EGFRCR SERPLBLD CKD-EPI 2021: 74 ML/MIN/1.73M2
ERYTHROCYTE [DISTWIDTH] IN BLOOD BY AUTOMATED COUNT: 11.8 % (ref 10–15)
GLUCOSE SERPL-MCNC: 106 MG/DL (ref 70–99)
HCO3 SERPL-SCNC: 29 MMOL/L (ref 22–29)
HCT VFR BLD AUTO: 27.5 % (ref 40–53)
HGB BLD-MCNC: 9.6 G/DL (ref 13.3–17.7)
MCH RBC QN AUTO: 46.6 PG (ref 26.5–33)
MCHC RBC AUTO-ENTMCNC: 34.9 G/DL (ref 31.5–36.5)
MCV RBC AUTO: 134 FL (ref 78–100)
PLATELET # BLD AUTO: 154 10E3/UL (ref 150–450)
POTASSIUM SERPL-SCNC: 4.2 MMOL/L (ref 3.4–5.3)
RBC # BLD AUTO: 2.06 10E6/UL (ref 4.4–5.9)
SODIUM SERPL-SCNC: 133 MMOL/L (ref 135–145)
VIT D+METAB SERPL-MCNC: 46 NG/ML (ref 20–50)
WBC # BLD AUTO: 10.9 10E3/UL (ref 4–11)

## 2025-02-25 PROCEDURE — 36415 COLL VENOUS BLD VENIPUNCTURE: CPT

## 2025-02-25 PROCEDURE — 80048 BASIC METABOLIC PNL TOTAL CA: CPT

## 2025-02-25 PROCEDURE — 82306 VITAMIN D 25 HYDROXY: CPT

## 2025-02-25 PROCEDURE — 85027 COMPLETE CBC AUTOMATED: CPT

## 2025-02-26 ENCOUNTER — OFFICE VISIT (OUTPATIENT)
Dept: CARDIOLOGY | Facility: CLINIC | Age: 65
End: 2025-02-26
Payer: COMMERCIAL

## 2025-02-26 VITALS
OXYGEN SATURATION: 98 % | DIASTOLIC BLOOD PRESSURE: 73 MMHG | WEIGHT: 162.2 LBS | HEIGHT: 72 IN | SYSTOLIC BLOOD PRESSURE: 141 MMHG | HEART RATE: 74 BPM | BODY MASS INDEX: 21.97 KG/M2

## 2025-02-26 DIAGNOSIS — I40.1 EOSINOPHILIC MYOCARDITIS: ICD-10-CM

## 2025-02-26 DIAGNOSIS — I42.9 SECONDARY CARDIOMYOPATHY (H): Primary | ICD-10-CM

## 2025-02-26 DIAGNOSIS — D72.18 EOSINOPHILIC MYOCARDITIS: ICD-10-CM

## 2025-02-26 DIAGNOSIS — I25.10 CORONARY ARTERY CALCIFICATION: ICD-10-CM

## 2025-02-26 RX ORDER — PRAVASTATIN SODIUM 20 MG
20 TABLET ORAL DAILY
Qty: 90 TABLET | Refills: 3 | Status: SHIPPED | OUTPATIENT
Start: 2025-02-26

## 2025-02-26 NOTE — PROGRESS NOTES
Cardiology Progress Note          Assessment and Plan:       Dyspnea on exertion with history of cardiomyopathy and extensive LAD territory calcification on CT  Plan stress echocardiogram and follow-up with TY.  Low threshold to pursue cardiac catheterization given his symptoms.  Add pravastatin, if positive stress testing or percutaneous revascularization required then would augment his statin.      30 minutes was spent with the patient, precharting and reviewing tests as well as post visit charting all done today..    This note was transcribed using electronic voice recognition software and there may be typographical errors present.     The longitudinal plan of care for the diagnosis(es)/condition(s) as documented were addressed during this visit. Due to the added complexity in care, I will continue to support Artie in the subsequent management and with ongoing continuity of care.                    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  He recently had a PET scan with CT that showed severe coronary calcifications.  He has noticed more dyspnea on exertion and exertional leg weakness.    He recently had an episode of bronchitis that cleared with nebulizers and prednisone.    He looks euvolemic in the office today.  He is able to lie flat at this time.                     Review of Systems:     Review of Systems:  Skin:  not assessed     Eyes:  Positive for glasses  ENT:  Negative    Respiratory:  Positive for shortness of breath  Cardiovascular:    edema, Positive for  Gastroenterology: Negative    Genitourinary:  Negative    Musculoskeletal:  Negative    Neurologic:  Negative    Psychiatric:  Positive for sleep disturbances  Heme/Lymph/Imm:  Negative    Endocrine:  Negative                Physical Exam:     Vitals: BP (!) 141/73 (BP Location: Right arm, Patient Position: Sitting, Cuff Size: Adult Regular)    Pulse 74   Ht 1.829 m (6')   Wt 73.6 kg (162 lb 3.2 oz)   SpO2 98%   BMI 22.00 kg/m    Constitutional:  cooperative, alert and oriented, well developed, well nourished, in no acute distress appears younger than stated age      Skin:  warm and dry to the touch        Head:  normocephalic        Eyes:  pupils equal and round, conjunctivae and lids unremarkable, sclera white, no xanthalasma, EOMS intact, no nystagmus        Chest:  clear to auscultation        Cardiac: regular rhythm       systolic murmur, grade 1          Extremities and Back:  no deformities, clubbing, cyanosis, erythema observed        Neurological:  no gross motor deficits, affect appropriate                 Medications:     Current Outpatient Medications   Medication Sig Dispense Refill    acetaminophen (TYLENOL) 325 MG tablet Take 2 tablets (650 mg) by mouth every 4 hours as needed for other (mild pain) 100 tablet 0    albuterol (PROVENTIL) (2.5 MG/3ML) 0.083% neb solution Take 2.5 mg by nebulization every 6 hours as needed for shortness of breath, wheezing or cough.      augmented betamethasone dipropionate (DIPROLENE AF) 0.05 % external cream Apply topically 2 times daily. 100 g 3    carvedilol (COREG) 25 MG tablet Take 1 tablet (25 mg) by mouth 2 times daily (with meals). 180 tablet 3    hydroxyurea (HYDREA) 500 MG capsule Take 1 capsule (500 mg) by mouth 2 times daily 180 capsule 4    ipratropium - albuterol 0.5 mg/2.5 mg/3 mL (DUONEB) 0.5-2.5 (3) MG/3ML neb solution Take 1 vial (3 mLs) by nebulization every 4 hours as needed for shortness of breath, wheezing or cough. 90 mL 0    nicotine (NICODERM CQ) 14 MG/24HR 24 hr patch Place 1 patch onto the skin every 24 hours Start after completion of the 21 mg patches. 14 patch 0    nicotine (NICODERM CQ) 21 MG/24HR 24 hr patch Place 1 patch onto the skin every 24 hours Start with these patches first. 14 patch 0    nicotine (NICODERM CQ) 7 MG/24HR 24 hr patch Place 1 patch onto the skin every  24 hours Start after completing the full course of 14 mg patches. 14 patch 0    omeprazole (PRILOSEC OTC) 20 MG EC tablet Take 20 mg by mouth daily      pravastatin (PRAVACHOL) 20 MG tablet Take 1 tablet (20 mg) by mouth daily. 90 tablet 3    Probiotic Product (PROBIOTIC DAILY PO) Take by mouth daily.                  Data:   All laboratory data reviewed  No results found for this or any previous visit (from the past 24 hours).    All laboratory data reviewed  Lab Results   Component Value Date    CHOL 143 04/22/2024    CHOL 166 09/18/2014     Lab Results   Component Value Date    HDL 44 04/22/2024    HDL 51 09/18/2014     Lab Results   Component Value Date    LDL 76 04/22/2024    LDL 98 09/18/2014     Lab Results   Component Value Date    TRIG 113 04/22/2024    TRIG 87 09/18/2014     Lab Results   Component Value Date    CHOLHDLRATIO 3.3 09/18/2014     TSH   Date Value Ref Range Status   07/05/2024 1.71 0.30 - 4.20 uIU/mL Final   10/04/2005 0.82 0.4 - 5.0 mU/L Final     Last Basic Metabolic Panel:  Lab Results   Component Value Date     02/25/2025     12/23/2014      Lab Results   Component Value Date    POTASSIUM 4.2 02/25/2025    POTASSIUM 4.5 12/23/2014     Lab Results   Component Value Date    CHLORIDE 96 02/25/2025    CHLORIDE 102 12/23/2014     Lab Results   Component Value Date    PAULINA 9.0 02/25/2025    PAULINA 9.3 12/23/2014     Lab Results   Component Value Date    CO2 29 02/25/2025    CO2 27 12/23/2014     Lab Results   Component Value Date    BUN 21.7 02/25/2025    BUN 18 12/23/2014     Lab Results   Component Value Date    CR 1.12 02/25/2025    CR 1.06 12/23/2014     Lab Results   Component Value Date     02/25/2025     12/23/2014     Lab Results   Component Value Date    WBC 10.9 02/25/2025    WBC 12.9 12/23/2014     Lab Results   Component Value Date    RBC 2.06 02/25/2025    RBC 4.53 12/23/2014     Lab Results   Component Value Date    HGB 9.6 02/25/2025    HGB 14.8 12/23/2014      Lab Results   Component Value Date    HCT 27.5 02/25/2025    HCT 42.7 12/23/2014     Lab Results   Component Value Date     02/25/2025    MCV 94 12/23/2014     Lab Results   Component Value Date    MCH 46.6 02/25/2025    MCH 32.7 12/23/2014     Lab Results   Component Value Date    MCHC 34.9 02/25/2025    MCHC 34.7 12/23/2014     Lab Results   Component Value Date    RDW 11.8 02/25/2025    RDW 12.2 12/23/2014     Lab Results   Component Value Date     02/25/2025     12/23/2014

## 2025-02-26 NOTE — LETTER
2/26/2025    Jc Belcher MD  [address] [facility] [place] [address]    RE: Alvarez Bess       Dear Colleague,     I had the pleasure of seeing Alvarez Bess in the [facility].  Cardiology Progress Note          Assessment and Plan:       Dyspnea on exertion with history of cardiomyopathy and extensive LAD territory calcification on CT  Plan stress echocardiogram and follow-up with TY.  Low threshold to pursue cardiac catheterization given his symptoms.  Add pravastatin, if positive stress testing or percutaneous revascularization required then would augment his statin.      30 minutes was spent with the patient, precharting and reviewing tests as well as post visit charting all done today..    This note was transcribed using electronic voice recognition software and there may be typographical errors present.     The longitudinal plan of care for the diagnosis(es)/condition(s) as documented were addressed during this visit. Due to the added complexity in care, I will continue to support Artie in the subsequent management and with ongoing continuity of care.                    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  He recently had a PET scan with CT that showed severe coronary calcifications.  He has noticed more dyspnea on exertion and exertional leg weakness.    He recently had an episode of bronchitis that cleared with nebulizers and prednisone.    He looks euvolemic in the office today.  He is able to lie flat at this time.                     Review of Systems:     Review of Systems:  Skin:  not assessed     Eyes:  Positive for glasses  ENT:  Negative    Respiratory:  Positive for shortness of breath  Cardiovascular:    edema, Positive for  Gastroenterology: Negative    Genitourinary:  Negative    Musculoskeletal:  Negative    Neurologic:  Negative    Psychiatric:   Positive for sleep disturbances  Heme/Lymph/Imm:  Negative    Endocrine:  Negative                Physical Exam:     Vitals: BP (!) 141/73 (BP Location: Right arm, Patient Position: Sitting, Cuff Size: Adult Regular)   Pulse 74   Ht 1.829 m (6')   Wt 73.6 kg (162 lb 3.2 oz)   SpO2 98%   BMI 22.00 kg/m    Constitutional:  cooperative, alert and oriented, well developed, well nourished, in no acute distress appears younger than stated age      Skin:  warm and dry to the touch        Head:  normocephalic        Eyes:  pupils equal and round, conjunctivae and lids unremarkable, sclera white, no xanthalasma, EOMS intact, no nystagmus        Chest:  clear to auscultation        Cardiac: regular rhythm       systolic murmur, grade 1          Extremities and Back:  no deformities, clubbing, cyanosis, erythema observed        Neurological:  no gross motor deficits, affect appropriate                 Medications:     Current Outpatient Medications   Medication Sig Dispense Refill     acetaminophen (TYLENOL) 325 MG tablet Take 2 tablets (650 mg) by mouth every 4 hours as needed for other (mild pain) 100 tablet 0     albuterol (PROVENTIL) (2.5 MG/3ML) 0.083% neb solution Take 2.5 mg by nebulization every 6 hours as needed for shortness of breath, wheezing or cough.       augmented betamethasone dipropionate (DIPROLENE AF) 0.05 % external cream Apply topically 2 times daily. 100 g 3     carvedilol (COREG) 25 MG tablet Take 1 tablet (25 mg) by mouth 2 times daily (with meals). 180 tablet 3     hydroxyurea (HYDREA) 500 MG capsule Take 1 capsule (500 mg) by mouth 2 times daily 180 capsule 4     ipratropium - albuterol 0.5 mg/2.5 mg/3 mL (DUONEB) 0.5-2.5 (3) MG/3ML neb solution Take 1 vial (3 mLs) by nebulization every 4 hours as needed for shortness of breath, wheezing or cough. 90 mL 0     nicotine (NICODERM CQ) 14 MG/24HR 24 hr patch Place 1 patch onto the skin every 24 hours Start after completion of the 21 mg patches. 14  patch 0     nicotine (NICODERM CQ) 21 MG/24HR 24 hr patch Place 1 patch onto the skin every 24 hours Start with these patches first. 14 patch 0     nicotine (NICODERM CQ) 7 MG/24HR 24 hr patch Place 1 patch onto the skin every 24 hours Start after completing the full course of 14 mg patches. 14 patch 0     omeprazole (PRILOSEC OTC) 20 MG EC tablet Take 20 mg by mouth daily       pravastatin (PRAVACHOL) 20 MG tablet Take 1 tablet (20 mg) by mouth daily. 90 tablet 3     Probiotic Product (PROBIOTIC DAILY PO) Take by mouth daily.                  Data:   All laboratory data reviewed  No results found for this or any previous visit (from the past 24 hours).    All laboratory data reviewed  Lab Results   Component Value Date    CHOL 143 04/22/2024    CHOL 166 09/18/2014     Lab Results   Component Value Date    HDL 44 04/22/2024    HDL 51 09/18/2014     Lab Results   Component Value Date    LDL 76 04/22/2024    LDL 98 09/18/2014     Lab Results   Component Value Date    TRIG 113 04/22/2024    TRIG 87 09/18/2014     Lab Results   Component Value Date    CHOLHDLRATIO 3.3 09/18/2014     TSH   Date Value Ref Range Status   07/05/2024 1.71 0.30 - 4.20 uIU/mL Final   10/04/2005 0.82 0.4 - 5.0 mU/L Final     Last Basic Metabolic Panel:  Lab Results   Component Value Date     02/25/2025     12/23/2014      Lab Results   Component Value Date    POTASSIUM 4.2 02/25/2025    POTASSIUM 4.5 12/23/2014     Lab Results   Component Value Date    CHLORIDE 96 02/25/2025    CHLORIDE 102 12/23/2014     Lab Results   Component Value Date    PAULINA 9.0 02/25/2025    PAULINA 9.3 12/23/2014     Lab Results   Component Value Date    CO2 29 02/25/2025    CO2 27 12/23/2014     Lab Results   Component Value Date    BUN 21.7 02/25/2025    BUN 18 12/23/2014     Lab Results   Component Value Date    CR 1.12 02/25/2025    CR 1.06 12/23/2014     Lab Results   Component Value Date     02/25/2025     12/23/2014     Lab Results    Component Value Date    WBC 10.9 02/25/2025    WBC 12.9 12/23/2014     Lab Results   Component Value Date    RBC 2.06 02/25/2025    RBC 4.53 12/23/2014     Lab Results   Component Value Date    HGB 9.6 02/25/2025    HGB 14.8 12/23/2014     Lab Results   Component Value Date    HCT 27.5 02/25/2025    HCT 42.7 12/23/2014     Lab Results   Component Value Date     02/25/2025    MCV 94 12/23/2014     Lab Results   Component Value Date    MCH 46.6 02/25/2025    MCH 32.7 12/23/2014     Lab Results   Component Value Date    MCHC 34.9 02/25/2025    MCHC 34.7 12/23/2014     Lab Results   Component Value Date    RDW 11.8 02/25/2025    RDW 12.2 12/23/2014     Lab Results   Component Value Date     02/25/2025     12/23/2014               Thank you for allowing me to participate in the care of your patient.      Sincerely,     Dl Ordonez MD     [facility]  cc:   Dl Ordonez MD  [address]  [place] [address]

## 2025-03-05 ENCOUNTER — VIRTUAL VISIT (OUTPATIENT)
Dept: ONCOLOGY | Facility: CLINIC | Age: 65
End: 2025-03-05
Attending: INTERNAL MEDICINE
Payer: COMMERCIAL

## 2025-03-05 VITALS
SYSTOLIC BLOOD PRESSURE: 140 MMHG | DIASTOLIC BLOOD PRESSURE: 80 MMHG | HEIGHT: 72 IN | WEIGHT: 162 LBS | BODY MASS INDEX: 21.94 KG/M2

## 2025-03-05 DIAGNOSIS — D47.1 MYELOPROLIFERATIVE DISORDER (H): Primary | ICD-10-CM

## 2025-03-05 DIAGNOSIS — E83.52 HYPERCALCEMIA: ICD-10-CM

## 2025-03-05 ASSESSMENT — PAIN SCALES - GENERAL: PAINLEVEL_OUTOF10: NO PAIN (0)

## 2025-03-05 NOTE — LETTER
3/5/2025      Alvarez Bess  [address] [facility], [place] [address]      Dear Colleague,    Thank you for referring your patient, Alvarez Bess, to the [facility] [place]. Please see a copy of my visit note below.    Virtual Visit Details    Type of service:  Video Visit     Originating Location (pt. Location): Home    Distant Location (provider location):  On-site  Platform used for Video Visit: Hussain         REASON for VISIT: MPD , eosinophilia    Oncology HPI:   2014: elevated WBC of 12.9  - 6/27/23: Initial consult: Patient states that he has had 14 pound unintentional weight loss over the last year . He denies any night sweats or weight loss. 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 He is following up with dermatology.  - 5/19/23 white count Is a 25.6 absolute neutrophils at 10.2 absolute eosinophils at 10.2 CRP level at 11.371-April 2024 it was at 16.5 sedimentation rate at 7. His hemoglobin is normal at 14.7 platelet count of 2.93   - 8/5/2023: bone marrow biopsy showed marked hypercellular bone marrow with marked eosinophilia maturing trilineage hematopoiesis no increased dysplasia or increase in blasts. Peripheral blood showing moderate leukocytosis with eosinophilia. Adequate neutrophils with slight shift to immaturity. Flow cytometry did not show any increase in myeloid blasts and no abnormal population of cells. Morphologic findings just showed peripheral eosinophilia. JAK2 mutation negative. 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 This was a summary for the bone marrow biopsy.   - FISH cytogenetics and molecular studies were negative for PD GFR alpha, beta, FGFR 1, JAK2 genes and BCR-ABL mutation ruling out certain leukemias and CML. No mass found on MRI that they could  biopsy   - 11/27/23 PET with PET/CT shows bilateral axillary and inguinal hypermetabolic adenopathy concerning for neoplastic process including hypereosinophilic syndromes and lymphoma. Recommend tissue sampling. SUV in those regions at 3.3-3.6. Additional of hypermetabolic foci within the right gluteus and left vastus lateralis intermedius muscles SUV of 8.4 may be related to the neoplastic process   - 3/2024 cholecystectomy- hydrea held  - 4/4/2024 restarted Hydrea 500 mg twice daily    - 12/3/2024 WBC 11.9, Hgb 11.8, Plts 115k, ANC 2.9, Absolute Eosinophils 6.4  - 1/6/25 labs with severed Vitamin D deficiency, started on cholecalciferol   - 2/4-2/5/25: Ca of 14.2 and Cr of 1.81. patient was advised to got to ED where he was seen, given fluids and kept for monitoring. Felt to be vitamin D toxicity. Hydrea held    Current Treatment: Hydrea 500 mg daily     Interval history:   Artie returns for follow-up today.  There was a concern for possible lymphoma and vitamin D toxicity therefore vitamin D was discontinued and PTH RP was also obtained which was negative.  His PTH as expected was low due to the hypercalcemia.  He clinically feels well.  His white count has stabilized.  Currently on Hydrea 500 mg a day .no rashes no night sweats      Review of Systems: See interval hx. Denies fevers, chills, dizziness,  changes in vision, abdominal pain, N/V, diarrhea, changes in urination, bleeding, bruising, rash.     Current Outpatient Medications   Medication Sig Dispense Refill     acetaminophen (TYLENOL) 325 MG tablet Take 2 tablets (650 mg) by mouth every 4 hours as needed for other (mild pain) 100 tablet 0     albuterol (PROVENTIL) (2.5 MG/3ML) 0.083% neb solution Take 2.5 mg by nebulization every 6 hours as needed for shortness of breath, wheezing or cough.       augmented betamethasone dipropionate (DIPROLENE AF) 0.05 % external cream Apply topically 2 times daily. 100 g 3     carvedilol (COREG) 25 MG tablet Take 1  tablet (25 mg) by mouth 2 times daily (with meals). 180 tablet 3     hydroxyurea (HYDREA) 500 MG capsule Take 1 capsule (500 mg) by mouth 2 times daily 180 capsule 4     ipratropium - albuterol 0.5 mg/2.5 mg/3 mL (DUONEB) 0.5-2.5 (3) MG/3ML neb solution Take 1 vial (3 mLs) by nebulization every 4 hours as needed for shortness of breath, wheezing or cough. 90 mL 0     nicotine (NICODERM CQ) 14 MG/24HR 24 hr patch Place 1 patch onto the skin every 24 hours Start after completion of the 21 mg patches. 14 patch 0     nicotine (NICODERM CQ) 21 MG/24HR 24 hr patch Place 1 patch onto the skin every 24 hours Start with these patches first. 14 patch 0     nicotine (NICODERM CQ) 7 MG/24HR 24 hr patch Place 1 patch onto the skin every 24 hours Start after completing the full course of 14 mg patches. 14 patch 0     omeprazole (PRILOSEC OTC) 20 MG EC tablet Take 20 mg by mouth daily       pravastatin (PRAVACHOL) 20 MG tablet Take 1 tablet (20 mg) by mouth daily. 90 tablet 3     Probiotic Product (PROBIOTIC DAILY PO) Take by mouth daily.            Allergies   Allergen Reactions     Morphine Hcl      Extreme agitation         Exam:  Blood pressure (!) 140/80, height 1.829 m (6'), weight 73.5 kg (162 lb).  Wt Readings from Last 4 Encounters:   03/05/25 73.5 kg (162 lb)   02/26/25 73.6 kg (162 lb 3.2 oz)   02/19/25 79 kg (174 lb 3.2 oz)   02/12/25 79 kg (174 lb 3.2 oz)       HEENT NA  Left axillary node palpable 3 cm , right axillary region normal       Labs:   noted     Imaging:  EXAM: PET ONCOLOGY (EYES TO THIGHS), CT CHEST/ABDOMEN/PELVIS W CONTRAST  LOCATION: [facility]  DATE: 11/27/2023     INDICATION: Malignant immunoproliferative disease, unspecified. 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 Interval hydroxyurea. Assess treatment response.  COMPARISON: FDG PET/CT 08/08/2023  CONTRAST: 99 mL IV Isovue 370, 500 mL oral water  TECHNIQUE:  Serum glucose level 102 mg/dL. One hour post intravenous administration of 10.9 mCi F-18 FDG, PET imaging was performed from the skull vertex to mid thighs, utilizing attenuation correction with concurrent axial CT and PET/CT image fusion.   Separate diagnostic CT of the chest, abdomen, and pelvis was performed. Dose reduction techniques were used.     PET/CT FINDINGS: No significant interval change in FDG avid left axillary (SUV max 3.6 unchanged), right axillary (SUV max 3.8, previously 3.3), right inguinal (SUV max 3.4 and 1.5 cm short axis, both unchanged) or left inguinal (SUV max 3.9, previously   3.4) lymphadenopathy as well as no significant change in FDG avid subcentimeter subcutaneous nodules in the upper back (SUV max 2.3 on PET image #99, previously 2.0).     Prior focal FDG uptake in the left thigh musculature as resolved. Inflammatory FDG uptake associated with left cervical facet joints.     CT FINDINGS: Mild senescent intracranial changes. Advanced coronary arterial calcification. Mild apical emphysema. New 6 mm solid nodule in the left lower lobe (series 2, image 75), below PET resolution. Several additional smaller pulmonary nodules   appear similar. Small hepatic cyst. Left adrenal adenoma. Left renal cysts. Bilateral non-obstructing renal calyceal tip calculi. Spleen is normal in size. Scattered colonic diverticuli. Mild multilevel degenerative changes in spine.                                                                      IMPRESSION:     1. No significant interval change in bilateral FDG avid axillary and inguinal lymphadenopathy and a few small subcutaneous nodules in the upper back.     2. New 6 mm pulmonary nodule in the left lower lobe, below PET resolution. Attention on follow-up.     3. Prior focal FDG uptake in the left thigh musculature as resolved.    Impression/plan: Artie is a 64 year old male who presents to clinic for follow of of eosinophilia and leukocytosis. He is currently  taking Hydrea 500 mg twice daily.     Hypercalcemia/KANNAN  ---resolved. Monitor        Vitamin D toxicity  -Resolved.  Hypercalcemia may be related to that and now it is improved.  Repeat PTH next month to make sure it is normalized.  CRP and ESR next month    MPD with Hypereosinophilic Syndrome  --continue hydrea 500 mg a day  ,no change     - 1/28/25 PET/CT with o significant interval change in bilateral FDG avid axillary and inguinal lymphadenopathy and a few small subcutaneous nodules in the upper back. Prior focal FDG uptake in the left thigh musculature as resolved.       Pulmonary Nodule  - 1/28/25: PET/CT with, New 6 mm pulmonary nodule in the left lower lobe, below PET resolution. Attention on follow-up.  - current smoker    Left forearm lesion  - He was seen by Dermatology, 12/9/24 underwent biopsy, pathology with squamous cell CA  - He scheduled for excision 3/31/25         Cardiology  CHF, Cardiomyopathy, HTN  - follows Dr. Dl Ordonez    Xeroderma  - Fissures of hands, and patient reports of feet, possibly secondary to Hydrea  - start Amlactin cream twice daily- prescription sent to pharmacy  - Vaseline in fissures of feet with socks nightly    History of LE Rash  - Resolved  - no biopsy report in EPIC    Right Arm   - path report 9/29/23: CASE FROM [facility], [place], [place] ([id number], OBTAINED 05/16/2023):  Skin, arm, right, lateral:- Epidermal hyperplasia with suprabasal acantholysis and apparent dyskeratosis - (see comment), Chart history was reviewed with this case. As noted in the original report, these microscopic features most resemble Patriot's disease. However given the clinical findings including a marked peripheral eosinophilia and concern for a myeloid neoplasm, an additional biopsy for direct immunofluorescence is suggested to more fully rule out the possibility of an autoimmune bullous process such as pemphigus vulgaris or paraneoplastic pemphigus.     Tobacco  Abuse  Actively attempting smoking cessation  - using Nicoderm patches    Right elbow effusion: rheumatology for drainage fluid and eval    Total time spent on day of visit, including review of tests, obtaining/reviewing separately obtained history, ordering medications/tests/procedures, communicating with PCP/consultants, and documenting in electronic medical record:  30  minutes               Again, thank you for allowing me to participate in the care of your patient.        Sincerely,        Vasyl Case MD    Electronically signed

## 2025-03-05 NOTE — NURSING NOTE
Current patient location: [address] [place] [id number]    Is the patient currently in the state of [place]? YES    Visit mode: VIDEO    If the visit is dropped, the patient can be reconnected by:VIDEO VISIT: Text to cell phone:   Telephone Information:   Mobile [phone]    and VIDEO VISIT: Send to e-mail at: [email or web address]    Will anyone else be joining the visit? NO  (If patient encounters technical issues they should call [phone])    Are changes needed to the allergy or medication list? No    Are refills needed on medications prescribed by this physician? NO    Rooming Documentation:  Not applicable    Reason for visit: RECHECK (HYDREA wondering about going back to 1 capsule twice daily. /)    Anabel FARRELL

## 2025-03-05 NOTE — PROGRESS NOTES
REASON for VISIT: MPD , eosinophilia    Oncology HPI:   2014: elevated WBC of 12.9  - 6/27/23: Initial consult: Patient states that he has had 14 pound unintentional weight loss over the last year . He denies any night sweats or weight loss. 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 He is following up with dermatology.  - 5/19/23 white count Is a 25.6 absolute neutrophils at 10.2 absolute eosinophils at 10.2 CRP level at 11.371-April 2024 it was at 16.5 sedimentation rate at 7. His hemoglobin is normal at 14.7 platelet count of 2.93   - 8/5/2023: bone marrow biopsy showed marked hypercellular bone marrow with marked eosinophilia maturing trilineage hematopoiesis no increased dysplasia or increase in blasts. Peripheral blood showing moderate leukocytosis with eosinophilia. Adequate neutrophils with slight shift to immaturity. Flow cytometry did not show any increase in myeloid blasts and no abnormal population of cells. Morphologic findings just showed peripheral eosinophilia. JAK2 mutation negative. 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 This was a summary for the bone marrow biopsy.   - FISH cytogenetics and molecular studies were negative for PD GFR alpha, beta, FGFR 1, JAK2 genes and BCR-ABL mutation ruling out certain leukemias and CML. No mass found on MRI that they could biopsy   - 11/27/23 PET with PET/CT shows bilateral axillary and inguinal hypermetabolic adenopathy concerning for neoplastic process including hypereosinophilic syndromes and lymphoma. Recommend tissue sampling. SUV in those regions at 3.3-3.6. Additional of hypermetabolic foci within the right gluteus and left vastus lateralis intermedius muscles SUV of 8.4 may be related to the neoplastic process   - 3/2024 cholecystectomy- hydrea  held  - 4/4/2024 restarted Hydrea 500 mg twice daily    - 12/3/2024 WBC 11.9, Hgb 11.8, Plts 115k, ANC 2.9, Absolute Eosinophils 6.4  - 1/6/25 labs with severed Vitamin D deficiency, started on cholecalciferol   - 2/4-2/5/25: Ca of 14.2 and Cr of 1.81. patient was advised to got to ED where he was seen, given fluids and kept for monitoring. Felt to be vitamin D toxicity. Hydrea held    Current Treatment: Hydrea 500 mg daily     Interval history:   Artie returns for follow-up today.  There was a concern for possible lymphoma and vitamin D toxicity therefore vitamin D was discontinued and PTH RP was also obtained which was negative.  His PTH as expected was low due to the hypercalcemia.  He clinically feels well.  His white count has stabilized.  Currently on Hydrea 500 mg a day .no rashes no night sweats      Review of Systems: See interval hx. Denies fevers, chills, dizziness,  changes in vision, abdominal pain, N/V, diarrhea, changes in urination, bleeding, bruising, rash.     Current Outpatient Medications   Medication Sig Dispense Refill    acetaminophen (TYLENOL) 325 MG tablet Take 2 tablets (650 mg) by mouth every 4 hours as needed for other (mild pain) 100 tablet 0    albuterol (PROVENTIL) (2.5 MG/3ML) 0.083% neb solution Take 2.5 mg by nebulization every 6 hours as needed for shortness of breath, wheezing or cough.      augmented betamethasone dipropionate (DIPROLENE AF) 0.05 % external cream Apply topically 2 times daily. 100 g 3    carvedilol (COREG) 25 MG tablet Take 1 tablet (25 mg) by mouth 2 times daily (with meals). 180 tablet 3    hydroxyurea (HYDREA) 500 MG capsule Take 1 capsule (500 mg) by mouth 2 times daily 180 capsule 4    ipratropium - albuterol 0.5 mg/2.5 mg/3 mL (DUONEB) 0.5-2.5 (3) MG/3ML neb solution Take 1 vial (3 mLs) by nebulization every 4 hours as needed for shortness of breath, wheezing or cough. 90 mL 0    nicotine (NICODERM CQ) 14 MG/24HR 24 hr patch Place 1 patch onto the skin every  24 hours Start after completion of the 21 mg patches. 14 patch 0    nicotine (NICODERM CQ) 21 MG/24HR 24 hr patch Place 1 patch onto the skin every 24 hours Start with these patches first. 14 patch 0    nicotine (NICODERM CQ) 7 MG/24HR 24 hr patch Place 1 patch onto the skin every 24 hours Start after completing the full course of 14 mg patches. 14 patch 0    omeprazole (PRILOSEC OTC) 20 MG EC tablet Take 20 mg by mouth daily      pravastatin (PRAVACHOL) 20 MG tablet Take 1 tablet (20 mg) by mouth daily. 90 tablet 3    Probiotic Product (PROBIOTIC DAILY PO) Take by mouth daily.            Allergies   Allergen Reactions    Morphine Hcl      Extreme agitation         Exam:  Blood pressure (!) 140/80, height 1.829 m (6'), weight 73.5 kg (162 lb).  Wt Readings from Last 4 Encounters:   03/05/25 73.5 kg (162 lb)   02/26/25 73.6 kg (162 lb 3.2 oz)   02/19/25 79 kg (174 lb 3.2 oz)   02/12/25 79 kg (174 lb 3.2 oz)       HEENT NA  Left axillary node palpable 3 cm , right axillary region normal       Labs:   noted     Imaging:  EXAM: PET ONCOLOGY (EYES TO THIGHS), CT CHEST/ABDOMEN/PELVIS W CONTRAST  LOCATION: [facility]  DATE: 11/27/2023     INDICATION: Malignant immunoproliferative disease, unspecified. 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 Interval hydroxyurea. Assess treatment response.  COMPARISON: FDG PET/CT 08/08/2023  CONTRAST: 99 mL IV Isovue 370, 500 mL oral water  TECHNIQUE: Serum glucose level 102 mg/dL. One hour post intravenous administration of 10.9 mCi F-18 FDG, PET imaging was performed from the skull vertex to mid thighs, utilizing attenuation correction with concurrent axial CT and PET/CT image fusion.   Separate diagnostic CT of the chest, abdomen, and pelvis was performed. Dose reduction techniques were used.     PET/CT FINDINGS: No significant interval change in FDG avid left axillary (SUV max 3.6 unchanged),  right axillary (SUV max 3.8, previously 3.3), right inguinal (SUV max 3.4 and 1.5 cm short axis, both unchanged) or left inguinal (SUV max 3.9, previously   3.4) lymphadenopathy as well as no significant change in FDG avid subcentimeter subcutaneous nodules in the upper back (SUV max 2.3 on PET image #99, previously 2.0).     Prior focal FDG uptake in the left thigh musculature as resolved. Inflammatory FDG uptake associated with left cervical facet joints.     CT FINDINGS: Mild senescent intracranial changes. Advanced coronary arterial calcification. Mild apical emphysema. New 6 mm solid nodule in the left lower lobe (series 2, image 75), below PET resolution. Several additional smaller pulmonary nodules   appear similar. Small hepatic cyst. Left adrenal adenoma. Left renal cysts. Bilateral non-obstructing renal calyceal tip calculi. Spleen is normal in size. Scattered colonic diverticuli. Mild multilevel degenerative changes in spine.                                                                      IMPRESSION:     1. No significant interval change in bilateral FDG avid axillary and inguinal lymphadenopathy and a few small subcutaneous nodules in the upper back.     2. New 6 mm pulmonary nodule in the left lower lobe, below PET resolution. Attention on follow-up.     3. Prior focal FDG uptake in the left thigh musculature as resolved.    Impression/plan: Artie is a 64 year old male who presents to clinic for follow of of eosinophilia and leukocytosis. He is currently taking Hydrea 500 mg twice daily.     Hypercalcemia/KANNAN  ---resolved. Monitor        Vitamin D toxicity  -Resolved.  Hypercalcemia may be related to that and now it is improved.  Repeat PTH next month to make sure it is normalized.  CRP and ESR next month    MPD with Hypereosinophilic Syndrome  --continue hydrea 500 mg a day  ,no change     - 1/28/25 PET/CT with o significant interval change in bilateral FDG avid axillary and inguinal  lymphadenopathy and a few small subcutaneous nodules in the upper back. Prior focal FDG uptake in the left thigh musculature as resolved.       Pulmonary Nodule  - 1/28/25: PET/CT with, New 6 mm pulmonary nodule in the left lower lobe, below PET resolution. Attention on follow-up.  - current smoker    Left forearm lesion  - He was seen by Dermatology, 12/9/24 underwent biopsy, pathology with squamous cell CA  - He scheduled for excision 3/31/25         Cardiology  CHF, Cardiomyopathy, HTN  - follows Dr. Dl Ordonez    Xeroderma  - Fissures of hands, and patient reports of feet, possibly secondary to Hydrea  - start Amlactin cream twice daily- prescription sent to pharmacy  - Vaseline in fissures of feet with socks nightly    History of LE Rash  - Resolved  - no biopsy report in EPIC    Right Arm   - path report 9/29/23: CASE FROM [facility], [place], [place] ([id number], OBTAINED 05/16/2023):  Skin, arm, right, lateral:- Epidermal hyperplasia with suprabasal acantholysis and apparent dyskeratosis - (see comment), Chart history was reviewed with this case. As noted in the original report, these microscopic features most resemble [place]'s disease. However given the clinical findings including a marked peripheral eosinophilia and concern for a myeloid neoplasm, an additional biopsy for direct immunofluorescence is suggested to more fully rule out the possibility of an autoimmune bullous process such as pemphigus vulgaris or paraneoplastic pemphigus.     Tobacco Abuse  Actively attempting smoking cessation  - using Nicoderm patches    Right elbow effusion: rheumatology for drainage fluid and eval    Total time spent on day of visit, including review of tests, obtaining/reviewing separately obtained history, ordering medications/tests/procedures, communicating with PCP/consultants, and documenting in electronic medical record:  30  minutes

## 2025-03-05 NOTE — LETTER
3/5/2025      Alvarez Bess  [address] [address] [facility] [id number]      Dear Colleague,    Thank you for referring your patient, Alvarez Bess, to the [facility] [place]. Please see a copy of my visit note below.    Virtual Visit Details    Type of service:  Video Visit     Originating Location (pt. Location): Home    Distant Location (provider location):  On-site  Platform used for Video Visit: Hussain         REASON for VISIT: MPD , eosinophilia    Oncology HPI:   2014: elevated WBC of 12.9  - 6/27/23: Initial consult: Patient states that he has had 14 pound unintentional weight loss over the last year . He denies any night sweats or weight loss. 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 He is following up with dermatology.  - 5/19/23 white count Is a 25.6 absolute neutrophils at 10.2 absolute eosinophils at 10.2 CRP level at 11.371-April 2024 it was at 16.5 sedimentation rate at 7. His hemoglobin is normal at 14.7 platelet count of 2.93   - 8/5/2023: bone marrow biopsy showed marked hypercellular bone marrow with marked eosinophilia maturing trilineage hematopoiesis no increased dysplasia or increase in blasts. Peripheral blood showing moderate leukocytosis with eosinophilia. Adequate neutrophils with slight shift to immaturity. Flow cytometry did not show any increase in myeloid blasts and no abnormal population of cells. Morphologic findings just showed peripheral eosinophilia. JAK2 mutation negative. 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 This was a summary for the bone marrow biopsy.   - FISH cytogenetics and molecular studies were negative for PD GFR alpha, beta, FGFR 1, JAK2 genes and BCR-ABL mutation ruling out certain leukemias and CML. No mass found on MRI that they could  biopsy   - 11/27/23 PET with PET/CT shows bilateral axillary and inguinal hypermetabolic adenopathy concerning for neoplastic process including hypereosinophilic syndromes and lymphoma. Recommend tissue sampling. SUV in those regions at 3.3-3.6. Additional of hypermetabolic foci within the right gluteus and left vastus lateralis intermedius muscles SUV of 8.4 may be related to the neoplastic process   - 3/2024 cholecystectomy- hydrea held  - 4/4/2024 restarted Hydrea 500 mg twice daily    - 12/3/2024 WBC 11.9, Hgb 11.8, Plts 115k, ANC 2.9, Absolute Eosinophils 6.4  - 1/6/25 labs with severed Vitamin D deficiency, started on cholecalciferol   - 2/4-2/5/25: Ca of 14.2 and Cr of 1.81. patient was advised to got to ED where he was seen, given fluids and kept for monitoring. Felt to be vitamin D toxicity. Hydrea held    Current Treatment: Hydrea 500 mg daily     Interval history:   Artie returns for follow-up today.  There was a concern for possible lymphoma and vitamin D toxicity therefore vitamin D was discontinued and PTH RP was also obtained which was negative.  His PTH as expected was low due to the hypercalcemia.  He clinically feels well.  His white count has stabilized.  Currently on Hydrea 500 mg a day .no rashes no night sweats      Review of Systems: See interval hx. Denies fevers, chills, dizziness,  changes in vision, abdominal pain, N/V, diarrhea, changes in urination, bleeding, bruising, rash.     Current Outpatient Medications   Medication Sig Dispense Refill     acetaminophen (TYLENOL) 325 MG tablet Take 2 tablets (650 mg) by mouth every 4 hours as needed for other (mild pain) 100 tablet 0     albuterol (PROVENTIL) (2.5 MG/3ML) 0.083% neb solution Take 2.5 mg by nebulization every 6 hours as needed for shortness of breath, wheezing or cough.       augmented betamethasone dipropionate (DIPROLENE AF) 0.05 % external cream Apply topically 2 times daily. 100 g 3     carvedilol (COREG) 25 MG tablet Take 1  tablet (25 mg) by mouth 2 times daily (with meals). 180 tablet 3     hydroxyurea (HYDREA) 500 MG capsule Take 1 capsule (500 mg) by mouth 2 times daily 180 capsule 4     ipratropium - albuterol 0.5 mg/2.5 mg/3 mL (DUONEB) 0.5-2.5 (3) MG/3ML neb solution Take 1 vial (3 mLs) by nebulization every 4 hours as needed for shortness of breath, wheezing or cough. 90 mL 0     nicotine (NICODERM CQ) 14 MG/24HR 24 hr patch Place 1 patch onto the skin every 24 hours Start after completion of the 21 mg patches. 14 patch 0     nicotine (NICODERM CQ) 21 MG/24HR 24 hr patch Place 1 patch onto the skin every 24 hours Start with these patches first. 14 patch 0     nicotine (NICODERM CQ) 7 MG/24HR 24 hr patch Place 1 patch onto the skin every 24 hours Start after completing the full course of 14 mg patches. 14 patch 0     omeprazole (PRILOSEC OTC) 20 MG EC tablet Take 20 mg by mouth daily       pravastatin (PRAVACHOL) 20 MG tablet Take 1 tablet (20 mg) by mouth daily. 90 tablet 3     Probiotic Product (PROBIOTIC DAILY PO) Take by mouth daily.            Allergies   Allergen Reactions     Morphine Hcl      Extreme agitation         Exam:  Blood pressure (!) 140/80, height 1.829 m (6'), weight 73.5 kg (162 lb).  Wt Readings from Last 4 Encounters:   03/05/25 73.5 kg (162 lb)   02/26/25 73.6 kg (162 lb 3.2 oz)   02/19/25 79 kg (174 lb 3.2 oz)   02/12/25 79 kg (174 lb 3.2 oz)       HEENT NA  Left axillary node palpable 3 cm , right axillary region normal       Labs:   noted     Imaging:  EXAM: PET ONCOLOGY (EYES TO THIGHS), CT CHEST/ABDOMEN/PELVIS W CONTRAST  LOCATION: [facility]  DATE: 11/27/2023     INDICATION: Malignant immunoproliferative disease, unspecified. 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 Interval hydroxyurea. Assess treatment response.  COMPARISON: FDG PET/CT 08/08/2023  CONTRAST: 99 mL IV Isovue 370, 500 mL oral water  TECHNIQUE:  Serum glucose level 102 mg/dL. One hour post intravenous administration of 10.9 mCi F-18 FDG, PET imaging was performed from the skull vertex to mid thighs, utilizing attenuation correction with concurrent axial CT and PET/CT image fusion.   Separate diagnostic CT of the chest, abdomen, and pelvis was performed. Dose reduction techniques were used.     PET/CT FINDINGS: No significant interval change in FDG avid left axillary (SUV max 3.6 unchanged), right axillary (SUV max 3.8, previously 3.3), right inguinal (SUV max 3.4 and 1.5 cm short axis, both unchanged) or left inguinal (SUV max 3.9, previously   3.4) lymphadenopathy as well as no significant change in FDG avid subcentimeter subcutaneous nodules in the upper back (SUV max 2.3 on PET image #99, previously 2.0).     Prior focal FDG uptake in the left thigh musculature as resolved. Inflammatory FDG uptake associated with left cervical facet joints.     CT FINDINGS: Mild senescent intracranial changes. Advanced coronary arterial calcification. Mild apical emphysema. New 6 mm solid nodule in the left lower lobe (series 2, image 75), below PET resolution. Several additional smaller pulmonary nodules   appear similar. Small hepatic cyst. Left adrenal adenoma. Left renal cysts. Bilateral non-obstructing renal calyceal tip calculi. Spleen is normal in size. Scattered colonic diverticuli. Mild multilevel degenerative changes in spine.                                                                      IMPRESSION:     1. No significant interval change in bilateral FDG avid axillary and inguinal lymphadenopathy and a few small subcutaneous nodules in the upper back.     2. New 6 mm pulmonary nodule in the left lower lobe, below PET resolution. Attention on follow-up.     3. Prior focal FDG uptake in the left thigh musculature as resolved.    Impression/plan: Artie is a 64 year old male who presents to clinic for follow of of eosinophilia and leukocytosis. He is currently  taking Hydrea 500 mg twice daily.     Hypercalcemia/KANNAN  ---resolved. Monitor        Vitamin D toxicity  -Resolved.  Hypercalcemia may be related to that and now it is improved.  Repeat PTH next month to make sure it is normalized.  CRP and ESR next month    MPD with Hypereosinophilic Syndrome  --continue hydrea 500 mg a day  ,no change     - 1/28/25 PET/CT with o significant interval change in bilateral FDG avid axillary and inguinal lymphadenopathy and a few small subcutaneous nodules in the upper back. Prior focal FDG uptake in the left thigh musculature as resolved.       Pulmonary Nodule  - 1/28/25: PET/CT with, New 6 mm pulmonary nodule in the left lower lobe, below PET resolution. Attention on follow-up.  - current smoker    Left forearm lesion  - He was seen by Dermatology, 12/9/24 underwent biopsy, pathology with squamous cell CA  - He scheduled for excision 3/31/25         Cardiology  CHF, Cardiomyopathy, HTN  - follows Dr. Dl Ordonez    Xeroderma  - Fissures of hands, and patient reports of feet, possibly secondary to Hydrea  - start Amlactin cream twice daily- prescription sent to pharmacy  - Vaseline in fissures of feet with socks nightly    History of LE Rash  - Resolved  - no biopsy report in EPIC    Right Arm   - path report 9/29/23: CASE FROM [facility], [place], [place] ([id number], OBTAINED 05/16/2023):  Skin, arm, right, lateral:- Epidermal hyperplasia with suprabasal acantholysis and apparent dyskeratosis - (see comment), Chart history was reviewed with this case. As noted in the original report, these microscopic features most resemble [place]'s disease. However given the clinical findings including a marked peripheral eosinophilia and concern for a myeloid neoplasm, an additional biopsy for direct immunofluorescence is suggested to more fully rule out the possibility of an autoimmune bullous process such as pemphigus vulgaris or paraneoplastic pemphigus.     Tobacco  Abuse  Actively attempting smoking cessation  - using Nicoderm patches    Right elbow effusion: rheumatology for drainage fluid and eval    Total time spent on day of visit, including review of tests, obtaining/reviewing separately obtained history, ordering medications/tests/procedures, communicating with PCP/consultants, and documenting in electronic medical record:  30  minutes               Again, thank you for allowing me to participate in the care of your patient.        Sincerely,        Vasyl Case MD    Electronically signed

## 2025-03-12 ENCOUNTER — TELEPHONE (OUTPATIENT)
Dept: DERMATOLOGY | Facility: CLINIC | Age: 65
End: 2025-03-12
Payer: COMMERCIAL

## 2025-03-12 NOTE — TELEPHONE ENCOUNTER
Health Call Center    Phone Message    May a detailed message be left on voicemail: yes     Reason for Call: Symptoms or Concerns     If patient has red-flag symptoms, warm transfer to triage line    Current symptom or concern: pt is asking if provider is able to look at 2 areas of concern at procedure Appt coming up?   Please call pt back to discuss a plan of action.     Pt has had a bump or lump on his head for a long time. About 2 years ago it was Bx. Since the Bx it is getting bigger.     Pt has a new skin lesion on the right arm that looks like the SCC on the forearm pt is scheduled for removal. This appeared about a month and a half ago.     Symptoms have been present for:  2 years(s) (for the growth on head) 11/2 months for the skin lesion on right arm     Has patient previously been seen for this? No    By : NA    Date: NA    Are there any new or worsening symptoms? Yes: The growth on head is growing.     Action Taken: Message routed to:  Other: [facility] derm    Travel Screening: Not Applicable     Date of Service:

## 2025-03-17 ENCOUNTER — OFFICE VISIT (OUTPATIENT)
Dept: DERMATOLOGY | Facility: CLINIC | Age: 65
End: 2025-03-17
Payer: COMMERCIAL

## 2025-03-17 DIAGNOSIS — D48.5 NEOPLASM OF UNCERTAIN BEHAVIOR OF SKIN: ICD-10-CM

## 2025-03-17 DIAGNOSIS — L57.0 ACTINIC KERATOSIS: Primary | ICD-10-CM

## 2025-03-17 DIAGNOSIS — R21 RASH AND NONSPECIFIC SKIN ERUPTION: ICD-10-CM

## 2025-03-17 PROCEDURE — 88305 TISSUE EXAM BY PATHOLOGIST: CPT | Performed by: DERMATOLOGY

## 2025-03-17 ASSESSMENT — PAIN SCALES - GENERAL: PAINLEVEL_OUTOF10: NO PAIN (0)

## 2025-03-17 NOTE — PROGRESS NOTES
[facility] Dermatology Note    Encounter Date: Mar 17, 2025    Dermatology Problem List:  #. NUB, L forearm, shave biopsy 12/9/2024   # RASH, L MCP, punch biopsy 12/9/2024      Pertinent Medical History:  Being worked up for neoplastic process including hypereosinophilic syndromes and lymphoma - hydroxyurea  ______________________________________    Impression/Plan:  Artie was seen today for lesion.    Diagnoses and all orders for this visit:    Actinic keratosis  -     DESTRUCT PREMALIGNANT LESION, FIRST  -     DESTRUCT PREMALIGNANT LESION, 2-14  -     Dermatological Path Order and Indications; Standing  -     Dermatological Path Order and Indications  -2 AK's on right forearm and scalp  - See procedure note    Neoplasm of uncertain behavior of skin  -     SHAVE 1 [[id number]]  -     Dermatological Path Order and Indications; Standing  -     Dermatological Path Order and Indications  -Cutaneous horn superior scalp, H AK versus SCCIS    Rash and nonspecific skin eruption  -Spongiotic dermatitis with eosinophils on hands  - continue  BMZ cream  - eczema vs hydroxyurea induced rash  - Not particularly bothersome      Cryotherapy procedure note: After verbal consent and discussion of risks and benefits including but no limited to dyspigmentation/scar, blister, and pain, 2 aks on R forearm and scalp was(were) treated with 1-2mm freeze border for 2 cycles with liquid nitrogen. Post cryotherapy instructions were provided.    and - SHAVE BIOPSY PROCEDURE NOTE: After written informed consent was obtained, a time out was taken to identify the patient and the correct site for biopsy. The lesion on the scalp  was cleansed with a 70% isopropyl alcohol wipe, and then injected with 2 cc of lidocaine 1% with epinephrine 1:100,000. Once anesthesia was ensured, the visible surface of the lesion was biopsied using a Nitza blade in standard technique. Hemostasis was obtained with pressure and aluminum chloride 20%  solution. The specimen was placed in a labeled formalin container and sent to pathology for sectioning and analysis. The wound was dressed with white petrolatum and an adhesive bandage. The patient tolerated the procedure well. Post-procedure instructions and recommendations were provided both verbally and in writing.    Follow-up in 6 mo.       Staff Involved:  Staff Only    Domingo Gonsalez MD   of Dermatology  Department of Dermatology  [facility]      CC:   Chief Complaint   Patient presents with    Lesion       History of Present Illness:  Mr. Alvarez Bess is a 64 year old male who presents as a return patient.    SCC L forearm    Rash from bx showed findings c/w eczema     Presents today with concerns about a spot on his superior scalp        Labs:      Physical exam:  Vitals: There were no vitals taken for this visit.  GEN: well developed, well-nourished, in no acute distress, in a pleasant mood.     SKIN: Mcclure phototype 1  - Sun-exposed skin, which includes the head/face, neck, both arms, digits, and/or nails was examined.   - gritty pink papules on scalp and R forearm  - Flat brown macules and patches in a sun exposed areas on face and extremities  - erythema and scaling of hands and dorsal MCPs   - No other lesions of concern on areas examined.     Past Medical History:   Past Medical History:   Diagnosis Date    Cardiomyopathy (H)     Congestive heart failure (H)      Problem list name updated by automated process. Provider to review    Congestive heart failure, unspecified     Eosinophilia     Gastro-oesophageal reflux disease     Hyperlipidemia 07/19/2019    Hypertension     Penile discharge     Squamous cell carcinoma of skin, unspecified     Vitamin D deficiency 07/09/2010     Past Surgical History:   Procedure Laterality Date    ANGIOGRAM  01/06/2005    left dominant coronary system with essentially normal coronary arteries, trivial plaque,  severe dilated cardiomyopathy, left ventricle hypertrophy and severely hypokinetic, asynchrony or desynchrony of LV function    BONE MARROW BIOPSY, BONE SPECIMEN, NEEDLE/TROCAR N/A 08/15/2023    Procedure: BIOPSY, BONE MARROW;  Surgeon: Susy Baird;  Location: [facility] OR    COLONOSCOPY      COLONOSCOPY N/A 10/22/2024    Procedure: Colonoscopy;  Surgeon: Kaylyn Yates MD;  Location:  GI    DAVINCI LAPAROSCOPIC CHOLECYSTECTOMY WITHOUT GRAMS N/A 03/20/2024    Procedure: CHOLECYSTECTOMY, ROBOT-ASSISTED;  Surgeon: Anabel Crandall MD;  Location:  OR    ENT SURGERY      polyps from throat    EXCISE LESION FOOT  12/16/2013    Procedure: EXCISE LESION FOOT;  Scar revision of Right 3rd toe      SOFT TISSUE SURGERY      neuroma from right foot       Social History:   reports that he has been smoking cigarettes. He started smoking about 29 years ago. He has never used smokeless tobacco. He reports that he does not currently use alcohol. He reports that he does not use drugs.    Family History:  Family History   Problem Relation Age of Onset    Heart Disease Mother     Skin Cancer Father     Skin Cancer Brother     Heart Disease Brother     Colon Cancer No family hx of        Medications:  Current Outpatient Medications   Medication Sig Dispense Refill    acetaminophen (TYLENOL) 325 MG tablet Take 2 tablets (650 mg) by mouth every 4 hours as needed for other (mild pain) 100 tablet 0    albuterol (PROVENTIL) (2.5 MG/3ML) 0.083% neb solution Take 2.5 mg by nebulization every 6 hours as needed for shortness of breath, wheezing or cough.      augmented betamethasone dipropionate (DIPROLENE AF) 0.05 % external cream Apply topically 2 times daily. 100 g 3    carvedilol (COREG) 25 MG tablet Take 1 tablet (25 mg) by mouth 2 times daily (with meals). 180 tablet 3    hydroxyurea (HYDREA) 500 MG capsule Take 1 capsule (500 mg) by mouth 2 times daily 180 capsule 4    ipratropium - albuterol 0.5 mg/2.5 mg/3 mL (DUONEB)  0.5-2.5 (3) MG/3ML neb solution Take 1 vial (3 mLs) by nebulization every 4 hours as needed for shortness of breath, wheezing or cough. 90 mL 0    nicotine (NICODERM CQ) 14 MG/24HR 24 hr patch Place 1 patch onto the skin every 24 hours Start after completion of the 21 mg patches. 14 patch 0    nicotine (NICODERM CQ) 21 MG/24HR 24 hr patch Place 1 patch onto the skin every 24 hours Start with these patches first. 14 patch 0    nicotine (NICODERM CQ) 7 MG/24HR 24 hr patch Place 1 patch onto the skin every 24 hours Start after completing the full course of 14 mg patches. 14 patch 0    omeprazole (PRILOSEC OTC) 20 MG EC tablet Take 20 mg by mouth daily      pravastatin (PRAVACHOL) 20 MG tablet Take 1 tablet (20 mg) by mouth daily. 90 tablet 3    Probiotic Product (PROBIOTIC DAILY PO) Take by mouth daily.       Allergies   Allergen Reactions    Morphine Hcl      Extreme agitation

## 2025-03-17 NOTE — PATIENT INSTRUCTIONS
Wound Care After a Biopsy    What is a skin biopsy?  A skin biopsy allows the doctor to examine a very small piece of tissue under the microscope to determine the diagnosis and the best treatment for the skin condition. A local anesthetic (numbing medicine) is injected with a very small needle into the skin area to be tested. A small piece of skin is taken from the area. Sometimes a suture (stitch) is used.     What are the risks of a skin biopsy?  I will experience scar, bleeding, swelling, pain, crusting and redness. I may experience incomplete removal or recurrence. Risks of this procedure are excessive bleeding, bruising, infection, nerve damage, numbness, thick (hypertrophic or keloidal) scar and non-diagnostic biopsy.    How should I care for my wound for the first 24 hours?  Keep the wound dry and covered for 24 hours  If it bleeds, hold direct pressure on the area for 15 minutes. If bleeding does not stop, call us or go to the emergency room  Avoid strenuous exercise the first 1-2 days or as your doctor instructs you    How should I care for the wound after 24 hours?  After 24 hours, remove the bandage  You may bathe or shower as normal  If you had a scalp biopsy, you can shampoo as usual and can use shower water to clean the biopsy site daily  Clean the wound once a day with gentle soap and water  Do not scrub, be gentle  Apply white petroleum/Vaseline after cleaning the wound with a cotton swab or a clean finger, and keep the site covered with a Bandaid /bandage. Bandages are not necessary with a scalp biopsy  If you are unable to cover the site with a Bandaid /bandage, re-apply ointment 2-3 times a day to keep the site moist. Moisture will help with healing  Avoid strenuous activity for first 1-2 days  Avoid lakes, rivers, pools, and oceans until the stitches are removed or the site is healed    How do I clean my wound?  Wash hands thoroughly with soap or use hand  before all wound care  Clean  the wound with gentle soap and water  Apply white petroleum/Vaseline  to wound after it is clean  Replace the Bandaid /bandage to keep the wound covered for the first few days or as instructed by your doctor  If you had a scalp biopsy, warm shower water to the area on a daily basis should suffice    What should I use to clean my wound?   Cotton-tipped applicators (Qtips )  White petroleum jelly (Vaseline ). Use a clean new container and use Q-tips to apply.  Bandaids  as needed  Gentle soap     How should I care for my wound long term?  Do not get your wound dirty  Keep up with wound care for one week or until the area is healed.  A small scab will form and fall off by itself when the area is completely healed. The area will be red and will become pink in color as it heals. Sun protection is very important for how your scar will turn out. Sunscreen with an SPF 30 or greater is recommended once the area is healed.  You should have some soreness but it should be mild and slowly go away over several days. Talk to your doctor about using tylenol for pain,    When should I call my doctor?  If you have increased:   Pain or swelling  Pus or drainage (clear or slightly yellow drainage is ok)  Temperature over 100F  Spreading redness or warmth around wound    When will I hear about my results?  The biopsy results can take 2 weeks to come back.  Your results will automatically release to [employer] before your provider has even reviewed them.  The clinic will call you with the results, send you a [employer] message, or have you schedule a follow-up clinic or phone time to discuss the results.  Contact our clinics if you do not hear from us in 2 weeks.    Who should I call with questions?  [facility]: [phone]  [facility]: [phone]  For urgent needs outside of business hours call the [facility] at [phone] and ask for the dermatology resident on call

## 2025-03-17 NOTE — LETTER
3/17/2025      Alvarez Bess  [id number] [address]      Dear Colleague,    Thank you for referring your patient, Alvarez Bess, to the [facility]. Please see a copy of my visit note below.    [facility] Dermatology Note    Encounter Date: Mar 17, 2025    Dermatology Problem List:  #. NUB, L forearm, shave biopsy 12/9/2024   # RASH, L MCP, punch biopsy 12/9/2024      Pertinent Medical History:  Being worked up for neoplastic process including hypereosinophilic syndromes and lymphoma - hydroxyurea  ______________________________________    Impression/Plan:  Artie was seen today for lesion.    Diagnoses and all orders for this visit:    Actinic keratosis  -     DESTRUCT PREMALIGNANT LESION, FIRST  -     DESTRUCT PREMALIGNANT LESION, 2-14  -     Dermatological Path Order and Indications; Standing  -     Dermatological Path Order and Indications  -2 AK's on right forearm and scalp  - See procedure note    Neoplasm of uncertain behavior of skin  -     SHAVE 1 [[id number]]  -     Dermatological Path Order and Indications; Standing  -     Dermatological Path Order and Indications  -Cutaneous horn superior scalp, H AK versus SCCIS    Rash and nonspecific skin eruption  -Spongiotic dermatitis with eosinophils on hands  - continue  BMZ cream  - eczema vs hydroxyurea induced rash  - Not particularly bothersome      Cryotherapy procedure note: After verbal consent and discussion of risks and benefits including but no limited to dyspigmentation/scar, blister, and pain, 2 aks on R forearm and scalp was(were) treated with 1-2mm freeze border for 2 cycles with liquid nitrogen. Post cryotherapy instructions were provided.    and - SHAVE BIOPSY PROCEDURE NOTE: After written informed consent was obtained, a time out was taken to identify the patient and the correct site for biopsy. The lesion on the scalp  was cleansed with a 70% isopropyl alcohol wipe, and  then injected with 2 cc of lidocaine 1% with epinephrine 1:100,000. Once anesthesia was ensured, the visible surface of the lesion was biopsied using a David blade in standard technique. Hemostasis was obtained with pressure and aluminum chloride 20% solution. The specimen was placed in a labeled formalin container and sent to pathology for sectioning and analysis. The wound was dressed with white petrolatum and an adhesive bandage. The patient tolerated the procedure well. Post-procedure instructions and recommendations were provided both verbally and in writing.    Follow-up in 6 mo.       Staff Involved:  Staff Only    Domingo Gonsalez MD   of Dermatology  Department of Dermatology  [facility]      CC:   Chief Complaint   Patient presents with     Lesion       History of Present Illness:  Mr. Alvarez Bess is a 64 year old male who presents as a return patient.    SCC L forearm    Rash from bx showed findings c/w eczema     Presents today with concerns about a spot on his superior scalp        Labs:      Physical exam:  Vitals: There were no vitals taken for this visit.  GEN: well developed, well-nourished, in no acute distress, in a pleasant mood.     SKIN: Mcclure phototype 1  - Sun-exposed skin, which includes the head/face, neck, both arms, digits, and/or nails was examined.   - gritty pink papules on scalp and R forearm  - Flat brown macules and patches in a sun exposed areas on face and extremities  - erythema and scaling of hands and dorsal MCPs   - No other lesions of concern on areas examined.     Past Medical History:   Past Medical History:   Diagnosis Date     Cardiomyopathy (H)      Congestive heart failure (H)      Problem list name updated by automated process. Provider to review     Congestive heart failure, unspecified      Eosinophilia      Gastro-oesophageal reflux disease      Hyperlipidemia 07/19/2019     Hypertension      Penile  Impression:  41 yo F with a pmhx hepatitis B, and  COVID + ( 12/2 but currently negative) who presents to the ED with right face, arm, and leg numbness.  Of note patient does complain of three weeks of new onset, daily headache which she attributed to her new oral contraceptive (OCP) regimen. Her OB/GYN started her on OCP due to heavy menstrual bleeding about two months ago and the patient decided to stop the medication about one week ago due to the headaches. The headaches did not improve with cessation of OCP and states that it is not relieved by OTC analgesics  These headaches are thus something she has never experienced before and she states they are bifrontal, throbbing, moderately-severe, and debilitating lasting over thirty minutes. She denies thunderclap at onset; scintillations, unilateral tearing, sweating, redness, and neck pain are denied. Numbness had not been an issue prior to today. No family/personal hx of migraines. In ED STROKE CODE was called. Neuro saw pt, initial NIHSS 2. CT head & CTA did not show any intracranial pathology.     Suggestion:  Follow up MRI MRV  If above negative for acute pathology can follow up as outpatient.     Zane Wells NP  [phone]   discharge      Squamous cell carcinoma of skin, unspecified      Vitamin D deficiency 07/09/2010     Past Surgical History:   Procedure Laterality Date     ANGIOGRAM  01/06/2005    left dominant coronary system with essentially normal coronary arteries, trivial plaque, severe dilated cardiomyopathy, left ventricle hypertrophy and severely hypokinetic, asynchrony or desynchrony of LV function     BONE MARROW BIOPSY, BONE SPECIMEN, NEEDLE/TROCAR N/A 08/15/2023    Procedure: BIOPSY, BONE MARROW;  Surgeon: Susy Baird;  Location: [facility] OR     COLONOSCOPY       COLONOSCOPY N/A 10/22/2024    Procedure: Colonoscopy;  Surgeon: Kaylyn Yates MD;  Location:  GI     DAVINCI LAPAROSCOPIC CHOLECYSTECTOMY WITHOUT GRAMS N/A 03/20/2024    Procedure: CHOLECYSTECTOMY, ROBOT-ASSISTED;  Surgeon: Anabel Crandall MD;  Location:  OR     ENT SURGERY      polyps from throat     EXCISE LESION FOOT  12/16/2013    Procedure: EXCISE LESION FOOT;  Scar revision of Right 3rd toe       SOFT TISSUE SURGERY      neuroma from right foot       Social History:   reports that he has been smoking cigarettes. He started smoking about 29 years ago. He has never used smokeless tobacco. He reports that he does not currently use alcohol. He reports that he does not use drugs.    Family History:  Family History   Problem Relation Age of Onset     Heart Disease Mother      Skin Cancer Father      Skin Cancer Brother      Heart Disease Brother      Colon Cancer No family hx of        Medications:  Current Outpatient Medications   Medication Sig Dispense Refill     acetaminophen (TYLENOL) 325 MG tablet Take 2 tablets (650 mg) by mouth every 4 hours as needed for other (mild pain) 100 tablet 0     albuterol (PROVENTIL) (2.5 MG/3ML) 0.083% neb solution Take 2.5 mg by nebulization every 6 hours as needed for shortness of breath, wheezing or cough.       augmented betamethasone dipropionate (DIPROLENE AF) 0.05 % external cream Apply topically 2  times daily. 100 g 3     carvedilol (COREG) 25 MG tablet Take 1 tablet (25 mg) by mouth 2 times daily (with meals). 180 tablet 3     hydroxyurea (HYDREA) 500 MG capsule Take 1 capsule (500 mg) by mouth 2 times daily 180 capsule 4     ipratropium - albuterol 0.5 mg/2.5 mg/3 mL (DUONEB) 0.5-2.5 (3) MG/3ML neb solution Take 1 vial (3 mLs) by nebulization every 4 hours as needed for shortness of breath, wheezing or cough. 90 mL 0     nicotine (NICODERM CQ) 14 MG/24HR 24 hr patch Place 1 patch onto the skin every 24 hours Start after completion of the 21 mg patches. 14 patch 0     nicotine (NICODERM CQ) 21 MG/24HR 24 hr patch Place 1 patch onto the skin every 24 hours Start with these patches first. 14 patch 0     nicotine (NICODERM CQ) 7 MG/24HR 24 hr patch Place 1 patch onto the skin every 24 hours Start after completing the full course of 14 mg patches. 14 patch 0     omeprazole (PRILOSEC OTC) 20 MG EC tablet Take 20 mg by mouth daily       pravastatin (PRAVACHOL) 20 MG tablet Take 1 tablet (20 mg) by mouth daily. 90 tablet 3     Probiotic Product (PROBIOTIC DAILY PO) Take by mouth daily.       Allergies   Allergen Reactions     Morphine Hcl      Extreme agitation               Again, thank you for allowing me to participate in the care of your patient.        Sincerely,        Domingo Gonsalez MD    Electronically signed

## 2025-03-19 ENCOUNTER — LAB (OUTPATIENT)
Dept: LAB | Facility: CLINIC | Age: 65
End: 2025-03-19
Payer: COMMERCIAL

## 2025-03-19 DIAGNOSIS — D47.1 MYELOPROLIFERATIVE DISORDER (H): ICD-10-CM

## 2025-03-19 DIAGNOSIS — D72.118 OTHER HYPEREOSINOPHILIC SYNDROME: ICD-10-CM

## 2025-03-19 DIAGNOSIS — E83.52 HYPERCALCEMIA: ICD-10-CM

## 2025-03-19 LAB
ERYTHROCYTE [SEDIMENTATION RATE] IN BLOOD BY WESTERGREN METHOD: 15 MM/HR (ref 0–20)
PATH REPORT.COMMENTS IMP SPEC: NORMAL
PATH REPORT.FINAL DX SPEC: NORMAL
PATH REPORT.GROSS SPEC: NORMAL
PATH REPORT.MICROSCOPIC SPEC OTHER STN: NORMAL
PATH REPORT.RELEVANT HX SPEC: NORMAL

## 2025-03-19 PROCEDURE — 36415 COLL VENOUS BLD VENIPUNCTURE: CPT

## 2025-03-19 PROCEDURE — 85652 RBC SED RATE AUTOMATED: CPT

## 2025-03-20 LAB
ALBUMIN SERPL BCG-MCNC: 3.8 G/DL (ref 3.5–5.2)
ALP SERPL-CCNC: 68 U/L (ref 40–150)
ALT SERPL W P-5'-P-CCNC: 19 U/L (ref 0–70)
ANION GAP SERPL CALCULATED.3IONS-SCNC: 10 MMOL/L (ref 7–15)
AST SERPL W P-5'-P-CCNC: 18 U/L (ref 0–45)
BASOPHILS # BLD MANUAL: 0.3 10E3/UL (ref 0–0.2)
BASOPHILS NFR BLD MANUAL: 2 %
BILIRUB SERPL-MCNC: 0.3 MG/DL
BUN SERPL-MCNC: 19.6 MG/DL (ref 8–23)
CALCIUM SERPL-MCNC: NORMAL MG/DL
CHLORIDE SERPL-SCNC: 101 MMOL/L (ref 98–107)
CREAT SERPL-MCNC: 0.98 MG/DL (ref 0.67–1.17)
CRP SERPL-MCNC: 5.11 MG/L
EGFRCR SERPLBLD CKD-EPI 2021: 87 ML/MIN/1.73M2
EOSINOPHIL # BLD MANUAL: 6 10E3/UL (ref 0–0.7)
EOSINOPHIL NFR BLD MANUAL: 47 %
ERYTHROCYTE [DISTWIDTH] IN BLOOD BY AUTOMATED COUNT: 10.8 % (ref 10–15)
GLUCOSE SERPL-MCNC: 92 MG/DL (ref 70–99)
HCO3 SERPL-SCNC: 24 MMOL/L (ref 22–29)
HCT VFR BLD AUTO: 31 % (ref 40–53)
HGB BLD-MCNC: 10.8 G/DL (ref 13.3–17.7)
LYMPHOCYTES # BLD MANUAL: 2.2 10E3/UL (ref 0.8–5.3)
LYMPHOCYTES NFR BLD MANUAL: 17 %
MCH RBC QN AUTO: 45 PG (ref 26.5–33)
MCHC RBC AUTO-ENTMCNC: 34.8 G/DL (ref 31.5–36.5)
MCV RBC AUTO: 129 FL (ref 78–100)
MONOCYTES # BLD MANUAL: 0.4 10E3/UL (ref 0–1.3)
MONOCYTES NFR BLD MANUAL: 3 %
NEUTROPHILS # BLD MANUAL: 3.9 10E3/UL (ref 1.6–8.3)
NEUTROPHILS NFR BLD MANUAL: 31 %
PATH REV: NORMAL
PLAT MORPH BLD: NORMAL
PLATELET # BLD AUTO: 166 10E3/UL (ref 150–450)
POTASSIUM SERPL-SCNC: 4.6 MMOL/L (ref 3.4–5.3)
PROT SERPL-MCNC: 6.6 G/DL (ref 6.4–8.3)
PTH-INTACT SERPL-MCNC: 9 PG/ML (ref 15–65)
RBC # BLD AUTO: 2.4 10E6/UL (ref 4.4–5.9)
RBC MORPH BLD: NORMAL
SODIUM SERPL-SCNC: 135 MMOL/L (ref 135–145)
VIT D+METAB SERPL-MCNC: 31 NG/ML (ref 20–50)
WBC # BLD AUTO: 12.7 10E3/UL (ref 4–11)

## 2025-03-31 ENCOUNTER — OFFICE VISIT (OUTPATIENT)
Dept: DERMATOLOGY | Facility: CLINIC | Age: 65
End: 2025-03-31
Payer: COMMERCIAL

## 2025-03-31 DIAGNOSIS — C44.629 SQUAMOUS CELL CANCER OF SKIN OF LEFT FOREARM: ICD-10-CM

## 2025-03-31 DIAGNOSIS — D48.9 NEOPLASM OF UNCERTAIN BEHAVIOR: ICD-10-CM

## 2025-03-31 ASSESSMENT — PAIN SCALES - GENERAL: PAINLEVEL_OUTOF10: NO PAIN (0)

## 2025-03-31 NOTE — PATIENT INSTRUCTIONS
Excision Wound Care Instructions  I will experience scar, altered skin color, bleeding, swelling, pain, crusting and redness. I may experience altered sensation. Risks are excessive bleeding, infection, muscle weakness, thick (hypertrophic or keloidal) scar, and recurrence. A second procedure may be recommended to obtain the best cosmetic or functional result.  Possible complications of any surgical procedure are bleeding, infection, scarring, alteration in skin color and sensation, muscle weakness in the area, wound dehiscence or seperation, or recurrence of the lesion or disease. On occasion, after healing, a secondary procedure or revision may be recommended in order to obtain the best cosmetic or functional result.   After your surgery, a pressure bandage will be placed over the area that has sutures. This will help prevent bleeding. Please follow these instructions as they will help you to prevent complications as your wound heals.    For the First 24 hours After Surgery:  Leave the pressure bandage on and keep it dry. If it should come loose, you may retape it, but do not take it off.  Relax and take it easy. Do not do any vigorous exercise, heavy lifting, or bending forward. This could cause the wound to bleed.  Post-operative pain is usually mild. You may alternate between 1000 mg of Tylenol (acetaminophen) and 400 mg of Ibuprofen every 4 hours.  Do not take more than 4,000mg of acetaminophen in a 24 hour period or 3200 mg of Ibuprofen in a 24 hr period.  Avoid alcohol and vitamin E as these may increase your tendency to bleed.  You may put an ice pack around the bandaged area for 20 minutes every 2-3 hours. This may help reduce swelling, bruising, and pain. Make sure the ice pack is waterproof so that the pressure bandage does not get wet.   You may see a small amount of drainage or blood on your pressure bandage. This is normal. However, if drainage or bleeding continues or saturates the bandage, you will  need to apply firm pressure over the bandage with a washcloth for 15 minutes. If bleeding continues after applying pressure for 15 minutes then go to the nearest emergency room.    After the first 24 hours from Surgery  Carefully remove the bandage. Since your wound was closed with sutures underneath the skin and sealed with Derma-bond, you do not have to do daily wound care or dressing changes. The blue Derma-bond polymer takes the place of a bandage. It naturally detaches within a week. At that point the skin is knitted together and daily wound washing along with dressing changes are not required.     After the first 2 weeks from Surgery  At 2 weeks post surgery you can begin gently massaging the scar using light pressure applied in a circular motion. Do this for 5 minutes 3 times a day. Doing this consistently and regularly will improve the appearance of your final scar. You should continue to do this for AT LEAST 2 weeks, but ideally 4 in order to give your scar the best possible outcome        Call Us If:  You have pain that is not controlled with Tylenol/Ibuprofen  You have signs or symptoms of an infection, such as: fever over 100 degrees F, redness, warmth, or foul-smelling or yellow drainage from the wound.  Who should I call with questions?  [facility]: [phone]   [facility]: [phone]  [facility]: [phone]  For urgent needs outside of business hours call the [facility] at [phone] and ask to speak with the dermatology resident on call

## 2025-03-31 NOTE — PROGRESS NOTES
DERMATOLOGIC SURGERY REPORT    NAME OF PROCEDURE:  EXCISION AND CLOSURE    Surgeon:  Domingo Gonsalez MD    PREOPERATIVE DIAGNOSIS: SCC  POSTOPERATIVE DIAGNOSIS: Same  Lesion Size: 15 mm lesion with 4 mm margins  Final excision size: 23 mm  Repair type: Complex  FINAL REPAIR LENGTH:   55 mm  Location: L forearm   Prior Biopsy Accession #: [id number]     INDICATIONS:Excision was indicated for treatment. We discussed the principles of treatment and most likely complications including bleeding, infection, wound dehiscence, pain, nerve damage, and scarring. Informed consent was obtained and the patient underwent the procedure as follows.    PROCEDURE:  The patient was taken to the operative suite. The treatment area was anesthetized with 1% lidocaine with 1:[id number] epinephrine buffered with bicarbonate. The area was washed with hibiclens, rinsed with saline and draped with sterile towels. The lesion was delineated and excised down to subcutaneous fat. Hemostasis was obtained by electrocoagulation.     REPAIR:  In order to repair this defect while maintaining the normal anatomic relations and function, we elected to utilize a linear closure. Closure was oriented so that the wound was in the patient's natural skin tension lines. Two redundant cones were removed by triangulation. Due to tightness of the surrounding skin deeper layers of the subcutaneous tissue were undermined extensively to a distance  greater than width of the defect on both sides by dissection in the subcutaneous plane until adequate tissue mobility was obtained. Deep layering suturing was performed using 3-0 monocryl deep, intradermal and subcutaneous sutures.  Final cutaneous approximation was achieved with 3-0 monocryl running subcuticular sutures.      A total of 5 mL of anesthesia was administered for all surgical sites. Estimated blood loss was less than 5 mL for all surgical sites. Dermabond applied. A sterile pressure dressing was applied and wound  care instructions, with a written handout, were given. The patient was discharged alert and ambulatory.    Domingo Gonsalez M.D.      Department of Dermatology

## 2025-03-31 NOTE — LETTER
3/31/2025      Alvarez Bess  [address] [facility] [id number]      Dear Colleague,    Thank you for referring your patient, Alvarez Bess, to the [facility]. Please see a copy of my visit note below.    DERMATOLOGIC SURGERY REPORT    NAME OF PROCEDURE:  EXCISION AND CLOSURE    Surgeon:  Domingo Gonsalez MD    PREOPERATIVE DIAGNOSIS: SCC  POSTOPERATIVE DIAGNOSIS: Same  Lesion Size: 15 mm lesion with 4 mm margins  Final excision size: 23 mm  Repair type: Complex  FINAL REPAIR LENGTH:   55 mm  Location: L forearm   Prior Biopsy Accession #: [id number]     INDICATIONS:Excision was indicated for treatment. We discussed the principles of treatment and most likely complications including bleeding, infection, wound dehiscence, pain, nerve damage, and scarring. Informed consent was obtained and the patient underwent the procedure as follows.    PROCEDURE:  The patient was taken to the operative suite. The treatment area was anesthetized with 1% lidocaine with 1:678049 epinephrine buffered with bicarbonate. The area was washed with hibiclens, rinsed with saline and draped with sterile towels. The lesion was delineated and excised down to subcutaneous fat. Hemostasis was obtained by electrocoagulation.     REPAIR:  In order to repair this defect while maintaining the normal anatomic relations and function, we elected to utilize a linear closure. Closure was oriented so that the wound was in the patient's natural skin tension lines. Two redundant cones were removed by triangulation. Due to tightness of the surrounding skin deeper layers of the subcutaneous tissue were undermined extensively to a distance  greater than width of the defect on both sides by dissection in the subcutaneous plane until adequate tissue mobility was obtained. Deep layering suturing was performed using 3-0 monocryl deep, intradermal and subcutaneous sutures.  Final cutaneous approximation was achieved  with 3-0 monocryl running subcuticular sutures.      A total of 5 mL of anesthesia was administered for all surgical sites. Estimated blood loss was less than 5 mL for all surgical sites. Dermabond applied. A sterile pressure dressing was applied and wound care instructions, with a written handout, were given. The patient was discharged alert and ambulatory.    Domingo Gonsalez M.D.      Department of Dermatology       Again, thank you for allowing me to participate in the care of your patient.        Sincerely,        Domingo Gonsalez MD    Electronically signed

## 2025-04-01 ENCOUNTER — VIRTUAL VISIT (OUTPATIENT)
Dept: ONCOLOGY | Facility: CLINIC | Age: 65
End: 2025-04-01
Attending: INTERNAL MEDICINE
Payer: COMMERCIAL

## 2025-04-01 VITALS — HEIGHT: 72 IN | BODY MASS INDEX: 22.08 KG/M2 | WEIGHT: 163 LBS

## 2025-04-01 DIAGNOSIS — G47.00 INSOMNIA, UNSPECIFIED TYPE: Primary | ICD-10-CM

## 2025-04-01 DIAGNOSIS — D47.1 MYELOPROLIFERATIVE DISORDER (H): ICD-10-CM

## 2025-04-01 PROCEDURE — 98006 SYNCH AUDIO-VIDEO EST MOD 30: CPT | Performed by: INTERNAL MEDICINE

## 2025-04-01 PROCEDURE — 1126F AMNT PAIN NOTED NONE PRSNT: CPT | Performed by: INTERNAL MEDICINE

## 2025-04-01 RX ORDER — TRAZODONE HYDROCHLORIDE 50 MG/1
50 TABLET ORAL AT BEDTIME
Qty: 90 TABLET | Refills: 3 | Status: SHIPPED | OUTPATIENT
Start: 2025-04-01

## 2025-04-01 ASSESSMENT — PAIN SCALES - GENERAL: PAINLEVEL_OUTOF10: NO PAIN (0)

## 2025-04-01 NOTE — PROGRESS NOTES
[facility] follow up     REASON for VISIT: MPD , eosinophilia    Oncology HPI:   2014: elevated WBC of 12.9 - 6/27/23: Initial consult: Patient states that he has had 14 pound unintentional weight loss over the last year . He denies any night sweats or weight loss. 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 He is following up with dermatology.  - 5/19/23 white count Is a 25.6 absolute neutrophils at 10.2 absolute eosinophils at 10.2 CRP level at 11.371-April 2024 it was at 16.5 sedimentation rate at 7. His hemoglobin is normal at 14.7 platelet count of 2.93   - 8/5/2023: bone marrow biopsy showed marked hypercellular bone marrow with marked eosinophilia maturing trilineage hematopoiesis no increased dysplasia or increase in blasts. Peripheral blood showing moderate leukocytosis with eosinophilia. Adequate neutrophils with slight shift to immaturity. Flow cytometry did not show any increase in myeloid blasts and no abnormal population of cells. Morphologic findings just showed peripheral eosinophilia. JAK2 mutation negative. 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 This was a summary for the bone marrow biopsy.   - FISH cytogenetics and molecular studies were negative for PD GFR alpha, beta, FGFR 1, JAK2 genes and BCR-ABL mutation ruling out certain leukemias and CML. No mass found on MRI that they could biopsy   - 11/27/23 PET with PET/CT shows bilateral axillary and inguinal hypermetabolic adenopathy concerning for neoplastic process including hypereosinophilic syndromes and lymphoma. Recommend tissue sampling. SUV in those regions at 3.3-3.6. Additional of hypermetabolic foci within the right gluteus and left vastus lateralis intermedius muscles SUV of 8.4 may be related to the neoplastic process   - 3/2024  cholecystectomy- hydrea held  - 4/4/2024 restarted Hydrea 500 mg twice daily    - 12/3/2024 WBC 11.9, Hgb 11.8, Plts 115k, ANC 2.9, Absolute Eosinophils 6.4  - 1/6/25 labs with severed Vitamin D deficiency, started on cholecalciferol   - 2/4-2/5/25: Ca of 14.2 and Cr of 1.81. patient was advised to got to ED where he was seen, given fluids and kept for monitoring. Felt to be vitamin D toxicity. Hydrea held restarted     Current Treatment: Hydrea 500 mg daily on 3/5/2025     Interval history:   Artie returns for follow-up today.  He is doing well. WBC at 12.2  eosinophils at 600 tired all the time and sleeps 4-5 hours a night before he was sleeping  7 hours, previously diagnosed with vitamin D toxicity now improved.        Review of Systems: See interval hx. Denies fevers, chills, dizziness,  changes in vision, abdominal pain, N/V, diarrhea, changes in urination, bleeding, bruising, rash.     Current Outpatient Medications   Medication Sig Dispense Refill    acetaminophen (TYLENOL) 325 MG tablet Take 2 tablets (650 mg) by mouth every 4 hours as needed for other (mild pain) 100 tablet 0    albuterol (PROVENTIL) (2.5 MG/3ML) 0.083% neb solution Take 2.5 mg by nebulization every 6 hours as needed for shortness of breath, wheezing or cough.      augmented betamethasone dipropionate (DIPROLENE AF) 0.05 % external cream Apply topically 2 times daily. 100 g 3    carvedilol (COREG) 25 MG tablet Take 1 tablet (25 mg) by mouth 2 times daily (with meals). 180 tablet 3    hydroxyurea (HYDREA) 500 MG capsule Take 1 capsule (500 mg) by mouth 2 times daily 180 capsule 4    ipratropium - albuterol 0.5 mg/2.5 mg/3 mL (DUONEB) 0.5-2.5 (3) MG/3ML neb solution Take 1 vial (3 mLs) by nebulization every 4 hours as needed for shortness of breath, wheezing or cough. 90 mL 0    nicotine (NICODERM CQ) 14 MG/24HR 24 hr patch Place 1 patch onto the skin every 24 hours Start after completion of the 21 mg patches. 14 patch 0    nicotine (NICODERM  CQ) 21 MG/24HR 24 hr patch Place 1 patch onto the skin every 24 hours Start with these patches first. 14 patch 0    nicotine (NICODERM CQ) 7 MG/24HR 24 hr patch Place 1 patch onto the skin every 24 hours Start after completing the full course of 14 mg patches. 14 patch 0    omeprazole (PRILOSEC OTC) 20 MG EC tablet Take 20 mg by mouth daily      pravastatin (PRAVACHOL) 20 MG tablet Take 1 tablet (20 mg) by mouth daily. 90 tablet 3    Probiotic Product (PROBIOTIC DAILY PO) Take by mouth daily.            Allergies   Allergen Reactions    Morphine Hcl      Extreme agitation         Exam:  Height 1.829 m (6'), weight 73.9 kg (163 lb).  Wt Readings from Last 4 Encounters:   25 73.9 kg (163 lb)   25 73.5 kg (162 lb)   25 73.6 kg (162 lb 3.2 oz)   25 79 kg (174 lb 3.2 oz)       ECO  GENERAL/CONSTITUTIONAL: No acute distress. Healthy, alert.  EYES: No scleral icterus.  No redness or discharge.    RESPIRATORY: No audible wheeze, cough, or visible cyanosis.  No visible retractions or increased work of breathing.  Able to speak fully in complete sentences.  MUSCULOSKELETAL: Normal range of motion.  NEUROLOGIC: Alert, oriented, answers questions appropriately. No tremor. Mentation intact and speech normal  INTEGUMENTARY: No jaundice.  No obvious rash or skin lesions.  PSYCHIATRIC:  Mentation appears normal, affect normal/bright, judgement and insight intact, normal speech and appearance well-groomed.    The rest of a comprehensive physical exam is deferred due to public health emergency video visit restrictions.        Labs:   Noted wbc 12.2 eosinophils at 0.6     Imaging:  EXAM: PET ONCOLOGY (EYES TO THIGHS), CT CHEST/ABDOMEN/PELVIS W CONTRAST  LOCATION: [facility]  DATE: 2023     INDICATION: Malignant immunoproliferative disease, unspecified. 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 Interval  hydroxyurea. Assess treatment response.  COMPARISON: FDG PET/CT 08/08/2023  CONTRAST: 99 mL IV Isovue 370, 500 mL oral water  TECHNIQUE: Serum glucose level 102 mg/dL. One hour post intravenous administration of 10.9 mCi F-18 FDG, PET imaging was performed from the skull vertex to mid thighs, utilizing attenuation correction with concurrent axial CT and PET/CT image fusion.   Separate diagnostic CT of the chest, abdomen, and pelvis was performed. Dose reduction techniques were used.     PET/CT FINDINGS: No significant interval change in FDG avid left axillary (SUV max 3.6 unchanged), right axillary (SUV max 3.8, previously 3.3), right inguinal (SUV max 3.4 and 1.5 cm short axis, both unchanged) or left inguinal (SUV max 3.9, previously   3.4) lymphadenopathy as well as no significant change in FDG avid subcentimeter subcutaneous nodules in the upper back (SUV max 2.3 on PET image #99, previously 2.0).     Prior focal FDG uptake in the left thigh musculature as resolved. Inflammatory FDG uptake associated with left cervical facet joints.     CT FINDINGS: Mild senescent intracranial changes. Advanced coronary arterial calcification. Mild apical emphysema. New 6 mm solid nodule in the left lower lobe (series 2, image 75), below PET resolution. Several additional smaller pulmonary nodules   appear similar. Small hepatic cyst. Left adrenal adenoma. Left renal cysts. Bilateral non-obstructing renal calyceal tip calculi. Spleen is normal in size. Scattered colonic diverticuli. Mild multilevel degenerative changes in spine.                                                                      IMPRESSION:     1. No significant interval change in bilateral FDG avid axillary and inguinal lymphadenopathy and a few small subcutaneous nodules in the upper back.     2. New 6 mm pulmonary nodule in the left lower lobe, below PET resolution. Attention on follow-up.     3. Prior focal FDG uptake in the left thigh musculature as  resolved.    Impression/plan: Artie is a very pleaseant  64 year old male who presents to clinic for follow of of eosinophilia and leukocytosis. He is currently taking Hydrea 500 mg once daily     Hypercalcemia/KANNAN  ---resolved. Monitor        Vitamin D toxicity  -Resolved.  Hypercalcemia may be related to that and now it is improved.  PTH improving appropriately.  Repeat d level in 2 months    MPD with Hypereosinophilic Syndrome  --continue 500 mg a day, after I got off the virtual call with him it appears its twice a day listed, I will verify the dose with him, if fatigue continues and wbc increases next month will increase to bid dosing          Pulmonary Nodule  - 1/28/25: PET/CT with, New 6 mm pulmonary nodule in the left lower lobe, below PET resolution. Attention on follow-up.  - current smoker scan in 6 months    Left forearm lesion  - He was seen by Dermatology, 12/9/24 underwent biopsy, pathology with squamous cell CA  -s/p reexcsiion          Cardiology  CHF, Cardiomyopathy, HTN  - follows Dr. Dl Ordonez    Xeroderma  - Fissures of hands, and patient reports of feet, possibly secondary to Hydrea  - start Amlactin cream twice daily- prescription sent to pharmacy  - Vaseline in fissures of feet with socks nightly    History of LE Rash  - Resolved  - no biopsy report in EPIC    Right Arm   - path report 9/29/23: CASE FROM [facility], [place], [place] ([id number], OBTAINED 05/16/2023):  Skin, arm, right, lateral:- Epidermal hyperplasia with suprabasal acantholysis and apparent dyskeratosis - (see comment), Chart history was reviewed with this case. As noted in the original report, these microscopic features most resemble Ramando's disease. However given the clinical findings including a marked peripheral eosinophilia and concern for a myeloid neoplasm, an additional biopsy for direct immunofluorescence is suggested to more fully rule out the possibility of an autoimmune bullous process such  as pemphigus vulgaris or paraneoplastic pemphigus.     Tobacco Abuse  Actively attempting smoking cessation  - using Nicoderm patches    Right elbow effusion: no drainage was recommended they gave him a sleeve to wear and if no improvement x 4 weeks to call    Fatigue /insomnia may be sleep deprived. Told him to try trazadone daily.  If no improvement of fatigue with sleeping better we can increase his hydrea, in one month if counts remain elevated, concern for worsening anemia with hydrea    Rona in one month with cbc, see me in 2 months with vitamin d level repeat and CRP/cbc/cmp    Total time spent on day of visit, including review of tests, obtaining/reviewing separately obtained history, ordering medications/tests/procedures, communicating with PCP/consultants, and documenting in electronic medical record:  30  minutes

## 2025-04-01 NOTE — NURSING NOTE
Current patient location: [id number] [place] [id number]    Is the patient currently in the state of [place]? YES    Visit mode: VIDEO    If the visit is dropped, the patient can be reconnected by:VIDEO VISIT: Send to e-mail at: [email or web address]    Will anyone else be joining the visit? NO  (If patient encounters technical issues they should call [phone]    Are changes needed to the allergy or medication list? Pt stated no changes to allergies and Pt stated no med changes    Are refills needed on medications prescribed by this physician? NO    Rooming Documentation:  Questionnaire(s) completed    Reason for visit: SARAH FARRELL

## 2025-04-01 NOTE — LETTER
4/1/2025      Alvarez Bess  [address] [facility] [id number]      Dear Colleague,    Thank you for referring your patient, Alvarez Bess, to the [facility] CANCER [facility]. Please see a copy of my visit note below.    Virtual Visit Details    Type of service:  Video Visit     Originating Location (pt. Location): Home    Distant Location (provider location):  On-site  Platform used for Video Visit: [facility] Cancer clinic follow up     REASON for VISIT: MPD , eosinophilia    Oncology HPI:   2014: elevated WBC of 12.9 - 6/27/23: Initial consult: Patient states that he has had 14 pound unintentional weight loss over the last year . He denies any night sweats or weight loss. 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 He is following up with dermatology.  - 5/19/23 white count Is a 25.6 absolute neutrophils at 10.2 absolute eosinophils at 10.2 CRP level at 11.371-April 2024 it was at 16.5 sedimentation rate at 7. His hemoglobin is normal at 14.7 platelet count of 2.93   - 8/5/2023: bone marrow biopsy showed marked hypercellular bone marrow with marked eosinophilia maturing trilineage hematopoiesis no increased dysplasia or increase in blasts. Peripheral blood showing moderate leukocytosis with eosinophilia. Adequate neutrophils with slight shift to immaturity. Flow cytometry did not show any increase in myeloid blasts and no abnormal population of cells. Morphologic findings just showed peripheral eosinophilia. JAK2 mutation negative. 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 This was a summary for the bone marrow biopsy.   - FISH cytogenetics and molecular studies were negative for PD GFR alpha, beta, FGFR 1, JAK2 genes and BCR-ABL mutation ruling out certain leukemias and CML. No mass found  on MRI that they could biopsy   - 11/27/23 PET with PET/CT shows bilateral axillary and inguinal hypermetabolic adenopathy concerning for neoplastic process including hypereosinophilic syndromes and lymphoma. Recommend tissue sampling. SUV in those regions at 3.3-3.6. Additional of hypermetabolic foci within the right gluteus and left vastus lateralis intermedius muscles SUV of 8.4 may be related to the neoplastic process   - 3/2024 cholecystectomy- hydrea held  - 4/4/2024 restarted Hydrea 500 mg twice daily    - 12/3/2024 WBC 11.9, Hgb 11.8, Plts 115k, ANC 2.9, Absolute Eosinophils 6.4  - 1/6/25 labs with severed Vitamin D deficiency, started on cholecalciferol   - 2/4-2/5/25: Ca of 14.2 and Cr of 1.81. patient was advised to got to ED where he was seen, given fluids and kept for monitoring. Felt to be vitamin D toxicity. Hydrea held restarted     Current Treatment: Hydrea 500 mg daily on 3/5/2025     Interval history:   Artie returns for follow-up today.  He is doing well. WBC at 12.2  eosinophils at 600 tired all the time and sleeps 4-5 hours a night before he was sleeping  7 hours, previously diagnosed with vitamin D toxicity now improved.        Review of Systems: See interval hx. Denies fevers, chills, dizziness,  changes in vision, abdominal pain, N/V, diarrhea, changes in urination, bleeding, bruising, rash.     Current Outpatient Medications   Medication Sig Dispense Refill     acetaminophen (TYLENOL) 325 MG tablet Take 2 tablets (650 mg) by mouth every 4 hours as needed for other (mild pain) 100 tablet 0     albuterol (PROVENTIL) (2.5 MG/3ML) 0.083% neb solution Take 2.5 mg by nebulization every 6 hours as needed for shortness of breath, wheezing or cough.       augmented betamethasone dipropionate (DIPROLENE AF) 0.05 % external cream Apply topically 2 times daily. 100 g 3     carvedilol (COREG) 25 MG tablet Take 1 tablet (25 mg) by mouth 2 times daily (with meals). 180 tablet 3     hydroxyurea (HYDREA) 500  MG capsule Take 1 capsule (500 mg) by mouth 2 times daily 180 capsule 4     ipratropium - albuterol 0.5 mg/2.5 mg/3 mL (DUONEB) 0.5-2.5 (3) MG/3ML neb solution Take 1 vial (3 mLs) by nebulization every 4 hours as needed for shortness of breath, wheezing or cough. 90 mL 0     nicotine (NICODERM CQ) 14 MG/24HR 24 hr patch Place 1 patch onto the skin every 24 hours Start after completion of the 21 mg patches. 14 patch 0     nicotine (NICODERM CQ) 21 MG/24HR 24 hr patch Place 1 patch onto the skin every 24 hours Start with these patches first. 14 patch 0     nicotine (NICODERM CQ) 7 MG/24HR 24 hr patch Place 1 patch onto the skin every 24 hours Start after completing the full course of 14 mg patches. 14 patch 0     omeprazole (PRILOSEC OTC) 20 MG EC tablet Take 20 mg by mouth daily       pravastatin (PRAVACHOL) 20 MG tablet Take 1 tablet (20 mg) by mouth daily. 90 tablet 3     Probiotic Product (PROBIOTIC DAILY PO) Take by mouth daily.            Allergies   Allergen Reactions     Morphine Hcl      Extreme agitation         Exam:  Height 1.829 m (6'), weight 73.9 kg (163 lb).  Wt Readings from Last 4 Encounters:   25 73.9 kg (163 lb)   25 73.5 kg (162 lb)   25 73.6 kg (162 lb 3.2 oz)   25 79 kg (174 lb 3.2 oz)       ECO  GENERAL/CONSTITUTIONAL: No acute distress. Healthy, alert.  EYES: No scleral icterus.  No redness or discharge.    RESPIRATORY: No audible wheeze, cough, or visible cyanosis.  No visible retractions or increased work of breathing.  Able to speak fully in complete sentences.  MUSCULOSKELETAL: Normal range of motion.  NEUROLOGIC: Alert, oriented, answers questions appropriately. No tremor. Mentation intact and speech normal  INTEGUMENTARY: No jaundice.  No obvious rash or skin lesions.  PSYCHIATRIC:  Mentation appears normal, affect normal/bright, judgement and insight intact, normal speech and appearance well-groomed.    The rest of a comprehensive physical exam is deferred  due to public health emergency video visit restrictions.        Labs:   Noted wbc 12.2 eosinophils at 0.6     Imaging:  EXAM: PET ONCOLOGY (EYES TO THIGHS), CT CHEST/ABDOMEN/PELVIS W CONTRAST  LOCATION: [facility]  DATE: 11/27/2023     INDICATION: Malignant immunoproliferative disease, unspecified. 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 Interval hydroxyurea. Assess treatment response.  COMPARISON: FDG PET/CT 08/08/2023  CONTRAST: 99 mL IV Isovue 370, 500 mL oral water  TECHNIQUE: Serum glucose level 102 mg/dL. One hour post intravenous administration of 10.9 mCi F-18 FDG, PET imaging was performed from the skull vertex to mid thighs, utilizing attenuation correction with concurrent axial CT and PET/CT image fusion.   Separate diagnostic CT of the chest, abdomen, and pelvis was performed. Dose reduction techniques were used.     PET/CT FINDINGS: No significant interval change in FDG avid left axillary (SUV max 3.6 unchanged), right axillary (SUV max 3.8, previously 3.3), right inguinal (SUV max 3.4 and 1.5 cm short axis, both unchanged) or left inguinal (SUV max 3.9, previously   3.4) lymphadenopathy as well as no significant change in FDG avid subcentimeter subcutaneous nodules in the upper back (SUV max 2.3 on PET image #99, previously 2.0).     Prior focal FDG uptake in the left thigh musculature as resolved. Inflammatory FDG uptake associated with left cervical facet joints.     CT FINDINGS: Mild senescent intracranial changes. Advanced coronary arterial calcification. Mild apical emphysema. New 6 mm solid nodule in the left lower lobe (series 2, image 75), below PET resolution. Several additional smaller pulmonary nodules   appear similar. Small hepatic cyst. Left adrenal adenoma. Left renal cysts. Bilateral non-obstructing renal calyceal tip calculi. Spleen is normal in size. Scattered colonic diverticuli. Mild  multilevel degenerative changes in spine.                                                                      IMPRESSION:     1. No significant interval change in bilateral FDG avid axillary and inguinal lymphadenopathy and a few small subcutaneous nodules in the upper back.     2. New 6 mm pulmonary nodule in the left lower lobe, below PET resolution. Attention on follow-up.     3. Prior focal FDG uptake in the left thigh musculature as resolved.    Impression/plan: Artie is a very pleaseant  64 year old male who presents to clinic for follow of of eosinophilia and leukocytosis. He is currently taking Hydrea 500 mg once daily     Hypercalcemia/KANNAN  ---resolved. Monitor        Vitamin D toxicity  -Resolved.  Hypercalcemia may be related to that and now it is improved.  PTH improving appropriately.  Repeat d level in 2 months    MPD with Hypereosinophilic Syndrome  --continue 500 mg a day, after I got off the virtual call with him it appears its twice a day listed, I will verify the dose with him, if fatigue continues and wbc increases next month will increase to bid dosing          Pulmonary Nodule  - 1/28/25: PET/CT with, New 6 mm pulmonary nodule in the left lower lobe, below PET resolution. Attention on follow-up.  - current smoker scan in 6 months    Left forearm lesion  - He was seen by Dermatology, 12/9/24 underwent biopsy, pathology with squamous cell CA  -s/p reexcsiion          Cardiology  CHF, Cardiomyopathy, HTN  - follows Dr. Dl Ordonez    Xeroderma  - Fissures of hands, and patient reports of feet, possibly secondary to Hydrea  - start Amlactin cream twice daily- prescription sent to pharmacy  - Vaseline in fissures of feet with socks nightly    History of LE Rash  - Resolved  - no biopsy report in EPIC    Right Arm   - path report 9/29/23: CASE FROM [facility] DERMATOPATHOLOGY, [place], [place] ([id number], OBTAINED 05/16/2023):  Skin, arm, right, lateral:- Epidermal hyperplasia with suprabasal  acantholysis and apparent dyskeratosis - (see comment), Chart history was reviewed with this case. As noted in the original report, these microscopic features most resemble [place]'s disease. However given the clinical findings including a marked peripheral eosinophilia and concern for a myeloid neoplasm, an additional biopsy for direct immunofluorescence is suggested to more fully rule out the possibility of an autoimmune bullous process such as pemphigus vulgaris or paraneoplastic pemphigus.     Tobacco Abuse  Actively attempting smoking cessation  - using Nicoderm patches    Right elbow effusion: no drainage was recommended they gave him a sleeve to wear and if no improvement x 4 weeks to call    Fatigue /insomnia may be sleep deprived. Told him to try trazadone daily.  If no improvement of fatigue with sleeping better we can increase his hydrea, in one month if counts remain elevated, concern for worsening anemia with hydrea    Rona in one month with cbc, see me in 2 months with vitamin d level repeat and CRP/cbc/cmp    Total time spent on day of visit, including review of tests, obtaining/reviewing separately obtained history, ordering medications/tests/procedures, communicating with PCP/consultants, and documenting in electronic medical record:  30  minutes               Again, thank you for allowing me to participate in the care of your patient.        Sincerely,        Vasyl Case MD    Electronically signed

## 2025-04-01 NOTE — LETTER
4/1/2025      Alvarez Bess  [address] [facility] [id number]      Dear Colleague,    Thank you for referring your patient, Alvarez Bess, to the [facility] CANCER [facility]. Please see a copy of my visit note below.    Virtual Visit Details    Type of service:  Video Visit     Originating Location (pt. Location): Home    Distant Location (provider location):  On-site  Platform used for Video Visit: [facility] Cancer clinic follow up     REASON for VISIT: MPD , eosinophilia    Oncology HPI:   2014: elevated WBC of 12.9 - 6/27/23: Initial consult: Patient states that he has had 14 pound unintentional weight loss over the last year . He denies any night sweats or weight loss. 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 He is following up with dermatology.  - 5/19/23 white count Is a 25.6 absolute neutrophils at 10.2 absolute eosinophils at 10.2 CRP level at 11.371-April 2024 it was at 16.5 sedimentation rate at 7. His hemoglobin is normal at 14.7 platelet count of 2.93   - 8/5/2023: bone marrow biopsy showed marked hypercellular bone marrow with marked eosinophilia maturing trilineage hematopoiesis no increased dysplasia or increase in blasts. Peripheral blood showing moderate leukocytosis with eosinophilia. Adequate neutrophils with slight shift to immaturity. Flow cytometry did not show any increase in myeloid blasts and no abnormal population of cells. Morphologic findings just showed peripheral eosinophilia. JAK2 mutation negative. 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 This was a summary for the bone marrow biopsy.   - FISH cytogenetics and molecular studies were negative for PD GFR alpha, beta, FGFR 1, JAK2 genes and BCR-ABL mutation ruling out certain leukemias and CML. No mass found  on MRI that they could biopsy   - 11/27/23 PET with PET/CT shows bilateral axillary and inguinal hypermetabolic adenopathy concerning for neoplastic process including hypereosinophilic syndromes and lymphoma. Recommend tissue sampling. SUV in those regions at 3.3-3.6. Additional of hypermetabolic foci within the right gluteus and left vastus lateralis intermedius muscles SUV of 8.4 may be related to the neoplastic process   - 3/2024 cholecystectomy- hydrea held  - 4/4/2024 restarted Hydrea 500 mg twice daily    - 12/3/2024 WBC 11.9, Hgb 11.8, Plts 115k, ANC 2.9, Absolute Eosinophils 6.4  - 1/6/25 labs with severed Vitamin D deficiency, started on cholecalciferol   - 2/4-2/5/25: Ca of 14.2 and Cr of 1.81. patient was advised to got to ED where he was seen, given fluids and kept for monitoring. Felt to be vitamin D toxicity. Hydrea held restarted     Current Treatment: Hydrea 500 mg daily on 3/5/2025     Interval history:   Artie returns for follow-up today.  He is doing well. WBC at 12.2  eosinophils at 600 tired all the time and sleeps 4-5 hours a night before he was sleeping  7 hours, previously diagnosed with vitamin D toxicity now improved.        Review of Systems: See interval hx. Denies fevers, chills, dizziness,  changes in vision, abdominal pain, N/V, diarrhea, changes in urination, bleeding, bruising, rash.     Current Outpatient Medications   Medication Sig Dispense Refill     acetaminophen (TYLENOL) 325 MG tablet Take 2 tablets (650 mg) by mouth every 4 hours as needed for other (mild pain) 100 tablet 0     albuterol (PROVENTIL) (2.5 MG/3ML) 0.083% neb solution Take 2.5 mg by nebulization every 6 hours as needed for shortness of breath, wheezing or cough.       augmented betamethasone dipropionate (DIPROLENE AF) 0.05 % external cream Apply topically 2 times daily. 100 g 3     carvedilol (COREG) 25 MG tablet Take 1 tablet (25 mg) by mouth 2 times daily (with meals). 180 tablet 3     hydroxyurea (HYDREA) 500  MG capsule Take 1 capsule (500 mg) by mouth 2 times daily 180 capsule 4     ipratropium - albuterol 0.5 mg/2.5 mg/3 mL (DUONEB) 0.5-2.5 (3) MG/3ML neb solution Take 1 vial (3 mLs) by nebulization every 4 hours as needed for shortness of breath, wheezing or cough. 90 mL 0     nicotine (NICODERM CQ) 14 MG/24HR 24 hr patch Place 1 patch onto the skin every 24 hours Start after completion of the 21 mg patches. 14 patch 0     nicotine (NICODERM CQ) 21 MG/24HR 24 hr patch Place 1 patch onto the skin every 24 hours Start with these patches first. 14 patch 0     nicotine (NICODERM CQ) 7 MG/24HR 24 hr patch Place 1 patch onto the skin every 24 hours Start after completing the full course of 14 mg patches. 14 patch 0     omeprazole (PRILOSEC OTC) 20 MG EC tablet Take 20 mg by mouth daily       pravastatin (PRAVACHOL) 20 MG tablet Take 1 tablet (20 mg) by mouth daily. 90 tablet 3     Probiotic Product (PROBIOTIC DAILY PO) Take by mouth daily.            Allergies   Allergen Reactions     Morphine Hcl      Extreme agitation         Exam:  Height 1.829 m (6'), weight 73.9 kg (163 lb).  Wt Readings from Last 4 Encounters:   25 73.9 kg (163 lb)   25 73.5 kg (162 lb)   25 73.6 kg (162 lb 3.2 oz)   25 79 kg (174 lb 3.2 oz)       ECO  GENERAL/CONSTITUTIONAL: No acute distress. Healthy, alert.  EYES: No scleral icterus.  No redness or discharge.    RESPIRATORY: No audible wheeze, cough, or visible cyanosis.  No visible retractions or increased work of breathing.  Able to speak fully in complete sentences.  MUSCULOSKELETAL: Normal range of motion.  NEUROLOGIC: Alert, oriented, answers questions appropriately. No tremor. Mentation intact and speech normal  INTEGUMENTARY: No jaundice.  No obvious rash or skin lesions.  PSYCHIATRIC:  Mentation appears normal, affect normal/bright, judgement and insight intact, normal speech and appearance well-groomed.    The rest of a comprehensive physical exam is deferred  due to public health emergency video visit restrictions.        Labs:   Noted wbc 12.2 eosinophils at 0.6     Imaging:  EXAM: PET ONCOLOGY (EYES TO THIGHS), CT CHEST/ABDOMEN/PELVIS W CONTRAST  LOCATION: [facility]  DATE: 11/27/2023     INDICATION: Malignant immunoproliferative disease, unspecified. 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 Interval hydroxyurea. Assess treatment response.  COMPARISON: FDG PET/CT 08/08/2023  CONTRAST: 99 mL IV Isovue 370, 500 mL oral water  TECHNIQUE: Serum glucose level 102 mg/dL. One hour post intravenous administration of 10.9 mCi F-18 FDG, PET imaging was performed from the skull vertex to mid thighs, utilizing attenuation correction with concurrent axial CT and PET/CT image fusion.   Separate diagnostic CT of the chest, abdomen, and pelvis was performed. Dose reduction techniques were used.     PET/CT FINDINGS: No significant interval change in FDG avid left axillary (SUV max 3.6 unchanged), right axillary (SUV max 3.8, previously 3.3), right inguinal (SUV max 3.4 and 1.5 cm short axis, both unchanged) or left inguinal (SUV max 3.9, previously   3.4) lymphadenopathy as well as no significant change in FDG avid subcentimeter subcutaneous nodules in the upper back (SUV max 2.3 on PET image #99, previously 2.0).     Prior focal FDG uptake in the left thigh musculature as resolved. Inflammatory FDG uptake associated with left cervical facet joints.     CT FINDINGS: Mild senescent intracranial changes. Advanced coronary arterial calcification. Mild apical emphysema. New 6 mm solid nodule in the left lower lobe (series 2, image 75), below PET resolution. Several additional smaller pulmonary nodules   appear similar. Small hepatic cyst. Left adrenal adenoma. Left renal cysts. Bilateral non-obstructing renal calyceal tip calculi. Spleen is normal in size. Scattered colonic diverticuli. Mild  multilevel degenerative changes in spine.                                                                      IMPRESSION:     1. No significant interval change in bilateral FDG avid axillary and inguinal lymphadenopathy and a few small subcutaneous nodules in the upper back.     2. New 6 mm pulmonary nodule in the left lower lobe, below PET resolution. Attention on follow-up.     3. Prior focal FDG uptake in the left thigh musculature as resolved.    Impression/plan: Artie is a very pleaseant  64 year old male who presents to clinic for follow of of eosinophilia and leukocytosis. He is currently taking Hydrea 500 mg once daily     Hypercalcemia/KANNAN  ---resolved. Monitor        Vitamin D toxicity  -Resolved.  Hypercalcemia may be related to that and now it is improved.  PTH improving appropriately.  Repeat d level in 2 months    MPD with Hypereosinophilic Syndrome  --continue 500 mg a day, after I got off the virtual call with him it appears its twice a day listed, I will verify the dose with him, if fatigue continues and wbc increases next month will increase to bid dosing          Pulmonary Nodule  - 1/28/25: PET/CT with, New 6 mm pulmonary nodule in the left lower lobe, below PET resolution. Attention on follow-up.  - current smoker scan in 6 months    Left forearm lesion  - He was seen by Dermatology, 12/9/24 underwent biopsy, pathology with squamous cell CA  -s/p reexcsiion          Cardiology  CHF, Cardiomyopathy, HTN  - follows Dr. Dl Ordonez    Xeroderma  - Fissures of hands, and patient reports of feet, possibly secondary to Hydrea  - start Amlactin cream twice daily- prescription sent to pharmacy  - Vaseline in fissures of feet with socks nightly    History of LE Rash  - Resolved  - no biopsy report in EPIC    Right Arm   - path report 9/29/23: CASE FROM [facility] DERMATOPATHOLOGY, [place], [place] ([id number], OBTAINED 05/16/2023):  Skin, arm, right, lateral:- Epidermal hyperplasia with suprabasal  acantholysis and apparent dyskeratosis - (see comment), Chart history was reviewed with this case. As noted in the original report, these microscopic features most resemble [place]'s disease. However given the clinical findings including a marked peripheral eosinophilia and concern for a myeloid neoplasm, an additional biopsy for direct immunofluorescence is suggested to more fully rule out the possibility of an autoimmune bullous process such as pemphigus vulgaris or paraneoplastic pemphigus.     Tobacco Abuse  Actively attempting smoking cessation  - using Nicoderm patches    Right elbow effusion: no drainage was recommended they gave him a sleeve to wear and if no improvement x 4 weeks to call    Fatigue /insomnia may be sleep deprived. Told him to try trazadone daily.  If no improvement of fatigue with sleeping better we can increase his hydrea, in one month if counts remain elevated, concern for worsening anemia with hydrea    Rona in one month with cbc, see me in 2 months with vitamin d level repeat and CRP/cbc/cmp    Total time spent on day of visit, including review of tests, obtaining/reviewing separately obtained history, ordering medications/tests/procedures, communicating with PCP/consultants, and documenting in electronic medical record:  30  minutes               Again, thank you for allowing me to participate in the care of your patient.        Sincerely,        Vasyl Case MD    Electronically signed

## 2025-04-02 DIAGNOSIS — L57.0 ACTINIC KERATOSIS: Primary | ICD-10-CM

## 2025-04-02 LAB
PATH REPORT.COMMENTS IMP SPEC: NORMAL
PATH REPORT.COMMENTS IMP SPEC: NORMAL
PATH REPORT.FINAL DX SPEC: NORMAL
PATH REPORT.GROSS SPEC: NORMAL
PATH REPORT.MICROSCOPIC SPEC OTHER STN: NORMAL
PATH REPORT.RELEVANT HX SPEC: NORMAL

## 2025-04-02 RX ORDER — FLUOROURACIL 50 MG/G
CREAM TOPICAL 2 TIMES DAILY
Qty: 40 G | Refills: 4 | Status: SHIPPED | OUTPATIENT
Start: 2025-04-02

## 2025-04-29 ENCOUNTER — LAB (OUTPATIENT)
Dept: LAB | Facility: CLINIC | Age: 65
End: 2025-04-29
Payer: COMMERCIAL

## 2025-04-29 DIAGNOSIS — I50.9 CONGESTIVE HEART FAILURE (H): ICD-10-CM

## 2025-04-29 DIAGNOSIS — D47.1 MYELOPROLIFERATIVE DISORDER (H): ICD-10-CM

## 2025-04-29 DIAGNOSIS — Z13.6 SCREENING FOR CARDIOVASCULAR CONDITION: Primary | ICD-10-CM

## 2025-04-29 DIAGNOSIS — G47.00 INSOMNIA, UNSPECIFIED TYPE: ICD-10-CM

## 2025-04-29 LAB
BASOPHILS # BLD MANUAL: 0.3 10E3/UL (ref 0–0.2)
BASOPHILS NFR BLD MANUAL: 2 %
EOSINOPHIL # BLD MANUAL: 7.7 10E3/UL (ref 0–0.7)
EOSINOPHIL NFR BLD MANUAL: 54 %
ERYTHROCYTE [DISTWIDTH] IN BLOOD BY AUTOMATED COUNT: 13.3 % (ref 10–15)
FRAGMENTS BLD QL SMEAR: ABNORMAL
HCT VFR BLD AUTO: 32.3 % (ref 40–53)
HGB BLD-MCNC: 11.5 G/DL (ref 13.3–17.7)
LYMPHOCYTES # BLD MANUAL: 1.7 10E3/UL (ref 0.8–5.3)
LYMPHOCYTES NFR BLD MANUAL: 12 %
MCH RBC QN AUTO: 44.2 PG (ref 26.5–33)
MCHC RBC AUTO-ENTMCNC: 35.6 G/DL (ref 31.5–36.5)
MCV RBC AUTO: 124 FL (ref 78–100)
MONOCYTES # BLD MANUAL: 0.6 10E3/UL (ref 0–1.3)
MONOCYTES NFR BLD MANUAL: 4 %
NEUTROPHILS # BLD MANUAL: 4 10E3/UL (ref 1.6–8.3)
NEUTROPHILS NFR BLD MANUAL: 28 %
PLAT MORPH BLD: ABNORMAL
PLATELET # BLD AUTO: 125 10E3/UL (ref 150–450)
RBC # BLD AUTO: 2.6 10E6/UL (ref 4.4–5.9)
RBC MORPH BLD: ABNORMAL
WBC # BLD AUTO: 14.3 10E3/UL (ref 4–11)

## 2025-04-29 PROCEDURE — 85027 COMPLETE CBC AUTOMATED: CPT

## 2025-04-29 PROCEDURE — 85007 BL SMEAR W/DIFF WBC COUNT: CPT

## 2025-04-29 PROCEDURE — 36415 COLL VENOUS BLD VENIPUNCTURE: CPT

## 2025-05-01 ENCOUNTER — VIRTUAL VISIT (OUTPATIENT)
Dept: ONCOLOGY | Facility: CLINIC | Age: 65
End: 2025-05-01
Attending: INTERNAL MEDICINE
Payer: COMMERCIAL

## 2025-05-01 VITALS
BODY MASS INDEX: 21.81 KG/M2 | HEIGHT: 72 IN | SYSTOLIC BLOOD PRESSURE: 117 MMHG | OXYGEN SATURATION: 99 % | DIASTOLIC BLOOD PRESSURE: 80 MMHG | WEIGHT: 161 LBS | HEART RATE: 79 BPM

## 2025-05-01 DIAGNOSIS — D47.1 MYELOPROLIFERATIVE DISORDER (H): Primary | ICD-10-CM

## 2025-05-01 ASSESSMENT — PAIN SCALES - GENERAL: PAINLEVEL_OUTOF10: NO PAIN (0)

## 2025-05-01 NOTE — PROGRESS NOTES
Virtual Visit Details    Type of service:  Video Visit     Originating Location (pt. Location): Home  Distant Location (provider location):  On-site  Platform used for Video Visit: [facility]    Oncology/Hematology Visit Note  May 1, 2025    Reason for Visit: follow up of eosinophilia and leukocytosis  MPD with hypereosinophilic syndrome  Follows with Dr. Case  Currently on hydroxyurea 1000 mg p.o. daily    Interval History:  Overall patient reports feeling well.  He denies fatigue denies bleeding or bruising denies fever chills sweats cough shortness of breath chest pain denies nausea vomiting diarrhea abdominal pain.  Denies lymphadenopathy    Review of Systems:  14 point ROS of systems including Constitutional, Eyes, Respiratory, Cardiovascular, Gastroenterology, Genitourinary, Integumentary, Muscularskeletal, Psychiatric were all negative except for pertinent positives noted in my HPI.      Physical Examination:  Video visit no audible wheezing or cough.  Patient answers all questions appropriately    Laboratory Data:  CBC with differential reviewed      Assessment and Plan:    MPD with hypereosinophilic syndrome  eosinophilia and leukocytosis  Follows with Dr. Case  Currently on hydroxyurea 1000 mg p.o. daily  -Patient reports feeling well fatigue has significantly improved.  Labs reviewed stable counts  Continue with same hydroxyurea 1000 mg p.o. daily  Patient scheduled next month with labs and follow-up with Dr. Case    Chronic intermittent thrombocytopenia  Patient denies bleeding  In the event of bleeding bruising fall injury please go to ER    Vitamin D toxicity  Resolved now    Pulmonary Nodule  - 1/28/25: PET/CT New 6 mm pulmonary nodule in the left lower lobe, below PET resolution. Attention on follow-up.  - current smoker   -scan in 6 months    Please call clinic with any changes in health condition or questions       CARLI Moralez [facility] [place]     Chart  documentation with Dragon Voice recognition Software. Although reviewed after completion, some words and grammatical errors may remain.

## 2025-05-01 NOTE — LETTER
5/1/2025      Alvarez Bess  [id number] [place] [address]      Dear Colleague,    Thank you for referring your patient, Alvarez Bess, to the [facility]. Please see a copy of my visit note below.    Virtual Visit Details    Type of service:  Video Visit     Originating Location (pt. Location): Home  Distant Location (provider location):  On-site  Platform used for Video Visit: [facility]    Oncology/Hematology Visit Note  May 1, 2025    Reason for Visit: follow up of eosinophilia and leukocytosis  MPD with hypereosinophilic syndrome  Follows with Dr. Case  Currently on hydroxyurea 1000 mg p.o. daily    Interval History:  Overall patient reports feeling well.  He denies fatigue denies bleeding or bruising denies fever chills sweats cough shortness of breath chest pain denies nausea vomiting diarrhea abdominal pain.  Denies lymphadenopathy    Review of Systems:  14 point ROS of systems including Constitutional, Eyes, Respiratory, Cardiovascular, Gastroenterology, Genitourinary, Integumentary, Muscularskeletal, Psychiatric were all negative except for pertinent positives noted in my HPI.      Physical Examination:  Video visit no audible wheezing or cough.  Patient answers all questions appropriately    Laboratory Data:  CBC with differential reviewed      Assessment and Plan:    MPD with hypereosinophilic syndrome  eosinophilia and leukocytosis  Follows with Dr. Case  Currently on hydroxyurea 1000 mg p.o. daily  -Patient reports feeling well fatigue has significantly improved.  Labs reviewed stable counts  Continue with same hydroxyurea 1000 mg p.o. daily  Patient scheduled next month with labs and follow-up with Dr. Case    Chronic intermittent thrombocytopenia  Patient denies bleeding  In the event of bleeding bruising fall injury please go to ER    Vitamin D toxicity  Resolved now    Pulmonary Nodule  - 1/28/25: PET/CT New 6 mm pulmonary nodule in the left lower  lobe, below PET resolution. Attention on follow-up.  - current smoker   -scan in 6 months    Please call clinic with any changes in health condition or questions       CARLI Moralez CNP  [facility] [place]     Chart documentation with Dragon Voice recognition Software. Although reviewed after completion, some words and grammatical errors may remain.          Again, thank you for allowing me to participate in the care of your patient.        Sincerely,        CARLI Moralez CNP    Electronically signed

## 2025-05-01 NOTE — NURSING NOTE
Current patient location: [id number] [place] [id number]    Is the patient currently in the state of [place]? YES    Visit mode: VIDEO    If the visit is dropped, the patient can be reconnected by:VIDEO VISIT: Text to cell phone:   Telephone Information:   Mobile [phone]       Will anyone else be joining the visit? NO  (If patient encounters technical issues they should call [phone]    Are changes needed to the allergy or medication list? Pt completed echeck-in an confirms medications and allergies are correct.      Are refills needed on medications prescribed by this physician? NO    Rooming Documentation:  Questionnaire(s) not done per department protocol    Reason for visit: SARAH FARRELL

## 2025-05-01 NOTE — LETTER
5/1/2025      Alvarez Bess  [address] [address] [address]      Dear Colleague,    Thank you for referring your patient, Alvarez Bess, to the [facility]. Please see a copy of my visit note below.    Virtual Visit Details    Type of service:  Video Visit     Originating Location (pt. Location): Home  Distant Location (provider location):  On-site  Platform used for Video Visit: [facility]    Oncology/Hematology Visit Note  May 1, 2025    Reason for Visit: follow up of eosinophilia and leukocytosis  MPD with hypereosinophilic syndrome  Follows with Dr. Case  Currently on hydroxyurea 1000 mg p.o. daily    Interval History:  Overall patient reports feeling well.  He denies fatigue denies bleeding or bruising denies fever chills sweats cough shortness of breath chest pain denies nausea vomiting diarrhea abdominal pain.  Denies lymphadenopathy    Review of Systems:  14 point ROS of systems including Constitutional, Eyes, Respiratory, Cardiovascular, Gastroenterology, Genitourinary, Integumentary, Muscularskeletal, Psychiatric were all negative except for pertinent positives noted in my HPI.      Physical Examination:  Video visit no audible wheezing or cough.  Patient answers all questions appropriately    Laboratory Data:  CBC with differential reviewed      Assessment and Plan:    MPD with hypereosinophilic syndrome  eosinophilia and leukocytosis  Follows with Dr. Case  Currently on hydroxyurea 1000 mg p.o. daily  -Patient reports feeling well fatigue has significantly improved.  Labs reviewed stable counts  Continue with same hydroxyurea 1000 mg p.o. daily  Patient scheduled next month with labs and follow-up with Dr. Case    Chronic intermittent thrombocytopenia  Patient denies bleeding  In the event of bleeding bruising fall injury please go to ER    Vitamin D toxicity  Resolved now    Pulmonary Nodule  - 1/28/25: PET/CT New 6 mm pulmonary nodule in the left lower  lobe, below PET resolution. Attention on follow-up.  - current smoker   -scan in 6 months    Please call clinic with any changes in health condition or questions       CARLI Moralez CNP  [facility] [place]     Chart documentation with Dragon Voice recognition Software. Although reviewed after completion, some words and grammatical errors may remain.          Again, thank you for allowing me to participate in the care of your patient.        Sincerely,        CARLI Moralez CNP    Electronically signed

## 2025-05-18 ENCOUNTER — MYC MEDICAL ADVICE (OUTPATIENT)
Dept: INTERNAL MEDICINE | Facility: CLINIC | Age: 65
End: 2025-05-18
Payer: COMMERCIAL

## 2025-05-19 ENCOUNTER — TELEPHONE (OUTPATIENT)
Dept: CARDIOLOGY | Facility: CLINIC | Age: 65
End: 2025-05-19
Payer: COMMERCIAL

## 2025-05-19 NOTE — TELEPHONE ENCOUNTER
2nd attempt- Left voicemail for the patient to call back and schedule the following:    Appointment type:  testing and follow up  Provider:  Dr Ordonez  Return date:  routine, next avail  Additional appointment(s) needed:  pt needs stress echo then TY of Dr Ordonez's for follow up  Additional Notes:    Specialty phone number: [phone]

## 2025-06-02 ENCOUNTER — LAB (OUTPATIENT)
Dept: LAB | Facility: CLINIC | Age: 65
End: 2025-06-02
Payer: COMMERCIAL

## 2025-06-02 DIAGNOSIS — E83.52 HYPERCALCEMIA: ICD-10-CM

## 2025-06-02 DIAGNOSIS — G47.00 INSOMNIA, UNSPECIFIED TYPE: ICD-10-CM

## 2025-06-02 DIAGNOSIS — D47.1 MYELOPROLIFERATIVE DISORDER (H): ICD-10-CM

## 2025-06-02 DIAGNOSIS — I50.9 CONGESTIVE HEART FAILURE (H): ICD-10-CM

## 2025-06-02 LAB
ALBUMIN SERPL BCG-MCNC: 3.7 G/DL (ref 3.5–5.2)
ALP SERPL-CCNC: 86 U/L (ref 40–150)
ALT SERPL W P-5'-P-CCNC: 14 U/L (ref 0–70)
ANION GAP SERPL CALCULATED.3IONS-SCNC: 9 MMOL/L (ref 7–15)
AST SERPL W P-5'-P-CCNC: 14 U/L (ref 0–45)
BILIRUB SERPL-MCNC: 0.4 MG/DL
BUN SERPL-MCNC: 19.1 MG/DL (ref 8–23)
CALCIUM SERPL-MCNC: 9.9 MG/DL (ref 8.8–10.4)
CHLORIDE SERPL-SCNC: 99 MMOL/L (ref 98–107)
CHOLEST SERPL-MCNC: 114 MG/DL
CREAT SERPL-MCNC: 1.07 MG/DL (ref 0.67–1.17)
CRP SERPL-MCNC: 9.36 MG/L
EGFRCR SERPLBLD CKD-EPI 2021: 78 ML/MIN/1.73M2
FASTING STATUS PATIENT QL REPORTED: YES
FASTING STATUS PATIENT QL REPORTED: YES
GLUCOSE SERPL-MCNC: 102 MG/DL (ref 70–99)
HCO3 SERPL-SCNC: 26 MMOL/L (ref 22–29)
HDLC SERPL-MCNC: 31 MG/DL
LDLC SERPL CALC-MCNC: 56 MG/DL
NONHDLC SERPL-MCNC: 83 MG/DL
POTASSIUM SERPL-SCNC: 4.4 MMOL/L (ref 3.4–5.3)
PROT SERPL-MCNC: 6.5 G/DL (ref 6.4–8.3)
SODIUM SERPL-SCNC: 134 MMOL/L (ref 135–145)
TRIGL SERPL-MCNC: 134 MG/DL
VIT D+METAB SERPL-MCNC: 21 NG/ML (ref 20–50)

## 2025-06-02 PROCEDURE — 36415 COLL VENOUS BLD VENIPUNCTURE: CPT

## 2025-06-02 PROCEDURE — 80053 COMPREHEN METABOLIC PANEL: CPT

## 2025-06-02 PROCEDURE — 80061 LIPID PANEL: CPT

## 2025-06-02 PROCEDURE — 85025 COMPLETE CBC W/AUTO DIFF WBC: CPT

## 2025-06-02 PROCEDURE — 82306 VITAMIN D 25 HYDROXY: CPT

## 2025-06-02 PROCEDURE — 86140 C-REACTIVE PROTEIN: CPT

## 2025-06-04 ENCOUNTER — VIRTUAL VISIT (OUTPATIENT)
Dept: ONCOLOGY | Facility: CLINIC | Age: 65
End: 2025-06-04
Attending: INTERNAL MEDICINE
Payer: COMMERCIAL

## 2025-06-04 VITALS
WEIGHT: 161 LBS | BODY MASS INDEX: 21.81 KG/M2 | DIASTOLIC BLOOD PRESSURE: 75 MMHG | HEIGHT: 72 IN | SYSTOLIC BLOOD PRESSURE: 121 MMHG

## 2025-06-04 DIAGNOSIS — D47.1 MYELOPROLIFERATIVE DISORDER (H): Primary | ICD-10-CM

## 2025-06-04 DIAGNOSIS — L29.9 ITCHING: ICD-10-CM

## 2025-06-04 DIAGNOSIS — R91.8 PULMONARY NODULES: ICD-10-CM

## 2025-06-04 DIAGNOSIS — E83.52 HYPERCALCEMIA: ICD-10-CM

## 2025-06-04 PROCEDURE — 3078F DIAST BP <80 MM HG: CPT | Performed by: INTERNAL MEDICINE

## 2025-06-04 PROCEDURE — 1126F AMNT PAIN NOTED NONE PRSNT: CPT | Performed by: INTERNAL MEDICINE

## 2025-06-04 PROCEDURE — 98007 SYNCH AUDIO-VIDEO EST HI 40: CPT | Performed by: INTERNAL MEDICINE

## 2025-06-04 PROCEDURE — 3074F SYST BP LT 130 MM HG: CPT | Performed by: INTERNAL MEDICINE

## 2025-06-04 ASSESSMENT — PAIN SCALES - GENERAL: PAINLEVEL_OUTOF10: NO PAIN (0)

## 2025-06-04 NOTE — LETTER
2025      Alvarez Bess  [address] [address] [place] [place] [facility] [address]      Dear Colleague,    Thank you for referring your patient, Alvarez Bess, to the [facility]. Please see a copy of my visit note below.    Virtual Visit Details    Type of service:  Video Visit     Originating Location (pt. Location): Home    Distant Location (provider location):  On-site  Platform used for Video Visit: [facility]    Virtual Visit Details    Type of service:  Video Visit     Originating Location (pt. Location): Home  Distant Location (provider location):  On-site  Platform used for Video Visit: [facility]    Oncology/Hematology Visit Note  2025    Reason for Visit: follow up of eosinophilia and leukocytosis  MPD with hypereosinophilic syndrome  Currently on hydroxyurea 1000 mg p.o. daily    Interval History:  Overall patient reports feeling well.  He feels more tired, dragging. Vitamin d low. Joint swelling crp higher . Fh of MD.     Review of Systems:  14 point ROS of systems including Constitutional, Eyes, Respiratory, Cardiovascular, Gastroenterology, Genitourinary, Integumentary, Muscularskeletal, Psychiatric were all negative except for pertinent positives noted in my HPI.      Physical Examination:  ECO  GENERAL/CONSTITUTIONAL: No acute distress. Healthy, alert.  EYES: No scleral icterus.  No redness or discharge.    RESPIRATORY: No audible wheeze, cough, or visible cyanosis.  No visible retractions or increased work of breathing.  Able to speak fully in complete sentences.  MUSCULOSKELETAL: Normal range of motion.  NEUROLOGIC: Alert, oriented, answers questions appropriately. No tremor. Mentation intact and speech normal  INTEGUMENTARY: No jaundice.  No obvious rash or skin lesions.  PSYCHIATRIC:  Mentation appears normal, affect normal/bright, judgement and insight intact, normal speech and appearance well-groomed.    The rest of a comprehensive physical exam is deferred due to  public health emergency video visit restrictions.     Laboratory Data:  CBC with differential reviewed      Assessment and Plan:    MPD with hypereosinophilic syndrome  eosinophilia and leukocytosis     Currently on hydroxyurea 1000 mg p.o. daily, decrease to 500 mg alternating with 1000 mg    Joint swelling, crp elevation fh of muscular dystrophy, rash  Rhematology consult     Chronic intermittent thrombocytopenia  Patient denies bleeding  In the event of bleeding bruising fall injury please go to ER    Vitamin D toxicity  Resolved now ,  low start 2000 international unit(s) daily     Pulmonary Nodule  - 1/28/25: PET/CT New 6 mm pulmonary nodule in the left lower lobe, below PET resolution. Attention on follow-up.  - current smoker   -scan in July     Please call clinic with any changes in health condition or questions     Vasyl Case MD   [facility]     Chart documentation with Dragon Voice recognition Software. Although reviewed after completion, some words and grammatical errors may remain.          Again, thank you for allowing me to participate in the care of your patient.        Sincerely,        Vasyl Case MD    Electronically signed

## 2025-06-04 NOTE — NURSING NOTE
Current patient location: [address] [place] [id number]    Is the patient currently in the state of [place]? YES    Visit mode: VIDEO    If the visit is dropped, the patient can be reconnected by:VIDEO VISIT: Send to e-mail at: [email or web address]    Will anyone else be joining the visit? NO  (If patient encounters technical issues they should call [phone])    Are changes needed to the allergy or medication list? Pt stated no changes to allergies and Pt stated no med changes    Are refills needed on medications prescribed by this physician? NO    Rooming Documentation:  Questionnaire(s) not done per department protocol    Reason for visit: SARAH RASMUSSENF

## 2025-06-04 NOTE — PROGRESS NOTES
Virtual Visit Details    Type of service:  Video Visit     Originating Location (pt. Location): Home  Distant Location (provider location):  On-site  Platform used for Video Visit: [facility]    Oncology/Hematology Visit Note  2025    Reason for Visit: follow up of eosinophilia and leukocytosis  MPD with hypereosinophilic syndrome  Currently on hydroxyurea 1000 mg p.o. daily    Interval History:  Overall patient reports feeling well.  He feels more tired, dragging. Vitamin d low. Joint swelling crp higher . Fh of MD.     Review of Systems:  14 point ROS of systems including Constitutional, Eyes, Respiratory, Cardiovascular, Gastroenterology, Genitourinary, Integumentary, Muscularskeletal, Psychiatric were all negative except for pertinent positives noted in my HPI.      Physical Examination:  ECO  GENERAL/CONSTITUTIONAL: No acute distress. Healthy, alert.  EYES: No scleral icterus.  No redness or discharge.    RESPIRATORY: No audible wheeze, cough, or visible cyanosis.  No visible retractions or increased work of breathing.  Able to speak fully in complete sentences.  MUSCULOSKELETAL: Normal range of motion.  NEUROLOGIC: Alert, oriented, answers questions appropriately. No tremor. Mentation intact and speech normal  INTEGUMENTARY: No jaundice.  No obvious rash or skin lesions.  PSYCHIATRIC:  Mentation appears normal, affect normal/bright, judgement and insight intact, normal speech and appearance well-groomed.    The rest of a comprehensive physical exam is deferred due to public health emergency video visit restrictions.     Laboratory Data:  CBC with differential reviewed      Assessment and Plan:    MPD with hypereosinophilic syndrome  eosinophilia and leukocytosis     Currently on hydroxyurea 1000 mg p.o. daily, decrease to 500 mg alternating with 1000 mg    Joint swelling, crp elevation fh of muscular dystrophy, rash  Rhematology consult     Chronic intermittent thrombocytopenia  Patient denies  bleeding  In the event of bleeding bruising fall injury please go to ER    Vitamin D toxicity  Resolved now ,  low start 2000 international unit(s) daily     Pulmonary Nodule  - 1/28/25: PET/CT New 6 mm pulmonary nodule in the left lower lobe, below PET resolution. Attention on follow-up.  - current smoker   -scan in July     Low HDL add fish oil 1200 mg a day    Please call clinic with any changes in health condition or questions     Vasyl Case MD   [facility]     Chart documentation with Dragon Voice recognition Software. Although reviewed after completion, some words and grammatical errors may remain.

## 2025-06-04 NOTE — LETTER
2025      Alvarez Bess  [address] [address] [address]      Dear Colleague,    Thank you for referring your patient, Alvarez Bess, to the [facility]. Please see a copy of my visit note below.    Virtual Visit Details    Type of service:  Video Visit     Originating Location (pt. Location): Home    Distant Location (provider location):  On-site  Platform used for Video Visit: [facility]    Virtual Visit Details    Type of service:  Video Visit     Originating Location (pt. Location): Home  Distant Location (provider location):  On-site  Platform used for Video Visit: [facility]    Oncology/Hematology Visit Note  2025    Reason for Visit: follow up of eosinophilia and leukocytosis  MPD with hypereosinophilic syndrome  Currently on hydroxyurea 1000 mg p.o. daily    Interval History:  Overall patient reports feeling well.  He feels more tired, dragging. Vitamin d low. Joint swelling crp higher . Fh of MD.     Review of Systems:  14 point ROS of systems including Constitutional, Eyes, Respiratory, Cardiovascular, Gastroenterology, Genitourinary, Integumentary, Muscularskeletal, Psychiatric were all negative except for pertinent positives noted in my HPI.      Physical Examination:  ECO  GENERAL/CONSTITUTIONAL: No acute distress. Healthy, alert.  EYES: No scleral icterus.  No redness or discharge.    RESPIRATORY: No audible wheeze, cough, or visible cyanosis.  No visible retractions or increased work of breathing.  Able to speak fully in complete sentences.  MUSCULOSKELETAL: Normal range of motion.  NEUROLOGIC: Alert, oriented, answers questions appropriately. No tremor. Mentation intact and speech normal  INTEGUMENTARY: No jaundice.  No obvious rash or skin lesions.  PSYCHIATRIC:  Mentation appears normal, affect normal/bright, judgement and insight intact, normal speech and appearance well-groomed.    The rest of a comprehensive physical exam is deferred due to  public health emergency video visit restrictions.     Laboratory Data:  CBC with differential reviewed      Assessment and Plan:    MPD with hypereosinophilic syndrome  eosinophilia and leukocytosis     Currently on hydroxyurea 1000 mg p.o. daily, decrease to 500 mg alternating with 1000 mg    Joint swelling, crp elevation fh of muscular dystrophy, rash  Rhematology consult     Chronic intermittent thrombocytopenia  Patient denies bleeding  In the event of bleeding bruising fall injury please go to ER    Vitamin D toxicity  Resolved now ,  low start 2000 international unit(s) daily     Pulmonary Nodule  - 1/28/25: PET/CT New 6 mm pulmonary nodule in the left lower lobe, below PET resolution. Attention on follow-up.  - current smoker   -scan in July     Please call clinic with any changes in health condition or questions     Vasyl Case MD   [facility] [place]     Chart documentation with Dragon Voice recognition Software. Although reviewed after completion, some words and grammatical errors may remain.          Again, thank you for allowing me to participate in the care of your patient.        Sincerely,        Vasyl Case MD    Electronically signed

## 2025-06-05 LAB
BASOPHILS # BLD AUTO: 0.1 10E3/UL (ref 0–0.2)
BASOPHILS # BLD MANUAL: 0.1 10E3/UL (ref 0–0.2)
BASOPHILS NFR BLD AUTO: 1 %
BASOPHILS NFR BLD MANUAL: 1 %
EOSINOPHIL # BLD AUTO: 6.7 10E3/UL (ref 0–0.7)
EOSINOPHIL # BLD MANUAL: 6.8 10E3/UL (ref 0–0.7)
EOSINOPHIL NFR BLD AUTO: 54 %
EOSINOPHIL NFR BLD MANUAL: 55 %
ERYTHROCYTE [DISTWIDTH] IN BLOOD BY AUTOMATED COUNT: 16.5 % (ref 10–15)
FRAGMENTS BLD QL SMEAR: SLIGHT
HCT VFR BLD AUTO: 29 % (ref 40–53)
HGB BLD-MCNC: 10.4 G/DL (ref 13.3–17.7)
IMM GRANULOCYTES # BLD: 0.1 10E3/UL
IMM GRANULOCYTES NFR BLD: 1 %
LYMPHOCYTES # BLD AUTO: 1.2 10E3/UL (ref 0.8–5.3)
LYMPHOCYTES # BLD MANUAL: 0.9 10E3/UL (ref 0.8–5.3)
LYMPHOCYTES NFR BLD AUTO: 10 %
LYMPHOCYTES NFR BLD MANUAL: 7 %
MCH RBC QN AUTO: 44.3 PG (ref 26.5–33)
MCHC RBC AUTO-ENTMCNC: 35.9 G/DL (ref 31.5–36.5)
MCV RBC AUTO: 123 FL (ref 78–100)
MONOCYTES # BLD AUTO: 0.6 10E3/UL (ref 0–1.3)
MONOCYTES # BLD MANUAL: 0.2 10E3/UL (ref 0–1.3)
MONOCYTES NFR BLD AUTO: 5 %
MONOCYTES NFR BLD MANUAL: 2 %
NEUTROPHILS # BLD AUTO: 3.7 10E3/UL (ref 1.6–8.3)
NEUTROPHILS # BLD MANUAL: 4.3 10E3/UL (ref 1.6–8.3)
NEUTROPHILS NFR BLD AUTO: 30 %
NEUTROPHILS NFR BLD MANUAL: 35 %
NRBC # BLD AUTO: 0 10E3/UL
NRBC BLD AUTO-RTO: 0 /100
PATH REV: ABNORMAL
PLAT MORPH BLD: ABNORMAL
PLATELET # BLD AUTO: 81 10E3/UL (ref 150–450)
RBC # BLD AUTO: 2.35 10E6/UL (ref 4.4–5.9)
RBC MORPH BLD: ABNORMAL
STOMATOCYTES BLD QL SMEAR: SLIGHT
WBC # BLD AUTO: 12.4 10E3/UL (ref 4–11)

## 2025-06-16 ENCOUNTER — TELEPHONE (OUTPATIENT)
Dept: CARDIOLOGY | Facility: CLINIC | Age: 65
End: 2025-06-16
Payer: COMMERCIAL

## 2025-06-16 NOTE — TELEPHONE ENCOUNTER
1st attempt- Left voicemail for the patient to call back and schedule the following:    Appointment type:  Testing only- No clinic visit  Provider:  Dr Ordonez  Return date:  routine  Additional appointment(s) needed:    Additional Notes:  Cancelled stress echo and this will replace  Specialty phone number: [phone]

## 2025-07-03 SDOH — HEALTH STABILITY: PHYSICAL HEALTH: ON AVERAGE, HOW MANY MINUTES DO YOU ENGAGE IN EXERCISE AT THIS LEVEL?: 20 MIN

## 2025-07-03 SDOH — HEALTH STABILITY: PHYSICAL HEALTH: ON AVERAGE, HOW MANY DAYS PER WEEK DO YOU ENGAGE IN MODERATE TO STRENUOUS EXERCISE (LIKE A BRISK WALK)?: 1 DAY

## 2025-07-03 ASSESSMENT — SOCIAL DETERMINANTS OF HEALTH (SDOH): HOW OFTEN DO YOU GET TOGETHER WITH FRIENDS OR RELATIVES?: THREE TIMES A WEEK

## 2025-07-07 ENCOUNTER — OFFICE VISIT (OUTPATIENT)
Dept: INTERNAL MEDICINE | Facility: CLINIC | Age: 65
End: 2025-07-07
Attending: INTERNAL MEDICINE
Payer: COMMERCIAL

## 2025-07-07 ENCOUNTER — MYC MEDICAL ADVICE (OUTPATIENT)
Dept: INTERNAL MEDICINE | Facility: CLINIC | Age: 65
End: 2025-07-07

## 2025-07-07 VITALS
TEMPERATURE: 97.6 F | HEIGHT: 72 IN | DIASTOLIC BLOOD PRESSURE: 58 MMHG | OXYGEN SATURATION: 100 % | HEART RATE: 64 BPM | RESPIRATION RATE: 18 BRPM | SYSTOLIC BLOOD PRESSURE: 100 MMHG | BODY MASS INDEX: 21.05 KG/M2 | WEIGHT: 155.4 LBS

## 2025-07-07 DIAGNOSIS — M79.9 SOFT TISSUE LESION: Primary | ICD-10-CM

## 2025-07-07 DIAGNOSIS — Z00.00 ANNUAL PHYSICAL EXAM: Primary | ICD-10-CM

## 2025-07-07 DIAGNOSIS — E78.2 MIXED HYPERLIPIDEMIA: ICD-10-CM

## 2025-07-07 DIAGNOSIS — I42.9 CARDIOMYOPATHY, UNSPECIFIED TYPE (H): ICD-10-CM

## 2025-07-07 DIAGNOSIS — R29.898 WEAKNESS OF BOTH LOWER EXTREMITIES: ICD-10-CM

## 2025-07-07 DIAGNOSIS — Z71.6 ENCOUNTER FOR SMOKING CESSATION COUNSELING: ICD-10-CM

## 2025-07-07 DIAGNOSIS — M79.9 SOFT TISSUE LESION: ICD-10-CM

## 2025-07-07 DIAGNOSIS — Z12.5 PROSTATE CANCER SCREENING: ICD-10-CM

## 2025-07-07 DIAGNOSIS — D47.1 MYELOPROLIFERATIVE DISORDER (H): ICD-10-CM

## 2025-07-07 DIAGNOSIS — J43.8 OTHER EMPHYSEMA (H): ICD-10-CM

## 2025-07-07 DIAGNOSIS — I10 PRIMARY HYPERTENSION: ICD-10-CM

## 2025-07-07 DIAGNOSIS — Z23 NEED FOR DIPHTHERIA-TETANUS-PERTUSSIS (TDAP) VACCINE: ICD-10-CM

## 2025-07-07 PROBLEM — N17.9 AKI (ACUTE KIDNEY INJURY): Status: RESOLVED | Noted: 2025-02-04 | Resolved: 2025-07-07

## 2025-07-07 PROCEDURE — 99396 PREV VISIT EST AGE 40-64: CPT | Mod: 25 | Performed by: INTERNAL MEDICINE

## 2025-07-07 PROCEDURE — 90471 IMMUNIZATION ADMIN: CPT | Performed by: INTERNAL MEDICINE

## 2025-07-07 PROCEDURE — 90715 TDAP VACCINE 7 YRS/> IM: CPT | Performed by: INTERNAL MEDICINE

## 2025-07-07 PROCEDURE — 3078F DIAST BP <80 MM HG: CPT | Performed by: INTERNAL MEDICINE

## 2025-07-07 PROCEDURE — 3074F SYST BP LT 130 MM HG: CPT | Performed by: INTERNAL MEDICINE

## 2025-07-07 PROCEDURE — 99214 OFFICE O/P EST MOD 30 MIN: CPT | Mod: 25 | Performed by: INTERNAL MEDICINE

## 2025-07-07 RX ORDER — NICOTINE 21 MG/24HR
1 PATCH, TRANSDERMAL 24 HOURS TRANSDERMAL EVERY 24 HOURS
Qty: 14 PATCH | Refills: 0 | Status: SHIPPED | OUTPATIENT
Start: 2025-07-07

## 2025-07-07 NOTE — PROGRESS NOTES
Preventive Care Visit  [facility]  Jc Belcher MD, Internal Medicine  Jul 7, 2025      Assessment & Plan     Annual physical exam  At this time, patient does have a relatively unremarkable physical examination. His blood pressure is noted to be at an acceptable level. Fasting lab work is up-to-date. All health maintenance items were addressed.      Other emphysema (H) and e.he ncounter for smoking cessation counseling  At this time, we did discuss the importance of smoking cessation.  Patient will proceed with a repeat trial of nicotine patches.  Patient will be placed on 21 mg transdermal patches for 2 weeks before having the dose titrated down to 14 mg for 2 additional weeks.  After completion of the 14 mg transdermal patches, he will proceed with 2 final weeks of 7 mg patches.  Side effects of nicotine replacement were reviewed.  Patient in no further questions or concerns in this regard.    Mixed hyperlipidemia  Patient's recent cholesterol panel showed that his cholesterol is under good control. We did discuss dietary modifications for continued control of his cholesterol. He will continue his pravastatin at 20 mg daily as prescribed by his cardiologist.    Primary hypertension  Patient blood pressure is noted to be an acceptable level.  He will continue his carvedilol as prescribed by his cardiologist.  Blood pressure monitoring is encouraged.    Prostate cancer screening  - PSA, screen; Future    Weakness of both lower extremities  This has been a chronic condition for the patient.  He does have routine lab work that showed no major abnormalities that would explain his symptoms.  We will add a TSH along with a creatine kinase level.  His next scheduled lab draws to evaluate for any other potential causes of his symptoms.  - TSH with free T4 reflex; Future  - CK total; Future    Cardiomyopathy, unspecified type (H)  Chronic condition. Followed by cardiology. Will continue  carvedilol as currently prescribed.     Myeloproliferative disorder (H)  Chronic condition.  Followed by oncology,  who is monitoring with routine lab work.    Need for diphtheria-tetanus-pertussis (Tdap) vaccine  Tdap immunization administered.    Soft tissue lesion  Patient has multiple lesions located along his bilateral upper extremities that appear to be consistent with suspected lipomas versus ganglion cyst.  We will proceed with an ultrasound of these lesions for further characterization.  - US Soft Tissue Chest Wall or Upper Back; Future    Patient has been advised of split billing requirements and indicates understanding: Yes        Nicotine/Tobacco Cessation  He reports that he has been smoking cigarettes. He started smoking about 29 years ago. He has never used smokeless tobacco.  Nicotine/Tobacco Cessation Plan  Pharmacotherapies : Nicotine patch    Reviewed preventive health counseling, as reflected in patient instructions       Regular exercise       Healthy diet/nutrition       Immunizations  Appropriate vaccinations were ordered.    Counseling  Appropriate preventive services were addressed with this patient via screening, questionnaire, or discussion as appropriate for fall prevention, nutrition, physical activity, Tobacco-use cessation, social engagement, weight loss and cognition.  Checklist reviewing preventive services available has been given to the patient.  Reviewed patient's diet, addressing concerns and/or questions.   He is at risk for lack of exercise and has been provided with information to increase physical activity for the benefit of his well-being.         Follow-up    Follow-up Visit   Expected date:  Jul 07, 2026 (Approximate)      Follow Up Appointment Details:     Follow-up with whom?: PCP    Follow-Up for what?: Adult Preventive    Any Additional Chronic Condition Management?:  Hyperlipidemia  Hypertension       How?: In Person                 Mago Alva is a 64 year old,  presenting for the following:  Physical        7/7/2025     9:17 AM   Additional Questions   Roomed by [email or web address]   Accompanied by self         7/7/2025     9:17 AM   Patient Reported Additional Medications   Patient reports taking the following new medications none          Patient is a 64-year-old  male with a complicated past medical history who presents to the clinic for his annual physical.  His major concerns today are in relation to the development of bumps along his bilateral upper extremities.  Patient states that these lesions have been present for approximately 2 to 3 months.  They did began after he started wearing a compression sleeve on his right elbow for bursitis.  Patient states that these lesions are not causing any discomfort, but he is concerned about the sudden appearance.  He does have a history of a myeloproliferative disorder, and he is followed by oncology.  He does have lab work for monitoring of his blood counts performed every month.  Patient is followed by cardiology for management of his cardiomyopathy and cholesterol.  He currently takes pravastatin 20 mg daily along with carvedilol 25 mg twice per day as prescribed by his cardiologist.  Patient does have a smoking history, and he did try nicotine replacement patches at his last annual physical.  Patient states that these were very helpful, but he did stop making the effort to not smoke a few months ago.  Patient would like to try the patches again at this time.  He does report long-term issues with bilateral lower extremity weakness.  Patient feels that this is getting progressively worse.      Document on file is a Health Care Directive or POLST.        7/3/2025   General Health   How would you rate your overall physical health? (!) FAIR   Feel stress (tense, anxious, or unable to sleep) Only a little   (!) STRESS CONCERN      7/3/2025   Nutrition   Three or more servings of calcium each day? (!) NO   Diet: Low salt   How  many servings of fruit and vegetables per day? (!) 0-1   How many sweetened beverages each day? 0-1         7/3/2025   Exercise   Days per week of moderate/strenous exercise 1 day   Average minutes spent exercising at this level 20 min   (!) EXERCISE CONCERN      7/3/2025   Social Factors   Frequency of gathering with friends or relatives Three times a week   Worry food won't last until get money to buy more No   Food not last or not have enough money for food? No   Do you have housing? (Housing is defined as stable permanent housing and does not include staying outside in a car, in a tent, in an abandoned building, in an overnight shelter, or couch-surfing.) No   Are you worried about losing your housing? No   Lack of transportation? No   Unable to get utilities (heat,electricity)? No   Want help with housing or utility concern? No   (!) HOUSING CONCERN PRESENT      7/7/2025   Fall Risk   Reason Gait Speed Test Not Completed Patient declines   Reason for decline feels weak          7/3/2025   Dental   Dentist two times every year? Yes         Today's PHQ-2 Score:       7/7/2025     8:56 AM   PHQ-2 ( 1999 [employer])   Q1: Little interest or pleasure in doing things 0   Q2: Feeling down, depressed or hopeless 0   PHQ-2 Score 0    Q1: Little interest or pleasure in doing things Not at all   Q2: Feeling down, depressed or hopeless Not at all   PHQ-2 Score 0       Patient-reported           7/3/2025   Substance Use   If I could quit smoking, I would Somewhat agree   I want to quit somking, worry about health affects Somewhat agree   Willing to make a plan to quit smoking Somewhat agree   Willing to cut down before quitting Completely agree   Alcohol more than 3/day or more than 7/wk No   Do you use any other substances recreationally? No     Social History     Tobacco Use    Smoking status: Some Days     Current packs/day: 0.00     Types: Cigarettes     Start date: 12/10/1995     Last attempt to quit: 12/10/2005     Years  "since quittin.5    Smokeless tobacco: Never    Tobacco comments:     Close to none, About 5 cigarettes per week   Vaping Use    Vaping status: Never Used   Substance Use Topics    Alcohol use: Not Currently     Comment: \"I quit\"    Drug use: No           7/3/2025   STI Screening   New sexual partner(s) since last STI/HIV test? No   Last PSA:   PSA   Date Value Ref Range Status   2019 0.73 0 - 4 ug/L Final     Comment:     Assay Method:  Chemiluminescence using Siemens Vista analyzer     Prostate Specific Antigen Screen   Date Value Ref Range Status   2024 2.89 0.00 - 4.50 ng/mL Final     ASCVD Risk   The ASCVD Risk score (Payam CADET, et al., 2019) failed to calculate for the following reasons:    The valid total cholesterol range is 130 to 320 mg/dL      Reviewed and updated as needed this visit by Provider                    Lab work is in process      Review of Systems  CONSTITUTIONAL: NEGATIVE for fever, chills, change in weight  INTEGUMENTARY/SKIN: Positive for skin lesions as noted in HPI.  ENT/MOUTH: NEGATIVE for ear, mouth and throat problems  RESP: NEGATIVE for significant cough or SOB  CV: NEGATIVE for chest pain, palpitations or peripheral edema  GI: NEGATIVE for nausea, abdominal pain, heartburn, or change in bowel habits  : NEGATIVE for frequency, dysuria, or hematuria  MUSCULOSKELETAL: NEGATIVE for significant arthralgias or myalgia  NEURO: Positive for bilateral lower extremity weakness as noted in HPI.     Objective    Exam  BP 92/54 (BP Location: Right arm, Patient Position: Sitting, Cuff Size: Adult Regular)   Pulse 64   Temp 97.6  F (36.4  C) (Oral)   Resp 18   Ht 1.829 m (6')   Wt 70.5 kg (155 lb 6.4 oz)   SpO2 100%   BMI 21.08 kg/m     Estimated body mass index is 21.08 kg/m  as calculated from the following:    Height as of this encounter: 1.829 m (6').    Weight as of this encounter: 70.5 kg (155 lb 6.4 oz).    Physical Exam  Vitals reviewed.   Constitutional:  "      Appearance: Normal appearance.   HENT:      Head: Normocephalic and atraumatic.      Right Ear: Tympanic membrane, ear canal and external ear normal.      Left Ear: Tympanic membrane, ear canal and external ear normal.      Mouth/Throat:      Mouth: Mucous membranes are moist.      Pharynx: Oropharynx is clear.   Eyes:      Extraocular Movements: Extraocular movements intact.      Conjunctiva/sclera: Conjunctivae normal.      Pupils: Pupils are equal, round, and reactive to light.   Cardiovascular:      Rate and Rhythm: Normal rate. Rhythm irregular.      Pulses: Normal pulses.      Heart sounds: Normal heart sounds.   Pulmonary:      Effort: Pulmonary effort is normal.      Breath sounds: Normal breath sounds.   Abdominal:      General: Bowel sounds are normal.      Palpations: Abdomen is soft.   Musculoskeletal:      Cervical back: Normal range of motion and neck supple.   Skin:     General: Skin is warm and dry.      Comments: Patient is noted to have multiple, discrete lesions located along his bilateral upper extremities.  These lesions are  noted to be mobile and rubbery in texture.  They are nontender to palpation.   Neurological:      Mental Status: He is alert.         Gait and balance assessed per Gait Speed Test.  Result as above.        Signed Electronically by: Jc Belcher MD

## 2025-07-07 NOTE — TELEPHONE ENCOUNTER
Please see patient's mychart message.     Please advise, thanks.    Brad, Triage STEPHAN Middleton    1:52 PM 7/7/2025

## 2025-07-14 ENCOUNTER — LAB (OUTPATIENT)
Dept: LAB | Facility: CLINIC | Age: 65
End: 2025-07-14
Payer: COMMERCIAL

## 2025-07-14 DIAGNOSIS — L29.9 ITCHING: ICD-10-CM

## 2025-07-14 DIAGNOSIS — R91.8 PULMONARY NODULES: ICD-10-CM

## 2025-07-14 DIAGNOSIS — D47.1 MYELOPROLIFERATIVE DISORDER (H): ICD-10-CM

## 2025-07-14 DIAGNOSIS — Z12.5 PROSTATE CANCER SCREENING: ICD-10-CM

## 2025-07-14 DIAGNOSIS — R29.898 WEAKNESS OF BOTH LOWER EXTREMITIES: ICD-10-CM

## 2025-07-14 DIAGNOSIS — E83.52 HYPERCALCEMIA: ICD-10-CM

## 2025-07-14 LAB
BASOPHILS # BLD MANUAL: 0.2 10E3/UL (ref 0–0.2)
BASOPHILS NFR BLD MANUAL: 1 %
EOSINOPHIL # BLD MANUAL: 14 10E3/UL (ref 0–0.7)
EOSINOPHIL NFR BLD MANUAL: 64 %
ERYTHROCYTE [DISTWIDTH] IN BLOOD BY AUTOMATED COUNT: 12.4 % (ref 10–15)
HCT VFR BLD AUTO: 28.6 % (ref 40–53)
HGB BLD-MCNC: 10.6 G/DL (ref 13.3–17.7)
LYMPHOCYTES # BLD MANUAL: 2 10E3/UL (ref 0.8–5.3)
LYMPHOCYTES NFR BLD MANUAL: 9 %
MCH RBC QN AUTO: 48.6 PG (ref 26.5–33)
MCHC RBC AUTO-ENTMCNC: 37.1 G/DL (ref 31.5–36.5)
MCV RBC AUTO: 131 FL (ref 78–100)
MONOCYTES # BLD MANUAL: 0.7 10E3/UL (ref 0–1.3)
MONOCYTES NFR BLD MANUAL: 3 %
NEUTROPHILS # BLD MANUAL: 5 10E3/UL (ref 1.6–8.3)
NEUTROPHILS NFR BLD MANUAL: 23 %
PLAT MORPH BLD: ABNORMAL
PLATELET # BLD AUTO: 123 10E3/UL (ref 150–450)
RBC # BLD AUTO: 2.18 10E6/UL (ref 4.4–5.9)
RBC MORPH BLD: ABNORMAL
SMUDGE CELLS BLD QL SMEAR: PRESENT
VARIANT LYMPHS BLD QL SMEAR: PRESENT
WBC # BLD AUTO: 21.9 10E3/UL (ref 4–11)

## 2025-07-14 PROCEDURE — 86140 C-REACTIVE PROTEIN: CPT

## 2025-07-14 PROCEDURE — G0103 PSA SCREENING: HCPCS

## 2025-07-14 PROCEDURE — 85007 BL SMEAR W/DIFF WBC COUNT: CPT

## 2025-07-14 PROCEDURE — 84443 ASSAY THYROID STIM HORMONE: CPT

## 2025-07-14 PROCEDURE — 36415 COLL VENOUS BLD VENIPUNCTURE: CPT

## 2025-07-14 PROCEDURE — 85027 COMPLETE CBC AUTOMATED: CPT

## 2025-07-14 PROCEDURE — 82550 ASSAY OF CK (CPK): CPT

## 2025-07-14 PROCEDURE — 82306 VITAMIN D 25 HYDROXY: CPT

## 2025-07-15 LAB
CK SERPL-CCNC: 39 U/L (ref 39–308)
CRP SERPL-MCNC: 16.9 MG/L
PSA SERPL DL<=0.01 NG/ML-MCNC: 0.47 NG/ML (ref 0–4.5)
TSH SERPL DL<=0.005 MIU/L-ACNC: 1.55 UIU/ML (ref 0.3–4.2)
VIT D+METAB SERPL-MCNC: 21 NG/ML (ref 20–50)

## 2025-07-21 ENCOUNTER — HOSPITAL ENCOUNTER (OUTPATIENT)
Dept: ULTRASOUND IMAGING | Facility: CLINIC | Age: 65
Discharge: HOME | End: 2025-07-21
Attending: INTERNAL MEDICINE
Payer: COMMERCIAL

## 2025-07-21 ENCOUNTER — HOSPITAL ENCOUNTER (OUTPATIENT)
Dept: CARDIOLOGY | Facility: CLINIC | Age: 65
Discharge: HOME | End: 2025-07-21
Attending: INTERNAL MEDICINE
Payer: COMMERCIAL

## 2025-07-21 ENCOUNTER — RESULTS FOLLOW-UP (OUTPATIENT)
Dept: CARDIOLOGY | Facility: CLINIC | Age: 65
End: 2025-07-21

## 2025-07-21 DIAGNOSIS — R06.09 DYSPNEA ON EXERTION: ICD-10-CM

## 2025-07-21 DIAGNOSIS — I25.10 CORONARY ARTERY CALCIFICATION: ICD-10-CM

## 2025-07-21 DIAGNOSIS — I42.9 SECONDARY CARDIOMYOPATHY (H): ICD-10-CM

## 2025-07-21 DIAGNOSIS — I73.9 PAD (PERIPHERAL ARTERY DISEASE): Primary | ICD-10-CM

## 2025-07-21 DIAGNOSIS — R29.898 BILATERAL LEG WEAKNESS: ICD-10-CM

## 2025-07-21 PROCEDURE — 93350 STRESS TTE ONLY: CPT | Mod: 26 | Performed by: INTERNAL MEDICINE

## 2025-07-21 PROCEDURE — 93321 DOPPLER ECHO F-UP/LMTD STD: CPT | Mod: 26 | Performed by: INTERNAL MEDICINE

## 2025-07-21 PROCEDURE — 93325 DOPPLER ECHO COLOR FLOW MAPG: CPT | Mod: TC

## 2025-07-21 PROCEDURE — 93924 LWR XTR VASC STDY BILAT: CPT

## 2025-07-21 PROCEDURE — 93016 CV STRESS TEST SUPVJ ONLY: CPT | Performed by: INTERNAL MEDICINE

## 2025-07-21 PROCEDURE — 93018 CV STRESS TEST I&R ONLY: CPT | Performed by: INTERNAL MEDICINE

## 2025-07-21 PROCEDURE — 93924 LWR XTR VASC STDY BILAT: CPT | Mod: 26 | Performed by: INTERNAL MEDICINE

## 2025-07-21 PROCEDURE — 255N000002 HC RX 255 OP 636: Performed by: INTERNAL MEDICINE

## 2025-07-21 PROCEDURE — 93325 DOPPLER ECHO COLOR FLOW MAPG: CPT | Mod: 26 | Performed by: INTERNAL MEDICINE

## 2025-07-21 RX ADMIN — HUMAN ALBUMIN MICROSPHERES AND PERFLUTREN 9 ML (DILUTED): 10; .22 INJECTION, SOLUTION INTRAVENOUS at 09:43

## 2025-07-22 ENCOUNTER — LAB (OUTPATIENT)
Dept: LAB | Facility: CLINIC | Age: 65
End: 2025-07-22
Payer: COMMERCIAL

## 2025-07-22 DIAGNOSIS — D47.1 MYELOPROLIFERATIVE DISORDER (H): ICD-10-CM

## 2025-07-22 LAB
BASOPHILS # BLD MANUAL: 0.2 10E3/UL (ref 0–0.2)
BASOPHILS NFR BLD MANUAL: 1 %
EOSINOPHIL # BLD MANUAL: 11 10E3/UL (ref 0–0.7)
EOSINOPHIL NFR BLD MANUAL: 56 %
ERYTHROCYTE [DISTWIDTH] IN BLOOD BY AUTOMATED COUNT: 11.5 % (ref 10–15)
HCT VFR BLD AUTO: 29.2 % (ref 40–53)
HGB BLD-MCNC: 10.4 G/DL (ref 13.3–17.7)
LYMPHOCYTES # BLD MANUAL: 1.8 10E3/UL (ref 0.8–5.3)
LYMPHOCYTES NFR BLD MANUAL: 9 %
MCH RBC QN AUTO: 48.6 PG (ref 26.5–33)
MCHC RBC AUTO-ENTMCNC: 35.6 G/DL (ref 31.5–36.5)
MCV RBC AUTO: 136 FL (ref 78–100)
MONOCYTES # BLD MANUAL: 1.8 10E3/UL (ref 0–1.3)
MONOCYTES NFR BLD MANUAL: 9 %
NEUTROPHILS # BLD MANUAL: 4.9 10E3/UL (ref 1.6–8.3)
NEUTROPHILS NFR BLD MANUAL: 25 %
PLAT MORPH BLD: ABNORMAL
PLATELET # BLD AUTO: 116 10E3/UL (ref 150–450)
RBC # BLD AUTO: 2.14 10E6/UL (ref 4.4–5.9)
RBC MORPH BLD: ABNORMAL
VARIANT LYMPHS BLD QL SMEAR: PRESENT
WBC # BLD AUTO: 19.6 10E3/UL (ref 4–11)

## 2025-07-22 PROCEDURE — 81170 ABL1 GENE: CPT | Mod: 90

## 2025-07-22 PROCEDURE — 85007 BL SMEAR W/DIFF WBC COUNT: CPT

## 2025-07-22 PROCEDURE — 85027 COMPLETE CBC AUTOMATED: CPT

## 2025-07-22 PROCEDURE — 88185 FLOWCYTOMETRY/TC ADD-ON: CPT | Performed by: INTERNAL MEDICINE

## 2025-07-22 PROCEDURE — 99000 SPECIMEN HANDLING OFFICE-LAB: CPT

## 2025-07-22 PROCEDURE — 88189 FLOWCYTOMETRY/READ 16 & >: CPT | Mod: GC | Performed by: STUDENT IN AN ORGANIZED HEALTH CARE EDUCATION/TRAINING PROGRAM

## 2025-07-22 PROCEDURE — 36415 COLL VENOUS BLD VENIPUNCTURE: CPT

## 2025-07-23 ENCOUNTER — TELEPHONE (OUTPATIENT)
Dept: CARDIOLOGY | Facility: CLINIC | Age: 65
End: 2025-07-23
Payer: COMMERCIAL

## 2025-07-23 DIAGNOSIS — R94.39 ABNORMAL CARDIOVASCULAR STRESS TEST: Primary | ICD-10-CM

## 2025-07-23 DIAGNOSIS — I25.10 CORONARY ARTERY CALCIFICATION: ICD-10-CM

## 2025-07-23 DIAGNOSIS — R93.1 ABNORMAL FINDINGS ON DIAGNOSTIC IMAGING OF HEART AND CORONARY CIRCULATION: ICD-10-CM

## 2025-07-23 DIAGNOSIS — R06.09 DYSPNEA ON EXERTION: ICD-10-CM

## 2025-07-23 NOTE — TELEPHONE ENCOUNTER
Stress echo reviewed by Dr. Ordonez:     Dl Ordonez MD to [facility] Heart Team 4  (Selected Message)    7/23/25 11:15 AM  Result Note  Can we get patient set up for TY visit and cath?     Dr. Ordonez sent pt a [facility] IT message as well that pt has viewed. Orders placed for case request and TY pre/post procedure follow up. Called pt, no answer. Left detailed VM advising will have scheduling call to set up TY follow up and coronary angiogram. Left call back number for Team 4 [phone] if any questions.

## 2025-07-28 LAB
BCR/ABL1 KINASE DOMAIN MUT ANL BLD/T: NOT DETECTED
SPECIMEN SOURCE: NORMAL

## 2025-07-29 ENCOUNTER — TELEPHONE (OUTPATIENT)
Dept: ONCOLOGY | Facility: CLINIC | Age: 65
End: 2025-07-29
Payer: COMMERCIAL

## 2025-07-29 ENCOUNTER — HOSPITAL ENCOUNTER (OUTPATIENT)
Dept: PET IMAGING | Facility: CLINIC | Age: 65
Discharge: HOME | End: 2025-07-29
Attending: INTERNAL MEDICINE
Payer: COMMERCIAL

## 2025-07-29 DIAGNOSIS — D47.1 MYELOPROLIFERATIVE DISORDER (H): ICD-10-CM

## 2025-07-29 PROCEDURE — 78816 PET IMAGE W/CT FULL BODY: CPT | Mod: PS

## 2025-07-29 PROCEDURE — A9552 F18 FDG: HCPCS | Performed by: INTERNAL MEDICINE

## 2025-07-29 PROCEDURE — 71260 CT THORAX DX C+: CPT

## 2025-07-29 PROCEDURE — 343N000001 HC RX 343 MED OP 636: Performed by: INTERNAL MEDICINE

## 2025-07-29 PROCEDURE — 250N000011 HC RX IP 250 OP 636: Performed by: INTERNAL MEDICINE

## 2025-07-29 RX ORDER — FLUDEOXYGLUCOSE F 18 200 MCI/ML
10-18 INJECTION, SOLUTION INTRAVENOUS ONCE
Status: COMPLETED | OUTPATIENT
Start: 2025-07-29 | End: 2025-07-29

## 2025-07-29 RX ORDER — IOPAMIDOL 755 MG/ML
10-135 INJECTION, SOLUTION INTRAVASCULAR ONCE
Status: COMPLETED | OUTPATIENT
Start: 2025-07-29 | End: 2025-07-29

## 2025-07-29 RX ADMIN — FLUDEOXYGLUCOSE F 18 11.4 MILLICURIE: 200 INJECTION, SOLUTION INTRAVENOUS at 07:14

## 2025-07-29 RX ADMIN — IOPAMIDOL 92 ML: 755 INJECTION, SOLUTION INTRAVENOUS at 07:17

## 2025-07-29 NOTE — PROGRESS NOTES
Received positive distress screen regarding patient's concern for current weight. Called and spoke with patient. Discussed high calorie protein shakes with patient and ways to increase calories. Answered patient's questions. Encouraged patient to contact clinic and request RD call if needs additional nutrition assistance. Patient appreciative of call and verbalized understanding of plan.    Marcie Arnold, RD, LD  Clinical Dietitian  [facility]  [phone] (direct)

## 2025-07-30 ENCOUNTER — TELEPHONE (OUTPATIENT)
Dept: CARDIOLOGY | Facility: CLINIC | Age: 65
End: 2025-07-30
Payer: COMMERCIAL

## 2025-07-30 LAB
RAD FLAG-ADDENDUM: NORMAL
RAD FLAG-ADDENDUM: NORMAL

## 2025-08-04 ENCOUNTER — HOSPITAL ENCOUNTER (OUTPATIENT)
Facility: CLINIC | Age: 65
Discharge: HOME | End: 2025-08-04
Attending: INTERNAL MEDICINE | Admitting: INTERNAL MEDICINE
Payer: COMMERCIAL

## 2025-08-04 VITALS
OXYGEN SATURATION: 98 % | RESPIRATION RATE: 20 BRPM | TEMPERATURE: 97.8 F | DIASTOLIC BLOOD PRESSURE: 59 MMHG | SYSTOLIC BLOOD PRESSURE: 110 MMHG | HEART RATE: 78 BPM

## 2025-08-04 DIAGNOSIS — R59.1 LYMPHADENOPATHY: ICD-10-CM

## 2025-08-04 DIAGNOSIS — R06.09 DYSPNEA ON EXERTION: ICD-10-CM

## 2025-08-04 DIAGNOSIS — E83.52 HYPERCALCEMIA: Primary | ICD-10-CM

## 2025-08-04 DIAGNOSIS — R73.01 IMPAIRED FASTING GLUCOSE: ICD-10-CM

## 2025-08-04 DIAGNOSIS — K52.9 GASTROENTERITIS: ICD-10-CM

## 2025-08-04 DIAGNOSIS — I25.10 ATHEROSCLEROSIS OF NATIVE CORONARY ARTERY OF NATIVE HEART WITHOUT ANGINA PECTORIS: ICD-10-CM

## 2025-08-04 DIAGNOSIS — R94.39 ABNORMAL CARDIOVASCULAR STRESS TEST: ICD-10-CM

## 2025-08-04 DIAGNOSIS — I25.10 CORONARY ARTERY CALCIFICATION: ICD-10-CM

## 2025-08-04 DIAGNOSIS — R93.1 ABNORMAL FINDINGS ON DIAGNOSTIC IMAGING OF HEART AND CORONARY CIRCULATION: ICD-10-CM

## 2025-08-04 DIAGNOSIS — E55.9 VITAMIN D DEFICIENCY: ICD-10-CM

## 2025-08-04 PROBLEM — Z98.890 STATUS POST CORONARY ANGIOGRAM: Status: ACTIVE | Noted: 2025-08-04

## 2025-08-04 LAB
ANION GAP SERPL CALCULATED.3IONS-SCNC: 11 MMOL/L (ref 7–15)
APTT PPP: 35 SECONDS (ref 22–38)
ATRIAL RATE - MUSE: 72 BPM
BUN SERPL-MCNC: 17.9 MG/DL (ref 8–23)
CALCIUM SERPL-MCNC: 9.8 MG/DL (ref 8.8–10.4)
CHLORIDE SERPL-SCNC: 95 MMOL/L (ref 98–107)
CREAT SERPL-MCNC: 0.98 MG/DL (ref 0.67–1.17)
DIASTOLIC BLOOD PRESSURE - MUSE: NORMAL MMHG
EGFRCR SERPLBLD CKD-EPI 2021: 87 ML/MIN/1.73M2
ERYTHROCYTE [DISTWIDTH] IN BLOOD BY AUTOMATED COUNT: 10.7 % (ref 10–15)
GLUCOSE SERPL-MCNC: 93 MG/DL (ref 70–99)
HCO3 SERPL-SCNC: 26 MMOL/L (ref 22–29)
HCT VFR BLD AUTO: 30.1 % (ref 40–53)
HGB BLD-MCNC: 11 G/DL (ref 13.3–17.7)
INR PPP: 1.03 (ref 0.85–1.15)
INTERPRETATION ECG - MUSE: NORMAL
MCH RBC QN AUTO: 47.4 PG (ref 26.5–33)
MCHC RBC AUTO-ENTMCNC: 36.5 G/DL (ref 31.5–36.5)
MCV RBC AUTO: 130 FL (ref 78–100)
P AXIS - MUSE: 67 DEGREES
PLATELET # BLD AUTO: 121 10E3/UL (ref 150–450)
POTASSIUM SERPL-SCNC: 4.4 MMOL/L (ref 3.4–5.3)
PR INTERVAL - MUSE: 178 MS
PROTHROMBIN TIME: 13.6 SECONDS (ref 11.8–14.8)
QRS DURATION - MUSE: 96 MS
QT - MUSE: 396 MS
QTC - MUSE: 433 MS
R AXIS - MUSE: 43 DEGREES
RBC # BLD AUTO: 2.32 10E6/UL (ref 4.4–5.9)
SODIUM SERPL-SCNC: 132 MMOL/L (ref 135–145)
SYSTOLIC BLOOD PRESSURE - MUSE: NORMAL MMHG
T AXIS - MUSE: 43 DEGREES
VENTRICULAR RATE- MUSE: 72 BPM
WBC # BLD AUTO: 15.2 10E3/UL (ref 4–11)

## 2025-08-04 PROCEDURE — 93458 L HRT ARTERY/VENTRICLE ANGIO: CPT | Performed by: INTERNAL MEDICINE

## 2025-08-04 PROCEDURE — 85610 PROTHROMBIN TIME: CPT | Performed by: INTERNAL MEDICINE

## 2025-08-04 PROCEDURE — 80048 BASIC METABOLIC PNL TOTAL CA: CPT | Performed by: INTERNAL MEDICINE

## 2025-08-04 PROCEDURE — 250N000011 HC RX IP 250 OP 636: Performed by: INTERNAL MEDICINE

## 2025-08-04 PROCEDURE — 250N000009 HC RX 250: Performed by: INTERNAL MEDICINE

## 2025-08-04 PROCEDURE — C1887 CATHETER, GUIDING: HCPCS | Performed by: INTERNAL MEDICINE

## 2025-08-04 PROCEDURE — 85027 COMPLETE CBC AUTOMATED: CPT | Performed by: INTERNAL MEDICINE

## 2025-08-04 PROCEDURE — 36415 COLL VENOUS BLD VENIPUNCTURE: CPT | Performed by: INTERNAL MEDICINE

## 2025-08-04 PROCEDURE — C1894 INTRO/SHEATH, NON-LASER: HCPCS | Performed by: INTERNAL MEDICINE

## 2025-08-04 PROCEDURE — 258N000003 HC RX IP 258 OP 636: Performed by: INTERNAL MEDICINE

## 2025-08-04 PROCEDURE — 85730 THROMBOPLASTIN TIME PARTIAL: CPT | Performed by: INTERNAL MEDICINE

## 2025-08-04 PROCEDURE — 99152 MOD SED SAME PHYS/QHP 5/>YRS: CPT | Performed by: INTERNAL MEDICINE

## 2025-08-04 PROCEDURE — 272N000001 HC OR GENERAL SUPPLY STERILE: Performed by: INTERNAL MEDICINE

## 2025-08-04 PROCEDURE — C1769 GUIDE WIRE: HCPCS | Performed by: INTERNAL MEDICINE

## 2025-08-04 RX ORDER — IOPAMIDOL 755 MG/ML
INJECTION, SOLUTION INTRAVASCULAR
Status: DISCONTINUED | OUTPATIENT
Start: 2025-08-04 | End: 2025-08-04 | Stop reason: HOSPADM

## 2025-08-04 RX ORDER — NALOXONE HYDROCHLORIDE 0.4 MG/ML
0.2 INJECTION, SOLUTION INTRAMUSCULAR; INTRAVENOUS; SUBCUTANEOUS
Status: DISCONTINUED | OUTPATIENT
Start: 2025-08-04 | End: 2025-08-04 | Stop reason: HOSPADM

## 2025-08-04 RX ORDER — OXYCODONE HYDROCHLORIDE 10 MG/1
10 TABLET ORAL EVERY 4 HOURS PRN
Status: DISCONTINUED | OUTPATIENT
Start: 2025-08-04 | End: 2025-08-04 | Stop reason: HOSPADM

## 2025-08-04 RX ORDER — ASPIRIN 325 MG
325 TABLET ORAL ONCE
Status: COMPLETED | OUTPATIENT
Start: 2025-08-04 | End: 2025-08-04

## 2025-08-04 RX ORDER — NALOXONE HYDROCHLORIDE 0.4 MG/ML
0.4 INJECTION, SOLUTION INTRAMUSCULAR; INTRAVENOUS; SUBCUTANEOUS
Status: DISCONTINUED | OUTPATIENT
Start: 2025-08-04 | End: 2025-08-04 | Stop reason: HOSPADM

## 2025-08-04 RX ORDER — POTASSIUM CHLORIDE 1500 MG/1
20 TABLET, EXTENDED RELEASE ORAL
Status: DISCONTINUED | OUTPATIENT
Start: 2025-08-04 | End: 2025-08-04 | Stop reason: HOSPADM

## 2025-08-04 RX ORDER — ASPIRIN 81 MG/1
243 TABLET, CHEWABLE ORAL ONCE
Status: COMPLETED | OUTPATIENT
Start: 2025-08-04 | End: 2025-08-04

## 2025-08-04 RX ORDER — SODIUM CHLORIDE 9 MG/ML
INJECTION, SOLUTION INTRAVENOUS CONTINUOUS
Status: DISCONTINUED | OUTPATIENT
Start: 2025-08-04 | End: 2025-08-04 | Stop reason: HOSPADM

## 2025-08-04 RX ORDER — NITROGLYCERIN 5 MG/ML
VIAL (ML) INTRAVENOUS
Status: DISCONTINUED | OUTPATIENT
Start: 2025-08-04 | End: 2025-08-04 | Stop reason: HOSPADM

## 2025-08-04 RX ORDER — FENTANYL CITRATE 50 UG/ML
25 INJECTION, SOLUTION INTRAMUSCULAR; INTRAVENOUS
Refills: 0 | Status: DISCONTINUED | OUTPATIENT
Start: 2025-08-04 | End: 2025-08-04 | Stop reason: HOSPADM

## 2025-08-04 RX ORDER — HEPARIN SODIUM 1000 [USP'U]/ML
INJECTION, SOLUTION INTRAVENOUS; SUBCUTANEOUS
Status: DISCONTINUED | OUTPATIENT
Start: 2025-08-04 | End: 2025-08-04 | Stop reason: HOSPADM

## 2025-08-04 RX ORDER — FENTANYL CITRATE 50 UG/ML
INJECTION, SOLUTION INTRAMUSCULAR; INTRAVENOUS
Status: DISCONTINUED | OUTPATIENT
Start: 2025-08-04 | End: 2025-08-04 | Stop reason: HOSPADM

## 2025-08-04 RX ORDER — LIDOCAINE 40 MG/G
CREAM TOPICAL
Status: DISCONTINUED | OUTPATIENT
Start: 2025-08-04 | End: 2025-08-04 | Stop reason: HOSPADM

## 2025-08-04 RX ORDER — FLUMAZENIL 0.1 MG/ML
0.2 INJECTION, SOLUTION INTRAVENOUS
Status: DISCONTINUED | OUTPATIENT
Start: 2025-08-04 | End: 2025-08-04 | Stop reason: HOSPADM

## 2025-08-04 RX ORDER — LIDOCAINE 40 MG/G
CREAM TOPICAL
Status: DISCONTINUED | OUTPATIENT
Start: 2025-08-04 | End: 2025-08-04

## 2025-08-04 RX ORDER — ATROPINE SULFATE 0.1 MG/ML
0.5 INJECTION INTRAVENOUS
Status: DISCONTINUED | OUTPATIENT
Start: 2025-08-04 | End: 2025-08-04 | Stop reason: HOSPADM

## 2025-08-04 RX ORDER — VERAPAMIL HYDROCHLORIDE 2.5 MG/ML
INJECTION INTRAVENOUS
Status: DISCONTINUED | OUTPATIENT
Start: 2025-08-04 | End: 2025-08-04 | Stop reason: HOSPADM

## 2025-08-04 RX ORDER — OXYCODONE HYDROCHLORIDE 5 MG/1
5 TABLET ORAL EVERY 4 HOURS PRN
Status: DISCONTINUED | OUTPATIENT
Start: 2025-08-04 | End: 2025-08-04 | Stop reason: HOSPADM

## 2025-08-04 RX ADMIN — SODIUM CHLORIDE: 900 INJECTION INTRAVENOUS at 10:11

## 2025-08-04 ASSESSMENT — ACTIVITIES OF DAILY LIVING (ADL)
ADLS_ACUITY_SCORE: 47

## 2025-08-12 ENCOUNTER — OFFICE VISIT (OUTPATIENT)
Dept: CARDIOLOGY | Facility: CLINIC | Age: 65
End: 2025-08-12
Payer: COMMERCIAL

## 2025-08-12 ENCOUNTER — TELEPHONE (OUTPATIENT)
Dept: CARDIOLOGY | Facility: CLINIC | Age: 65
End: 2025-08-12

## 2025-08-12 VITALS
DIASTOLIC BLOOD PRESSURE: 55 MMHG | HEART RATE: 87 BPM | WEIGHT: 153.7 LBS | SYSTOLIC BLOOD PRESSURE: 103 MMHG | BODY MASS INDEX: 20.82 KG/M2 | HEIGHT: 72 IN | OXYGEN SATURATION: 100 %

## 2025-08-12 DIAGNOSIS — R94.39 ABNORMAL CARDIOVASCULAR STRESS TEST: ICD-10-CM

## 2025-08-12 DIAGNOSIS — I25.10 CORONARY ARTERY CALCIFICATION: ICD-10-CM

## 2025-08-12 DIAGNOSIS — R93.1 ABNORMAL FINDINGS ON DIAGNOSTIC IMAGING OF HEART AND CORONARY CIRCULATION: ICD-10-CM

## 2025-08-12 DIAGNOSIS — I77.9 BILATERAL CAROTID ARTERY DISEASE, UNSPECIFIED TYPE: Primary | ICD-10-CM

## 2025-08-12 DIAGNOSIS — I77.9 CAROTID ARTERY DISEASE: Primary | ICD-10-CM

## 2025-08-12 DIAGNOSIS — R06.09 DYSPNEA ON EXERTION: ICD-10-CM

## 2025-08-17 DIAGNOSIS — D47.1 MYELOPROLIFERATIVE DISORDER (H): Primary | ICD-10-CM

## 2025-08-18 ENCOUNTER — INFUSION THERAPY VISIT (OUTPATIENT)
Dept: INFUSION THERAPY | Facility: CLINIC | Age: 65
End: 2025-08-18
Attending: PHYSICIAN ASSISTANT
Payer: COMMERCIAL

## 2025-08-18 ENCOUNTER — PROCEDURE ONLY VISIT (OUTPATIENT)
Dept: ONCOLOGY | Facility: CLINIC | Age: 65
End: 2025-08-18
Attending: PHYSICIAN ASSISTANT
Payer: COMMERCIAL

## 2025-08-18 VITALS
OXYGEN SATURATION: 99 % | RESPIRATION RATE: 18 BRPM | TEMPERATURE: 96.5 F | HEART RATE: 68 BPM | SYSTOLIC BLOOD PRESSURE: 132 MMHG | DIASTOLIC BLOOD PRESSURE: 72 MMHG

## 2025-08-18 VITALS
OXYGEN SATURATION: 100 % | RESPIRATION RATE: 18 BRPM | SYSTOLIC BLOOD PRESSURE: 111 MMHG | HEART RATE: 65 BPM | TEMPERATURE: 96 F | DIASTOLIC BLOOD PRESSURE: 68 MMHG

## 2025-08-18 DIAGNOSIS — D47.1 MYELOPROLIFERATIVE DISORDER (H): Primary | ICD-10-CM

## 2025-08-18 DIAGNOSIS — K80.50 CALCULUS OF BILE DUCT WITHOUT CHOLECYSTITIS AND WITHOUT OBSTRUCTION: ICD-10-CM

## 2025-08-18 DIAGNOSIS — D47.1 MYELOPROLIFERATIVE DISORDER (H): ICD-10-CM

## 2025-08-18 LAB
BASOPHILS # BLD MANUAL: 0.17 10E3/UL (ref 0–0.2)
BASOPHILS NFR BLD MANUAL: 1 %
EOSINOPHIL # BLD MANUAL: 8.38 10E3/UL (ref 0–0.7)
EOSINOPHIL NFR BLD MANUAL: 49 %
ERYTHROCYTE [DISTWIDTH] IN BLOOD BY AUTOMATED COUNT: 11.1 % (ref 10–15)
HCT VFR BLD AUTO: 34.1 % (ref 40–53)
HGB BLD-MCNC: 12.2 G/DL (ref 13.3–17.7)
LYMPHOCYTES # BLD MANUAL: 2.05 10E3/UL (ref 0.8–5.3)
LYMPHOCYTES NFR BLD MANUAL: 12 %
MCH RBC QN AUTO: 45.5 PG (ref 26.5–33)
MCHC RBC AUTO-ENTMCNC: 35.8 G/DL (ref 31.5–36.5)
MCV RBC AUTO: 127.2 FL (ref 78–100)
MONOCYTES # BLD MANUAL: 1.03 10E3/UL (ref 0–1.3)
MONOCYTES NFR BLD MANUAL: 6 %
NEUTROPHILS # BLD MANUAL: 5.47 10E3/UL (ref 1.6–8.3)
NEUTROPHILS NFR BLD MANUAL: 32 %
PLAT MORPH BLD: NORMAL
PLATELET # BLD AUTO: 134 10E3/UL (ref 150–450)
RBC # BLD AUTO: 2.68 10E6/UL (ref 4.4–5.9)
RBC MORPH BLD: NORMAL
RETICS # AUTO: 0.06 10E6/UL (ref 0.03–0.1)
RETICS/RBC NFR AUTO: 2.12 % (ref 0.5–2)
WBC # BLD AUTO: 17.08 10E3/UL (ref 4–11)

## 2025-08-18 PROCEDURE — 250N000011 HC RX IP 250 OP 636: Performed by: PHYSICIAN ASSISTANT

## 2025-08-18 PROCEDURE — 85045 AUTOMATED RETICULOCYTE COUNT: CPT | Performed by: PHYSICIAN ASSISTANT

## 2025-08-18 PROCEDURE — 38222 DX BONE MARROW BX & ASPIR: CPT | Performed by: PHYSICIAN ASSISTANT

## 2025-08-18 PROCEDURE — 85018 HEMOGLOBIN: CPT | Performed by: PHYSICIAN ASSISTANT

## 2025-08-18 PROCEDURE — 96374 THER/PROPH/DIAG INJ IV PUSH: CPT | Mod: 59 | Performed by: PHYSICIAN ASSISTANT

## 2025-08-18 PROCEDURE — 36415 COLL VENOUS BLD VENIPUNCTURE: CPT

## 2025-08-18 PROCEDURE — 38222 DX BONE MARROW BX & ASPIR: CPT | Mod: LT | Performed by: PHYSICIAN ASSISTANT

## 2025-08-18 PROCEDURE — 85007 BL SMEAR W/DIFF WBC COUNT: CPT | Performed by: PHYSICIAN ASSISTANT

## 2025-08-18 RX ADMIN — MIDAZOLAM HYDROCHLORIDE 1 MG: 1 INJECTION, SOLUTION INTRAMUSCULAR; INTRAVENOUS at 10:08

## 2025-08-18 ASSESSMENT — PAIN SCALES - GENERAL
PAINLEVEL_OUTOF10: NO PAIN (0)
PAINLEVEL_OUTOF10: NO PAIN (0)

## 2025-08-19 LAB
PATH REPORT.COMMENTS IMP SPEC: ABNORMAL
PATH REPORT.COMMENTS IMP SPEC: ABNORMAL
PATH REPORT.COMMENTS IMP SPEC: NORMAL
PATH REPORT.COMMENTS IMP SPEC: YES
PATH REPORT.FINAL DX SPEC: ABNORMAL
PATH REPORT.FINAL DX SPEC: NORMAL
PATH REPORT.MICROSCOPIC SPEC OTHER STN: ABNORMAL
PATH REPORT.MICROSCOPIC SPEC OTHER STN: ABNORMAL
PATH REPORT.MICROSCOPIC SPEC OTHER STN: NORMAL
PATH REPORT.RELEVANT HX SPEC: ABNORMAL
PATH REPORT.RELEVANT HX SPEC: NORMAL

## 2025-08-19 RX ORDER — HYDROXYUREA 500 MG/1
500 CAPSULE ORAL 2 TIMES DAILY
Qty: 180 CAPSULE | Refills: 3 | Status: SHIPPED | OUTPATIENT
Start: 2025-08-19

## 2025-08-24 ENCOUNTER — HOSPITAL ENCOUNTER (OUTPATIENT)
Dept: CT IMAGING | Facility: CLINIC | Age: 65
Discharge: HOME | End: 2025-08-24
Attending: INTERNAL MEDICINE | Admitting: INTERNAL MEDICINE
Payer: COMMERCIAL

## 2025-08-24 DIAGNOSIS — I73.9 PAD (PERIPHERAL ARTERY DISEASE): ICD-10-CM

## 2025-08-24 PROCEDURE — 75635 CT ANGIO ABDOMINAL ARTERIES: CPT

## 2025-08-24 PROCEDURE — 255N000002 HC RX 255 OP 636: Performed by: INTERNAL MEDICINE

## 2025-08-24 RX ADMIN — IOHEXOL 95 ML: 350 INJECTION, SOLUTION INTRAVENOUS at 07:32

## 2025-08-25 ENCOUNTER — LAB (OUTPATIENT)
Dept: LAB | Facility: CLINIC | Age: 65
End: 2025-08-25
Payer: COMMERCIAL

## 2025-08-25 DIAGNOSIS — I73.9 PAD (PERIPHERAL ARTERY DISEASE): ICD-10-CM

## 2025-08-25 DIAGNOSIS — I25.10 CORONARY ARTERY CALCIFICATION: ICD-10-CM

## 2025-08-25 LAB
ANION GAP SERPL CALCULATED.3IONS-SCNC: 11 MMOL/L (ref 7–15)
BUN SERPL-MCNC: 18 MG/DL (ref 8–23)
CALCIUM SERPL-MCNC: 10.2 MG/DL (ref 8.8–10.4)
CHLORIDE SERPL-SCNC: 97 MMOL/L (ref 98–107)
CREAT SERPL-MCNC: 0.99 MG/DL (ref 0.67–1.17)
EGFRCR SERPLBLD CKD-EPI 2021: 86 ML/MIN/1.73M2
GLUCOSE SERPL-MCNC: 105 MG/DL (ref 70–99)
HCO3 SERPL-SCNC: 26 MMOL/L (ref 22–29)
POTASSIUM SERPL-SCNC: 4 MMOL/L (ref 3.4–5.3)
SODIUM SERPL-SCNC: 134 MMOL/L (ref 135–145)

## 2025-08-25 PROCEDURE — 80048 BASIC METABOLIC PNL TOTAL CA: CPT

## 2025-08-25 PROCEDURE — 36415 COLL VENOUS BLD VENIPUNCTURE: CPT

## 2025-08-26 ENCOUNTER — RESULTS FOLLOW-UP (OUTPATIENT)
Dept: CARDIOLOGY | Facility: CLINIC | Age: 65
End: 2025-08-26

## 2025-08-26 DIAGNOSIS — I77.9 BILATERAL CAROTID ARTERY DISEASE, UNSPECIFIED TYPE: ICD-10-CM

## 2025-08-26 DIAGNOSIS — I65.22 ICAO (INTERNAL CAROTID ARTERY OCCLUSION), LEFT: ICD-10-CM

## 2025-08-26 DIAGNOSIS — I77.9 CAROTID ARTERY DISEASE: Primary | ICD-10-CM

## 2025-08-26 DIAGNOSIS — I77.9 BILATERAL CAROTID ARTERY DISEASE: ICD-10-CM

## 2025-08-26 LAB
INTERPRETATION SERPL IEP-IMP: NORMAL
LAB TEST RESULTS REPORTED IN RPTPERIOD: NORMAL
SEQUENCING METHOD PNL BLD/T: NORMAL
SPECIMEN TYPE: NORMAL

## 2025-08-27 LAB
CULTURE HARVEST COMPLETE DATE: NORMAL

## 2025-08-28 LAB
INTERPRETATION: NORMAL
ISCN: NORMAL
METHODS: NORMAL

## 2025-09-03 ENCOUNTER — HOSPITAL ENCOUNTER (OUTPATIENT)
Dept: ULTRASOUND IMAGING | Facility: CLINIC | Age: 65
Discharge: HOME | End: 2025-09-03
Attending: STUDENT IN AN ORGANIZED HEALTH CARE EDUCATION/TRAINING PROGRAM
Payer: COMMERCIAL

## 2025-09-03 DIAGNOSIS — I77.9 BILATERAL CAROTID ARTERY DISEASE, UNSPECIFIED TYPE: ICD-10-CM

## 2025-09-03 PROCEDURE — 93880 EXTRACRANIAL BILAT STUDY: CPT

## 2025-09-03 RX ORDER — ASPIRIN 81 MG/1
81 TABLET, CHEWABLE ORAL ONCE
Status: DISCONTINUED | OUTPATIENT
Start: 2025-09-03 | End: 2025-09-04

## 2025-09-04 ENCOUNTER — TELEPHONE (OUTPATIENT)
Dept: OTHER | Facility: CLINIC | Age: 65
End: 2025-09-04
Payer: COMMERCIAL

## 2025-09-04 DIAGNOSIS — I65.23 BILATERAL CAROTID ARTERY STENOSIS: Primary | ICD-10-CM

## 2025-09-04 RX ORDER — ASPIRIN 81 MG/1
81 TABLET ORAL DAILY
Qty: 90 TABLET | Refills: 3 | Status: SHIPPED | OUTPATIENT
Start: 2025-09-04

## (undated) DEVICE — ANTIFOG SOLUTION SEE SHARP 150M TROCAR SWABS 30978

## (undated) DEVICE — TRAY BONE MARROW BIOPSY ASC 640 31-0097A

## (undated) DEVICE — DAVINCI XI SEAL UNIVERSAL 5-8MM 470361

## (undated) DEVICE — MANIFOLD KIT ANGIO AUTOMATED 014613

## (undated) DEVICE — SLEEVE TR BAND RADIAL COMPRESSION DEVICE 24CM TRB24-REG

## (undated) DEVICE — DEFIB PRO-PADZ LVP LQD GEL ADULT 8900-2105-01

## (undated) DEVICE — CATH DIAGNOSTIC RADIAL 5FR TIG 4.0

## (undated) DEVICE — Device

## (undated) DEVICE — DAVINCI XI DRAPE ARM 470015

## (undated) DEVICE — LUBRICANT INST ELECTROLUBE EL101

## (undated) DEVICE — SOL WATER IRRIG 1000ML BOTTLE 2F7114

## (undated) DEVICE — DAVINCI XI OBTURATOR BLADELESS 8MM 470359

## (undated) DEVICE — ENDO POUCH UNIVERSAL RETRIEVAL SYSTEM INZII 5MM CD003

## (undated) DEVICE — CLIP ENDO HEMO-LOC GREEN MED/LG 544230

## (undated) DEVICE — SU VICRYL 0 UR-6 27" J603H

## (undated) DEVICE — ENDO TROCAR FIRST ENTRY KII FIOS Z-THRD 05X100MM CTF03

## (undated) DEVICE — KIT ENDO TURNOVER/PROCEDURE W/CLEAN A SCOPE LINERS 103888

## (undated) DEVICE — SU VICRYL 4-0 PS-2 18" UND J496H

## (undated) DEVICE — ESU GROUND PAD ADULT W/CORD E7507

## (undated) DEVICE — ESU PENCIL W/HOLSTER E2350H

## (undated) DEVICE — GLOVE BIOGEL PI MICRO SZ 6.5 48565

## (undated) DEVICE — NDL BX BONE MARROW 11GA 4"

## (undated) DEVICE — BLADE KNIFE SURG 11 371111

## (undated) DEVICE — SOL NACL 0.9% IRRIG 1000ML BOTTLE 2F7124

## (undated) DEVICE — KIT HAND CONTROL ANGIOTOUCH ACIST 65CM AT-P65

## (undated) DEVICE — GLOVE BIOGEL PI MICRO INDICATOR UNDERGLOVE SZ 7.0 48970

## (undated) DEVICE — WIRE GUIDE 0.035"X260CM SAFE-T-J EXCHANGE G00517

## (undated) DEVICE — INTRO GLIDESHEATH SLENDER 6FR 10X45CM 60-1060

## (undated) DEVICE — DAVINCI XI ESU FORCE BIPOLAR 8MM 471405

## (undated) DEVICE — CATH ANGIO JUDKINS JL3.5 6FRX100CM INFINITI 534618T

## (undated) DEVICE — DAVINCI XI DRAPE COLUMN 470341

## (undated) DEVICE — PREP CHLORAPREP 26ML TINTED ORANGE  260815

## (undated) DEVICE — ESU ELEC BLADE 2.75" COATED/INSULATED E1455

## (undated) RX ORDER — GLYCOPYRROLATE 0.2 MG/ML
INJECTION, SOLUTION INTRAMUSCULAR; INTRAVENOUS
Status: DISPENSED
Start: 2024-03-20

## (undated) RX ORDER — ONDANSETRON 2 MG/ML
INJECTION INTRAMUSCULAR; INTRAVENOUS
Status: DISPENSED
Start: 2024-03-20

## (undated) RX ORDER — CEFAZOLIN SODIUM/WATER 2 G/20 ML
SYRINGE (ML) INTRAVENOUS
Status: DISPENSED
Start: 2024-03-20

## (undated) RX ORDER — FENTANYL CITRATE 50 UG/ML
INJECTION, SOLUTION INTRAMUSCULAR; INTRAVENOUS
Status: DISPENSED
Start: 2024-10-22

## (undated) RX ORDER — LIDOCAINE HYDROCHLORIDE 10 MG/ML
INJECTION, SOLUTION EPIDURAL; INFILTRATION; INTRACAUDAL; PERINEURAL
Status: DISPENSED
Start: 2024-03-20

## (undated) RX ORDER — VERAPAMIL HYDROCHLORIDE 2.5 MG/ML
INJECTION INTRAVENOUS
Status: DISPENSED
Start: 2025-08-04

## (undated) RX ORDER — FENTANYL CITRATE 50 UG/ML
INJECTION, SOLUTION INTRAMUSCULAR; INTRAVENOUS
Status: DISPENSED
Start: 2025-08-04

## (undated) RX ORDER — BUPIVACAINE HYDROCHLORIDE 5 MG/ML
INJECTION, SOLUTION EPIDURAL; INTRACAUDAL
Status: DISPENSED
Start: 2024-03-20

## (undated) RX ORDER — DEXAMETHASONE SODIUM PHOSPHATE 4 MG/ML
INJECTION, SOLUTION INTRA-ARTICULAR; INTRALESIONAL; INTRAMUSCULAR; INTRAVENOUS; SOFT TISSUE
Status: DISPENSED
Start: 2024-03-20

## (undated) RX ORDER — PROPOFOL 10 MG/ML
INJECTION, EMULSION INTRAVENOUS
Status: DISPENSED
Start: 2024-03-20

## (undated) RX ORDER — SIMETHICONE 40MG/0.6ML
SUSPENSION, DROPS(FINAL DOSAGE FORM)(ML) ORAL
Status: DISPENSED
Start: 2024-10-22

## (undated) RX ORDER — KETOROLAC TROMETHAMINE 30 MG/ML
INJECTION, SOLUTION INTRAMUSCULAR; INTRAVENOUS
Status: DISPENSED
Start: 2024-03-20

## (undated) RX ORDER — ACETAMINOPHEN 325 MG/1
TABLET ORAL
Status: DISPENSED
Start: 2023-08-15

## (undated) RX ORDER — INDOCYANINE GREEN AND WATER 25 MG
KIT INJECTION
Status: DISPENSED
Start: 2024-03-20

## (undated) RX ORDER — NITROGLYCERIN 5 MG/ML
VIAL (ML) INTRAVENOUS
Status: DISPENSED
Start: 2025-08-04

## (undated) RX ORDER — FENTANYL CITRATE 50 UG/ML
INJECTION, SOLUTION INTRAMUSCULAR; INTRAVENOUS
Status: DISPENSED
Start: 2024-03-20

## (undated) RX ORDER — LIDOCAINE HYDROCHLORIDE 10 MG/ML
INJECTION, SOLUTION EPIDURAL; INFILTRATION; INTRACAUDAL; PERINEURAL
Status: DISPENSED
Start: 2025-08-04

## (undated) RX ORDER — HEPARIN SODIUM 1000 [USP'U]/ML
INJECTION, SOLUTION INTRAVENOUS; SUBCUTANEOUS
Status: DISPENSED
Start: 2025-08-04